# Patient Record
Sex: FEMALE | Race: WHITE | NOT HISPANIC OR LATINO | Employment: OTHER | URBAN - METROPOLITAN AREA
[De-identification: names, ages, dates, MRNs, and addresses within clinical notes are randomized per-mention and may not be internally consistent; named-entity substitution may affect disease eponyms.]

---

## 2018-06-26 ENCOUNTER — HOSPITAL ENCOUNTER (INPATIENT)
Facility: HOSPITAL | Age: 68
LOS: 3 days | Discharge: NURSING, REHAB OR LONG-TERM CARE | DRG: 065 | End: 2018-06-29
Attending: EMERGENCY MEDICINE | Admitting: PSYCHIATRY & NEUROLOGY
Payer: COMMERCIAL

## 2018-06-26 DIAGNOSIS — Z74.09 IMPAIRED FUNCTIONAL MOBILITY AND ENDURANCE: ICD-10-CM

## 2018-06-26 DIAGNOSIS — I63.311 CEREBROVASCULAR ACCIDENT (CVA) DUE TO THROMBOSIS OF RIGHT MIDDLE CEREBRAL ARTERY: ICD-10-CM

## 2018-06-26 DIAGNOSIS — I63.81 LACUNAR INFARCT, ACUTE: ICD-10-CM

## 2018-06-26 DIAGNOSIS — I67.1 CAROTID-CAVERNOUS FISTULA: ICD-10-CM

## 2018-06-26 DIAGNOSIS — I10 ESSENTIAL HYPERTENSION: Primary | ICD-10-CM

## 2018-06-26 DIAGNOSIS — E87.6 HYPOKALEMIA: ICD-10-CM

## 2018-06-26 DIAGNOSIS — I63.9 STROKE: ICD-10-CM

## 2018-06-26 LAB
ALBUMIN SERPL BCP-MCNC: 4.2 G/DL
ALP SERPL-CCNC: 67 U/L
ALT SERPL W/O P-5'-P-CCNC: 11 U/L
ANION GAP SERPL CALC-SCNC: 15 MMOL/L
AST SERPL-CCNC: 27 U/L
BASOPHILS # BLD AUTO: 0.04 K/UL
BASOPHILS NFR BLD: 0.6 %
BILIRUB SERPL-MCNC: 0.3 MG/DL
BILIRUB UR QL STRIP: NEGATIVE
BUN SERPL-MCNC: 6 MG/DL
CALCIUM SERPL-MCNC: 9.9 MG/DL
CHLORIDE SERPL-SCNC: 97 MMOL/L
CHOLEST SERPL-MCNC: 275 MG/DL
CHOLEST/HDLC SERPL: 3.6 {RATIO}
CLARITY UR REFRACT.AUTO: CLEAR
CO2 SERPL-SCNC: 22 MMOL/L
COLOR UR AUTO: NORMAL
CREAT SERPL-MCNC: 0.5 MG/DL (ref 0.5–1.4)
CREAT SERPL-MCNC: 0.7 MG/DL
DIFFERENTIAL METHOD: ABNORMAL
EOSINOPHIL # BLD AUTO: 0 K/UL
EOSINOPHIL NFR BLD: 0.3 %
ERYTHROCYTE [DISTWIDTH] IN BLOOD BY AUTOMATED COUNT: 13 %
EST. GFR  (AFRICAN AMERICAN): >60 ML/MIN/1.73 M^2
EST. GFR  (NON AFRICAN AMERICAN): >60 ML/MIN/1.73 M^2
ESTIMATED AVG GLUCOSE: 103 MG/DL
GLUCOSE SERPL-MCNC: 85 MG/DL
GLUCOSE UR QL STRIP: NEGATIVE
HBA1C MFR BLD HPLC: 5.2 %
HCT VFR BLD AUTO: 38.5 %
HDLC SERPL-MCNC: 77 MG/DL
HDLC SERPL: 28 %
HGB BLD-MCNC: 13.4 G/DL
HGB UR QL STRIP: NEGATIVE
IMM GRANULOCYTES # BLD AUTO: 0.03 K/UL
IMM GRANULOCYTES NFR BLD AUTO: 0.5 %
INR PPP: 0.9
KETONES UR QL STRIP: NEGATIVE
LDLC SERPL CALC-MCNC: 163 MG/DL
LEUKOCYTE ESTERASE UR QL STRIP: NEGATIVE
LYMPHOCYTES # BLD AUTO: 1.4 K/UL
LYMPHOCYTES NFR BLD: 22.2 %
MCH RBC QN AUTO: 31.3 PG
MCHC RBC AUTO-ENTMCNC: 34.8 G/DL
MCV RBC AUTO: 90 FL
MONOCYTES # BLD AUTO: 0.5 K/UL
MONOCYTES NFR BLD: 8 %
NEUTROPHILS # BLD AUTO: 4.3 K/UL
NEUTROPHILS NFR BLD: 68.4 %
NITRITE UR QL STRIP: NEGATIVE
NONHDLC SERPL-MCNC: 198 MG/DL
NRBC BLD-RTO: 0 /100 WBC
PH UR STRIP: 7 [PH] (ref 5–8)
PLATELET # BLD AUTO: 326 K/UL
PMV BLD AUTO: 10.2 FL
POCT GLUCOSE: 92 MG/DL (ref 70–110)
POTASSIUM SERPL-SCNC: 3.5 MMOL/L
PROT SERPL-MCNC: 7.8 G/DL
PROT UR QL STRIP: NEGATIVE
PROTHROMBIN TIME: 9.6 SEC
RBC # BLD AUTO: 4.28 M/UL
RETIRED EF AND QEF - SEE NOTES: 65 (ref 55–65)
SAMPLE: NORMAL
SODIUM SERPL-SCNC: 134 MMOL/L
SP GR UR STRIP: 1.02 (ref 1–1.03)
TRIGL SERPL-MCNC: 175 MG/DL
TSH SERPL DL<=0.005 MIU/L-ACNC: 1.05 UIU/ML
URN SPEC COLLECT METH UR: NORMAL
UROBILINOGEN UR STRIP-ACNC: NEGATIVE EU/DL
WBC # BLD AUTO: 6.26 K/UL

## 2018-06-26 PROCEDURE — A4216 STERILE WATER/SALINE, 10 ML: HCPCS | Performed by: PSYCHIATRY & NEUROLOGY

## 2018-06-26 PROCEDURE — 92523 SPEECH SOUND LANG COMPREHEN: CPT

## 2018-06-26 PROCEDURE — 85610 PROTHROMBIN TIME: CPT

## 2018-06-26 PROCEDURE — 83036 HEMOGLOBIN GLYCOSYLATED A1C: CPT

## 2018-06-26 PROCEDURE — G8996 SWALLOW CURRENT STATUS: HCPCS | Mod: CI

## 2018-06-26 PROCEDURE — 25000003 PHARM REV CODE 250: Performed by: PSYCHIATRY & NEUROLOGY

## 2018-06-26 PROCEDURE — 25500020 PHARM REV CODE 255: Performed by: EMERGENCY MEDICINE

## 2018-06-26 PROCEDURE — 99285 EMERGENCY DEPT VISIT HI MDM: CPT | Mod: 25

## 2018-06-26 PROCEDURE — 93005 ELECTROCARDIOGRAM TRACING: CPT

## 2018-06-26 PROCEDURE — 99285 EMERGENCY DEPT VISIT HI MDM: CPT | Mod: ,,, | Performed by: EMERGENCY MEDICINE

## 2018-06-26 PROCEDURE — 80053 COMPREHEN METABOLIC PANEL: CPT

## 2018-06-26 PROCEDURE — 93306 TTE W/DOPPLER COMPLETE: CPT | Mod: 26,,, | Performed by: INTERNAL MEDICINE

## 2018-06-26 PROCEDURE — 81003 URINALYSIS AUTO W/O SCOPE: CPT

## 2018-06-26 PROCEDURE — 93306 TTE W/DOPPLER COMPLETE: CPT

## 2018-06-26 PROCEDURE — 84443 ASSAY THYROID STIM HORMONE: CPT

## 2018-06-26 PROCEDURE — 93010 ELECTROCARDIOGRAM REPORT: CPT | Mod: ,,, | Performed by: INTERNAL MEDICINE

## 2018-06-26 PROCEDURE — 82565 ASSAY OF CREATININE: CPT

## 2018-06-26 PROCEDURE — 20600001 HC STEP DOWN PRIVATE ROOM

## 2018-06-26 PROCEDURE — 25000003 PHARM REV CODE 250: Performed by: NURSE PRACTITIONER

## 2018-06-26 PROCEDURE — 99223 1ST HOSP IP/OBS HIGH 75: CPT | Mod: ,,, | Performed by: PSYCHIATRY & NEUROLOGY

## 2018-06-26 PROCEDURE — 99223 1ST HOSP IP/OBS HIGH 75: CPT | Mod: ,,, | Performed by: NEUROLOGICAL SURGERY

## 2018-06-26 PROCEDURE — 85025 COMPLETE CBC W/AUTO DIFF WBC: CPT

## 2018-06-26 PROCEDURE — 92610 EVALUATE SWALLOWING FUNCTION: CPT

## 2018-06-26 PROCEDURE — 80061 LIPID PANEL: CPT

## 2018-06-26 PROCEDURE — G8997 SWALLOW GOAL STATUS: HCPCS | Mod: CH

## 2018-06-26 PROCEDURE — 82962 GLUCOSE BLOOD TEST: CPT

## 2018-06-26 RX ORDER — SODIUM CHLORIDE 9 MG/ML
INJECTION, SOLUTION INTRAVENOUS CONTINUOUS
Status: DISCONTINUED | OUTPATIENT
Start: 2018-06-26 | End: 2018-06-28

## 2018-06-26 RX ORDER — CLOPIDOGREL BISULFATE 75 MG/1
75 TABLET ORAL DAILY
Status: DISCONTINUED | OUTPATIENT
Start: 2018-06-26 | End: 2018-06-29 | Stop reason: HOSPADM

## 2018-06-26 RX ORDER — ATORVASTATIN CALCIUM 20 MG/1
80 TABLET, FILM COATED ORAL DAILY
Status: DISCONTINUED | OUTPATIENT
Start: 2018-06-27 | End: 2018-06-29 | Stop reason: HOSPADM

## 2018-06-26 RX ORDER — ASPIRIN 325 MG
325 TABLET ORAL DAILY
Status: DISCONTINUED | OUTPATIENT
Start: 2018-06-26 | End: 2018-06-27

## 2018-06-26 RX ORDER — SODIUM CHLORIDE 0.9 % (FLUSH) 0.9 %
3 SYRINGE (ML) INJECTION EVERY 8 HOURS
Status: DISCONTINUED | OUTPATIENT
Start: 2018-06-26 | End: 2018-06-29 | Stop reason: HOSPADM

## 2018-06-26 RX ORDER — CLOPIDOGREL BISULFATE 75 MG/1
75 TABLET ORAL DAILY
Status: DISCONTINUED | OUTPATIENT
Start: 2018-06-27 | End: 2018-06-26

## 2018-06-26 RX ORDER — ATORVASTATIN CALCIUM 20 MG/1
40 TABLET, FILM COATED ORAL DAILY
Status: DISCONTINUED | OUTPATIENT
Start: 2018-06-26 | End: 2018-06-26

## 2018-06-26 RX ADMIN — SODIUM CHLORIDE: 0.9 INJECTION, SOLUTION INTRAVENOUS at 10:06

## 2018-06-26 RX ADMIN — Medication 3 ML: at 01:06

## 2018-06-26 RX ADMIN — IOHEXOL 100 ML: 350 INJECTION, SOLUTION INTRAVENOUS at 10:06

## 2018-06-26 RX ADMIN — CLOPIDOGREL 75 MG: 75 TABLET, FILM COATED ORAL at 04:06

## 2018-06-26 RX ADMIN — ASPIRIN 325 MG ORAL TABLET 325 MG: 325 PILL ORAL at 11:06

## 2018-06-26 RX ADMIN — ATORVASTATIN CALCIUM 40 MG: 20 TABLET, FILM COATED ORAL at 11:06

## 2018-06-26 NOTE — CONSULTS
Inpatient consult to Physical Medicine Rehab  Consult performed by: KAYLA FLANAGAN  Consult ordered by: ALEXANDRU MARTIN  Reason for consult: assess rehab needs      Reviewed patient history and current admission.  Rehab team following.  Full consult to follow.    MADIHA Terrazas, FNP-C  Physical Medicine & Rehabilitation   06/26/2018  Spectralink: [id number]

## 2018-06-26 NOTE — ASSESSMENT & PLAN NOTE
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   NIHSS 4, CTH without acute abnormality.  Classic ataxic hemiparesis lacunar syndrome. She has been symptomatic for approximately > 24 h, thus out of the window for iv alteplase or endovascular intervention.  Admit to the stroke service and complete stroke work up. She is unable to have MRI due to clipped aneurysm but a follow up CTH in 24 h is advisable.    Antithrombotics for secondary stroke prevention: Antiplatelets: Aspirin: 325 mg daily    Statins for secondary stroke prevention and hyperlipidemia, if present:   Statins: Atorvastatin- 40 mg daily    Aggressive risk factor modification: Smoking, Exercise     Rehab efforts: PT/OT/SLP to evaluate and treat    Diagnostics ordered/pending: CTA Head to assess vasculature , CTA Neck/Arch to assess vasculature, HgbA1C to assess blood glucose levels, Lipid Profile to assess cholesterol levels, TTE to assess cardiac function/status , TSH to assess thyroid function    VTE prophylaxis: Heparin 5000 units SQ every 8 hours    BP parameters: Infarct: No intervention, SBP <220

## 2018-06-26 NOTE — PLAN OF CARE
Problem: SLP Goal  Goal: SLP Goal  Speech Language Pathology Goals  Goals expected to be met by 7/3/18  1. Pt will tolerate regular diet with thin liquids, MOD I      Outcome: Ongoing (interventions implemented as appropriate)  SLP evaluation completed. Patient presents with Mild Dysarthria. No overt S/S aspiration with trials presented. REC: Regular diet with thin liquids, whole medications one at a time, and strict aspiration precautions. ST to continue to follow to monitor tolerance and educate Patient on compensatory strategies for speech. Findings/recs reviewed with Pt, nurse and MD team.  Thank you.    POORNIMA Teixeira., [facility]-SLP  Speech-Language Pathology  Pager: [phone]  6/26/2018

## 2018-06-26 NOTE — ED NOTES
Patient remains on continuous cardiac monitor, automatic blood pressure cuff and continuous pulse oximeter. TV turned on for the patient. HOB elevated to 45 degrees

## 2018-06-26 NOTE — ED PROVIDER NOTES
Encounter Date: 6/26/2018    SCRIBE #1 NOTE: I, Tammie Bocanegra, am scribing for, and in the presence of,  Dr. Gamboa. I have scribed the following portions of the note - the Resident attestation. Other sections scribed: Attending's interpretation EKG.       History     Chief Complaint   Patient presents with    Extremity Weakness     started tripping over L foot yest at 0930     Shannan Reza is a 67 y.o. healthy female who presents ot the ED c/o evolving L sided weakness and L facial droop onset 9:30PM yesterday.     Patient states that she began to notice L-sided foot weakness yesterday evening, though she was unconcerned and went to sleep. When she awoke around 0500 this AM 06/26, she recognized L Leg and L arm weakness. She was initially able to walk around her house and made a cup of coffee. Shortly thereafter she dropped her coffee, noticed evolving weakness, and was no longer able to walk so she sat down. Around 0630 this morning, her  awoke and when he saws the patient noticed a L sided facial droop. Deficit persists on arrival and evaluation in the ED.     Patient denies CP/SOB, N/V/D, F/C, denies arthralgia/myalgia, numbness/tingling or pain otherwise.             Review of patient's allergies indicates:   Allergen Reactions    Percodan [oxycodone hcl-oxycodone-asa] Itching     Past Medical History:   Diagnosis Date    Cerebrovascular accident (CVA) due to thrombosis of right middle cerebral artery 6/26/2018     Past Surgical History:   Procedure Laterality Date    BRAIN SURGERY       History reviewed. No pertinent family history.  Social History   Substance Use Topics    Smoking status: Current Every Day Smoker     Packs/day: 0.50     Years: 25.00     Types: Cigarettes    Smokeless tobacco: Former User    Alcohol use Yes     Review of Systems   Constitutional: Negative for activity change, chills and fever.   HENT: Negative for voice change.    Eyes: Negative for photophobia and visual  disturbance.   Respiratory: Negative for apnea, chest tightness and shortness of breath.    Cardiovascular: Negative for chest pain, palpitations and leg swelling.   Gastrointestinal: Negative for abdominal distention, abdominal pain, diarrhea, nausea and vomiting.   Endocrine: Negative for polyuria.   Genitourinary: Negative for dysuria and urgency.   Musculoskeletal: Positive for gait problem. Negative for arthralgias and back pain.   Skin: Negative for color change.   Neurological: Positive for facial asymmetry (L facial droop) and weakness (LUE/LLE). Negative for dizziness, seizures, syncope and numbness.   Hematological: Negative for adenopathy. Does not bruise/bleed easily.   Psychiatric/Behavioral: Negative for agitation and behavioral problems.       Physical Exam     Initial Vitals [06/26/18 0819]   BP Pulse Resp Temp SpO2   (!) 229/102 81 18 98.6 °F (37 °C) 96 %      MAP       --         Physical Exam    Vitals reviewed.  Constitutional: She appears well-developed and well-nourished.   HENT:   Head: Normocephalic and atraumatic.   Eyes: Conjunctivae and EOM are normal. Pupils are equal, round, and reactive to light. No scleral icterus.   Neck: Normal range of motion.   Cardiovascular: Normal rate, regular rhythm and normal heart sounds.   No murmur heard.  Pulmonary/Chest: Breath sounds normal. No stridor. No respiratory distress. She has no wheezes. She has no rales.   Abdominal: Soft. Bowel sounds are normal. She exhibits no distension. There is no tenderness.   Musculoskeletal: Normal range of motion. She exhibits no edema or tenderness.   Neurological: She is alert and oriented to person, place, and time. A cranial nerve deficit (L facial droop ) is present. No sensory deficit. GCS eye subscore is 4. GCS verbal subscore is 5. GCS motor subscore is 6.   LUE/LLE 4/5 strength  RUE/RLE 5/5 strength  Ataxia of LUE on finger to nose test         ED Course   Procedures  Labs Reviewed   CBC W/ AUTO  DIFFERENTIAL - Abnormal; Notable for the following:        Result Value    MCH 31.3 (*)     All other components within normal limits   COMPREHENSIVE METABOLIC PANEL - Abnormal; Notable for the following:     Sodium 134 (*)     CO2 22 (*)     BUN, Bld 6 (*)     All other components within normal limits   LIPID PANEL - Abnormal; Notable for the following:     Cholesterol 275 (*)     Triglycerides 175 (*)     HDL 77 (*)     LDL Cholesterol 163.0 (*)     All other components within normal limits   PROTIME-INR   TSH   HEMOGLOBIN A1C   HEMOGLOBIN A1C    Narrative:     add on [facility] ER order #[id number] per Dr. Gamboa @  06/26/2018  11:02    URINALYSIS   POCT GLUCOSE   POCT GLUCOSE   ISTAT CREATININE     EKG Readings: (Independently Interpreted)   Adequate tracing EKG shows sinus rhythm at a rate of 73 BPM with a normal axis without ischemic changes    Attending's interpretation: NSR at a rate of 73 bpm. Normal axis. Normal ST segments. Normal T waves.           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  Angiography would be helpful for further evaluation.    History of remote aneurysm surgery with a surgical clip in the right suprasellar cistern and metal plate over the right frontal bone    Moderate to severe stenosis of the right vertebral artery origin.    No focal high-grade stenosis or occlusion of the intracranial arteries.    COMMUNICATION  This critical result was discovered/received at 11:00.  The critical information above was relayed directly by Dr. Angela by telephone to Dr. Thomas On 06/26/2018 at 11:05 a.m..    Electronically signed by resident:  Olaf Lima  Date:    06/26/2018  Time:    10:40    Electronically signed by: Randall Angela MD  Date:    06/26/2018  Time:    11:44             Narrative:    EXAMINATION:  CTA HEAD AND NECK (XPD)    CLINICAL HISTORY:  Stroke    TECHNIQUE:  CT angiogram was performed from the level of the sravanthi to the top of the head following the IV administration of 75mL of Omnipaque 350.   Sagittal and coronal reconstructions and maximum intensity projection reconstructions were performed. Arterial stenosis percentages are based on NASCET measurement criteria.    COMPARISON:  CT head 06/26/2018    FINDINGS:  Intracranial Compartment:    Extensive artifact from right anterior calvarial hardware and metallic device within the suprasellar cistern presumably a surgical clip by history.  Artifact obscures the frontal cerebral hemispheres.  Given the limitation from metallic artifact, there is no evidence of hemorrhage, edema or major vascular distribution infarct.    Ventricles and sulci are normal in size for age without evidence of hydrocephalus. No extra-axial blood or fluid collections.    Skull/Extracranial Contents (limited evaluation): Postoperative changes from right frontal craniotomy.  Mastoid air cells and paranasal sinuses are essentially clear.    Non-Vascular Structures of the Neck/Thoracic Inlet (limited evaluation): Normal.    Aorta: Normal 3 vessel arch.    Extracranial carotid circulation: Occlusion of the right ICA at its origin with reconstitution of the distal cervical ICA near the skull base.  Enlargement of the right ECA branches.  Left ICA is patent without evidence of hemodynamically significant stenosis.    Extracranial vertebral circulation: Right vertebral artery is patent however there is moderate to severe stenosis at its origin.  Left vertebral artery is dominant.    Intracranial Arteries: Direct communication between the right ICA and cavernous sinus which appears to be filled with arterialized blood  consistent with a carotid cavernous sinus fistula.  No focal high-grade stenosis or occlusion of the intracranial arteries.    Venous structures (limited evaluation): There appears to be decompression of the cavernous sinus by an enlarged right superior ophthalmic vein and other small skullbase veins.  No proptosis identified.  Recommend clinical correlation for sequela increased intraorbital pressure.  Right facial vein is also dilated.                               X-Ray Chest AP Portable (Final result)  Result time 06/26/18 09:38:42    Final result by Trevor Way Jr., MD (06/26/18 09:38:42)                 Impression:      No detrimental change.      Electronically signed by: Trevor Way MD  Date:    06/26/2018  Time:    09:38             Narrative:    EXAMINATION:  XR CHEST AP PORTABLE    CLINICAL HISTORY:  Stroke;    TECHNIQUE:  Single frontal view of the chest was performed.    COMPARISON:  October 2015.    FINDINGS:  Heart size and pulmonary vessels are normal.  The lungs fairly well aerated.  Bilateral breast implants noted.  No confluent consolidation or pneumothorax seen.                               CT Head Without Contrast (Final result)  Result time 06/26/18 09:45:08    Final result by Randall Angela MD (06/26/18 09:45:08)                 Impression:      Limited exam secondary to extensive artifact from right frontal calvarial metallic structure and right paraclinoid surgical clip.  Given limitation, there is no evidence of acute hemorrhage or infarction.    Postoperative changes from right craniotomy and paraclinoid aneurysmal clipping.    Electronically signed by resident: Olaf Lima  Date:    06/26/2018  Time:    09:09    Electronically signed by: Randall Angela MD  Date:    06/26/2018  Time:    09:45             Narrative:    EXAMINATION:  CT HEAD WITHOUT CONTRAST    CLINICAL HISTORY:  Focal neuro deficit, new, fixed or worsening, >6 hours    TECHNIQUE:  Low dose axial CT images  obtained throughout the head without intravenous contrast. Sagittal and coronal reconstructions were performed.    COMPARISON:  None.    FINDINGS:  Intracranial compartment:    Extensive artifact from a metallic device within the anterior right frontal calvarium and right paraclinoid surgical clip.  Artifact obscures the frontal cerebral hemispheres. Postoperative changes from right craniotomy and paraclinoid aneurysmal clipping.  Given the limitation from metallic artifact, there is no evidence of hemorrhage, edema or major vascular distribution infarct.    Ventricles and sulci are normal in size for age without evidence of hydrocephalus. No extra-axial blood or fluid collections.    Skull/extracranial contents (limited evaluation): Postoperative changes as above.  Mastoid air cells and paranasal sinuses are essentially clear.                                 Medical Decision Making:   History:   Old Medical Records: I decided to obtain old medical records.  Independently Interpreted Test(s):   I have ordered and independently interpreted EKG Reading(s) - see prior notes  Clinical Tests:   Lab Tests: Reviewed and Ordered  Radiological Study: Ordered and Reviewed  Medical Tests: Ordered and Reviewed       APC / Resident Notes:   67 y.o. F presents to the ED with evolving LUE/LLE motor deficit, L-sided ataxia, and L facial droop. Concerning for acute stroke, stroke code called, vascular neurology evaluating patient. Dispo per vascular neurology. 9:05 AM        Scribe Attestation:   Scribe #1: I performed the above scribed service and the documentation accurately describes the services I performed. I attest to the accuracy of the note.    Attending Attestation:   Physician Attestation Statement for Resident:  As the supervising MD   Physician Attestation Statement: I have personally seen and examined this patient.   I agree with the above history. -: 67 y.o. female presents with left sided weakness and left facial  droop with an evolving time course. Stroke code activated. CT limited by patient's metal hardware in head. Unable to obtain MRI. She will be admitted to the stroke service.    As the supervising MD I agree with the above PE.    As the supervising MD I agree with the above treatment, course, plan, and disposition.          Physician Attestation for Scribe:      Comments: I, Dr. Jason Gamboa, personally performed the services described in this documentation. All medical record entries made by the scribe were at my direction and in my presence.  I have reviewed the chart and agree that the record reflects my personal performance and is accurate and complete. Jason Gamboa MD.  1:18 PM 06/26/2018                 Clinical Impression:   The encounter diagnosis was Stroke.      Disposition:   Disposition: Admitted                        Jason Gamboa MD  06/26/18 8620

## 2018-06-26 NOTE — ED NOTES
"The patient came to the ER today with c/o "foot drop" that started yesterday. Patient make coffee this am and began to feed her cat around 0630 (so was walking around this am) then "couldn't get out of the chair". The patient was LKN at 0400. Pt states at 0400 "it felt really numb but she was able to walk to the kitchen". Pt arrived in ED hypertensive, although pt denies any hx of htn and states she is not on any prescription medication. Non skid socks applied to both feet. Fall risk and allergy band applied to upper extremity. Pt denies pain at this time. Pt denies any confusion yesterday. Sensation intact at this time. Pt is oriented x4. Pt reports hx of brain aneurysm at age 18 with clip placement surgery  "

## 2018-06-26 NOTE — ED NOTES
Patient dressed in gown & placed on continuous cardiac monitor, automatic blood pressure cuff and continuous pulse oximeter.

## 2018-06-26 NOTE — ED NOTES
Per Speech Therapy, ok to have regular diet, & take oral medications one at a time. Speech states she will contact Stroke about diet order

## 2018-06-26 NOTE — SIGNIFICANT EVENT
"Vascular Neurology attending:  Called by ED nurse regarding concern of increasing left sided weakness.  In fact, my exam reveals significant worsening of LUE weakness that now virtually exhibits plegic LUE monoparesis, and slight worsening LLE weakness.  CTA brain and neck : R ICA occluded (as per patient the R ICA was " closed" at the time of CC fistula surgery in the late 60's). 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  Plan for angio as discussed with IR.  Add Plavix.  Will follow closely.    Bakari Ga MD  Vascular Neurology.          "

## 2018-06-26 NOTE — SUBJECTIVE & OBJECTIVE
(Not in a hospital admission)    Review of patient's allergies indicates:   Allergen Reactions    Percodan [oxycodone hcl-oxycodone-asa] Itching       Past Medical History:   Diagnosis Date    Cerebrovascular accident (CVA) due to thrombosis of right middle cerebral artery 6/26/2018     Past Surgical History:   Procedure Laterality Date    BRAIN SURGERY       Family History     None        Social History Main Topics    Smoking status: Current Every Day Smoker     Packs/day: 0.50     Years: 25.00     Types: Cigarettes    Smokeless tobacco: Former User    Alcohol use Yes    Drug use: No    Sexual activity: Not on file     Review of Systems   Constitutional: Negative for chills, diaphoresis, fatigue and fever.   HENT: Negative for hearing loss, tinnitus and trouble swallowing.    Eyes: Negative for photophobia, pain and visual disturbance.   Respiratory: Negative for chest tightness and shortness of breath.    Cardiovascular: Negative for chest pain and palpitations.   Gastrointestinal: Negative for abdominal pain and vomiting.   Endocrine: Negative for cold intolerance and polyuria.   Genitourinary: Negative for hematuria.   Musculoskeletal: Negative for arthralgias, neck pain and neck stiffness.   Skin: Negative for rash.   Allergic/Immunologic: Negative for immunocompromised state.   Neurological: Positive for facial asymmetry and weakness. Negative for dizziness, speech difficulty and numbness.   Hematological: Does not bruise/bleed easily.   Psychiatric/Behavioral: Negative for agitation, behavioral problems and confusion.     Objective:     Weight: 48.1 kg (106 lb)  Body mass index is 19.39 kg/m².  Vital Signs (Most Recent):  Temp: 98.6 °F (37 °C) (06/26/18 0819)  Pulse: 71 (06/26/18 1232)  Resp: (!) 21 (06/26/18 1232)  BP: (!) 194/79 (06/26/18 1232)  SpO2: 99 % (06/26/18 1232) Vital Signs (24h Range):  Temp:  [98.6 °F (37 °C)] 98.6 °F (37 °C)  Pulse:  [66-81] 71  Resp:  [13-21] 21  SpO2:  [96 %-99 %]  99 %  BP: (185-229)/() 194/79       Date 06/26/18 0700 - 06/27/18 0659   Shift 9100-8928 0322-4720 5938-4638 24 Hour Total   I  N  T  A  K  E   Shift Total  (mL/kg)       O  U  T  P  U  T   Urine  (mL/kg/hr) 600   600    Shift Total  (mL/kg) 600  (12.5)   600  (12.5)   Weight (kg) 48.1 48.1 48.1 48.1                        Neurosurgery Physical Exam    NIH Scale:  Interval: 24 hrs post onset of symptoms +/- 20 mins  1a. Level Of Consciousness: 0-->Alert: keenly responsive  1b. LOC Questions: 0-->Answers both questions correctly  1c. LOC Commands: 0-->Performs both tasks correctly  2. Best Gaze: 0-->Normal  3. Visual: 0-->No visual loss  4. Facial Palsy: 0-->Normal symmetrical movements  5a. Motor Arm, Left: 1-->Drift: limb holds 90 (or 45) degrees, but drifts down before full 10 seconds: does not hit bed or other support  5b. Motor Arm, Right: 0-->No drift: limb holds 90 (or 45) degrees for full 10 secs  6a. Motor Leg, Left: 1-->Drift: leg falls by the end of the 5-sec period but does not hit bed  6b. Motor Leg, Right: 0-->No drift: leg holds 30 degree position for full 5 secs  7. Limb Ataxia: 2-->Present in two limbs  8. Sensory: 0-->Normal: no sensory loss  9. Best Language: 0-->No aphasia: normal  10. Dysarthria: 0-->Normal  11. Extinction and Inattention (formerly Neglect): 0-->No abnormality  Total (NIH Stroke Scale): 4    General: well developed, well nourished, no distress.   Head: normocephalic, atraumatic  Neurologic: Alert and oriented. Thought content appropriate.  GCS: Motor: 6/Verbal: 5/Eyes: 4 GCS Total: 15  Mental Status: Awake, Alert, Oriented x 4  Language: No aphasia  Speech: No dysarthria  Cranial nerves: face symmetric, tongue midline, CN II-XII grossly intact.   Eyes: pupils equal, round, reactive to light with accomodation, EOMI.  Pulmonary: normal respirations, no signs of respiratory distress  Abdomen: soft, non-distended, not tender to palpation  Sensory: intact to light touch  throughout    Motor Strength: Moves all extremities spontaneously with good tone.  Full strength right upper and lower extremities; left hemiparesis. No abnormal movements seen.     DTR's: 2 + and symmetric in UE and LE  Pronator Drift: L drift  Finger-to-nose: L ataxia  Babinski: absent  Pulses: 2+ and symmetric radial and dorsalis pedis.  Skin: Skin is warm, dry and intact.      Significant Labs:    Recent Labs  Lab 06/26/18  0842   GLU 85   *   K 3.5   CL 97   CO2 22*   BUN 6*   CREATININE 0.7   CALCIUM 9.9       Recent Labs  Lab 06/26/18  0842   WBC 6.26   HGB 13.4   HCT 38.5          Recent Labs  Lab 06/26/18  0842   INR 0.9     Microbiology Results (last 7 days)     ** No results found for the last 168 hours. **        Significant Diagnostics:  CTA head 06/26/2018 reviewed.  Direct communication between the right ICA and cavernous sinus which appears to be filled with arterialized blood consistent with a carotid cavernous sinus fistula.

## 2018-06-26 NOTE — ED NOTES
Report weakness tripping over L foot yest 930, then 8 pm weakness to L arm, van neg, + sensation to both sides of body, facial droop L, no visual probplems

## 2018-06-26 NOTE — HPI
"Mrs Reza is a 68 y/o R handed female with a medical history that is relevant for clipping of brain aneurysm in the late 60's and smoking, who presents to the ED with complains of left sided weakness and trouble walking since yesterday morning. Stroke code activated.  Patient said that she never had similar symptoms before. Yesterday morning around 9 am started noticing " weakness and left foot drop" that impeded normal ambulation, and subsequently the entire left leg and leg arm became weak. Said that this morning she couldn't use the left hand properly and at some point her  noticed slight left face weakness and they made a decision to come to the ED.  She stated that she has been off balance since yesterday but denies associated left sided numbness-tingling, HA, vertigo, double vision, difficulty swallowing, confusion, slurred speech, language or vision impairment.  "

## 2018-06-26 NOTE — CONSULTS
[facility]  Vascular Neurology  Comprehensive Stroke Center  Consult Note    Consults  Assessment/Plan:     Patient is a 67 y.o. 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   NIHSS 4, CTH without acute abnormality.  Classic ataxic hemiparesis lacunar syndrome. She has been symptomatic for approximately > 24 h, thus out of the window for iv alteplase or endovascular intervention.  Admit to the stroke service and complete stroke work up. She is unable to have MRI due to clipped CC fistula but a follow up CTH in 24 h is advisable.    Antithrombotics for secondary stroke prevention: Antiplatelets: Aspirin: 325 mg daily    Statins for secondary stroke prevention and hyperlipidemia, if present:   Statins: Atorvastatin- 40 mg daily    Aggressive risk factor modification: Smoking, Exercise     Rehab efforts: PT/OT/SLP to evaluate and treat    Diagnostics ordered/pending: CTA Head to assess vasculature , CTA Neck/Arch to assess vasculature, HgbA1C to assess blood glucose levels, Lipid Profile to assess cholesterol levels, TTE to assess cardiac function/status , TSH to assess thyroid function    VTE prophylaxis: Heparin 5000 units SQ every 8 hours    BP parameters: Infarct: No intervention, SBP <220                STROKE DOCUMENTATION     Acute Stroke Times   Last Known Normal Date: 06/25/18  Last Known Normal Time: 0900  Symptom Onset Date: 06/25/18  Symptom Onset Time: 0900  Stroke Team Called Date: 06/26/18  Stroke Team Called Time: 0830  Stroke Team Arrival Date: 06/26/18  Stroke Team Arrival Time: 0835  CT Interpretation Time: 0840  Decision to Treat Time for Alteplase:  (No iv alteplase candidate)  Decision to Treat  "Time for IR:  (No IR candidate)    NIH Scale:  Interval: 24 hrs post onset of symptoms +/- 20 mins  1a. Level Of Consciousness: 0-->Alert: keenly responsive  1b. LOC Questions: 0-->Answers both questions correctly  1c. LOC Commands: 0-->Performs both tasks correctly  2. Best Gaze: 0-->Normal  3. Visual: 0-->No visual loss  4. Facial Palsy: 0-->Normal symmetrical movements  5a. Motor Arm, Left: 1-->Drift: limb holds 90 (or 45) degrees, but drifts down before full 10 seconds: does not hit bed or other support  5b. Motor Arm, Right: 0-->No drift: limb holds 90 (or 45) degrees for full 10 secs  6a. Motor Leg, Left: 1-->Drift: leg falls by the end of the 5-sec period but does not hit bed  6b. Motor Leg, Right: 0-->No drift: leg holds 30 degree position for full 5 secs  7. Limb Ataxia: 2-->Present in two limbs  8. Sensory: 0-->Normal: no sensory loss  9. Best Language: 0-->No aphasia: normal  10. Dysarthria: 0-->Normal  11. Extinction and Inattention (formerly Neglect): 0-->No abnormality  Total (NIH Stroke Scale): 4    Modified Deo Score: 0  [place] Coma Scale:15   ABCD2 Score:    [id number] Score:   HAS -BLED Score:   ICH Score:   Hunt & Thomas Classification:       Thrombolysis Candidate? No, Out of window       Interventional Revascularization Candidate?   Is the patient eligible for mechanical endovascular reperfusion (GEORGES)?  No; No significant neurological deficit      Hemorrhagic change of an Ischemic Stroke: Does this patient have an ischemic stroke with hemorrhagic changes? 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 Stroke code activated.  Patient said that she never had similar symptoms before. Yesterday morning around 9 am started noticing " weakness and left " "foot drop" that impeded normal ambulation, and subsequently the entire left leg and leg arm became weak. Said that this morning she couldn't use the left hand properly and at some point her  noticed slight left face weakness and they made a decision to come to the ED.  She stated that she has been off balance since yesterday but denies associated left sided numbness-tingling, HA, vertigo, double vision, difficulty swallowing, confusion, slurred speech, language or vision impairment.        History reviewed. No pertinent past medical history.  Past Surgical History:   Procedure Laterality Date    BRAIN SURGERY       History reviewed. No pertinent family history.  Social History   Substance Use Topics    Smoking status: Current Every Day Smoker     Packs/day: 0.50     Years: 25.00     Types: Cigarettes    Smokeless tobacco: Former User    Alcohol use Yes     Review of patient's allergies indicates:   Allergen Reactions    Percodan [oxycodone hcl-oxycodone-asa] Itching       Medications: I have reviewed the current medication administration record.      (Not in a hospital admission)    Review of Systems   Constitutional: Negative for chills, diaphoresis, fatigue and fever.   HENT: Negative for hearing loss, tinnitus and trouble swallowing.    Eyes: Negative for photophobia, pain and visual disturbance.   Respiratory: Negative for chest tightness and shortness of breath.    Cardiovascular: Negative for chest pain and palpitations.   Gastrointestinal: Negative for abdominal pain and vomiting.   Endocrine: Negative for cold intolerance and polyuria.   Genitourinary: Negative for hematuria.   Musculoskeletal: Negative for arthralgias, neck pain and neck stiffness.   Skin: Negative for rash.   Allergic/Immunologic: Negative for immunocompromised state.   Neurological: Positive for facial asymmetry and weakness. Negative for dizziness, speech difficulty and numbness.   Hematological: Does not bruise/bleed easily. "   Psychiatric/Behavioral: Negative for agitation, behavioral problems and confusion.     Objective:     Vital Signs (Most Recent):  Temp: 98.6 °F (37 °C) (06/26/18 0819)  Pulse: 75 (06/26/18 0859)  Resp: 19 (06/26/18 0859)  BP: (!) 185/76 (06/26/18 0857)  SpO2: 99 % (06/26/18 0859)    Vital Signs Range (Last 24H):  Temp:  [98.6 °F (37 °C)]   Pulse:  [75-81]   Resp:  [18-19]   BP: (185-229)/()   SpO2:  [96 %-99 %]     Physical Exam   Constitutional: She is oriented to person, place, and time. She appears well-developed and well-nourished.   HENT:   Head: Normocephalic and atraumatic.   Eyes: EOM are normal. Pupils are equal, round, and reactive to light.   Neck: Normal range of motion. Neck supple.   Cardiovascular: Normal rate and regular rhythm.    Pulmonary/Chest: Effort normal. No respiratory distress.   Abdominal: She exhibits no distension and no mass.   Genitourinary:   Genitourinary Comments: No performed   Musculoskeletal: Normal range of motion. She exhibits no edema or deformity.   Neurological: She is alert and oriented to person, place, and time. A cranial nerve deficit is present. No sensory deficit. She exhibits normal muscle tone. Coordination abnormal.   Skin: No rash noted. She is not diaphoretic. No erythema. No pallor.   Psychiatric: She has a normal mood and affect. Her behavior is normal.   Vitals reviewed.      Neurological Exam:   LOC: alert  Attention Span: Good   Language: No aphasia  Articulation: No dysarthria  Orientation: Person, Place, Time   Visual Fields: Full  EOM (CN III, IV, VI): Full/intact  Pupils (CN II, III): PERRL  Facial Sensation (CN V): Normal  Facial Movement (CN VII): Lower facial weakness on the Left  Gag Reflex: present  Reflexes: 2+ throughout  Motor: Arm left  Paresis: 3/5  Leg left  Paresis: 4/5  Arm right  Normal 5/5  Leg right Normal 5/5  Cebellar: Upper Extremity Appendicular Ataxia (Finger Nose Finger)  Left and impaired heel-knee-shin on the L  Sensation:  Intact to light touch, temperature and vibration  Tone: Normal tone throughout      Laboratory:  CMP:   Recent Labs  Lab 06/26/18  0842   CALCIUM 9.9   ALBUMIN 4.2   PROT 7.8   *   K 3.5   CO2 22*   CL 97   BUN 6*   CREATININE 0.7   ALKPHOS 67   ALT 11   AST 27   BILITOT 0.3     CBC:   Recent Labs  Lab 06/26/18  0842   WBC 6.26   RBC 4.28   HGB 13.4   HCT 38.5      MCV 90   MCH 31.3*   MCHC 34.8     Lipid Panel:   Recent Labs  Lab 06/26/18  0842   CHOL 275*   LDLCALC 163.0*   HDL 77*   TRIG 175*     Coagulation:   Recent Labs  Lab 06/26/18  0842   INR 0.9     Hgb A1C: No results for input(s): HGBA1C in the last 168 hours.  TSH: No results for input(s): TSH in the last 168 hours.    Diagnostic Results:      Brain imaging:  CT head without contrast 6/26/18: limited by artifacts from prior clipping but no ostensible acute intracranial abnormality.    Vessel Imaging:  None    Cardiac Evaluation:   NSR on bedside monitor.      Bakari Ga MD  Comprehensive Stroke Center  Department of Vascular Neurology   [facility]

## 2018-06-26 NOTE — HPI
Shannan Reza is a 67 y.o. female smoker with a history of CC fistula s/p clipping in 1969 who presents following acute onset LSW and left facial droop at 0930 yesterday morning. Her symptoms began as L foot drop and progressed to L hemiparesis, at which time she decided to present to the ED. Symptoms of previous CC fistula included proptosis, diplopia, and conjunctival injection, which she currently denies.

## 2018-06-26 NOTE — PT/OT/SLP EVAL
Speech Language Pathology Evaluation  Cognitive/Bedside Swallow    Patient Name:  Shannan Reza   MRN:  [id number]  Admitting Diagnosis: .Diagnoses of Stroke, Lacunar infarct, acute, and Carotid-cavernous fistula were pertinent to this visit.    Recommendations:                  General Recommendations:  Dysphagia therapy and Speech/language therapy  Diet recommendations:  Regular, Thin   Aspiration Precautions: 1 bite/sip at a time, Avoid talking while eating, Feed only when awake/alert, HOB to 90 degrees, Meds whole 1 at a time, Remain upright 30 minutes post meal and Strict aspiration precautions  Please continue to monitor for signs and symptoms of aspiration and discontinue oral feeding should you notice any of the following: watery eyes, reddened facial area, wet vocal quality, increased work of breathing, change in respiratory status, increased congestion, coughing, fever, and.or confusion.   General Precautions: Standard, aspiration, fall  Communication strategies:  provide increased time to answer    History:     Past Medical History:   Diagnosis Date    Cerebrovascular accident (CVA) due to thrombosis of right middle cerebral artery 6/26/2018       Past Surgical History:   Procedure Laterality Date    BRAIN SURGERY         Social History: Patient lives with Son in [place], [place].    Prior Intubation HX:  n/a    Chest X-Rays: 6/26/18: No detrimental change.    CT head 6/26/18: Limited exam secondary to extensive artifact from right frontal calvarial metallic structure and right paraclinoid surgical clip.  Given limitation, there is no evidence of acute hemorrhage or infarction.    Postoperative changes from right craniotomy and paraclinoid aneurysmal clipping.    Prior diet: regular, thin    Occupation/hobbies/homemaking:Currently employed, X-ray tech    Subjective     SLp reviewed Pt with nurse, nurse clears for ST and reports no difficulties with speech or word-finding noted  Pt presents calm and  "cooperative  She explains, "I don't know why my son made me come in, its just my leg and arm a little bit"  She denies pain   No family present in room  Patient goals: to get back to work    Pain/Comfort:  · Pain Rating 1: 0/10  · Pain Rating Post-Intervention 1: 0/10    Objective:     Cognitive Status:    Attention No obvious deficits observed at the sustained level, [facility] assessment of divided attention warranted   Perseveration Not present  Orientation Oriented x4  Memory Immediate Recall WFL for 3 unrelated items, and long term recall WFl  [employer] to ADLs intact  Safety awareness intact  Managing finances 100% accuracy on fx math word problems for figuring change   Simple calculation intact  Reasoning: time: 50% accuracy, I'ly, Deductive reasoning: WFL for F04, verbally presented ,     Receptive Language:   Comprehension:      Patient completes m/u YNQ, 2-unit commands and 3-unit commands WFL     Pragmatics:    Normal affect, normal eye contact, normal give and take t/o conversational tasks appreciated     Expressive Language:  Verbal:    Automatic Speech  Counting WFL  Naming Confrontation WFL for 5/5 objects , Convergent WFL for Fo3 verbally presented  and Divergent responsive Patient names 15 animals/minute I'ly   Sentence formulation deemed normal  Conversational speech no word-finding difficulty noted at the structured converational level or during picture description tasks (cookie theft)   Nonverbal:   DNA      Motor Speech:  Mild Dysarthria as characterized by mildly imprecise articulatory precision at the sentence-length level, decreased breath support     Voice:   mildly strained, clear, adequate intensity     Visual-Spatial:  Mildly Impaired. Patient completes clock drawing tasks with mildly rotated numbers around clock and mildly decreased symmetry of [place].    Reading:   WFL at the paragraph level     Written Expression:   WFl for writing to disctation at the sentence level. Pt " uses R, dominant hand t/o writing tasks     Oral Musculature Evaluation  · Oral Musculature: left weakness  · Dentition: present and adequate  · Secretion Management: adequate  · Mucosal Quality: adequate  · Mandibular Strength and Mobility: WNL  · Oral Labial Strength and Mobility: impaired coordination  · Lingual Strength and Mobility: functional strength, functional lateral movement  · Volitional Cough: present, strong   · Volitional Swallow: elcited, timely   · Voice Prior to PO Intake: clear    Bedside Swallow Eval:   Consistencies Assessed:  · Thin liquids ice chip x1, cup edge sips x6  · Puree tsp bites x2  · Solids bites of cracker x2     Oral Phase:   · WFL    Pharyngeal Phase:   · no overt clinical signs/symptoms of aspiration    Compensatory Strategies  · None    Treatment: SLP educates Pt on safety/fall precautions, stroke awareness and S/S aspiration. Patient verbalizes understanding. No additional questions. Whiteboard updated.     Assessment:     Shannan Reza is a 67 y.o. female with an SLP diagnosis of Dysarthria and Mild Cognitive-Linguistic Impairment .  She presents with mildly decreased articulatory precision, breath support, time/math reasoning and visiospatial skills. No overt S/S aspiration noted with trials at the bedside. ST to continue to follow to monitor tolerance of diet and provide tx to improve speech and cognitive-linguistic skills for highest level of [place]. She would benefit from ongoing ST via IRF upon d/c from acute.    Goals:    SLP Goals        Problem: SLP Goal    Goal Priority Disciplines Outcome   SLP Goal     SLP Ongoing (interventions implemented as appropriate)   Description:  Speech Language Pathology Goals  Goals expected to be met by 7/3/18  1. Pt will tolerate regular diet with thin liquids, MOD I  2. Pt will recall 3/3 unrelated items post 3 minute filled delay, 90% of the time,  MOD I  3. Pt will complete OMEs x10 ea to improve labial strength/coordination,  MOD I  4. Pt will repeat sentences (moderate length) with 90% intelligibility, MOD I   5. Pt will complete visiospatial tasks with 90% accuracy, MOD I  6. Educate Pt on S/S aspiration and aspiration precautions                        Plan:     · Patient to be seen:  5 x/week   · Plan of Care expires:  07/26/18  · Plan of Care reviewed with:  patient   · SLP Follow-Up:  Yes       Discharge recommendations:  Discharge Facility/Level Of Care Needs: other (see comments) (pending progress and PT/OT recs)   Barriers to Discharge:  Level of Skilled Assistance Needed     Time Tracking:     SLP Treatment Date:   06/26/18  Speech Start Time:  1104  Speech Stop Time:  1128     Speech Total Time (min):  24 min    Billable Minutes: Eval 16  and Eval Swallow and Oral Function 8    POORNIMA Teixeira., [facility]-SLP  Speech-Language Pathology  Pager: [phone]      06/26/2018

## 2018-06-26 NOTE — SUBJECTIVE & OBJECTIVE
History reviewed. No pertinent past medical history.  Past Surgical History:   Procedure Laterality Date    BRAIN SURGERY       History reviewed. No pertinent family history.  Social History   Substance Use Topics    Smoking status: Current Every Day Smoker     Packs/day: 0.50     Years: 25.00     Types: Cigarettes    Smokeless tobacco: Former User    Alcohol use Yes     Review of patient's allergies indicates:   Allergen Reactions    Percodan [oxycodone hcl-oxycodone-asa] Itching       Medications: I have reviewed the current medication administration record.      (Not in a hospital admission)    Review of Systems   Constitutional: Negative for chills, diaphoresis, fatigue and fever.   HENT: Negative for hearing loss, tinnitus and trouble swallowing.    Eyes: Negative for photophobia, pain and visual disturbance.   Respiratory: Negative for chest tightness and shortness of breath.    Cardiovascular: Negative for chest pain and palpitations.   Gastrointestinal: Negative for abdominal pain and vomiting.   Endocrine: Negative for cold intolerance and polyuria.   Genitourinary: Negative for hematuria.   Musculoskeletal: Negative for arthralgias, neck pain and neck stiffness.   Skin: Negative for rash.   Allergic/Immunologic: Negative for immunocompromised state.   Neurological: Positive for facial asymmetry and weakness. Negative for dizziness, speech difficulty and numbness.   Hematological: Does not bruise/bleed easily.   Psychiatric/Behavioral: Negative for agitation, behavioral problems and confusion.     Objective:     Vital Signs (Most Recent):  Temp: 98.6 °F (37 °C) (06/26/18 0819)  Pulse: 75 (06/26/18 0859)  Resp: 19 (06/26/18 0859)  BP: (!) 185/76 (06/26/18 0857)  SpO2: 99 % (06/26/18 0859)    Vital Signs Range (Last 24H):  Temp:  [98.6 °F (37 °C)]   Pulse:  [75-81]   Resp:  [18-19]   BP: (185-229)/()   SpO2:  [96 %-99 %]     Physical Exam   Constitutional: She is oriented to person, place, and  time. She appears well-developed and well-nourished.   HENT:   Head: Normocephalic and atraumatic.   Eyes: EOM are normal. Pupils are equal, round, and reactive to light.   Neck: Normal range of motion. Neck supple.   Cardiovascular: Normal rate and regular rhythm.    Pulmonary/Chest: Effort normal. No respiratory distress.   Abdominal: She exhibits no distension and no mass.   Genitourinary:   Genitourinary Comments: No performed   Musculoskeletal: Normal range of motion. She exhibits no edema or deformity.   Neurological: She is alert and oriented to person, place, and time. A cranial nerve deficit is present. No sensory deficit. She exhibits normal muscle tone. Coordination abnormal.   Skin: No rash noted. She is not diaphoretic. No erythema. No pallor.   Psychiatric: She has a normal mood and affect. Her behavior is normal.   Vitals reviewed.      Neurological Exam:   LOC: alert  Attention Span: Good   Language: No aphasia  Articulation: No dysarthria  Orientation: Person, Place, Time   Visual Fields: Full  EOM (CN III, IV, VI): Full/intact  Pupils (CN II, III): PERRL  Facial Sensation (CN V): Normal  Facial Movement (CN VII): Lower facial weakness on the Left  Gag Reflex: present  Reflexes: 2+ throughout  Motor: Arm left  Paresis: 3/5  Leg left  Paresis: 4/5  Arm right  Normal 5/5  Leg right Normal 5/5  Cebellar: Upper Extremity Appendicular Ataxia (Finger Nose Finger)  Left and impaired heel-knee-shin on the L  Sensation: Intact to light touch, temperature and vibration  Tone: Normal tone throughout      Laboratory:  CMP:   Recent Labs  Lab 06/26/18  0842   CALCIUM 9.9   ALBUMIN 4.2   PROT 7.8   *   K 3.5   CO2 22*   CL 97   BUN 6*   CREATININE 0.7   ALKPHOS 67   ALT 11   AST 27   BILITOT 0.3     CBC:   Recent Labs  Lab 06/26/18  0842   WBC 6.26   RBC 4.28   HGB 13.4   HCT 38.5      MCV 90   MCH 31.3*   MCHC 34.8     Lipid Panel:   Recent Labs  Lab 06/26/18  0842   CHOL 275*   LDLCALC 163.0*   HDL  77*   TRIG 175*     Coagulation:   Recent Labs  Lab 06/26/18  0842   INR 0.9     Hgb A1C: No results for input(s): HGBA1C in the last 168 hours.  TSH: No results for input(s): TSH in the last 168 hours.    Diagnostic Results:      Brain imaging:  CT head without contrast 6/26/18: limited by artifacts from prior clipping but no ostensible acute intracranial abnormality.    Vessel Imaging:  None    Cardiac Evaluation:   NSR on bedside monitor.

## 2018-06-26 NOTE — ED NOTES
Spoke with Dr. Ga with Stroke via telephone. NOTIFIED OF: Patient had a change in neuro status. Pt left arm is now weaker than earlier (some effort against gravity now as opposed earlier was left arm drift). LLE remains the same as prior assessments with drift. Pt now also c/o waxing and waning headaches on the right temporal area. No new orders at this time. Will continue to monitor the patient.

## 2018-06-26 NOTE — ASSESSMENT & PLAN NOTE
67 y.o. female s/p remote clipping of carotid carvernous fistula clipping who presents with LSW, L facial droop with likely lacunar infarct.    - No acute neurosurgical intervention indicated.  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  - Further management per vascular neurology.    Discussed with Dr. Garvin.

## 2018-06-26 NOTE — CONSULTS
[facility]  Neurosurgery  Consult Note    Consults  Subjective:     Chief Complaint/Reason for Admission: CC fistula, lacunar infarct    History of Present Illness: Shannan Reza is a 67 y.o. female smoker with a history of CC fistula s/p clipping in 1969 who presents following acute onset LSW and left facial droop at 0930 yesterday morning. Her symptoms began as L foot drop and progressed to L hemiparesis, at which time she decided to present to the ED. Symptoms of previous CC fistula included proptosis, diplopia, and conjunctival injection, which she currently denies.       (Not in a hospital admission)    Review of patient's allergies indicates:   Allergen Reactions    Percodan [oxycodone hcl-oxycodone-asa] Itching       Past Medical History:   Diagnosis Date    Cerebrovascular accident (CVA) due to thrombosis of right middle cerebral artery 6/26/2018     Past Surgical History:   Procedure Laterality Date    BRAIN SURGERY       Family History     None        Social History Main Topics    Smoking status: Current Every Day Smoker     Packs/day: 0.50     Years: 25.00     Types: Cigarettes    Smokeless tobacco: Former User    Alcohol use Yes    Drug use: No    Sexual activity: Not on file     Review of Systems   Constitutional: Negative for chills, diaphoresis, fatigue and fever.   HENT: Negative for hearing loss, tinnitus and trouble swallowing.    Eyes: Negative for photophobia, pain and visual disturbance.   Respiratory: Negative for chest tightness and shortness of breath.    Cardiovascular: Negative for chest pain and palpitations.   Gastrointestinal: Negative for abdominal pain and vomiting.   Endocrine: Negative for cold intolerance and polyuria.   Genitourinary: Negative for hematuria.   Musculoskeletal: Negative for arthralgias, neck pain and neck stiffness.   Skin: Negative for rash.   Allergic/Immunologic: Negative for immunocompromised state.   Neurological: Positive for  facial asymmetry and weakness. Negative for dizziness, speech difficulty and numbness.   Hematological: Does not bruise/bleed easily.   Psychiatric/Behavioral: Negative for agitation, behavioral problems and confusion.     Objective:     Weight: 48.1 kg (106 lb)  Body mass index is 19.39 kg/m².  Vital Signs (Most Recent):  Temp: 98.6 °F (37 °C) (06/26/18 0819)  Pulse: 71 (06/26/18 1232)  Resp: (!) 21 (06/26/18 1232)  BP: (!) 194/79 (06/26/18 1232)  SpO2: 99 % (06/26/18 1232) Vital Signs (24h Range):  Temp:  [98.6 °F (37 °C)] 98.6 °F (37 °C)  Pulse:  [66-81] 71  Resp:  [13-21] 21  SpO2:  [96 %-99 %] 99 %  BP: (185-229)/() 194/79       Date 06/26/18 0700 - 06/27/18 0659   Shift 6545-6276 4227-4188 4559-7050 24 Hour Total   I  N  T  A  K  E   Shift Total  (mL/kg)       O  U  T  P  U  T   Urine  (mL/kg/hr) 600   600    Shift Total  (mL/kg) 600  (12.5)   600  (12.5)   Weight (kg) 48.1 48.1 48.1 48.1                        Neurosurgery Physical Exam    NIH Scale:  Interval: 24 hrs post onset of symptoms +/- 20 mins  1a. Level Of Consciousness: 0-->Alert: keenly responsive  1b. LOC Questions: 0-->Answers both questions correctly  1c. LOC Commands: 0-->Performs both tasks correctly  2. Best Gaze: 0-->Normal  3. Visual: 0-->No visual loss  4. Facial Palsy: 0-->Normal symmetrical movements  5a. Motor Arm, Left: 1-->Drift: limb holds 90 (or 45) degrees, but drifts down before full 10 seconds: does not hit bed or other support  5b. Motor Arm, Right: 0-->No drift: limb holds 90 (or 45) degrees for full 10 secs  6a. Motor Leg, Left: 1-->Drift: leg falls by the end of the 5-sec period but does not hit bed  6b. Motor Leg, Right: 0-->No drift: leg holds 30 degree position for full 5 secs  7. Limb Ataxia: 2-->Present in two limbs  8. Sensory: 0-->Normal: no sensory loss  9. Best Language: 0-->No aphasia: normal  10. Dysarthria: 0-->Normal  11. Extinction and Inattention (formerly Neglect): 0-->No abnormality  Total (NIH Stroke  Scale): 4    General: well developed, well nourished, no distress.   Head: normocephalic, atraumatic  Neurologic: Alert and oriented. Thought content appropriate.  GCS: Motor: 6/Verbal: 5/Eyes: 4 GCS Total: 15  Mental Status: Awake, Alert, Oriented x 4  Language: No aphasia  Speech: No dysarthria  Cranial nerves: face symmetric, tongue midline, CN II-XII grossly intact.   Eyes: pupils equal, round, reactive to light with accomodation, EOMI.  Pulmonary: normal respirations, no signs of respiratory distress  Abdomen: soft, non-distended, not tender to palpation  Sensory: intact to light touch throughout    Motor Strength: Moves all extremities spontaneously with good tone.  Full strength right upper and lower extremities; left hemiparesis. No abnormal movements seen.     DTR's: 2 + and symmetric in UE and LE  Pronator Drift: L drift  Finger-to-nose: L ataxia  Babinski: absent  Pulses: 2+ and symmetric radial and dorsalis pedis.  Skin: Skin is warm, dry and intact.      Significant Labs:    Recent Labs  Lab 06/26/18  0842   GLU 85   *   K 3.5   CL 97   CO2 22*   BUN 6*   CREATININE 0.7   CALCIUM 9.9       Recent Labs  Lab 06/26/18  0842   WBC 6.26   HGB 13.4   HCT 38.5          Recent Labs  Lab 06/26/18  0842   INR 0.9     Microbiology Results (last 7 days)     ** No results found for the last 168 hours. **        Significant Diagnostics:  CTA head 06/26/2018 reviewed.  Direct communication between the right ICA and cavernous sinus which appears to be filled with arterialized blood consistent with a carotid cavernous sinus fistula.    Assessment/Plan:     Lacunar infarct, acute    67 y.o. female s/p remote clipping of carotid carvernous fistula clipping who presents with LSW, L facial droop with likely lacunar infarct.    - No acute neurosurgical intervention indicated.  - Recommend ophthalmology consult evaluation of engorged right ophthalmic vein, likely chronic given CC fistula but would like baseline  exam.  - Further management per vascular neurology.    Discussed with Dr. Garvin.              ALBERT AtwoodC  Neurosurgery  [facility]

## 2018-06-27 PROBLEM — I10 ESSENTIAL HYPERTENSION: Status: ACTIVE | Noted: 2018-06-27

## 2018-06-27 PROBLEM — Z74.09 IMPAIRED FUNCTIONAL MOBILITY AND ENDURANCE: Status: ACTIVE | Noted: 2018-06-27

## 2018-06-27 PROBLEM — E87.6 HYPOKALEMIA: Status: ACTIVE | Noted: 2018-06-27

## 2018-06-27 LAB
ALBUMIN SERPL BCP-MCNC: 3.5 G/DL
ALP SERPL-CCNC: 58 U/L
ALT SERPL W/O P-5'-P-CCNC: 9 U/L
ANION GAP SERPL CALC-SCNC: 10 MMOL/L
APTT BLDCRRT: 23.8 SEC
AST SERPL-CCNC: 20 U/L
BASOPHILS # BLD AUTO: 0.04 K/UL
BASOPHILS NFR BLD: 0.7 %
BILIRUB SERPL-MCNC: 0.5 MG/DL
BUN SERPL-MCNC: 6 MG/DL
CALCIUM SERPL-MCNC: 9.1 MG/DL
CHLORIDE SERPL-SCNC: 107 MMOL/L
CK MB SERPL-MCNC: 0.9 NG/ML
CK MB SERPL-RTO: 2 %
CK SERPL-CCNC: 44 U/L
CO2 SERPL-SCNC: 25 MMOL/L
CREAT SERPL-MCNC: 0.6 MG/DL
DIFFERENTIAL METHOD: ABNORMAL
EOSINOPHIL # BLD AUTO: 0.1 K/UL
EOSINOPHIL NFR BLD: 1.5 %
ERYTHROCYTE [DISTWIDTH] IN BLOOD BY AUTOMATED COUNT: 13.2 %
EST. GFR  (AFRICAN AMERICAN): >60 ML/MIN/1.73 M^2
EST. GFR  (NON AFRICAN AMERICAN): >60 ML/MIN/1.73 M^2
GLUCOSE SERPL-MCNC: 103 MG/DL
HCT VFR BLD AUTO: 34.7 %
HGB BLD-MCNC: 12.1 G/DL
IMM GRANULOCYTES # BLD AUTO: 0.02 K/UL
IMM GRANULOCYTES NFR BLD AUTO: 0.4 %
INR PPP: 1
LYMPHOCYTES # BLD AUTO: 1.4 K/UL
LYMPHOCYTES NFR BLD: 25.5 %
MAGNESIUM SERPL-MCNC: 2.3 MG/DL
MCH RBC QN AUTO: 31.8 PG
MCHC RBC AUTO-ENTMCNC: 34.9 G/DL
MCV RBC AUTO: 91 FL
MONOCYTES # BLD AUTO: 0.5 K/UL
MONOCYTES NFR BLD: 8.7 %
NEUTROPHILS # BLD AUTO: 3.5 K/UL
NEUTROPHILS NFR BLD: 63.2 %
NRBC BLD-RTO: 0 /100 WBC
PHOSPHATE SERPL-MCNC: 3.5 MG/DL
PLATELET # BLD AUTO: 300 K/UL
PMV BLD AUTO: 10.5 FL
POTASSIUM SERPL-SCNC: 3.4 MMOL/L
PROT SERPL-MCNC: 6.5 G/DL
PROTHROMBIN TIME: 10.1 SEC
RBC # BLD AUTO: 3.81 M/UL
SODIUM SERPL-SCNC: 142 MMOL/L
TROPONIN I SERPL DL<=0.01 NG/ML-MCNC: <0.006 NG/ML
WBC # BLD AUTO: 5.49 K/UL

## 2018-06-27 PROCEDURE — 83735 ASSAY OF MAGNESIUM: CPT

## 2018-06-27 PROCEDURE — 25000003 PHARM REV CODE 250: Performed by: NURSE PRACTITIONER

## 2018-06-27 PROCEDURE — 97166 OT EVAL MOD COMPLEX 45 MIN: CPT

## 2018-06-27 PROCEDURE — 82550 ASSAY OF CK (CPK): CPT

## 2018-06-27 PROCEDURE — 99233 SBSQ HOSP IP/OBS HIGH 50: CPT | Mod: ,,, | Performed by: PSYCHIATRY & NEUROLOGY

## 2018-06-27 PROCEDURE — 82553 CREATINE MB FRACTION: CPT

## 2018-06-27 PROCEDURE — 36415 COLL VENOUS BLD VENIPUNCTURE: CPT

## 2018-06-27 PROCEDURE — 25000003 PHARM REV CODE 250: Performed by: STUDENT IN AN ORGANIZED HEALTH CARE EDUCATION/TRAINING PROGRAM

## 2018-06-27 PROCEDURE — 85025 COMPLETE CBC W/AUTO DIFF WBC: CPT

## 2018-06-27 PROCEDURE — 80053 COMPREHEN METABOLIC PANEL: CPT

## 2018-06-27 PROCEDURE — A4216 STERILE WATER/SALINE, 10 ML: HCPCS | Performed by: PSYCHIATRY & NEUROLOGY

## 2018-06-27 PROCEDURE — 92526 ORAL FUNCTION THERAPY: CPT

## 2018-06-27 PROCEDURE — 85610 PROTHROMBIN TIME: CPT

## 2018-06-27 PROCEDURE — 97162 PT EVAL MOD COMPLEX 30 MIN: CPT

## 2018-06-27 PROCEDURE — 85730 THROMBOPLASTIN TIME PARTIAL: CPT

## 2018-06-27 PROCEDURE — 84100 ASSAY OF PHOSPHORUS: CPT

## 2018-06-27 PROCEDURE — 84484 ASSAY OF TROPONIN QUANT: CPT

## 2018-06-27 PROCEDURE — 20600001 HC STEP DOWN PRIVATE ROOM

## 2018-06-27 PROCEDURE — 97530 THERAPEUTIC ACTIVITIES: CPT

## 2018-06-27 PROCEDURE — 99222 1ST HOSP IP/OBS MODERATE 55: CPT | Mod: ,,, | Performed by: NURSE PRACTITIONER

## 2018-06-27 PROCEDURE — 25000003 PHARM REV CODE 250: Performed by: PSYCHIATRY & NEUROLOGY

## 2018-06-27 PROCEDURE — 63600175 PHARM REV CODE 636 W HCPCS: Performed by: PHYSICIAN ASSISTANT

## 2018-06-27 PROCEDURE — 25000003 PHARM REV CODE 250: Performed by: PHYSICIAN ASSISTANT

## 2018-06-27 RX ORDER — LABETALOL HYDROCHLORIDE 5 MG/ML
10 INJECTION, SOLUTION INTRAVENOUS EVERY 4 HOURS PRN
Status: DISCONTINUED | OUTPATIENT
Start: 2018-06-27 | End: 2018-06-29 | Stop reason: HOSPADM

## 2018-06-27 RX ORDER — ASPIRIN 325 MG
325 TABLET ORAL DAILY
Status: DISCONTINUED | OUTPATIENT
Start: 2018-06-28 | End: 2018-06-29 | Stop reason: HOSPADM

## 2018-06-27 RX ORDER — HEPARIN SODIUM 5000 [USP'U]/ML
5000 INJECTION, SOLUTION INTRAVENOUS; SUBCUTANEOUS EVERY 8 HOURS
Status: DISCONTINUED | OUTPATIENT
Start: 2018-06-27 | End: 2018-06-29 | Stop reason: HOSPADM

## 2018-06-27 RX ORDER — POTASSIUM CHLORIDE 20 MEQ/15ML
20 SOLUTION ORAL ONCE
Status: COMPLETED | OUTPATIENT
Start: 2018-06-27 | End: 2018-06-27

## 2018-06-27 RX ADMIN — CLOPIDOGREL 75 MG: 75 TABLET, FILM COATED ORAL at 10:06

## 2018-06-27 RX ADMIN — LABETALOL HYDROCHLORIDE 10 MG: 5 INJECTION, SOLUTION INTRAVENOUS at 05:06

## 2018-06-27 RX ADMIN — Medication 3 ML: at 10:06

## 2018-06-27 RX ADMIN — Medication 3 ML: at 05:06

## 2018-06-27 RX ADMIN — Medication 3 ML: at 02:06

## 2018-06-27 RX ADMIN — ASPIRIN 325 MG ORAL TABLET 325 MG: 325 PILL ORAL at 10:06

## 2018-06-27 RX ADMIN — ATORVASTATIN CALCIUM 80 MG: 20 TABLET, FILM COATED ORAL at 10:06

## 2018-06-27 RX ADMIN — HEPARIN SODIUM 5000 UNITS: 5000 INJECTION, SOLUTION INTRAVENOUS; SUBCUTANEOUS at 09:06

## 2018-06-27 RX ADMIN — POTASSIUM CHLORIDE 20 MEQ: 20 SOLUTION ORAL at 09:06

## 2018-06-27 NOTE — HOSPITAL COURSE
6/27/18:Evaluated by therapy.  Bed mobility SV-MaxA.  Sit to stand and transfers MaxA and then refused with PT.  UBD/grooming MaxA and LBD/toileting totalA. Passed bedside swallow evaluation.  SLP recommending regular diet and thin liquids. SLP diagnosis of Dysarthria and Mild Cognitive-Linguistic Impairment

## 2018-06-27 NOTE — HPI
Shannan Reza is a 67-year-old female with PMHx of tobacco abuse and Carotid carvenous fistula (s/p clipping in 1969).  Patient presented to [facility] on 6/26 with LSW and left facial droop.  CTH study was limited, but revealed no evidence of acute hemorrhage or infarction with postoperative changes from right craniotomy and paraclinoid aneurysmal clipping.  Not a tPA candidate 2/2 no significant neurological deficit. CTA revealed occlusion of the right ICA with CC fistula (cancelled angiogram, likely incidental in relation to acute stroke. F/U with NSGY) . Echo revealed Left Atrial Enlargement (need 30 day event monitor).  No MRI 2/2 clipping. Possible etiology includes small vessel disease vs embolic stroke of undetermined source.     Functional History: Patient lives in [place] with son in a single story home with 1 step to enter.  Prior to admission, (I) with ADLs and mobility.  Patient is R handed.  DME: none.

## 2018-06-27 NOTE — ASSESSMENT & PLAN NOTE
See hospital course for functional, cognitive/speech/language, and nutrition/swallow status.      Recommendations  -  Encourage mobility, OOB in chair at least 3 hours per day, and early ambulation as appropriate   -  PT/OT evaluate and treat  -  SLP speech and cognitive evaluate and treat  -  Monitor sleep disturbances and establish consistent sleep-wake cycle  -  Monitor for bowel and bladder dysfunction  -  Monitor for shoulder pain and subluxation  -  Monitor for spasticity  -  Monitor for and prevent skin breakdown and pressure ulcers  · Early mobility, repositioning/weight shifting every 20-30 minutes when sitting, turn patient every 2 hours, proper mattress/overlay and chair cushioning, pressure relief/heel protector boots  -  DVT prophylaxis:  [facility]  -  Reviewed discharge options (IP rehab, SNF, HH therapy, and OP therapy)

## 2018-06-27 NOTE — CONSULTS
[facility]  Physical Medicine & Rehab  Consult Note    Patient Name: Shannan Reza  MRN: [id number]  Admission Date: 6/26/2018  Hospital Length of Stay: 1 days  Attending Physician: Pat Anaya MD     Inpatient consult to Physical Medicine & Rehabilitation  Consult performed by: Jessica Murillo NP  Consult requested by:  Pat Anaya MD    Reason for Consult:  assess rehabilitation needs  Consults  Subjective:     Principal Problem: CVA due to thrombosis of right middle cerebral artery     HPI: Shannan Reza is a 67-year-old female with PMHx of tobacco abuse and CC fistula (s/p clipping in 1969)  .  Patient presented to [facility] on 6/26 with LSW and left facial droop.  CTH study was limited, but revealed no evidence of acute hemorrhage or infarction with postoperative changes from right craniotomy and paraclinoid aneurysmal clipping..  Not a tPA candidate 2/2 no significant neurological deficit. CTA revealed occlusion of the right ICA .  Cerebral angiogram pending.     Functional History: Patient lives in [place] with son in a single story home with 1 step to enter.  Prior to admission, (I) with ADLs and mobility.  Patient is R handed.  DME: none.    Hospital Course: 6/27/18: PT evaluation pending. Evaluated by OT.  Bed mobility SV-MaxA .  Sit to stand and transfers MaxA.  UBD/grooming MaxA and LBD/toileting totalA.. Passed bedside swallow evaluation.  SLP recommending regular diet and thin liquids. SLP diagnosis of Dysarthria and Mild Cognitive-Linguistic Impairment       Past Medical History:   Diagnosis Date    Cerebrovascular accident (CVA) due to thrombosis of right middle cerebral artery 6/26/2018     Past Surgical History:   Procedure Laterality Date    BRAIN SURGERY       Review of patient's allergies indicates:   Allergen Reactions    Percodan [oxycodone hcl-oxycodone-asa] Itching       Scheduled Medications:    aspirin  325 mg Per NG tube Daily    atorvastatin  80 mg Oral  Daily    clopidogrel  75 mg Oral Daily    sodium chloride 0.9%  3 mL Intravenous Q8H       PRN Medications: labetalol, sodium chloride 0.9%    Family History     Unknown per patient.         Social History Main Topics    Smoking status: Current Every Day Smoker     Packs/day: 0.50     Years: 25.00     Types: Cigarettes    Smokeless tobacco: Former User    Alcohol use Yes    Drug use: No    Sexual activity: Not on file     Review of Systems   Constitutional: Negative for chills, fatigue and fever.   HENT: Negative for trouble swallowing and voice change.    Eyes: Negative for photophobia and visual disturbance.   Respiratory: Negative for cough, shortness of breath and wheezing.    Cardiovascular: Negative for chest pain and palpitations.   Gastrointestinal: Negative for abdominal distention, nausea and vomiting.   Genitourinary: Negative for difficulty urinating and flank pain.   Musculoskeletal: Positive for gait problem. Negative for arthralgias.   Skin: Negative for color change and rash.   Neurological: Positive for weakness. Negative for facial asymmetry, speech difficulty, numbness and headaches.   Psychiatric/Behavioral: Negative for agitation and confusion.     Objective:     Vital Signs (Most Recent):  Temp: 98.4 °F (36.9 °C) (06/27/18 0724)  Pulse: 62 (06/27/18 0746)  Resp: 16 (06/27/18 0724)  BP: (!) 173/74 (06/27/18 0724)  SpO2: (!) 94 % (06/27/18 0724)    Vital Signs (24h Range):  Temp:  [97.7 °F (36.5 °C)-98.9 °F (37.2 °C)] 98.4 °F (36.9 °C)  Pulse:  [62-86] 62  Resp:  [13-23] 16  SpO2:  [94 %-99 %] 94 %  BP: (157-205)/(70-85) 173/74     Body mass index is 19.07 kg/m².    Physical Exam   Constitutional: She is oriented to person, place, and time. She appears well-developed and well-nourished.   HENT:   Head: Normocephalic and atraumatic.   Eyes: EOM are normal. Pupils are equal, round, and reactive to light.   Neck: Neck supple.   Cardiovascular: Normal rate and regular rhythm.     Pulmonary/Chest: Effort normal. No respiratory distress.   Abdominal: Soft. There is no tenderness.   Musculoskeletal: Normal range of motion. She exhibits no deformity.   Neurological: She is alert and oriented to person, place, and time. No sensory deficit. She exhibits abnormal muscle tone (L side).   -mild dysarthria  RUE: 5/5, 5/5 .  LUE: 0/5, 0/5 .  RLE: 5/5, DF 5/5, PF 5/5.  LLE: 0/5, DF 0/5, PF 0/5.  Skin: Skin is warm and dry.   Psychiatric: She has a normal mood and affect. Her behavior is normal. Cognition and memory are impaired.   Vitals reviewed.        Diagnostic Results:   Labs: Reviewed  ECG: Reviewed  X-Ray: Reviewed  CT: Reviewed  MRI: Reviewed    Assessment/Plan:     Impaired functional mobility and endurance    -2/2 stroke  -see CVA   -PT/OT/SLP  -OT recommending IRF        Cerebrovascular accident (CVA) due to thrombosis of right middle cerebral artery    See hospital course for functional, cognitive/speech/language, and nutrition/swallow status.      Recommendations  -  Encourage mobility, OOB in chair at least 3 hours per day, and early ambulation as appropriate   -  PT/OT evaluate and treat  -  SLP speech and cognitive evaluate and treat  -  Monitor sleep disturbances and establish consistent sleep-wake cycle  -  Monitor for bowel and bladder dysfunction  -  Monitor for shoulder pain and subluxation  -  Monitor for spasticity  -  Monitor for and prevent skin breakdown and pressure ulcers  · Early mobility, repositioning/weight shifting every 20-30 minutes when sitting, turn patient every 2 hours, proper mattress/overlay and chair cushioning, pressure relief/heel protector boots  -  DVT prophylaxis:  SCDs  -  Reviewed discharge options (IP rehab, SNF, HH therapy, and OP therapy)          PT evaluation pending. Will follow progress and discuss with rehab team for rehab recommendation.      Thank you for your consult.     Jessica Murillo NP  Department of Physical Medicine &  Rehab  [facility]

## 2018-06-27 NOTE — PLAN OF CARE
Problem: Physical Therapy Goal  Goal: Physical Therapy Goal  Goals to be met by: 18    Patient will increase functional independence with mobility by performin. Supine to sit with supervision with HOB flat   2. Sit to supine with supervision with HOB flat   3. Sit to stand transfer with Contact Guard Assistance with or without least restrictive AD.   4. Bed to chair transfer with Contact Guard Assistance with or without least restrictive AD.   5. Gait  x 20 feet with Minimal Assistance with or without least restrictive AD.   6. Sitting at edge of bed x5 minutes with stand by assistance while performing dynamic reaching tasks to improve sitting balance   7. Stand for 3 minutes with Contact Guard Assistance   8. Lower extremity exercise program x10 reps per handout, with assistance as needed    Outcome: Ongoing (interventions implemented as appropriate)  PT evaluation completed- see note for details. Goals established and POC initiated.     Rubina Medina PT, DPT   2018  Pager: [phone]

## 2018-06-27 NOTE — H&P
Please see consult note dated 6/26/18 per Dr. Harmeet Heredia PA-C  Vascular Neurology  [phone]

## 2018-06-27 NOTE — PLAN OF CARE
Problem: Occupational Therapy Goal  Goal: Occupational Therapy Goal  OT evaluation completed.  BRIGHT Umana  6/27/2018

## 2018-06-27 NOTE — PT/OT/SLP PROGRESS
"Speech Language Pathology Treatment    Patient Name:  Shannan Reza   MRN:  [id number]  Admitting Diagnosis: <principal problem not specified>    Recommendations:                 General Recommendations:  Dysphagia therapy and Speech/language therapy  Diet recommendations:  Regular, Liquid Diet Level: Thin   Aspiration Precautions: 1 bite/sip at a time, Avoid talking while eating, Feed only when awake/alert, HOB to 90 degrees, Meds whole 1 at a time, Remain upright 30 minutes post meal and Strict aspiration precautions   General Precautions: Standard, aspiration, fall  Communication strategies:  provide increased time to answer    Subjective     Discussed pt with nurse prior to session; nurse reported no difficulty with thin liquid/whole medication  Pt was alert, calm and cooperative  Pt reports that her left extremities have worsened since admit  Pt reports total sensation in her left oral cavity  Pt reports no difficulty manipulating or swallowing food/drink  No family present in room  Patient goals: "I have to choose one of these rehab places"     Pain/Comfort:  · Pain Rating 1: 0/10  · Pain Rating Post-Intervention 1: 0/10    Objective:     Has the patient been evaluated by SLP for swallowing?   Yes  Keep patient NPO? No   Current Respiratory Status: room air      Patient seen by Speech for swallowing treatment. Patient was found sitting up in bed when clinician entered the room. She tolerated trials of cup edge thin, ice chips, puree and solids without s/s of aspiration. Patient was alert, oriented and knowledgeable regarding her status and reason for hospitilization. Her speech continues to be mildy dysarthric, but she was 100% intelligible in all contexts. Patient reports worsening physical symptoms in her left extremities, but there was so significant change from a speech/swallowing perspective. Whiteboard updated. Call light within reach and bed alarm in place. Patient reclined HOB as clinician left the " room. Speech will continue to monitor for swallowing and csl.     Assessment:     Shannan Reza is a 67 y.o. female with an SLP diagnosis of Dysphagia and Mild Dysarthria.  She presents with mildly decreased articulatory precision and mildly reduced breath support. No overt s/s of aspiration noted with bedside trials of all consistencies. ST will continue to monitor diet tolerance and provide treatment to improve speech. Patient would benefit from ongoing ST via inpatient rehab upon d/c from acute.    Goals:    SLP Goals        Problem: SLP Goal    Goal Priority Disciplines Outcome   SLP Goal     SLP Ongoing (interventions implemented as appropriate)   Description:  Speech Language Pathology Goals  Goals expected to be met by 7/3/18  1. Pt will tolerate regular diet with thin liquids, MOD I  2. Pt will recall 3/3 unrelated items post 3 minute filled delay, 90% of the time,  MOD I  3. Pt will complete OMEs x10 ea to improve labial strength/coordination, MOD I  4. Pt will repeat sentences (moderate length) with 90% intelligibility, MOD I   5. Pt will complete visiospatial tasks with 90% accuracy, MOD I  6. Educate Pt on S/S aspiration and aspiration precautions                        Plan:     · Patient to be seen:  5 x/week   · Plan of Care expires:  07/26/18  · Plan of Care reviewed with:  patient   · SLP Follow-Up:  Yes       Discharge recommendations:  rehabilitation facility   Barriers to Discharge:  Level of Skilled Assistance Needed     Time Tracking:     SLP Treatment Date:   06/27/18  Speech Start Time:  1200  Speech Stop Time:  1217     Speech Total Time (min):  17 min    Billable Minutes: Treatment Swallowing Dysfunction 17    RAUL Olsen-SLP  Speech-Language Pathology  Pager: [phone]     06/27/2018

## 2018-06-27 NOTE — PT/OT/SLP EVAL
"Occupational Therapy   Evaluation    Name: Shannan Reza  MRN: [id number]  Admitting Diagnosis:  Stroke    Recommendations:     Discharge Recommendations: rehabilitation facility  Discharge Equipment Recommendations:  3-in-1 commode, bath bench, wheelchair  Barriers to discharge:  Inaccessible home environment, Decreased caregiver support    History:     Occupational Profile:  Patient resides in [place] with son (who works) in one story home with no steps to enter.  PTA patient independent with ADLs including driving.  Currently owns no DME.  Patient is right handed.  Works:  X-ray technician.  Hobbies:  Gardening.  Roles/Responsibilities: mother, x-ray tech, cooking, paying bills.      Past Medical History:   Diagnosis Date    Cerebrovascular accident (CVA) due to thrombosis of right middle cerebral artery 6/26/2018       Past Surgical History:   Procedure Laterality Date    BRAIN SURGERY         Subjective     Patient:  "I had a little foot drop at work Monday and I was stumbling on the floor so I started picking up my knee higher.  Then it started in my arm.  Tuesday morning my face was drooping. Now, I can't move my left side."  Communicated with: Nurse prior to session.  Pain/Comfort:  · Pain Rating 1: 0/10  · Pain Rating Post-Intervention 1: 0/10    Patients cultural, spiritual, Anabaptist conflicts given the current situation: Gnosticism    Objective:     Patient found with: bed alarm, blood pressure cuff, telemetry, peripheral IV  Family not present.  General Precautions: Standard, aspiration, fall   Orthopedic Precautions:N/A   Braces: N/A     Occupational Performance:    Bed Mobility:    · Patient completed Rolling/Turning to Left with  supervision  · Patient completed Rolling/Turning to Right with maximal assistance  · Patient completed Scooting/Bridging with maximal assistance  · Patient completed Supine to Sit with maximal assistance  · Patient completed Sit to Supine with maximal " assistance    Functional Mobility/Transfers:  · Patient completed Sit <> Stand Transfer with maximal assistance  with  no assistive device   · Patient completed Bed <> Chair Transfer using Stand Pivot technique with maximal assistance with no assistive device    Activities of Daily Living:  · Grooming: maximal assistance while seated EOB  · UB Dressing: maximal assistance while seated EOB  · LB Dressing: total assistance while seated EOB  · Toileting: total assistance      Cognitive/Visual Perceptual:  Cognitive/Psychosocial Skills:     -       Oriented to: Person, Place, Time and Situation   -       Follows Commands/attention:Follows one-step commands  -       Safety awareness/insight to disability: impaired   -       Mood/Affect/Coping skills/emotional control: mildly agitated    Physical Exam:  Postural examination/scapula alignment:    -       Rounded shoulders  Skin integrity: Visible skin intact  Edema:  None noted  Upper Extremity Range of Motion:     -       Right Upper Extremity: WNL  -       Left Upper Extremity: PROM WNL  Upper Extremity Strength:    -       Right Upper Extremity: WNL  -       Left Upper Extremity: 1/5    Patient left supine with all lines intact, call button in reach and bed alarm on    AMPAC 6 Click:  AMPA Total Score: 11    Treatment & Education:  Patient education provided for stroke warning signs, prevention guidelines and personal risk factors.  Patient verbalizing understanding via teach back method.  Patient education provided on role of OT and need for rehab upon discharge.   Continued education, patient/ family training recommended.  Patient alert and oriented x 3; able to follow 4/4 one step commands.  Patient attentive and interactive throughout the session.  Patient able to identify 5/5 body parts.  Able to name 5/5 objects.  Able to sequence 7/7 days of the week and 12/12 months of the year.  Addressed oral motor ex 2* left facial weakness.  PROM performed left UE one set x  "10 rep in all planes of motion.  Max assist with postural control while seated EOB with left UE in weight bearing.  Patient's functional status and disposition recommendation discussed with stroke team in daily rounds.  White board updated in patient's room.  OT asked if there were any other questions; patient/ family had no further questions.       Education:    Assessment:     Shannan Reza is a 67 y.o. female with a medical diagnosis of Stroke.  She presents with performance deficits affecting function are weakness, impaired self care skills, impaired balance, decreased coordination, decreased safety awareness, decreased ROM, impaired coordination, decreased upper extremity function, impaired functional mobilty, impaired endurance, impaired cognition, decreased lower extremity function, abnormal tone, impaired fine motor.    These performance deficits have resulted in activity limitations including but not limited to:   bed mobility, transfers, ascending/ descending stairs, walking short and long distances, walking around obstacles, transitional movement patterns (kneeling, bending); eating, upper body dressing, lower body dressing, brushing teeth, toileting, bathing, carrying objects, balancing checkbook, shopping, meal preparation, and driving.   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 Patient will benefit from skilled OT services to maximize level of independence with self-care skills and functional mobility.  Will benefit from rehab.    Rehab Prognosis:  Good; patient would benefit from acute skilled OT services to address these deficits and reach maximum level of function.         Clinical Decision Makin.  OT Mod:  "Pt evaluation falls under moderate complexity for evaluation coding due to identification of 3-5 performance deficits noted as stated above. Eval required Min/Mod assistance to complete on this date and detailed assessment(s) were " "utilized. Moreover, an expanded review of history and occupational profile obtained with additional review of cognitive, physical and psychosocial hx."     Plan:     Patient to be seen 5 x/week to address the above listed problems via self-care/home management, neuromuscular re-education, cognitive retraining, sensory integration, therapeutic activities, therapeutic exercises  · Plan of Care Expires: 07/25/18  · Plan of Care Reviewed with: patient    This Plan of care has been discussed with the patient who was involved in its development and understands and is in agreement with the identified goals and treatment plan    GOALS:    Occupational Therapy Goals        Problem: Occupational Therapy Goal    Goal Priority Disciplines Outcome Interventions   Occupational Therapy Goal     OT, PT/OT     Description:  Goals set 6/27 to be addressed for 7 days with expiration date, 7/4:  Patient will increase functional independence with ADLs by performing:    Patient will demonstrate rolling to the right with Min assist.  Not met   Patient will demonstrate rolling to the left with modified independence.   Not met  Patient will demonstrate supine -sit with min assist.   Not met  Patient will demonstrate stand pivot transfers with min assist.   Not met  Patient will demonstrate grooming while standing with min assist.   Not met  Patient will demonstrate upper body dressing with min assist while seated EOB.   Not met  Patient will demonstrate lower body dressing with mod assist while seated EOB.   Not met  Patient will demonstrate toileting with mod assist.   Not met  Patient's family / caregiver will demonstrate independence and safety with assisting patient with self-care skills and functional mobility.     Not met  Patient's family / caregiver will demonstrate independence with providing ROM and changes in bed positioning.   Not met  Patient and/or patient's family will verbalize understanding of stroke prevention guidelines, " personal risk factors and stroke warning signs via teachback method.  Not met                           Time Tracking:     OT Date of Treatment: 06/27/18  OT Start Time: 0544  OT Stop Time: 0614  OT Total Time (min): 30 min    Billable Minutes:Evaluation 20  Therapeutic Activity 10    BRIGHT Umana  6/27/2018

## 2018-06-27 NOTE — PLAN OF CARE
Problem: SLP Goal  Goal: SLP Goal  Speech Language Pathology Goals  Goals expected to be met by 7/3/18  1. Pt will tolerate regular diet with thin liquids, MOD I  2. Pt will recall 3/3 unrelated items post 3 minute filled delay, 90% of the time,  MOD I  3. Pt will complete OMEs x10 ea to improve labial strength/coordination, MOD I  4. Pt will repeat sentences (moderate length) with 90% intelligibility, MOD I   5. Pt will complete visiospatial tasks with 90% accuracy, MOD I  6. Educate Pt on S/S aspiration and aspiration precautions       Outcome: Ongoing (interventions implemented as appropriate)  Patient seen by Speech for swallowing treatment. She tolerated trials of cup edge thin, ice chips, puree and solids without s/s of aspiration. Patient was alert, oriented and knowledgeable regarding her status and reason for hospitilization. Her speech continues to be mildy dysarthric, but she was 100% intelligible in all contexts. Patient reports worsening physical symptoms in her left extremities, but there was so significant change from a speech/swallowing perspective. Speech will continue to monitor for swallowing and csl.     Olaf Mckenzie CF-SLP  Speech-Language Pathology  Pager: [phone]     06/27/2018

## 2018-06-27 NOTE — PLAN OF CARE
PCP: none    Extended Emergency Contact Information  Primary Emergency Contact: David Reza  Address: [address]           [place]  Home Phone: [phone]  Mobile Phone: [phone]  Relation: Son      [id number] - [place] - [address]  [address]  [place]  Phone: [phone] Fax: [phone]    Payor: [facility] / Plan: [facility] CHOICE PLUS / Product Type: Commercial /     Patient was independent living with her adult son prior to hospitalization. Awaiting PT/OT/SLP recommendations. Cm will continue to follow.     06/27/18 0815   Discharge Assessment   Assessment Type Discharge Planning Assessment   Confirmed/corrected address and phone number on facesheet? Yes   Assessment information obtained from? Patient;Caregiver   Expected Length of Stay (days) 2   Communicated expected length of stay with patient/caregiver yes   Prior to hospitilization cognitive status: Alert/Oriented   Prior to hospitalization functional status: Independent   Current cognitive status: Alert/Oriented   Current Functional Status: Needs Assistance   Facility Arrived From: Home   Lives With child(sandi), adult   Able to Return to Prior Arrangements unable to determine at this time (comments)   Is patient able to care for self after discharge? Unable to determine at this time (comments)   Who are your caregiver(s) and their phone number(s)? David Alfaro (Son) [phone]   Patient's perception of discharge disposition rehab facility   Readmission Within The Last 30 Days no previous admission in last 30 days   Patient currently being followed by outpatient case management? No   Patient currently receives any other outside agency services? No   Equipment Currently Used at Home none   Do you have any problems affording any of your prescribed medications? (N/A)   Is the patient taking medications as prescribed? (N/A)   Does the patient have transportation  home? Yes   Transportation Available car;family or friend will provide   Does the patient receive services at the [facility]? No   Discharge Plan A Rehab   Discharge Plan B Skilled Nursing Facility   Patient/Family In Agreement With Plan yes

## 2018-06-27 NOTE — SUBJECTIVE & OBJECTIVE
Past Medical History:   Diagnosis Date    Cerebrovascular accident (CVA) due to thrombosis of right middle cerebral artery 6/26/2018     Past Surgical History:   Procedure Laterality Date    BRAIN SURGERY       Review of patient's allergies indicates:   Allergen Reactions    Percodan [oxycodone hcl-oxycodone-asa] Itching       Scheduled Medications:    aspirin  325 mg Per NG tube Daily    atorvastatin  80 mg Oral Daily    clopidogrel  75 mg Oral Daily    sodium chloride 0.9%  3 mL Intravenous Q8H       PRN Medications: labetalol, sodium chloride 0.9%    Family History     Unknown per patient.         Social History Main Topics    Smoking status: Current Every Day Smoker     Packs/day: 0.50     Years: 25.00     Types: Cigarettes    Smokeless tobacco: Former User    Alcohol use Yes    Drug use: No    Sexual activity: Not on file     Review of Systems   Constitutional: Negative for chills, fatigue and fever.   HENT: Negative for trouble swallowing and voice change.    Eyes: Negative for photophobia and visual disturbance.   Respiratory: Negative for cough, shortness of breath and wheezing.    Cardiovascular: Negative for chest pain and palpitations.   Gastrointestinal: Negative for abdominal distention, nausea and vomiting.   Genitourinary: Negative for difficulty urinating and flank pain.   Musculoskeletal: Positive for gait problem. Negative for arthralgias.   Skin: Negative for color change and rash.   Neurological: Positive for weakness. Negative for facial asymmetry, speech difficulty, numbness and headaches.   Psychiatric/Behavioral: Negative for agitation and confusion.     Objective:     Vital Signs (Most Recent):  Temp: 98.4 °F (36.9 °C) (06/27/18 0724)  Pulse: 62 (06/27/18 0746)  Resp: 16 (06/27/18 0724)  BP: (!) 173/74 (06/27/18 0724)  SpO2: (!) 94 % (06/27/18 0724)    Vital Signs (24h Range):  Temp:  [97.7 °F (36.5 °C)-98.9 °F (37.2 °C)] 98.4 °F (36.9 °C)  Pulse:  [62-86] 62  Resp:  [13-23]  16  SpO2:  [94 %-99 %] 94 %  BP: (157-205)/(70-85) 173/74     Body mass index is 19.07 kg/m².    Physical Exam   Constitutional: She is oriented to person, place, and time. She appears well-developed and well-nourished.   HENT:   Head: Normocephalic and atraumatic.   Eyes: EOM are normal. Pupils are equal, round, and reactive to light.   Neck: Neck supple.   Cardiovascular: Normal rate and regular rhythm.    Pulmonary/Chest: Effort normal. No respiratory distress.   Abdominal: Soft. There is no tenderness.   Musculoskeletal: Normal range of motion. She exhibits no deformity.   Neurological: She is alert and oriented to person, place, and time. No sensory deficit. She exhibits abnormal muscle tone (L side).   -mild dysarthria  RUE: 5/5, 5/5 .  LUE: 0/5, 0/5 .  RLE: 5/5, DF 5/5, PF 5/5.  LLE: 0/5, DF 0/5, PF 0/5.     Skin: Skin is warm and dry.   Psychiatric: She has a normal mood and affect. Her behavior is normal. Cognition and memory are impaired.   Vitals reviewed.    NEUROLOGICAL EXAMINATION:     MENTAL STATUS   Oriented to person, place, and time.     CRANIAL NERVES     CN III, IV, VI   Pupils are equal, round, and reactive to light.  Extraocular motions are normal.       Diagnostic Results:   Labs: Reviewed  ECG: Reviewed  X-Ray: Reviewed  CT: Reviewed  MRI: Reviewed

## 2018-06-27 NOTE — CONSULTS
Consult received re: stroke pathway. Pt re-started on Regular diet after being NPO this AM. Pt reports good PO intake last night with good appetite. Reports wt is stable. Pt with no medical hx. No needs at this time. Please re-consult if needed.

## 2018-06-27 NOTE — PLAN OF CARE
Cm discussed in patient rehab recommendation from therapy with the patient. Cm provided a list of rehabs to choose from. Cm asked for three choices ASAP. Cm will continue to follow.    1515  Cm spoke to the patient and her son at bedside to get rehab choices. Cm was given [facility] and [facility]. Sw notified. Cm will continue to follow.

## 2018-06-27 NOTE — PT/OT/SLP EVAL
"Physical Therapy Evaluation    Patient Name:  Shannan Reza   MRN:  [id number]    Recommendations:     Discharge Recommendations:  rehabilitation facility   Discharge Equipment Recommendations: 3-in-1 commode, bath bench (additional DME TBD pending progress)   Barriers to discharge: Decreased caregiver support    Assessment:     Shannan Reza is a 67 y.o. female admitted with a medical diagnosis of cerebrovascular accident (CVA) due to thrombosis of right middle cerebral artery.  She presents with the following impairments/functional limitations: left sided weakness, impaired endurance, gait instability, impaired functional mobilty, impaired self care skills, impaired balance, decreased coordination, decreased lower extremity function, decreased upper extremity function, decreased ROM. Prior to admit, pt was independent and active, working full-time. Following CVA, pt has experienced significant decline in mobility and participation in ADLs due to functional deficits listed above. Pt is a good candidate for inpatient rehabilitation and would benefit from skilled PT intervention to address listed deficits, reduce fall risk, and maximize (I) and safety with functional mobility.     Rehab Prognosis:  good; patient would benefit from acute skilled PT services to address these deficits and reach maximum level of function.      Recent Surgery: * No surgery found *      Plan:     During this hospitalization, patient to be seen 5 x/week to address the above listed problems via gait training, therapeutic activities, therapeutic exercises, neuromuscular re-education, wheelchair management/training  · Plan of Care Expires:  07/27/18   Plan of Care Reviewed with: patient, son    Subjective     Communicated with RN prior to session.  Patient found supine with son at bedside upon PT entry to room. Pt alert and agreeable to evaluation.      Chief Complaint: L sided weakness   Patient comments/goals: "I stood up earlier, and my " "feet completely slipped out from under me. I'm not standing again."   Pain/Comfort:  · Pain Rating 1: 0/10  · Pain Rating Post-Intervention 1: 0/10    Patients cultural, spiritual, Episcopal conflicts given the current situation: no conflicts    Patient History:     Living Environment: Pt lives with son in [facility] with threshold to enter    Prior Level of Function: independent with mobility and ADLs   DME owned: none   Caregiver Assistance: assistance from son, however son works during the day. 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       Objective:     Patient found with: telemetry, peripheral IV     General Precautions: Standard, fall, aspiration   Orthopedic Precautions:N/A   Braces: N/A     Exams:  · Cognitive Exam:  Patient is oriented to Person, Place, Time and Situation and follows 100% of simple commands   · Gross Motor Coordination:  impaired heel to shin LLE   · Postural Exam:  Patient presented with the following abnormalities:    · -       Rounded shoulders  · -       Forward head  · -       L lateral and posterior lean   · Sensation:    · -       Intact  · RLE ROM: WFL  · RLE Strength: WFL  · LLE ROM/strength: PROM WFL; Ankle DF 0/5, knee flex/ext 3/5, hip flexion 2/5   · Vision: pt denied blurry or double vision; visual tracking intact       Functional Mobility:       Bed Mobility  · Rolling: to right with CGA   · Supine to sit: moderate assistance     · Sit to supine: moderate assistance       Transfers Pt refusing trial of sit>stand transfer. Pt stated that she performed standing trial earlier and feels unsafe to perform transfer again, despite max encouragement. Educated pt on importance of therapy assessment, pt agreeable to practicing transfers at next visit.      Gait    Unable to perform          Balance  - Static Sitting: impaired; " Pt requiring RUE support during static sitting EOB, demonstrating L lateral and posterior lean   - Dynamic Sitting: impaired; pt able to perform reaching outside ELISHA x 1 trial with RUE, with min A required to maintain balance. Pt with fluctuating balance, requiring CGA-mod A to maintain while sitting EOB.   - Static Standing: unable to assess  - Dynamic Standing: unable to assess     AM-PAC 6 CLICK MOBILITY  Total Score:11       Therapeutic Activities and Exercises:  Pt and son educated on:  - role of PT and POC/goals for therapy   - safety with mobility and fall risk      Pt verbalized understanding and expressed no further concerns/questions.     Patient left HOB elevated with all lines intact, call button in reach, RN notified and son present.    GOALS:    Physical Therapy Goals        Problem: Physical Therapy Goal    Goal Priority Disciplines Outcome Goal Variances Interventions   Physical Therapy Goal     PT/OT, PT Ongoing (interventions implemented as appropriate)     Description:  Goals to be met by: 18    Patient will increase functional independence with mobility by performin. Supine to sit with supervision with HOB flat   2. Sit to supine with supervision with HOB flat   3. Sit to stand transfer with Contact Guard Assistance with or without least restrictive AD.   4. Bed to chair transfer with Contact Guard Assistance with or without least restrictive AD.   5. Gait  x 20 feet with Minimal Assistance with or without least restrictive AD.   6. Sitting at edge of bed x5 minutes with stand by assistance while performing dynamic reaching tasks to improve sitting balance   7. Stand for 3 minutes with Contact Guard Assistance   8. Lower extremity exercise program x10 reps per handout, with assistance as needed                      History:     Past Medical History:   Diagnosis Date    Cerebrovascular accident (CVA) due to thrombosis of right middle cerebral artery 2018       Past Surgical  History:   Procedure Laterality Date    BRAIN SURGERY         Clinical Decision Making:     Moderate complexity evaluation:   · Evolving clinical presentation   · 1-2 personal factors/comorbidities affecting treatment   · Examining and addressing 3+ functional deficits, activity limitations, and participation restrictions    Time Tracking:     PT Received On: 06/27/18  PT Start Time: 0826     PT Stop Time: 0838  PT Total Time (min): 12 min     Billable Minutes: Evaluation 12 min    Rubina Medina PT, DPT   6/27/2018  Pager: [phone]

## 2018-06-27 NOTE — PLAN OF CARE
SW sent rehab referrals to [facility] and [facility]. [facility] will follow up.    Felicia Parish LMSW  [facility]  Ext. [phone]

## 2018-06-28 PROBLEM — I67.1 ANTERIOR COMMUNICATING ARTERY ANEURYSM: Status: ACTIVE | Noted: 2018-06-28

## 2018-06-28 LAB
ALBUMIN SERPL BCP-MCNC: 3.5 G/DL
ALP SERPL-CCNC: 55 U/L
ALT SERPL W/O P-5'-P-CCNC: 11 U/L
ANION GAP SERPL CALC-SCNC: 10 MMOL/L
AST SERPL-CCNC: 18 U/L
BASOPHILS # BLD AUTO: 0.05 K/UL
BASOPHILS NFR BLD: 0.9 %
BILIRUB SERPL-MCNC: 0.4 MG/DL
BUN SERPL-MCNC: 9 MG/DL
CALCIUM SERPL-MCNC: 9.4 MG/DL
CHLORIDE SERPL-SCNC: 109 MMOL/L
CO2 SERPL-SCNC: 23 MMOL/L
CREAT SERPL-MCNC: 0.7 MG/DL
DIFFERENTIAL METHOD: ABNORMAL
EOSINOPHIL # BLD AUTO: 0.1 K/UL
EOSINOPHIL NFR BLD: 2.2 %
ERYTHROCYTE [DISTWIDTH] IN BLOOD BY AUTOMATED COUNT: 13.2 %
EST. GFR  (AFRICAN AMERICAN): >60 ML/MIN/1.73 M^2
EST. GFR  (NON AFRICAN AMERICAN): >60 ML/MIN/1.73 M^2
GLUCOSE SERPL-MCNC: 111 MG/DL
HCT VFR BLD AUTO: 34.5 %
HGB BLD-MCNC: 11.8 G/DL
IMM GRANULOCYTES # BLD AUTO: 0.02 K/UL
IMM GRANULOCYTES NFR BLD AUTO: 0.3 %
LYMPHOCYTES # BLD AUTO: 1.7 K/UL
LYMPHOCYTES NFR BLD: 29.4 %
MCH RBC QN AUTO: 31.6 PG
MCHC RBC AUTO-ENTMCNC: 34.2 G/DL
MCV RBC AUTO: 92 FL
MONOCYTES # BLD AUTO: 0.4 K/UL
MONOCYTES NFR BLD: 7.6 %
NEUTROPHILS # BLD AUTO: 3.5 K/UL
NEUTROPHILS NFR BLD: 59.6 %
NRBC BLD-RTO: 0 /100 WBC
PLATELET # BLD AUTO: 266 K/UL
PMV BLD AUTO: 10.4 FL
POTASSIUM SERPL-SCNC: 3.8 MMOL/L
PROT SERPL-MCNC: 6.5 G/DL
RBC # BLD AUTO: 3.74 M/UL
SODIUM SERPL-SCNC: 142 MMOL/L
WBC # BLD AUTO: 5.82 K/UL

## 2018-06-28 PROCEDURE — 25000003 PHARM REV CODE 250: Performed by: STUDENT IN AN ORGANIZED HEALTH CARE EDUCATION/TRAINING PROGRAM

## 2018-06-28 PROCEDURE — 25000003 PHARM REV CODE 250: Performed by: PHYSICIAN ASSISTANT

## 2018-06-28 PROCEDURE — 36415 COLL VENOUS BLD VENIPUNCTURE: CPT

## 2018-06-28 PROCEDURE — 97530 THERAPEUTIC ACTIVITIES: CPT

## 2018-06-28 PROCEDURE — 25000003 PHARM REV CODE 250: Performed by: NURSE PRACTITIONER

## 2018-06-28 PROCEDURE — 63600175 PHARM REV CODE 636 W HCPCS: Performed by: PHYSICIAN ASSISTANT

## 2018-06-28 PROCEDURE — 25000003 PHARM REV CODE 250: Performed by: PSYCHIATRY & NEUROLOGY

## 2018-06-28 PROCEDURE — 99233 SBSQ HOSP IP/OBS HIGH 50: CPT | Mod: ,,, | Performed by: PSYCHIATRY & NEUROLOGY

## 2018-06-28 PROCEDURE — 20600001 HC STEP DOWN PRIVATE ROOM

## 2018-06-28 PROCEDURE — 85025 COMPLETE CBC W/AUTO DIFF WBC: CPT

## 2018-06-28 PROCEDURE — 92507 TX SP LANG VOICE COMM INDIV: CPT

## 2018-06-28 PROCEDURE — 97112 NEUROMUSCULAR REEDUCATION: CPT

## 2018-06-28 PROCEDURE — 99232 SBSQ HOSP IP/OBS MODERATE 35: CPT | Mod: ,,, | Performed by: NURSE PRACTITIONER

## 2018-06-28 PROCEDURE — 80053 COMPREHEN METABOLIC PANEL: CPT

## 2018-06-28 PROCEDURE — 97535 SELF CARE MNGMENT TRAINING: CPT

## 2018-06-28 PROCEDURE — A4216 STERILE WATER/SALINE, 10 ML: HCPCS | Performed by: PSYCHIATRY & NEUROLOGY

## 2018-06-28 RX ORDER — LISINOPRIL 2.5 MG/1
2.5 TABLET ORAL ONCE
Status: COMPLETED | OUTPATIENT
Start: 2018-06-28 | End: 2018-06-28

## 2018-06-28 RX ORDER — SENNOSIDES 8.6 MG/1
8.6 TABLET ORAL DAILY PRN
Status: DISCONTINUED | OUTPATIENT
Start: 2018-06-28 | End: 2018-06-29 | Stop reason: HOSPADM

## 2018-06-28 RX ORDER — LISINOPRIL 2.5 MG/1
2.5 TABLET ORAL DAILY
Status: DISCONTINUED | OUTPATIENT
Start: 2018-06-28 | End: 2018-06-28

## 2018-06-28 RX ORDER — LISINOPRIL 10 MG/1
10 TABLET ORAL DAILY
Status: DISCONTINUED | OUTPATIENT
Start: 2018-06-29 | End: 2018-06-29

## 2018-06-28 RX ADMIN — CLOPIDOGREL 75 MG: 75 TABLET, FILM COATED ORAL at 09:06

## 2018-06-28 RX ADMIN — Medication 3 ML: at 10:06

## 2018-06-28 RX ADMIN — ASPIRIN 325 MG ORAL TABLET 325 MG: 325 PILL ORAL at 09:06

## 2018-06-28 RX ADMIN — LISINOPRIL 2.5 MG: 2.5 TABLET ORAL at 08:06

## 2018-06-28 RX ADMIN — HEPARIN SODIUM 5000 UNITS: 5000 INJECTION, SOLUTION INTRAVENOUS; SUBCUTANEOUS at 08:06

## 2018-06-28 RX ADMIN — LISINOPRIL 2.5 MG: 2.5 TABLET ORAL at 10:06

## 2018-06-28 RX ADMIN — HEPARIN SODIUM 5000 UNITS: 5000 INJECTION, SOLUTION INTRAVENOUS; SUBCUTANEOUS at 03:06

## 2018-06-28 RX ADMIN — Medication 3 ML: at 02:06

## 2018-06-28 RX ADMIN — Medication 3 ML: at 06:06

## 2018-06-28 RX ADMIN — HEPARIN SODIUM 5000 UNITS: 5000 INJECTION, SOLUTION INTRAVENOUS; SUBCUTANEOUS at 06:06

## 2018-06-28 RX ADMIN — ATORVASTATIN CALCIUM 80 MG: 20 TABLET, FILM COATED ORAL at 09:06

## 2018-06-28 NOTE — PT/OT/SLP PROGRESS
"Occupational Therapy   Treatment    Name: Shannan Reza  MRN: [id number]  Admitting Diagnosis:  Cerebrovascular accident (CVA) due to thrombosis of right middle cerebral artery       Recommendations:     Discharge Recommendations: rehabilitation facility  Discharge Equipment Recommendations:  3-in-1 commode, bath bench, wheelchair  Barriers to discharge:  Inaccessible home environment, Decreased caregiver support    Subjective   Patient: "I get aggravated but I am ready to do what I have to."  Communicated with: Nurse prior to session.  Pain/Comfort:  · Pain Rating 1: 0/10  · Pain Rating Post-Intervention 1: 0/10    Patients cultural, spiritual, Druze conflicts given the current situation: Orthodoxy    Objective:     Patient found with: peripheral IV, telemetry  Family not present.  General Precautions: Standard, aspiration, fall   Orthopedic Precautions:N/A   Braces: N/A     Occupational Performance:    Bed Mobility:    · Patient completed Rolling/Turning to Left with  supervision  · Patient completed Rolling/Turning to Right with moderate assistance  · Patient completed Scooting/Bridging with moderate assistance  · Patient completed Supine to Sit with moderate assistance  · Patient completed Sit to Supine with moderate assistance     Functional Mobility/Transfers:  · Patient completed Sit <> Stand Transfer with moderate assistance  with  no assistive device   · Patient completed Bed <> Chair Transfer using Stand Pivot technique with maximal assistance with no assistive device    Activities of Daily Living:  · Grooming: minimum assistance while seated EOB with left UE in weight bearing  · UB Dressing: maximal assistance while seated EOB  · LB Dressing: total assistance while seated EOB    Patient left supine with all lines intact, call button in reach and bed alarm on    AMPAC 6 Click:  AMPAC Total Score: 13    Treatment & Education:  Patient education provided for stroke warning signs, prevention guidelines " and personal risk factors.  Patient verbalizing understanding via teach back method.  Patient education provided on role of OT and need for rehab upon discharge.   Continued education, patient/ family training recommended.  Patient alert and oriented x 3; able to follow 4/4 one step commands.  Patient attentive and interactive throughout the session.  Addressed oral motor ex 2* left facial weakness.  PROM performed left UE one set x 10 rep in all planes of motion.  SBA-CGA with postural control while seated EOB with left UE in weight bearing.  Patient's functional status and disposition recommendation discussed with stroke team in daily rounds.  White board updated in patient's room.  OT asked if there were any other questions; patient/ family had no further questions.  Education:    Assessment:     Shannan Reza is a 67 y.o. female with a medical diagnosis of Stroke.  She presents with performance deficits affecting function are weakness, impaired self care skills, impaired balance, decreased coordination, decreased safety awareness, decreased ROM, impaired coordination, decreased upper extremity function, impaired functional mobilty, impaired endurance, impaired cognition, decreased lower extremity function, abnormal tone, impaired fine motor.    These performance deficits have resulted in activity limitations including but not limited to:   bed mobility, transfers, ascending/ descending stairs, walking short and long distances, walking around obstacles, transitional movement patterns (kneeling, bending); eating, upper body dressing, lower body dressing, brushing teeth, toileting, bathing, carrying objects, balancing checkbook, shopping, meal preparation, and driving.   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 Patient will benefit from skilled OT services to maximize level of independence with self-care skills and functional mobility.  Will benefit from rehab.  Improvements noted with mobility.     Rehab Prognosis:  Good; patient would benefit from acute skilled OT services to address these deficits and reach maximum level of function.       Plan:     Patient to be seen 5 x/week to address the above listed problems via self-care/home management, neuromuscular re-education, cognitive retraining, sensory integration, therapeutic activities, therapeutic exercises  · Plan of Care Expires: 07/25/18  · Plan of Care Reviewed with: patient    This Plan of care has been discussed with the patient who was involved in its development and understands and is in agreement with the identified goals and treatment plan    GOALS:    Occupational Therapy Goals        Problem: Occupational Therapy Goal    Goal Priority Disciplines Outcome Interventions   Occupational Therapy Goal     OT, PT/OT     Description:  Goals set 6/27 to be addressed for 7 days with expiration date, 7/4:  Patient will increase functional independence with ADLs by performing:    Patient will demonstrate rolling to the right with Min assist.  Not met   Patient will demonstrate rolling to the left with modified independence.   Not met  Patient will demonstrate supine -sit with min assist.   Not met  Patient will demonstrate stand pivot transfers with min assist.   Not met  Patient will demonstrate grooming while standing with min assist.   Not met  Patient will demonstrate upper body dressing with min assist while seated EOB.   Not met  Patient will demonstrate lower body dressing with mod assist while seated EOB.   Not met  Patient will demonstrate toileting with mod assist.   Not met  Patient's family / caregiver will demonstrate independence and safety with assisting patient with self-care skills and functional mobility.     Not met  Patient's family / caregiver will demonstrate independence with providing ROM and changes in bed positioning.   Not met  Patient and/or patient's family will verbalize understanding of  stroke prevention guidelines, personal risk factors and stroke warning signs via teachback method.  Not met                           Time Tracking:     OT Date of Treatment: 06/28/18  OT Start Time: 0705  OT Stop Time: 0748  OT Total Time (min): 43 min    Billable Minutes:Self Care/Home Management 26  Neuromuscular Re-education 17    BRIGHT Umana  6/28/2018

## 2018-06-28 NOTE — SUBJECTIVE & OBJECTIVE
Neurologic Chief Complaint: LSW, L facial droop     Subjective:     Interval History: Patient is seen for follow-up neurological assessment and treatment recommendations:      Facial asymmetry on left overnight, repeat CTH stable. 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 Replaced potassium for hypokalemia.     HPI, Past Medical, Family, and Social History remains the same as documented in the initial encounter.     Review of Systems   Constitutional: Negative for fever.   Eyes: Negative for visual disturbance.   Respiratory: Negative for shortness of breath.    Cardiovascular: Negative for chest pain.   Skin: Negative for rash.   Neurological: Positive for facial asymmetry and weakness. Negative for speech difficulty.     Scheduled Meds:   [START ON 6/28/2018] aspirin  325 mg Oral Daily    atorvastatin  80 mg Oral Daily    clopidogrel  75 mg Oral Daily    heparin (porcine)  5,000 Units Subcutaneous Q8H    sodium chloride 0.9%  3 mL Intravenous Q8H     Continuous Infusions:   sodium chloride 0.9% 75 mL/hr at 06/26/18 1035    sodium chloride 0.9%       PRN Meds:labetalol, sodium chloride 0.9%    Objective:     Vital Signs (Most Recent):  Temp: 98.4 °F (36.9 °C) (06/27/18 1538)  Pulse: 66 (06/27/18 1549)  Resp: 18 (06/27/18 1538)  BP: 139/65 (06/27/18 1538)  SpO2: (!) 93 % (06/27/18 1538)  BP Location: Right arm    Vital Signs Range (Last 24H):  Temp:  [97.7 °F (36.5 °C)-98.9 °F (37.2 °C)]   Pulse:  [62-86]   Resp:  [16-20]   BP: (139-205)/(65-85)   SpO2:  [93 %-96 %]   BP Location: Right arm    Physical Exam   Constitutional: She appears well-developed and well-nourished. No distress.   HENT:   Head: Normocephalic and atraumatic.   Eyes: EOM are normal.   Pulmonary/Chest: Effort normal.   Musculoskeletal:   Left hemiparesis   Skin: Skin is warm and dry. She is not diaphoretic.   Psychiatric: She has a normal mood and affect. Her behavior is normal.       Neurological Exam:    LOC: alert  Attention Span: Good   Language: No aphasia  Articulation: No dysarthria  Orientation: Person, Place, Time   Visual Fields: Full  EOM (CN III, IV, VI): Full/intact  Facial Movement (CN VII): Lower facial weakness on the Left  Motor: Arm left  Paresis: 1/5  Leg left  Paresis: 3/5  Arm right  Normal 5/5  Leg right Normal 5/5  Cebellar: No evidence of appendicular or axial ataxia  Sensation: Intact to light touch, temperature and vibration  Tone: Flaccid  LUE     Laboratory:  CMP:   Recent Labs  Lab 06/27/18  0508   CALCIUM 9.1   ALBUMIN 3.5   PROT 6.5      K 3.4*   CO2 25      BUN 6*   CREATININE 0.6   ALKPHOS 58   ALT 9*   AST 20   BILITOT 0.5     BMP:   Recent Labs  Lab 06/27/18  0508      K 3.4*      CO2 25   BUN 6*   CREATININE 0.6   CALCIUM 9.1     CBC:   Recent Labs  Lab 06/27/18  0508   WBC 5.49   RBC 3.81*   HGB 12.1   HCT 34.7*      MCV 91   MCH 31.8*   MCHC 34.9     Lipid Panel:   Recent Labs  Lab 06/26/18  0842   CHOL 275*   LDLCALC 163.0*   HDL 77*   TRIG 175*     Coagulation:   Recent Labs  Lab 06/27/18  0508   INR 1.0   APTT 23.8     Platelet Aggregation Study: No results for input(s): PLTAGG, PLTAGINTERP, PLTAGREGLACO, ADPPLTAGGREG in the last 168 hours.  Hgb A1C:   Recent Labs  Lab 06/26/18  0842   HGBA1C 5.2     TSH:   Recent Labs  Lab 06/26/18  0842   TSH 1.055       Diagnostic Results     Brain Imaging   CTH 6/26/18:   No acute intracranial abnormalities identified allowing for limitations by the extensive metal artifact along the right frontal calvarium.    Stable postop changes.  No significant change noted compared to noncontrast head CT examination performed earlier today.      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  Angiography would be helpful for further  evaluation.    History of remote aneurysm surgery with a surgical clip in the right suprasellar cistern and metal plate over the right frontal bone. 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    Moderate to severe stenosis of the right vertebral artery origin.    No focal high-grade stenosis or occlusion of the intracranial arteries.      Cardiac Imaging   ECHO 6/26/18:   CONCLUSIONS     1 - Concentric remodeling.     2 - Normal left ventricular systolic function (EF 60-65%).     3 - Normal right ventricular systolic function .     4 - Indeterminate LV diastolic function.     5 - Severe left atrial enlargement

## 2018-06-28 NOTE — PT/OT/SLP PROGRESS
Physical Therapy Treatment    Patient Name:  Shannan Reza   MRN:  [id number]  Admitting Diagnosis: Cerebrovascular accident (CVA) due to thrombosis of right middle cerebral artery  Recent Surgery: * No surgery found *      Recommendations:     Discharge Recommendations:  rehabilitation facility   Discharge Equipment Recommendations: 3-in-1 commode, bath bench   Barriers to discharge: Decreased caregiver support    Plan:     During this hospitalization, patient to be seen 5 x/week to address the above listed problems via gait training, therapeutic activities, therapeutic exercises, neuromuscular re-education  · Plan of Care Expires:  07/27/18   Plan of Care Reviewed with: patient, son    This Plan of care has been discussed with the patient who was involved in its development and understands and is in agreement with the identified goals and treatment plan    Subjective     Communicated with RN (Irma) prior to session.     Patient comments: Pt with no major complaints  Pain/Comfort:  · Pain Rating 1: 0/10  · Pain Rating Post-Intervention 1: 0/10    Objective:     2 attempts for tx session 2* MD visit    Patient found with: bed alarm, telemetry    Patient found sup in bed upon PT entry to room, agreeable to treatment.  Son present in the room.    General Precautions: Standard, Cardiac fall, aspiration   Orthopedic Precautions:N/A   Braces: N/A       BED MOBILITY (vc's for hand placement sequencing of task):        Rolling to the L with mod A with no use of bedrail       Sup > sit at the EOB with mod A from L side lying        Sit > sup Not performed 2* pt left seated UIC        Scooting hips to the EOB upon sitting with mod A x2 symmetrical scoots       Scooting hips along the EOB to the R requiring mod A x2 scoots       Pt performs scooting laterally to the L via bridging with mod A x2 scoot(s)           SITTING AT THE EDGE OF THE BED (5-8 min)   Assistance Level Required: mod to min A for trunk control with R UE  support and L UE in weight bearing position on firm surface     Postural deviations noted: flexed trunk, rounded shoulders, PPT   Encouraged: upright posture, neutral pelvis.  Pt performs reaching with R UE in multiple directions with mod A for trunk and L UE in WB'ng position        TRANSFERS  (vc's for hand placement, sequencing of task and safety) 2 trials each direction   Patient completed Bed <> BS chair Transfer using Squat Pivot technique to the R with mod A with no assistive device   Patient completed Chair <> EOB Transfer using Squat Pivot technique to the L with mod A with no assistive device      EDUCATION  Education provided to pt regarding: postural control, weight shift, safety during transfers.    They were provided and educated on proper positioning in supine and in sitting with support of affected L UE in order to increase awareness of it and to decrease the effects of immobility, specifically edema and pain.     Whiteboard updated with correct mobility information. RN/PCT notified.  Pt safe to transfer with RN/PCT via SQPT: Use no AD with mod A of 1 person.    Patient left up in chair, with L UE propped on pillow for scapular support and edema management with all lines intact, call button in reach, RN notified and son present    AM-PAC 6 CLICK MOBILITY  Turning over in bed (including adjusting bedclothes, sheets and blankets)?: 2  Sitting down on and standing up from a chair with arms (e.g., wheelchair, bedside commode, etc.): 2  Moving from lying on back to sitting on the side of the bed?: 2  Moving to and from a bed to a chair (including a wheelchair)?: 2  Need to walk in hospital room?: 1  Climbing 3-5 steps with a railing?: 1  Basic Mobility Total Score: 10     Assessment:     Shannan Reza is a 67 y.o. female admitted with a medical diagnosis of Cerebrovascular accident (CVA) due to thrombosis of right middle cerebral artery.  She presents with the following impairments/functional  limitations:  weakness, impaired sensation, impaired self care skills, impaired functional mobilty, gait instability, impaired balance, decreased coordination, decreased upper extremity function, decreased lower extremity function, decreased safety awareness, abnormal tone, decreased ROM, impaired coordination, impaired fine motor. L hemiparesis requiring significant assistance and verbal cues for bed mob, sitting at the EOB, scooting to/along the EOB, transfers 2* weakness, impaired balance.   In light of pt's current functional level and deficits, it is anticipated that pt will need to participate in an intense rehab program consisting of PT, OT and ST in order to achieve full rehab potential to return to previous level of function and roles.  Pt remains motivated to participate in PT session and will cont to benefit from skilled PT intervention.    Rehab Prognosis:  Good; patient would benefit from acute skilled PT services to address these deficits and reach maximum level of function.      GOALS:    Physical Therapy Goals        Problem: Physical Therapy Goal    Goal Priority Disciplines Outcome Goal Variances Interventions   Physical Therapy Goal     PT/OT, PT Ongoing (interventions implemented as appropriate)     Description:  Goals to be met by: 18    Patient will increase functional independence with mobility by performin. Supine to sit with supervision with HOB flat   2. Sit to supine with supervision with HOB flat   3. Sit to stand transfer with Contact Guard Assistance with or without least restrictive AD.   4. Bed to chair transfer with Contact Guard Assistance with or without least restrictive AD.   5. Gait  x 20 feet with Minimal Assistance with or without least restrictive AD.   6. Sitting at edge of bed x5 minutes with stand by assistance while performing dynamic reaching tasks to improve sitting balance   7. Stand for 3 minutes with Contact Guard Assistance   8. Lower extremity exercise  program x10 reps per handout, with assistance as needed                      Time Tracking:     PT Received On: 06/28/18  PT Start Time: 1130     PT Stop Time: 1203  PT Total Time (min): 33 min     Billable Minutes: Therapeutic Activity 33    Treatment Type: Treatment  PT/PTA: MIKO Jensen PTA.  Pager [phone]    6/28/2018    .

## 2018-06-28 NOTE — PT/OT/SLP PROGRESS
Speech Language Pathology Treatment    Patient Name:  Shannan Reza   MRN:  [id number]  Admitting Diagnosis: Cerebrovascular accident (CVA) due to thrombosis of right middle cerebral artery    Recommendations:                 General Recommendations:  Dysphagia therapy and Speech/language therapy  Diet recommendations:  Regular, Liquid Diet Level: Thin   Aspiration Precautions: 1 bite/sip at a time, Avoid talking while eating, Feed only when awake/alert, HOB to 90 degrees, Meds whole 1 at a time, Remain upright 30 minutes post meal and Strict aspiration precautions   General Precautions: Standard, aspiration, fall  Communication strategies:  provide increased time to answer    Subjective     SLP reviewed patient with nurse prior to session; nurse reported no difficulty with current diet/meds   Son in room with patient during session   Pt was alert, calm and cooperative  Pt reports no change in her swallowing, language, speech or cognition  Pt and son wished to speak with CM regarding d/c; SLP contacted CM after session to pass along message  Patient goals: She is ready to move on to rehab     Pain/Comfort:  · Pain Rating 1: 0/10  · Pain Rating Post-Intervention 1: 0/10    Objective:     Has the patient been evaluated by SLP for swallowing?   Yes  Keep patient NPO? No   Current Respiratory Status: room air      Patient seated up in bed with son in room when SLP entered. Patient was seen for training with oral motor exercises to increase labial strength and coordination. Patient still exhibits reduced movement in left cheek upon lip protrusion and retraction, but she did not acknowledge any change her speech or oral sensation. Patient demonstrated OMEs with mod assist after modeling from SLP. Patient verbalized understanding of the reason for exercises. Patient reported tolerating regular diet with thin liquids without s/s of aspiration. No further questions for SLP from patient or son. Whiteboard updated. Call light  within reach and son present when SLP left the room.     Assessment:     Shannan Reza is a 67 y.o. female with an SLP diagnosis of Dysphagia and Mild Dysarthria.  She presents with mildly decreased articulatory precision, but is still 100% intelligible. Patient also presents with labial/lingual weakness which is being addressed with OMEs. ST will continue to monitor diet tolerance and provide treatment to improve speech. Patient would benefit from ongoing ST via inpatient rehab upon d/c from acute.     Goals:    SLP Goals        Problem: SLP Goal    Goal Priority Disciplines Outcome   SLP Goal     SLP Ongoing (interventions implemented as appropriate)   Description:  Speech Language Pathology Goals  Goals expected to be met by 7/3/18  1. Pt will tolerate regular diet with thin liquids, MOD I  2. Pt will recall 3/3 unrelated items post 3 minute filled delay, 90% of the time,  MOD I  3. Pt will complete OMEs x10 ea to improve labial strength/coordination, MOD I  4. Pt will repeat sentences (moderate length) with 90% intelligibility, MOD I   5. Pt will complete visiospatial tasks with 90% accuracy, MOD I  6. Educate Pt on S/S aspiration and aspiration precautions                        Plan:     · Patient to be seen:  5 x/week   · Plan of Care expires:  07/26/18  · Plan of Care reviewed with:  patient, son   · SLP Follow-Up:  Yes       Discharge recommendations:  rehabilitation facility   Barriers to Discharge:  Level of Skilled Assistance Needed     Time Tracking:     SLP Treatment Date:   06/28/18  Speech Start Time:  1044  Speech Stop Time:  1058     Speech Total Time (min):  14 min    Billable Minutes: Speech Therapy Individual 14    RAUL Olsen-SLP  Speech-Language Pathology  Pager: [phone]     06/28/2018

## 2018-06-28 NOTE — PROGRESS NOTES
[facility]  Physical Medicine & Rehab  Progress Note    Patient Name: Shannan Reza  MRN: [id number]  Admission Date: 6/26/2018  Length of Stay: 2 days  Attending Physician: Simon Martinez MD    Subjective:     Principal Problem:Cerebrovascular accident (CVA) due to thrombosis of right middle cerebral artery    Hospital Course:   6/27/18:Evaluated by therapy.  Bed mobility SV-MaxA.  Sit to stand and transfers MaxA and then refused with PT.  UBD/grooming MaxA and LBD/toileting totalA. Passed bedside swallow evaluation.  SLP recommending regular diet and thin liquids. SLP diagnosis of Dysarthria and Mild Cognitive-Linguistic Impairment     Interval History 6/28/2018:  Patient is seen for follow-up rehab evaluation and recommendations: Possible etiology includes small vessel disease vs embolic stroke of undetermined source.   Evaluated by therapy.     HPI, Past Medical, Family, and Social History remains the same as documented in the initial encounter.    Scheduled Medications:    aspirin  325 mg Oral Daily    atorvastatin  80 mg Oral Daily    clopidogrel  75 mg Oral Daily    heparin (porcine)  5,000 Units Subcutaneous Q8H    lisinopril  2.5 mg Oral Daily    sodium chloride 0.9%  3 mL Intravenous Q8H       Diagnostic Results: Labs: Reviewed    PRN Medications: labetalol, sodium chloride 0.9%    Review of Systems   Constitutional: Negative for chills, fatigue and fever.   HENT: Negative for trouble swallowing and voice change.    Eyes: Negative for photophobia and visual disturbance.   Respiratory: Negative for cough, shortness of breath and wheezing.    Cardiovascular: Negative for chest pain and palpitations.   Gastrointestinal: Negative for abdominal distention, nausea and vomiting.   Genitourinary: Negative for difficulty urinating and flank pain.   Musculoskeletal: Positive for gait problem. Negative for arthralgias.   Skin: Negative for color change and rash.   Neurological: Positive  for weakness. Negative for facial asymmetry, speech difficulty, numbness and headaches.   Psychiatric/Behavioral: Negative for agitation and confusion.     Objective:     Vital Signs (Most Recent):  Temp: 98 °F (36.7 °C) (06/28/18 0713)  Pulse: 65 (06/28/18 0850)  Resp: 18 (06/28/18 0713)  BP: (!) 144/85 (06/28/18 0713)  SpO2: (!) 94 % (06/28/18 0713)    Vital Signs (24h Range):  Temp:  [97.7 °F (36.5 °C)-98.5 °F (36.9 °C)] 98 °F (36.7 °C)  Pulse:  [62-78] 65  Resp:  [17-20] 18  SpO2:  [93 %-99 %] 94 %  BP: (139-167)/(65-85) 144/85     Physical Exam   Constitutional: She is oriented to person, place, and time. She appears well-developed and well-nourished.   HENT:   Head: Normocephalic and atraumatic.   Eyes: EOM are normal. Pupils are equal, round, and reactive to light.   Neck: Neck supple.   Cardiovascular: Normal rate and regular rhythm.    Pulmonary/Chest: Effort normal. No respiratory distress.   Abdominal: Soft. There is no tenderness.   Musculoskeletal: Normal range of motion. She exhibits no deformity.   Neurological: She is alert and oriented to person, place, and time. No sensory deficit. She exhibits abnormal muscle tone (L side).   -mild dysarthria  RUE: 5/5, 5/5 .  LUE: 0/5, 0/5 .  RLE: 5/5, DF 5/5, PF 5/5.  LLE: 0/5, DF 0/5, PF 0/5.  Skin: Skin is warm and dry.   Psychiatric: She has a normal mood and affect. Her behavior is normal. Cognition and memory are impaired.   Vitals reviewed.    Assessment/Plan:      * Cerebrovascular accident (CVA) due to thrombosis of right middle cerebral artery    See hospital course for functional, cognitive/speech/language, and nutrition/swallow status.      Recommendations  -  Encourage mobility, OOB in chair at least 3 hours per day, and early ambulation as appropriate   -  PT/OT evaluate and treat  -  SLP speech and cognitive evaluate and treat  -  Monitor sleep disturbances and establish consistent sleep-wake cycle  -  Monitor for bowel and bladder  dysfunction  -  Monitor for shoulder pain and subluxation  -  Monitor for spasticity  -  Monitor for and prevent skin breakdown and pressure ulcers  · Early mobility, repositioning/weight shifting every 20-30 minutes when sitting, turn patient every 2 hours, proper mattress/overlay and chair cushioning, pressure relief/heel protector boots  -  DVT prophylaxis:  [facility]  -  Reviewed discharge options (IP rehab, SNF, HH therapy, and OP therapy)          Hypokalemia    -resolved        Impaired functional mobility and endurance    -2/2 stroke  -see CVA   -PT/OT/SLP          Carotid-cavernous fistula    -noted on CTA  -s/p previous clipping 1969  -Per VN, f/u with NSGY recommended        Recommend Inpatient Rehab.  IRF preference per patient/[facility].  Barriers to IRF admission:  [facility] does not cover insurance. Will sign off.  Please call with questions/concerns or re-consult if situation changes.        Jessica Murillo NP  Department of Physical Medicine & Rehab   [facility]

## 2018-06-28 NOTE — PLAN OF CARE
Problem: Physical Therapy Goal  Goal: Physical Therapy Goal  Goals to be met by: 18    Patient will increase functional independence with mobility by performin. Supine to sit with supervision with HOB flat   2. Sit to supine with supervision with HOB flat   3. Sit to stand transfer with Contact Guard Assistance with or without least restrictive AD.   4. Bed to chair transfer with Contact Guard Assistance with or without least restrictive AD.   5. Gait  x 20 feet with Minimal Assistance with or without least restrictive AD.   6. Sitting at edge of bed x5 minutes with stand by assistance while performing dynamic reaching tasks to improve sitting balance   7. Stand for 3 minutes with Contact Guard Assistance   8. Lower extremity exercise program x10 reps per handout, with assistance as needed       Discharge Recommendations: Rehab    Pt safe to transfer with RN/PCT via SQPT: Use no AD with mod A of 1 person.    Goals remain appropriate.     Mariela Jensen, PTA.   [phone]   2018

## 2018-06-28 NOTE — PROGRESS NOTES
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   NIHSS 4, CTH without acute abnormality.  Classic ataxic hemiparesis lacunar syndrome. She has been symptomatic for approximately > 24 h, thus out of the window for iv alteplase or endovascular intervention. She is unable to have MRI due to clipped aneurysm.  Possible etiology includes small vessel disease vs embolic (ESUS). Evidence of LAE on ECHO, will recommend 30d event monitor upon discharge. 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 Recommend OP follow up with neurosurgery.   6/27:  Worsened L facial weakness overnight, repeat CTH without acute changes, likely progression and evolution of current stroke.     Antithrombotics for secondary stroke prevention: Antiplatelets: Aspirin: 325 mg daily    Statins for secondary stroke prevention and hyperlipidemia, if present: Statins: Atorvastatin- 40 mg daily    Aggressive risk factor modification: Smoking, Exercise     Rehab efforts: PT/OT/SLP to evaluate and treat - recommend IP rehab. On regular/thin diet     Diagnostics ordered/pending: none     VTE prophylaxis: Heparin 5000 units SQ every 8 hours    BP parameters: Infarct: No intervention, SBP <220            Essential hypertension    BP range 157-205 over last 24 hours  Ok BP <220 acutely with infarct and no intervention  Consider starting home meds gradually         Hypokalemia    K 3.4  20meq KCl solution, PO x 1 dose          Impaired functional mobility and endurance    PT/OT evaluate and treat  Recommend IP  rehab        Carotid-cavernous fistula    Seen on CTA   Cancelled angiogram, likely incidental in relation to acute stroke symptoms   Follow up OP with neurosurgery/IR              6/27/18: Facial asymmetry on left overnight, repeat CTH stable. 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 Replaced potassium for hypokalemia.     STROKE DOCUMENTATION   Acute Stroke Times   Last Known Normal Date: 06/25/18  Last Known Normal Time: 0900  Symptom Onset Date: 06/25/18  Symptom Onset Time: 0900  Stroke Team Called Date: 06/26/18  Stroke Team Called Time: 0830  Stroke Team Arrival Date: 06/26/18  Stroke Team Arrival Time: 0835  CT Interpretation Time: 0840  Decision to Treat Time for Alteplase:  (No iv alteplase candidate)  Decision to Treat Time for IR:  (No IR candidate)    NIH Scale:  1a. Level Of Consciousness: 0-->Alert: keenly responsive  1b. LOC Questions: 0-->Answers both questions correctly  1c. LOC Commands: 0-->Performs both tasks correctly  2. Best Gaze: 0-->Normal  3. Visual: 0-->No visual loss  4. Facial Palsy: 1-->Minor paralysis (flattened nasolabial fold, asymmetry on smiling)  5a. Motor Arm, Left: 3-->No effort against gravity: limb falls  5b. Motor Arm, Right: 0-->No drift: limb holds 90 (or 45) degrees for full 10 secs  6a. Motor Leg, Left: 1-->Drift: leg falls by the end of the 5-sec period but does not hit bed  6b. Motor Leg, Right: 0-->No drift: leg holds 30 degree position for full 5 secs  7. Limb Ataxia: 0-->Absent  8. Sensory: 0-->Normal: no sensory loss  9. Best Language: 0-->No aphasia: normal  10. Dysarthria: 0-->Normal  11. Extinction and Inattention (formerly Neglect): 0-->No abnormality  Total (NIH Stroke Scale): 5       Modified McLennan Score: 0  [place] Coma Scale:    ABCD2 Score:    [id number] Score:   HAS -BLED Score:   ICH Score:   Hunt & Thomas Classification:      Hemorrhagic change of an Ischemic Stroke: Does this patient have an ischemic stroke with  hemorrhagic changes? No     Neurologic Chief Complaint: LSW, L facial droop     Subjective:     Interval History: Patient is seen for follow-up neurological assessment and treatment recommendations:      Facial asymmetry on left overnight, repeat CTH stable. 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 Replaced potassium for hypokalemia.     HPI, Past Medical, Family, and Social History remains the same as documented in the initial encounter.     Review of Systems   Constitutional: Negative for fever.   Eyes: Negative for visual disturbance.   Respiratory: Negative for shortness of breath.    Cardiovascular: Negative for chest pain.   Skin: Negative for rash.   Neurological: Positive for facial asymmetry and weakness. Negative for speech difficulty.     Scheduled Meds:   [START ON 6/28/2018] aspirin  325 mg Oral Daily    atorvastatin  80 mg Oral Daily    clopidogrel  75 mg Oral Daily    heparin (porcine)  5,000 Units Subcutaneous Q8H    sodium chloride 0.9%  3 mL Intravenous Q8H     Continuous Infusions:   sodium chloride 0.9% 75 mL/hr at 06/26/18 1035    sodium chloride 0.9%       PRN Meds:labetalol, sodium chloride 0.9%    Objective:     Vital Signs (Most Recent):  Temp: 98.4 °F (36.9 °C) (06/27/18 1538)  Pulse: 66 (06/27/18 1549)  Resp: 18 (06/27/18 1538)  BP: 139/65 (06/27/18 1538)  SpO2: (!) 93 % (06/27/18 1538)  BP Location: Right arm    Vital Signs Range (Last 24H):  Temp:  [97.7 °F (36.5 °C)-98.9 °F (37.2 °C)]   Pulse:  [62-86]   Resp:  [16-20]   BP: (139-205)/(65-85)   SpO2:  [93 %-96 %]   BP Location: Right arm    Physical Exam   Constitutional: She appears well-developed and well-nourished. No distress.   HENT:   Head: Normocephalic and atraumatic.   Eyes: EOM are normal.   Pulmonary/Chest: Effort normal.   Musculoskeletal:   Left hemiparesis   Skin: Skin is warm and dry. She is not diaphoretic.   Psychiatric: She has a normal mood and affect. Her behavior is normal.        Neurological Exam:   LOC: alert  Attention Span: Good   Language: No aphasia  Articulation: No dysarthria  Orientation: Person, Place, Time   Visual Fields: Full  EOM (CN III, IV, VI): Full/intact  Facial Movement (CN VII): Lower facial weakness on the Left  Motor: Arm left  Paresis: 1/5  Leg left  Paresis: 3/5  Arm right  Normal 5/5  Leg right Normal 5/5  Cebellar: No evidence of appendicular or axial ataxia  Sensation: Intact to light touch, temperature and vibration  Tone: Flaccid  LUE     Laboratory:  CMP:   Recent Labs  Lab 06/27/18  0508   CALCIUM 9.1   ALBUMIN 3.5   PROT 6.5      K 3.4*   CO2 25      BUN 6*   CREATININE 0.6   ALKPHOS 58   ALT 9*   AST 20   BILITOT 0.5     BMP:   Recent Labs  Lab 06/27/18  0508      K 3.4*      CO2 25   BUN 6*   CREATININE 0.6   CALCIUM 9.1     CBC:   Recent Labs  Lab 06/27/18  0508   WBC 5.49   RBC 3.81*   HGB 12.1   HCT 34.7*      MCV 91   MCH 31.8*   MCHC 34.9     Lipid Panel:   Recent Labs  Lab 06/26/18  0842   CHOL 275*   LDLCALC 163.0*   HDL 77*   TRIG 175*     Coagulation:   Recent Labs  Lab 06/27/18  0508   INR 1.0   APTT 23.8     Platelet Aggregation Study: No results for input(s): PLTAGG, PLTAGINTERP, PLTAGREGLACO, ADPPLTAGGREG in the last 168 hours.  Hgb A1C:   Recent Labs  Lab 06/26/18  0842   HGBA1C 5.2     TSH:   Recent Labs  Lab 06/26/18  0842   TSH 1.055       Diagnostic Results     Brain Imaging   CTH 6/26/18:   No acute intracranial abnormalities identified allowing for limitations by the extensive metal artifact along the right frontal calvarium.    Stable postop changes.  No significant change noted compared to noncontrast head CT examination performed earlier today.      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  Angiography would  be helpful for further evaluation.    History of remote aneurysm surgery with a surgical clip in the right suprasellar cistern and metal plate over the right frontal bone. 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    Moderate to severe stenosis of the right vertebral artery origin.    No focal high-grade stenosis or occlusion of the intracranial arteries.      Cardiac Imaging   ECHO 6/26/18:   CONCLUSIONS     1 - Concentric remodeling.     2 - Normal left ventricular systolic function (EF 60-65%).     3 - Normal right ventricular systolic function .     4 - Indeterminate LV diastolic function.     5 - Severe left atrial enlargement      Meghann Heredia PA-C  Comprehensive Stroke Center  Department of Vascular Neurology   [facility]

## 2018-06-28 NOTE — PLAN OF CARE
WENDY sent clinical updates to [facility] for review. [facility] are interested in pt's case and will submit to pt's insurance once clincal updates are reviewed and physician approves. WENDY will follow up.    3:37 PM  Maeve with [facility] will submit to pt's insurance in hopes to admit patient to rehab tomorrow. WENDY will continue to follow.    Felicia Parish LMSW  [facility]  Ext. [phone]

## 2018-06-28 NOTE — PLAN OF CARE
Problem: SLP Goal  Goal: SLP Goal  Speech Language Pathology Goals  Goals expected to be met by 7/3/18  1. Pt will tolerate regular diet with thin liquids, MOD I  2. Pt will recall 3/3 unrelated items post 3 minute filled delay, 90% of the time,  MOD I  3. Pt will complete OMEs x10 ea to improve labial strength/coordination, MOD I  4. Pt will repeat sentences (moderate length) with 90% intelligibility, MOD I   5. Pt will complete visiospatial tasks with 90% accuracy, MOD I  6. Educate Pt on S/S aspiration and aspiration precautions       Outcome: Ongoing (interventions implemented as appropriate)  Patient was seen for training with oral motor exercises to increase labial strength and coordination. Patient still exhibits reduced movement in left cheek upon lip protrusion and retraction. Patient tolerating regular diet with thin liquids without s/s of aspiration.

## 2018-06-28 NOTE — HOSPITAL COURSE
In summary, Ms. Reza is a 68yo F smoker with HTN, HLD, prior intracranial aneurysm clipping who presented with LSW, dysarthria who likely has a lacunar stroke causing ataxic hemiparesis. CTH was inconclusive for acute infarcts due to metal artifact and lack of sensitivity for small infarcts on CT. MRI unable to be performed due to prior aneurysm clipping. Unclear etiology although small vessel thrombosis likely but cannot rule out small embolic event. ECHO with evidence of LAE, will recommend 30d event monitor after discharge from rehab. Patient was found to have C-C fistula and 3mm Acomm aneurysm on CTA. Patient can follow up with OP angiogram in 2-3 weeks with Dr. Angela. Patient was started on DAPT, atorvastatin 80mg daily for secondary stroke prevention. Patient's blood pressure goal long term is <140/80, and was started on lisinopril while inpatient. Recommend continued BP monitoring at rehab with adjustments to antihypertensives as appropriate to reach long term goals.     6/27/18: Facial asymmetry on left overnight, repeat CTH stable. 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 Replaced potassium for hypokalemia.   6/28/18: No acute events overnight. Started lisinopril 2.5mg daily, increase tomorrow likely pending response. Pending rehab discharge.   6/29/18: No acute events overnight. Awaiting discharge to IP rehab. VSS, labs stable.

## 2018-06-28 NOTE — ASSESSMENT & PLAN NOTE
Seen on CTA   Cancelled angiogram, likely incidental in relation to acute stroke symptoms   Follow up OP with neurosurgery/IR

## 2018-06-28 NOTE — SUBJECTIVE & OBJECTIVE
Interval History 6/28/2018:  Patient is seen for follow-up rehab evaluation and recommendations: Possible etiology includes small vessel disease vs embolic stroke of undetermined source.   Evaluated by therapy.     HPI, Past Medical, Family, and Social History remains the same as documented in the initial encounter.    Scheduled Medications:    aspirin  325 mg Oral Daily    atorvastatin  80 mg Oral Daily    clopidogrel  75 mg Oral Daily    heparin (porcine)  5,000 Units Subcutaneous Q8H    lisinopril  2.5 mg Oral Daily    sodium chloride 0.9%  3 mL Intravenous Q8H       Diagnostic Results: Labs: Reviewed    PRN Medications: labetalol, sodium chloride 0.9%    Review of Systems   Constitutional: Negative for chills, fatigue and fever.   HENT: Negative for trouble swallowing and voice change.    Eyes: Negative for photophobia and visual disturbance.   Respiratory: Negative for cough, shortness of breath and wheezing.    Cardiovascular: Negative for chest pain and palpitations.   Gastrointestinal: Negative for abdominal distention, nausea and vomiting.   Genitourinary: Negative for difficulty urinating and flank pain.   Musculoskeletal: Positive for gait problem. Negative for arthralgias.   Skin: Negative for color change and rash.   Neurological: Positive for weakness. Negative for facial asymmetry, speech difficulty, numbness and headaches.   Psychiatric/Behavioral: Negative for agitation and confusion.     Objective:     Vital Signs (Most Recent):  Temp: 98 °F (36.7 °C) (06/28/18 0713)  Pulse: 65 (06/28/18 0850)  Resp: 18 (06/28/18 0713)  BP: (!) 144/85 (06/28/18 0713)  SpO2: (!) 94 % (06/28/18 0713)    Vital Signs (24h Range):  Temp:  [97.7 °F (36.5 °C)-98.5 °F (36.9 °C)] 98 °F (36.7 °C)  Pulse:  [62-78] 65  Resp:  [17-20] 18  SpO2:  [93 %-99 %] 94 %  BP: (139-167)/(65-85) 144/85     Physical Exam   Constitutional: She is oriented to person, place, and time. She appears well-developed and well-nourished.    HENT:   Head: Normocephalic and atraumatic.   Eyes: EOM are normal. Pupils are equal, round, and reactive to light.   Neck: Neck supple.   Cardiovascular: Normal rate and regular rhythm.    Pulmonary/Chest: Effort normal. No respiratory distress.   Abdominal: Soft. There is no tenderness.   Musculoskeletal: Normal range of motion. She exhibits no deformity.   Neurological: She is alert and oriented to person, place, and time. No sensory deficit. She exhibits abnormal muscle tone (L side).   -mild dysarthria  RUE: 5/5, 5/5 .  LUE: 0/5, 0/5 .  RLE: 5/5, DF 5/5, PF 5/5.  LLE: 0/5, DF 0/5, PF 0/5.     Skin: Skin is warm and dry.   Psychiatric: She has a normal mood and affect. Her behavior is normal. Cognition and memory are impaired.   Vitals reviewed.    NEUROLOGICAL EXAMINATION:     MENTAL STATUS   Oriented to person, place, and time.     CRANIAL NERVES     CN III, IV, VI   Pupils are equal, round, and reactive to light.  Extraocular motions are normal.

## 2018-06-28 NOTE — PLAN OF CARE
Problem: Stroke (Ischemic) (Adult)  Goal: Signs and Symptoms of Listed Potential Problems Will be Absent, Minimized or Managed (Stroke)  Signs and symptoms of listed potential problems will be absent, minimized or managed by discharge/transition of care (reference Stroke (Ischemic) (Adult) CPG).   Outcome: Ongoing (interventions implemented as appropriate)  POC reviewed with pt at bedside.  Verbalized understanding.  VSS stable.  On tele.  NSR.  Able to make needs known.  Bed locked.  Safety maintained.  Bed alarm on and audible.  On 0.9% NS at 75ml/hr continous.  Slept well last night and no distress observed.

## 2018-06-28 NOTE — ASSESSMENT & PLAN NOTE
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   NIHSS 4, CTH without acute abnormality.  Classic ataxic hemiparesis lacunar syndrome. She has been symptomatic for approximately > 24 h, thus out of the window for iv alteplase or endovascular intervention. She is unable to have MRI due to clipped aneurysm.  Possible etiology includes small vessel disease vs embolic (ESUS). Evidence of LAE on ECHO, will recommend 30d event monitor upon discharge. 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 Recommend OP follow up with neurosurgery.   6/27:  Worsened L facial weakness overnight, repeat CTH without acute changes, likely progression and evolution of current stroke.     Antithrombotics for secondary stroke prevention: Antiplatelets: Aspirin: 325 mg daily    Statins for secondary stroke prevention and hyperlipidemia, if present: Statins: Atorvastatin- 40 mg daily    Aggressive risk factor modification: Smoking, Exercise     Rehab efforts: PT/OT/SLP to evaluate and treat - recommend IP rehab. On regular/thin diet     Diagnostics ordered/pending: none     VTE prophylaxis: Heparin 5000 units SQ every 8 hours    BP parameters: Infarct: No intervention, SBP <220

## 2018-06-28 NOTE — PLAN OF CARE
Problem: Occupational Therapy Goal  Goal: Occupational Therapy Goal  Goals set 6/27 to be addressed for 7 days with expiration date, 7/4:  Patient will increase functional independence with ADLs by performing:    Patient will demonstrate rolling to the right with Min assist.  Not met   Patient will demonstrate rolling to the left with modified independence.   Not met  Patient will demonstrate supine -sit with min assist.   Not met  Patient will demonstrate stand pivot transfers with min assist.   Not met  Patient will demonstrate grooming while standing with min assist.   Not met  Patient will demonstrate upper body dressing with min assist while seated EOB.   Not met  Patient will demonstrate lower body dressing with mod assist while seated EOB.   Not met  Patient will demonstrate toileting with mod assist.   Not met  Patient's family / caregiver will demonstrate independence and safety with assisting patient with self-care skills and functional mobility.     Not met  Patient's family / caregiver will demonstrate independence with providing ROM and changes in bed positioning.   Not met  Patient and/or patient's family will verbalize understanding of stroke prevention guidelines, personal risk factors and stroke warning signs via teachback method.  Not met          Goals remain appropriate.  BRIGHT Umana  6/28/2018

## 2018-06-28 NOTE — ASSESSMENT & PLAN NOTE
See hospital course for functional, cognitive/speech/language, and nutrition/swallow status.      Recommendations  -  Encourage mobility, OOB in chair at least 3 hours per day, and early ambulation as appropriate   -  PT/OT evaluate and treat  -  SLP speech and cognitive evaluate and treat  -  Monitor sleep disturbances and establish consistent sleep-wake cycle  -  Monitor for bowel and bladder dysfunction  -  Monitor for shoulder pain and subluxation  -  Monitor for spasticity  -  Monitor for and prevent skin breakdown and pressure ulcers  · Early mobility, repositioning/weight shifting every 20-30 minutes when sitting, turn patient every 2 hours, proper mattress/overlay and chair cushioning, pressure relief/heel protector boots  -  DVT prophylaxis:  [facility]  -  Reviewed discharge options (IP rehab, SNF, HH therapy, and OP therapy)

## 2018-06-28 NOTE — ASSESSMENT & PLAN NOTE
BP range 157-205 over last 24 hours  Ok BP <220 acutely with infarct and no intervention  Consider starting home meds gradually

## 2018-06-29 ENCOUNTER — TELEPHONE (OUTPATIENT)
Dept: NEUROLOGY | Facility: HOSPITAL | Age: 68
End: 2018-06-29

## 2018-06-29 VITALS
RESPIRATION RATE: 18 BRPM | HEIGHT: 62 IN | DIASTOLIC BLOOD PRESSURE: 85 MMHG | HEART RATE: 63 BPM | SYSTOLIC BLOOD PRESSURE: 159 MMHG | WEIGHT: 104.25 LBS | BODY MASS INDEX: 19.19 KG/M2 | TEMPERATURE: 98 F | OXYGEN SATURATION: 98 %

## 2018-06-29 DIAGNOSIS — I67.1 INTRACRANIAL ANEURYSM: Primary | ICD-10-CM

## 2018-06-29 PROBLEM — F17.200 SMOKER: Status: ACTIVE | Noted: 2018-06-29

## 2018-06-29 PROCEDURE — 63600175 PHARM REV CODE 636 W HCPCS: Performed by: PHYSICIAN ASSISTANT

## 2018-06-29 PROCEDURE — 25000003 PHARM REV CODE 250: Performed by: PHYSICIAN ASSISTANT

## 2018-06-29 PROCEDURE — 97112 NEUROMUSCULAR REEDUCATION: CPT

## 2018-06-29 PROCEDURE — 25000003 PHARM REV CODE 250: Performed by: STUDENT IN AN ORGANIZED HEALTH CARE EDUCATION/TRAINING PROGRAM

## 2018-06-29 PROCEDURE — 97535 SELF CARE MNGMENT TRAINING: CPT

## 2018-06-29 PROCEDURE — 25000003 PHARM REV CODE 250: Performed by: PSYCHIATRY & NEUROLOGY

## 2018-06-29 PROCEDURE — 25000003 PHARM REV CODE 250: Performed by: NURSE PRACTITIONER

## 2018-06-29 PROCEDURE — 92526 ORAL FUNCTION THERAPY: CPT

## 2018-06-29 PROCEDURE — 97530 THERAPEUTIC ACTIVITIES: CPT

## 2018-06-29 PROCEDURE — A4216 STERILE WATER/SALINE, 10 ML: HCPCS | Performed by: PSYCHIATRY & NEUROLOGY

## 2018-06-29 PROCEDURE — 99233 SBSQ HOSP IP/OBS HIGH 50: CPT | Mod: ,,, | Performed by: PSYCHIATRY & NEUROLOGY

## 2018-06-29 RX ORDER — LISINOPRIL 10 MG/1
10 TABLET ORAL DAILY
Qty: 90 TABLET | Refills: 0
Start: 2018-06-30 | End: 2018-06-29 | Stop reason: HOSPADM

## 2018-06-29 RX ORDER — LISINOPRIL 20 MG/1
20 TABLET ORAL DAILY
Status: DISCONTINUED | OUTPATIENT
Start: 2018-06-30 | End: 2018-06-29 | Stop reason: HOSPADM

## 2018-06-29 RX ORDER — ATORVASTATIN CALCIUM 80 MG/1
80 TABLET, FILM COATED ORAL DAILY
Qty: 90 TABLET | Refills: 0
Start: 2018-06-30 | End: 2018-10-15 | Stop reason: SDUPTHER

## 2018-06-29 RX ORDER — LISINOPRIL 10 MG/1
10 TABLET ORAL ONCE
Status: COMPLETED | OUTPATIENT
Start: 2018-06-29 | End: 2018-06-29

## 2018-06-29 RX ORDER — ASPIRIN 325 MG
325 TABLET ORAL DAILY
Refills: 0 | Status: ON HOLD | COMMUNITY
Start: 2018-06-30 | End: 2018-07-25

## 2018-06-29 RX ORDER — CLOPIDOGREL BISULFATE 75 MG/1
75 TABLET ORAL DAILY
Qty: 30 TABLET | Refills: 0
Start: 2018-06-30 | End: 2018-10-15 | Stop reason: SDUPTHER

## 2018-06-29 RX ORDER — LISINOPRIL 20 MG/1
20 TABLET ORAL DAILY
Qty: 90 TABLET | Refills: 0 | Status: ON HOLD
Start: 2018-06-30 | End: 2018-07-24

## 2018-06-29 RX ORDER — SENNOSIDES 8.6 MG/1
1 TABLET ORAL DAILY PRN
Status: ON HOLD | COMMUNITY
Start: 2018-06-29 | End: 2018-07-24

## 2018-06-29 RX ADMIN — CLOPIDOGREL 75 MG: 75 TABLET, FILM COATED ORAL at 09:06

## 2018-06-29 RX ADMIN — ATORVASTATIN CALCIUM 80 MG: 20 TABLET, FILM COATED ORAL at 09:06

## 2018-06-29 RX ADMIN — HEPARIN SODIUM 5000 UNITS: 5000 INJECTION, SOLUTION INTRAVENOUS; SUBCUTANEOUS at 03:06

## 2018-06-29 RX ADMIN — LISINOPRIL 10 MG: 10 TABLET ORAL at 09:06

## 2018-06-29 RX ADMIN — Medication 3 ML: at 03:06

## 2018-06-29 RX ADMIN — LISINOPRIL 10 MG: 10 TABLET ORAL at 12:06

## 2018-06-29 RX ADMIN — SENNA 8.6 MG: 8.6 TABLET, COATED ORAL at 09:06

## 2018-06-29 RX ADMIN — ASPIRIN 325 MG ORAL TABLET 325 MG: 325 PILL ORAL at 09:06

## 2018-06-29 RX ADMIN — Medication 3 ML: at 06:06

## 2018-06-29 RX ADMIN — HEPARIN SODIUM 5000 UNITS: 5000 INJECTION, SOLUTION INTRAVENOUS; SUBCUTANEOUS at 06:06

## 2018-06-29 NOTE — PLAN OF CARE
06/29/18 1604   Final Note   Assessment Type Final Discharge Note   Discharge Disposition Home     Patient is discharged to [facility] today. Sw to arrange transportation to the facility. Cm spoke to the patient's son regarding discharge today. The patient's son was in agreement with [facility].

## 2018-06-29 NOTE — PT/OT/SLP PROGRESS
"Physical Therapy Treatment    Patient Name:  Shannan Reza   MRN:  [id number]  Admitting Diagnosis: Cerebrovascular accident (CVA) due to thrombosis of right middle cerebral artery  Recent Surgery: * No surgery found *      Recommendations:     Discharge Recommendations:  rehabilitation facility   Discharge Equipment Recommendations:  (TBD)   Barriers to discharge: Decreased caregiver support    Plan:     During this hospitalization, patient to be seen 5 x/week to address the above listed problems via gait training, therapeutic activities, therapeutic exercises, neuromuscular re-education  · Plan of Care Expires:  07/27/18   Plan of Care Reviewed with: patient, son    This Plan of care has been discussed with the patient who was involved in its development and understands and is in agreement with the identified goals and treatment plan    Subjective     Communicated with RN (Sara) prior to session.     Patient comments: "I know this [L] overextends"  Pain/Comfort:  · Pain Rating 1: 0/10  · Pain Rating Post-Intervention 1: 0/10    Objective:     Patient found with: bed alarm, telemetry    Patient found sup in bed upon PT entry to room, agreeable to treatment.  Son present in the room.    General Precautions: Standard, Cardiac aspiration, fall   Orthopedic Precautions:N/A   Braces: N/A       BED MOBILITY (vc's for hand placement sequencing of task):        Rolling to the L with SBA with use of bedrail       Sup > sit at the EOB with SBA from L side lying        Sit > sup with mod A       Scooting hips to the EOB upon sitting with min/mod A x2 symmetrical scoots       Scooting hips along the EOB to the R & L requiring mod A xmultiple scoots       Pt performs scooting laterally to the R via bridging with min A x2 scoot(s)         SITTING AT THE EDGE OF THE BED    Assistance Level Required: CGA/SBA for trunk ext with R UE support and L UE in weight bearing position on firm surface     Postural deviations noted: " flexed trunk, rounded shoulders, PPT   Encouraged: upright posture, neutral pelvis        TRANSFERS  (vc's for hand placement, sequencing of task and safety)   Patient completed Sit <> Stand Transfer from EOB with mod A for hip ext, trunk ext, balance, L LE and L UE with B UE support x2-3 trials   Patient completed Stand <> Sit Transfer to EOB with mod A for controlled descent with B UE support       STANDING (5 min)       Pt tolerates standing with tray table requiring mod/min A for hip ext, trunk ext, L knee control, support to L UE with vc's.        DYNAMIC STANDING BALANCE   (5 min)       Pt performs the following activities:       - stepping laterally to the R with R LE and then to neutral, stepping forward and back to neutral with R LE with B UE support (son providing), with mod/max A of 2            EDUCATION  Education provided to pt regarding: postural control, weight shift, L knee control.    They were provided and educated on proper positioning in supine and in sitting with support of affected L UE in order to increase awareness of it and to decrease the effects of immobility, specifically edema and pain.     Whiteboard updated with correct mobility information. RN/PCT notified.  Pt safe to transfer OOB with RN/PCT via SQPT: Use no AD with min A of 1 person.    Patient left supine, with L UE propped on pillow for scapular support and edema management with all lines intact, call button in reach, bed alarm on, RN notified and son present    AM-PAC 6 CLICK MOBILITY  Turning over in bed (including adjusting bedclothes, sheets and blankets)?: 3  Sitting down on and standing up from a chair with arms (e.g., wheelchair, bedside commode, etc.): 2  Moving from lying on back to sitting on the side of the bed?: 3  Moving to and from a bed to a chair (including a wheelchair)?: 2  Need to walk in hospital room?: 2  Climbing 3-5 steps with a railing?: 1  Basic Mobility Total Score: 13     Assessment:     Shannan  Juaquin is a 67 y.o. female admitted with a medical diagnosis of Cerebrovascular accident (CVA) due to thrombosis of right middle cerebral artery.  She presents with the following impairments/functional limitations:  weakness, impaired endurance, impaired sensation, impaired self care skills, impaired functional mobilty, gait instability, impaired balance, impaired cognition, decreased coordination, decreased upper extremity function, decreased lower extremity function, decreased safety awareness, abnormal tone, decreased ROM, impaired coordination, impaired fine motor. L hemiparesis requiring significant assistance and verbal cues for sit < > stand, scooting along/to EOB, standing and pre-gait activities 2* weakness, impaired balance, decreased L knee control.   In light of pt's current functional level and deficits, it is anticipated that pt will need to participate in an intense rehab program consisting of PT, OT and ST in order to achieve full rehab potential to return to previous level of function and roles.      Rehab Prognosis:  Good; patient would benefit from acute skilled PT services to address these deficits and reach maximum level of function.      GOALS:    Physical Therapy Goals        Problem: Physical Therapy Goal    Goal Priority Disciplines Outcome Goal Variances Interventions   Physical Therapy Goal     PT/OT, PT Ongoing (interventions implemented as appropriate)     Description:  Goals to be met by: 18    Patient will increase functional independence with mobility by performin. Supine to sit with supervision with HOB flat    NOT MET  2. Sit to supine with supervision with HOB flat     NOT MET  3. Sit to stand transfer with Contact Guard Assistance with or without least restrictive AD.  NOT MET     4. Bed to chair transfer with Contact Guard Assistance with or without least restrictive AD.   NOT MET    5. Gait  x 20 feet with Minimal Assistance with or without least restrictive AD.      NOT MET  6. Sitting at edge of bed x5 minutes with stand by assistance while performing dynamic reaching tasks to improve sitting balance     NOT MET  7. Stand for 3 minutes with Contact Guard Assistance     NOT MET  8. Lower extremity exercise program x10 reps per handout, with assistance as needed    NOT MET                       Time Tracking:     PT Received On: 06/29/18  PT Start Time: 1408     PT Stop Time: 1439  PT Total Time (min): 31 min     Billable Minutes: Therapeutic Activity 21 and Neuromuscular Re-education 10    Treatment Type: Treatment  PT/PTA: PTA     PTA Visit Number: 2       Mariela Jensen PTA.  Pager [phone]    6/29/2018    .

## 2018-06-29 NOTE — PLAN OF CARE
Problem: Physical Therapy Goal  Goal: Physical Therapy Goal  Goals to be met by: 18    Patient will increase functional independence with mobility by performin. Supine to sit with supervision with HOB flat    NOT MET  2. Sit to supine with supervision with HOB flat     NOT MET  3. Sit to stand transfer with Contact Guard Assistance with or without least restrictive AD.  NOT MET     4. Bed to chair transfer with Contact Guard Assistance with or without least restrictive AD.   NOT MET    5. Gait  x 20 feet with Minimal Assistance with or without least restrictive AD.     NOT MET  6. Sitting at edge of bed x5 minutes with stand by assistance while performing dynamic reaching tasks to improve sitting balance     NOT MET  7. Stand for 3 minutes with Contact Guard Assistance     NOT MET  8. Lower extremity exercise program x10 reps per handout, with assistance as needed    NOT MET       Discharge Recommendations: Rehab    Pt safe to transfer OOB with RN/PCT via SQPT: Use no AD with min A of 1 person.    Goals remain appropriate.     Mariela Jensen, PTA.   [phone]   2018

## 2018-06-29 NOTE — PT/OT/SLP PROGRESS
"Speech Language Pathology Treatment    Patient Name:  Shannan Reza   MRN:  [id number]  Admitting Diagnosis: Cerebrovascular accident (CVA) due to thrombosis of right middle cerebral artery    Recommendations:                 General Recommendations:  Dysphagia therapy and Speech/language therapy  Diet recommendations:  Regular, Liquid Diet Level: Thin   Aspiration Precautions: Standard aspiration precautions   General Precautions: Standard, aspiration, fall  Communication strategies:  none    Subjective     "I sound all right" per pt  Patient goals: rehab  Pain/Comfort:  · Pain Rating 1: 0/10  · Pain Rating Post-Intervention 1: 0/10    Objective:     Has the patient been evaluated by SLP for swallowing?   Yes  Keep patient NPO? No   Current Respiratory Status: room air      Pt. Seen at bedside and was oriented x3 with good recall of recent and remote events.  OME were performed x10 with min - no cues.  She recalled speech and swallow strategies with no cues.  Speech was 100% intelligible in conversation.  REhab poc was reviewed with pt. With good understanding verbalized.       Assessment:     Shannan Reza is a 67 y.o. female with an SLP diagnosis of Dysphagia and Dysarthria.   Goals:    SLP Goals        Problem: SLP Goal    Goal Priority Disciplines Outcome   SLP Goal     SLP Ongoing (interventions implemented as appropriate)   Description:  Speech Language Pathology Goals  Goals expected to be met by 7/3/18  1. Pt will tolerate regular diet with thin liquids, MOD I  2. Pt will recall 3/3 unrelated items post 3 minute filled delay, 90% of the time,  MOD I  3. Pt will complete OMEs x10 ea to improve labial strength/coordination, MOD I  4. Pt will repeat sentences (moderate length) with 90% intelligibility, MOD I   5. Pt will complete visiospatial tasks with 90% accuracy, MOD I  6. Educate Pt on S/S aspiration and aspiration precautions                        Plan:     · Patient to be seen:  2 x/week   · Plan of " Care expires:  07/26/18  · Plan of Care reviewed with:  patient   · SLP Follow-Up:  Yes       Discharge recommendations:  rehabilitation facility       Time Tracking:     SLP Treatment Date:   06/29/18  Speech Start Time:  1125  Speech Stop Time:  1135     Speech Total Time (min):  10 min    Billable Minutes: Treatment Swallowing Dysfunction 10    Loren Miller MA, CCC-SLP  06/29/2018

## 2018-06-29 NOTE — PT/OT/SLP PROGRESS
"Occupational Therapy   Treatment    Name: Shannan Reza  MRN: [id number]  Admitting Diagnosis:  Cerebrovascular accident (CVA) due to thrombosis of right middle cerebral artery       Recommendations:     Discharge Recommendations: rehabilitation facility  Discharge Equipment Recommendations:  3-in-1 commode, bath bench  Barriers to discharge:  Inaccessible home environment, Decreased caregiver support    Subjective   Patient:  "I have been sitting up in the chair since 8am."  Communicated with: Nurse prior to session.  Pain/Comfort:  · Pain Rating 1: 0/10  · Pain Rating Post-Intervention 1: 0/10    Patients cultural, spiritual, Orthodoxy conflicts given the current situation: Hinduism    Objective:     Patient found with: bed alarm, telemetry  Family not present.  General Precautions: Standard, aspiration, fall   Orthopedic Precautions:N/A   Braces: N/A     Occupational Performance:    Bed Mobility:    · Patient completed Sit to Supine with min assistance      Functional Mobility/Transfers:  · Patient completed Sit <> Stand Transfer with min assistance  with  no assistive device   · Patient completed Bed <> Chair Transfer using Stand Pivot technique with moderate assistance with no assistive device     Activities of Daily Living:  · Grooming: minimum assistance while standing with left UE in weight bearing  · UB Dressing: moderate assistance while seated EOB  · LB Dressing: max assistance while seated EOB     Patient left supine with all lines intact and call button in reach    AMPAC 6 Click:  AMPAC Total Score: 14    Treatment & Education:  Patient education provided for stroke warning signs, prevention guidelines and personal risk factors.  Patient verbalizing understanding via teach back method.  Patient education provided on role of OT and need for rehab upon discharge.   Continued education, patient/ family training recommended.  Patient alert and oriented x 3; able to follow 4/4 one step commands.  Patient " attentive and interactive throughout the session. Addressed oral motor ex 2* left facial weakness.  PROM performed left UE one set x 10 rep in all planes of motion.  SBA-CGA with postural control while seated EOB with left UE in weight bearing.  Patient sat up in chair x 3 hours.   Patient's functional status and disposition recommendation discussed with stroke team in daily rounds.  White board updated in patient's room.  OT asked if there were any other questions; patient/ family had no further questions.  Education:    Assessment:     Shannan Reza is a 67 y.o. female with a medical diagnosis of Stroke.  She presents with performance deficits affecting function are weakness, impaired self care skills, impaired balance, decreased coordination, decreased safety awareness, decreased ROM, impaired coordination, decreased upper extremity function, impaired functional mobilty, impaired endurance, impaired cognition, decreased lower extremity function, abnormal tone, impaired fine motor.    These performance deficits have resulted in activity limitations including but not limited to:   bed mobility, transfers, ascending/ descending stairs, walking short and long distances, walking around obstacles, transitional movement patterns (kneeling, bending); eating, upper body dressing, lower body dressing, brushing teeth, toileting, bathing, carrying objects, balancing checkbook, shopping, meal preparation, and driving.   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 Patient will benefit from skilled OT services to maximize level of independence with self-care skills and functional mobility.  Will benefit from rehab. Improvements noted with ADLs and transfers.     Rehab Prognosis:  Good; patient would benefit from acute skilled OT services to address these deficits and reach maximum level of function.       Plan:     Patient to be seen 5 x/week to address the above listed problems  via self-care/home management, therapeutic activities, neuromuscular re-education, sensory integration, cognitive retraining, therapeutic exercises  · Plan of Care Expires: 07/25/18  · Plan of Care Reviewed with: patient    This Plan of care has been discussed with the patient who was involved in its development and understands and is in agreement with the identified goals and treatment plan    GOALS:    Occupational Therapy Goals        Problem: Occupational Therapy Goal    Goal Priority Disciplines Outcome Interventions   Occupational Therapy Goal     OT, PT/OT     Description:  Goals set 6/27 to be addressed for 7 days with expiration date, 7/4:  Patient will increase functional independence with ADLs by performing:    Patient will demonstrate rolling to the right with Min assist.  Not met   Patient will demonstrate rolling to the left with modified independence.   Not met  Patient will demonstrate supine -sit with min assist.   Not met  Patient will demonstrate stand pivot transfers with min assist.   Not met  Patient will demonstrate grooming while standing with min assist.   Not met  Patient will demonstrate upper body dressing with min assist while seated EOB.   Not met  Patient will demonstrate lower body dressing with mod assist while seated EOB.   Not met  Patient will demonstrate toileting with mod assist.   Not met  Patient's family / caregiver will demonstrate independence and safety with assisting patient with self-care skills and functional mobility.     Not met  Patient's family / caregiver will demonstrate independence with providing ROM and changes in bed positioning.   Not met  Patient and/or patient's family will verbalize understanding of stroke prevention guidelines, personal risk factors and stroke warning signs via teachback method.  Not met                           Time Tracking:     OT Date of Treatment: 06/29/18  OT Start Time: 1015  OT Stop Time: 1054  OT Total Time (min): 39  min    Billable Minutes:Self Care/Home Management 25  Neuromuscular Re-education 14    BRIGHT Umana  6/29/2018

## 2018-06-29 NOTE — NURSING
PT STABLE FOR D/C, VSS. IV REMOVED. D/C INSTRUCTIONS GIVEN TO PATIENT AND VERBALIZED D/C INSTRUCTIONS WITH PATIENT AND FAMILY. ALL QUESTIONS ANSWERED.

## 2018-06-29 NOTE — SUBJECTIVE & OBJECTIVE
Neurologic Chief Complaint: LSW, L facial droop     Subjective:     Interval History: Patient is seen for follow-up neurological assessment and treatment recommendations:     No acute events overnight. Awaiting discharge to IP rehab. VSS, labs stable.     HPI, Past Medical, Family, and Social History remains the same as documented in the initial encounter.     Review of Systems   Constitutional: Negative for fever.   Eyes: Negative for visual disturbance.   Respiratory: Negative for shortness of breath.    Cardiovascular: Negative for chest pain.   Skin: Negative for rash.   Neurological: Positive for facial asymmetry, weakness and numbness. Negative for speech difficulty.   Psychiatric/Behavioral: Negative for behavioral problems and confusion.     Scheduled Meds:   aspirin  325 mg Oral Daily    atorvastatin  80 mg Oral Daily    clopidogrel  75 mg Oral Daily    heparin (porcine)  5,000 Units Subcutaneous Q8H    lisinopril  10 mg Oral Once    [START ON 6/30/2018] lisinopril  20 mg Oral Daily    sodium chloride 0.9%  3 mL Intravenous Q8H     Continuous Infusions:   sodium chloride 0.9%       PRN Meds:labetalol, senna, sodium chloride 0.9%    Objective:     Vital Signs (Most Recent):  Temp: 99.1 °F (37.3 °C) (06/29/18 1121)  Pulse: 67 (06/29/18 1121)  Resp: 18 (06/29/18 1121)  BP: (!) 142/66 (06/29/18 1121)  SpO2: 96 % (06/29/18 1121)  BP Location: Right arm    Vital Signs Range (Last 24H):  Temp:  [97.4 °F (36.3 °C)-99.1 °F (37.3 °C)]   Pulse:  [49-80]   Resp:  [16-18]   BP: (142-194)/(66-85)   SpO2:  [92 %-98 %]   BP Location: Right arm    Physical Exam   Constitutional: She is oriented to person, place, and time. She appears well-developed and well-nourished. No distress.   HENT:   Head: Normocephalic and atraumatic.   Eyes: EOM are normal.   Pulmonary/Chest: Effort normal.   Musculoskeletal:   Left hemiparesis   Neurological: She is alert and oriented to person, place, and time.   Skin: Skin is warm and  dry. She is not diaphoretic.   Psychiatric: She has a normal mood and affect. Her behavior is normal.       Neurological Exam:   LOC: alert  Attention Span: Good   Language: No aphasia  Articulation: No dysarthria  Orientation: Person, Place, Time   Visual Fields: Full  EOM (CN III, IV, VI): Full/intact  Facial Movement (CN VII): Lower facial weakness on the Left  Motor: Arm left  Paresis: 1/5  Leg left  Paresis: 3/5  Arm right  Normal 5/5  Leg right Normal 5/5  Cebellar: No evidence of appendicular or axial ataxia  Sensation: Intact to light touch, temperature and vibration  Tone: Flaccid  LUE     Laboratory:  CMP:   No results for input(s): GLUCOSE, CALCIUM, ALBUMIN, PROT, NA, K, CO2, CL, BUN, CREATININE, ALKPHOS, ALT, AST, BILITOT in the last 24 hours.  BMP:     Recent Labs  Lab 06/28/18  0537      K 3.8      CO2 23   BUN 9   CREATININE 0.7   CALCIUM 9.4     CBC:     Recent Labs  Lab 06/28/18  0537   WBC 5.82   RBC 3.74*   HGB 11.8*   HCT 34.5*      MCV 92   MCH 31.6*   MCHC 34.2     Lipid Panel:     Recent Labs  Lab 06/26/18  0842   CHOL 275*   LDLCALC 163.0*   HDL 77*   TRIG 175*     Coagulation:     Recent Labs  Lab 06/27/18  0508   INR 1.0   APTT 23.8     Platelet Aggregation Study: No results for input(s): PLTAGG, PLTAGINTERP, PLTAGREGLACO, ADPPLTAGGREG in the last 168 hours.  Hgb A1C:     Recent Labs  Lab 06/26/18  0842   HGBA1C 5.2     TSH:     Recent Labs  Lab 06/26/18  0842   TSH 1.055       Diagnostic Results     Brain Imaging   CTH 6/26/18:   No acute intracranial abnormalities identified allowing for limitations by the extensive metal artifact along the right frontal calvarium.    Stable postop changes.  No significant change noted compared to noncontrast head CT examination performed earlier today.      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  Angiography would be helpful for further evaluation.    History of remote aneurysm surgery with a surgical clip in the right suprasellar cistern and metal plate over the right frontal bone. 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    Moderate to severe stenosis of the right vertebral artery origin.    No focal high-grade stenosis or occlusion of the intracranial arteries.      Cardiac Imaging   ECHO 6/26/18:   CONCLUSIONS     1 - Concentric remodeling.     2 - Normal left ventricular systolic function (EF 60-65%).     3 - Normal right ventricular systolic function .     4 - Indeterminate LV diastolic function.     5 - Severe left atrial enlargement

## 2018-06-29 NOTE — PROGRESS NOTES
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   NIHSS 4, CTH without acute abnormality.  Classic ataxic hemiparesis lacunar syndrome. She has been symptomatic for approximately > 24 h, thus out of the window for iv alteplase or endovascular intervention. She is unable to have MRI due to clipped aneurysm.  Possible etiology includes small vessel disease vs embolic (ESUS). Evidence of LAE on ECHO, will recommend 30d event monitor upon discharge. 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 Recommend OP follow up with neurosurgery.   6/27:  Worsened L facial weakness overnight, repeat CTH without acute changes, likely extension/evolution of current stroke.     Antithrombotics for secondary stroke prevention: Antiplatelets: Aspirin: 325 mg daily + plavix 75mg daily    Statins for secondary stroke prevention and hyperlipidemia, if present: Statins: Atorvastatin- 40 mg daily    Aggressive risk factor modification: Smoking, Exercise     Rehab efforts: PT/OT/SLP to evaluate and treat - recommend IP rehab. On regular/thin diet     Diagnostics ordered/pending: none     VTE prophylaxis: Heparin 5000 units SQ every 8 hours    BP parameters: Infarct: No intervention, SBP <180              Anterior communicating artery aneurysm    Wide neck, 3mm noted on CTA   Recommend follow up with neurosurgery and vascular neurology as OP         Essential hypertension    BP range 139-167 over last 24 hours  Ok BP <180   Lisinopril 2.5mg daily, increase to 10mg daily         Hypokalemia    K  3.4  20meq KCl solution, PO x 1 dose          Impaired functional mobility and endurance    PT/OT evaluate and treat  Recommend IP rehab        Carotid-cavernous fistula    Seen on CTA   Cancelled angiogram, likely incidental in relation to acute stroke symptoms   Follow up OP with neurosurgery/IR              6/27/18: Facial asymmetry on left overnight, repeat CTH stable. 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 Replaced potassium for hypokalemia.   6/28/18: No acute events overnight. Started lisinopril 2.5mg daily, increase tomorrow likely pending response. Pending rehab discharge.     STROKE DOCUMENTATION   Acute Stroke Times   Last Known Normal Date: 06/25/18  Last Known Normal Time: 0900  Symptom Onset Date: 06/25/18  Symptom Onset Time: 0900  Stroke Team Called Date: 06/26/18  Stroke Team Called Time: 0830  Stroke Team Arrival Date: 06/26/18  Stroke Team Arrival Time: 0835  CT Interpretation Time: 0840  Decision to Treat Time for Alteplase:  (No iv alteplase candidate)  Decision to Treat Time for IR:  (No IR candidate)    NIH Scale:  1a. Level Of Consciousness: 0-->Alert: keenly responsive  1b. LOC Questions: 1-->Answers one question correctly  1c. LOC Commands: 0-->Performs both tasks correctly  2. Best Gaze: 0-->Normal  3. Visual: 0-->No visual loss  4. Facial Palsy: 1-->Minor paralysis (flattened nasolabial fold, asymmetry on smiling)  5a. Motor Arm, Left: 3-->No effort against gravity: limb falls  5b. Motor Arm, Right: 0-->No drift: limb holds 90 (or 45) degrees for full 10 secs  6a. Motor Leg, Left: 1-->Drift: leg falls by the end of the 5-sec period but does not hit bed  6b. Motor Leg, Right: 0-->No drift: leg holds 30 degree position for full 5 secs  7. Limb Ataxia: 0-->Absent  8. Sensory: 0-->Normal: no sensory loss  9. Best Language: 0-->No aphasia: normal  10. Dysarthria: 1-->Mild-to-moderate dysarthria: patient slurs at least some words and, at worst, can be  understood with some difficulty  11. Extinction and Inattention (formerly Neglect): 0-->No abnormality  Total (NIH Stroke Scale): 7       Modified Deo Score: 0  Drake Coma Scale:    ABCD2 Score:    [id number] Score:   HAS -BLED Score:   ICH Score:   Hunt & Thomas Classification:      Hemorrhagic change of an Ischemic Stroke: Does this patient have an ischemic stroke with hemorrhagic changes? No     Neurologic Chief Complaint: LSW, L facial droop     Subjective:     Interval History: Patient is seen for follow-up neurological assessment and treatment recommendations:     No acute events overnight. Started lisinopril 2.5mg daily, increase tomorrow likely pending response. Pending rehab discharge.    HPI, Past Medical, Family, and Social History remains the same as documented in the initial encounter.     Review of Systems   Constitutional: Negative for fever.   Eyes: Negative for visual disturbance.   Respiratory: Negative for shortness of breath.    Cardiovascular: Negative for chest pain.   Skin: Negative for rash.   Neurological: Positive for facial asymmetry and weakness. Negative for speech difficulty.     Scheduled Meds:   aspirin  325 mg Oral Daily    atorvastatin  80 mg Oral Daily    clopidogrel  75 mg Oral Daily    heparin (porcine)  5,000 Units Subcutaneous Q8H    [START ON 6/29/2018] lisinopril  10 mg Oral Daily    lisinopril  2.5 mg Oral Once    sodium chloride 0.9%  3 mL Intravenous Q8H     Continuous Infusions:   sodium chloride 0.9%       PRN Meds:labetalol, senna, sodium chloride 0.9%    Objective:     Vital Signs (Most Recent):  Temp: 98.8 °F (37.1 °C) (06/28/18 1945)  Pulse: 65 (06/28/18 1945)  Resp: 16 (06/28/18 1945)  BP: (!) 170/78 (06/28/18 1945)  SpO2: (!) 92 % (06/28/18 1945)  BP Location: Right arm    Vital Signs Range (Last 24H):  Temp:  [97.4 °F (36.3 °C)-98.8 °F (37.1 °C)]   Pulse:  [64-80]   Resp:  [16-20]   BP: (140-194)/(70-85)   SpO2:  [92 %-99 %]   BP Location: Right  arm    Physical Exam   Constitutional: She appears well-developed and well-nourished. No distress.   HENT:   Head: Normocephalic and atraumatic.   Eyes: EOM are normal.   Pulmonary/Chest: Effort normal.   Musculoskeletal:   Left hemiparesis   Skin: Skin is warm and dry. She is not diaphoretic.   Psychiatric: She has a normal mood and affect. Her behavior is normal.       Neurological Exam:   LOC: alert  Attention Span: Good   Language: No aphasia  Articulation: No dysarthria  Orientation: Person, Place, Time   Visual Fields: Full  EOM (CN III, IV, VI): Full/intact  Facial Movement (CN VII): Lower facial weakness on the Left  Motor: Arm left  Paresis: 1/5  Leg left  Paresis: 3/5  Arm right  Normal 5/5  Leg right Normal 5/5  Cebellar: No evidence of appendicular or axial ataxia  Sensation: Intact to light touch, temperature and vibration  Tone: Flaccid  LUE     Laboratory:  CMP:     Recent Labs  Lab 06/28/18  0537   CALCIUM 9.4   ALBUMIN 3.5   PROT 6.5      K 3.8   CO2 23      BUN 9   CREATININE 0.7   ALKPHOS 55   ALT 11   AST 18   BILITOT 0.4     BMP:     Recent Labs  Lab 06/28/18  0537      K 3.8      CO2 23   BUN 9   CREATININE 0.7   CALCIUM 9.4     CBC:     Recent Labs  Lab 06/28/18  0537   WBC 5.82   RBC 3.74*   HGB 11.8*   HCT 34.5*      MCV 92   MCH 31.6*   MCHC 34.2     Lipid Panel:     Recent Labs  Lab 06/26/18  0842   CHOL 275*   LDLCALC 163.0*   HDL 77*   TRIG 175*     Coagulation:     Recent Labs  Lab 06/27/18  0508   INR 1.0   APTT 23.8     Platelet Aggregation Study: No results for input(s): PLTAGG, PLTAGINTERP, PLTAGREGLACO, ADPPLTAGGREG in the last 168 hours.  Hgb A1C:     Recent Labs  Lab 06/26/18  0842   HGBA1C 5.2     TSH:     Recent Labs  Lab 06/26/18  0842   TSH 1.055       Diagnostic Results     Brain Imaging   CTH 6/26/18:   No acute intracranial abnormalities identified allowing for limitations by the extensive metal artifact along the right frontal  calvarium.    Stable postop changes.  No significant change noted compared to noncontrast head CT examination performed earlier today.      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  Angiography would be helpful for further evaluation.    History of remote aneurysm surgery with a surgical clip in the right suprasellar cistern and metal plate over the right frontal bone. 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    Moderate to severe stenosis of the right vertebral artery origin.    No focal high-grade stenosis or occlusion of the intracranial arteries.      Cardiac Imaging   ECHO 6/26/18:   CONCLUSIONS     1 - Concentric remodeling.     2 - Normal left ventricular systolic function (EF 60-65%).     3 - Normal right ventricular systolic function .     4 - Indeterminate LV diastolic function.     5 - Severe left atrial enlargement      Meghann Heredia PA-C  Comprehensive Stroke Center  Department of Vascular Neurology   [facility]

## 2018-06-29 NOTE — PROGRESS NOTES
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   NIHSS 4, CTH without acute abnormality.  Classic ataxic hemiparesis lacunar syndrome. She has been symptomatic for approximately > 24 h upon presentation, thus out of the window for iv alteplase or endovascular intervention. She is unable to have MRI due to clipped aneurysm.  Possible etiology includes small vessel disease vs embolic (ESUS). Evidence of LAE on ECHO, will recommend 30d event monitor upon discharge. 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 Recommend OP follow up with neurosurgery/interventional radiology. Angiogram has been ordered for OP in 2-3 weeks.   6/27:  Worsened L facial weakness overnight, repeat CTH without acute changes, likely extension/evolution of current stroke.     6/29: Stable for discharge to IP rehab.     Antithrombotics for secondary stroke prevention: Antiplatelets: Aspirin: 325 mg daily + plavix 75mg daily    Statins for secondary stroke prevention and hyperlipidemia, if present: Statins: Atorvastatin- 40 mg daily    Aggressive risk factor modification: Smoking, Exercise     Rehab efforts: PT/OT/SLP to evaluate and treat - recommend IP rehab. On regular/thin diet     Diagnostics ordered/pending: none     VTE prophylaxis: Heparin 5000 units SQ every 8 hours    BP parameters: Infarct: No intervention, SBP <180              Smoker    Stroke risk factor  Encourage cessation         Anterior communicating artery aneurysm    Wide neck, 3mm noted on CTA   Recommend follow up with  interventional vascular neurology as OP for angiogram in about 2-3 weeks  Ordering angiogram prior to discharge, will plan with Dr. Angela         Essential hypertension    BP range 160-190 over last 24 hours  Ok BP <180 acutely   Lisinopril 10mg daily, increase to 20mg daily. May need to continue titration of antihypertensives at rehab to continue to bring patient down to long term goal of <140/80        Hypokalemia    K 3.4  20meq KCl solution, PO x 1 dose 6/28  Resolved          Impaired functional mobility and endurance    PT/OT evaluate and treat  Recommend IP rehab        Carotid-cavernous fistula    Seen on CTA   Cancelled angiogram acutely, likely incidental in relation to acute stroke symptoms   Follow up OP with neurosurgery/IR              6/27/18: Facial asymmetry on left overnight, repeat CTH stable. 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 Replaced potassium for hypokalemia.   6/28/18: No acute events overnight. Started lisinopril 2.5mg daily, increase tomorrow likely pending response. Pending rehab discharge.   6/29/18: No acute events overnight. Awaiting discharge to IP rehab. VSS, labs stable.     STROKE DOCUMENTATION   Acute Stroke Times   Last Known Normal Date: 06/25/18  Last Known Normal Time: 0900  Symptom Onset Date: 06/25/18  Symptom Onset Time: 0900  Stroke Team Called Date: 06/26/18  Stroke Team Called Time: 0830  Stroke Team Arrival Date: 06/26/18  Stroke Team Arrival Time: 0835  CT Interpretation Time: 0840  Decision to Treat Time for Alteplase:  (No iv alteplase candidate)  Decision to Treat Time for IR:  (No IR candidate)    NIH Scale:  1a. Level Of Consciousness: 0-->Alert: keenly responsive  1b. LOC Questions: 0-->Answers both questions correctly  1c. LOC Commands: 0-->Performs both tasks correctly  2. Best Gaze: 0-->Normal  3. Visual: 0-->No visual loss  4. Facial Palsy: 1-->Minor paralysis (flattened nasolabial fold, asymmetry on smiling)  5a.  Motor Arm, Left: 3-->No effort against gravity: limb falls  5b. Motor Arm, Right: 0-->No drift: limb holds 90 (or 45) degrees for full 10 secs  6a. Motor Leg, Left: 1-->Drift: leg falls by the end of the 5-sec period but does not hit bed  6b. Motor Leg, Right: 0-->No drift: leg holds 30 degree position for full 5 secs  7. Limb Ataxia: 0-->Absent  8. Sensory: 0-->Normal: no sensory loss  9. Best Language: 0-->No aphasia: normal  10. Dysarthria: 0-->Normal  11. Extinction and Inattention (formerly Neglect): 0-->No abnormality  Total (NIH Stroke Scale): 5       Modified Deo Score: 0  Terry Coma Scale:    ABCD2 Score:    [id number] Score:   HAS -BLED Score:   ICH Score:   Hunt & Thomas Classification:      Hemorrhagic change of an Ischemic Stroke: Does this patient have an ischemic stroke with hemorrhagic changes? No     Neurologic Chief Complaint: LSW, L facial droop     Subjective:     Interval History: Patient is seen for follow-up neurological assessment and treatment recommendations:     No acute events overnight. Awaiting discharge to IP rehab. VSS, labs stable.     HPI, Past Medical, Family, and Social History remains the same as documented in the initial encounter.     Review of Systems   Constitutional: Negative for fever.   Eyes: Negative for visual disturbance.   Respiratory: Negative for shortness of breath.    Cardiovascular: Negative for chest pain.   Skin: Negative for rash.   Neurological: Positive for facial asymmetry, weakness and numbness. Negative for speech difficulty.   Psychiatric/Behavioral: Negative for behavioral problems and confusion.     Scheduled Meds:   aspirin  325 mg Oral Daily    atorvastatin  80 mg Oral Daily    clopidogrel  75 mg Oral Daily    heparin (porcine)  5,000 Units Subcutaneous Q8H    lisinopril  10 mg Oral Once    [START ON 6/30/2018] lisinopril  20 mg Oral Daily    sodium chloride 0.9%  3 mL Intravenous Q8H     Continuous Infusions:   sodium chloride 0.9%        PRN Meds:labetalol, senna, sodium chloride 0.9%    Objective:     Vital Signs (Most Recent):  Temp: 99.1 °F (37.3 °C) (06/29/18 1121)  Pulse: 67 (06/29/18 1121)  Resp: 18 (06/29/18 1121)  BP: (!) 142/66 (06/29/18 1121)  SpO2: 96 % (06/29/18 1121)  BP Location: Right arm    Vital Signs Range (Last 24H):  Temp:  [97.4 °F (36.3 °C)-99.1 °F (37.3 °C)]   Pulse:  [49-80]   Resp:  [16-18]   BP: (142-194)/(66-85)   SpO2:  [92 %-98 %]   BP Location: Right arm    Physical Exam   Constitutional: She is oriented to person, place, and time. She appears well-developed and well-nourished. No distress.   HENT:   Head: Normocephalic and atraumatic.   Eyes: EOM are normal.   Pulmonary/Chest: Effort normal.   Musculoskeletal:   Left hemiparesis   Neurological: She is alert and oriented to person, place, and time.   Skin: Skin is warm and dry. She is not diaphoretic.   Psychiatric: She has a normal mood and affect. Her behavior is normal.       Neurological Exam:   LOC: alert  Attention Span: Good   Language: No aphasia  Articulation: No dysarthria  Orientation: Person, Place, Time   Visual Fields: Full  EOM (CN III, IV, VI): Full/intact  Facial Movement (CN VII): Lower facial weakness on the Left  Motor: Arm left  Paresis: 1/5  Leg left  Paresis: 3/5  Arm right  Normal 5/5  Leg right Normal 5/5  Cebellar: No evidence of appendicular or axial ataxia  Sensation: Intact to light touch, temperature and vibration  Tone: Flaccid  LUE     Laboratory:  CMP:   No results for input(s): GLUCOSE, CALCIUM, ALBUMIN, PROT, NA, K, CO2, CL, BUN, CREATININE, ALKPHOS, ALT, AST, BILITOT in the last 24 hours.  BMP:     Recent Labs  Lab 06/28/18  0537      K 3.8      CO2 23   BUN 9   CREATININE 0.7   CALCIUM 9.4     CBC:     Recent Labs  Lab 06/28/18  0537   WBC 5.82   RBC 3.74*   HGB 11.8*   HCT 34.5*      MCV 92   MCH 31.6*   MCHC 34.2     Lipid Panel:     Recent Labs  Lab 06/26/18  0842   CHOL 275*   LDLCALC 163.0*   HDL 77*    TRIG 175*     Coagulation:     Recent Labs  Lab 06/27/18  0508   INR 1.0   APTT 23.8     Platelet Aggregation Study: No results for input(s): PLTAGG, PLTAGINTERP, PLTAGREGLACO, ADPPLTAGGREG in the last 168 hours.  Hgb A1C:     Recent Labs  Lab 06/26/18  0842   HGBA1C 5.2     TSH:     Recent Labs  Lab 06/26/18  0842   TSH 1.055       Diagnostic Results     Brain Imaging   CTH 6/26/18:   No acute intracranial abnormalities identified allowing for limitations by the extensive metal artifact along the right frontal calvarium.    Stable postop changes.  No significant change noted compared to noncontrast head CT examination performed earlier today.      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  Angiography would be helpful for further evaluation.    History of remote aneurysm surgery with a surgical clip in the right suprasellar cistern and metal plate over the right frontal bone. 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    Moderate to severe stenosis of the right vertebral artery origin.    No focal high-grade stenosis or occlusion of the intracranial arteries.      Cardiac Imaging   ECHO 6/26/18:   CONCLUSIONS     1 - Concentric remodeling.     2 - Normal left ventricular systolic function (EF 60-65%).     3 - Normal right ventricular systolic function .     4 - Indeterminate LV diastolic function.     5 - Severe left atrial enlargement      Meghann Heredia PA-C  Comprehensive Stroke Center  Department of Vascular Neurology   [facility]

## 2018-06-29 NOTE — ASSESSMENT & PLAN NOTE
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   NIHSS 4, CTH without acute abnormality.  Classic ataxic hemiparesis lacunar syndrome. She has been symptomatic for approximately > 24 h, thus out of the window for iv alteplase or endovascular intervention. She is unable to have MRI due to clipped aneurysm.  Possible etiology includes small vessel disease vs embolic (ESUS). Evidence of LAE on ECHO, will recommend 30d event monitor upon discharge. 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 Recommend OP follow up with neurosurgery.   6/27:  Worsened L facial weakness overnight, repeat CTH without acute changes, likely extension/evolution of current stroke.     Antithrombotics for secondary stroke prevention: Antiplatelets: Aspirin: 325 mg daily + plavix 75mg daily    Statins for secondary stroke prevention and hyperlipidemia, if present: Statins: Atorvastatin- 40 mg daily    Aggressive risk factor modification: Smoking, Exercise     Rehab efforts: PT/OT/SLP to evaluate and treat - recommend IP rehab. On regular/thin diet     Diagnostics ordered/pending: none     VTE prophylaxis: Heparin 5000 units SQ every 8 hours    BP parameters: Infarct: No intervention, SBP <180

## 2018-06-29 NOTE — PLAN OF CARE
[facility]    FACILITY TRANSFER ORDERS      Patient Name: Shannan Reza  YOB: 1950    PCP: Primary Doctor No   PCP Address: None  PCP Phone Number: None  PCP Fax: None    Encounter Date: 06/29/2018    Admit to: [facility] rehab     Vital Signs:  Routine    Diagnoses:   Active Hospital Problems    Diagnosis  POA    *Cerebrovascular accident (CVA) due to thrombosis of right middle cerebral artery [I63.311]  Yes     Priority: 1 - High    Anterior communicating artery aneurysm [I67.1]  Yes    Impaired functional mobility and endurance [Z74.09]  Unknown    Hypokalemia [E87.6]  Unknown    Essential hypertension [I10]  Unknown    Stroke [I63.9]  Yes    Lacunar infarct, acute [I63.9]  Unknown    Carotid-cavernous fistula [I77.0]  Yes      Resolved Hospital Problems    Diagnosis Date Resolved POA   No resolved problems to display.       Allergies:  Review of patient's allergies indicates:   Allergen Reactions    Percodan [oxycodone hcl-oxycodone-asa] Itching       Diet: cardiac diet    Activities: Activity as tolerated    Nursing: per facility protocol      Labs: CBC and CMP Once     CONSULTS:    Physical Therapy to evaluate and treat. , Occupational Therapy to evaluate and treat., Speech Therapy to evaluate and treat for Language, Swallowing and Cognition. and  to evaluate for community resources/long-range planning.    MISCELLANEOUS CARE:  Routine Skin for Bedridden Patients: Apply moisture barrier cream to all skin folds and wet areas in perineal area daily and after baths and all bowel movements.    WOUND CARE ORDERS  None    Medications: Review discharge medications with patient and family and provide education.      Current Discharge Medication List      START taking these medications    Details   aspirin 325 MG tablet Take 1 tablet (325 mg total) by mouth once daily.  Refills: 0      atorvastatin (LIPITOR) 80 MG tablet Take 1 tablet (80 mg total) by mouth once  daily.  Qty: 90 tablet, Refills: 0      clopidogrel (PLAVIX) 75 mg tablet Take 1 tablet (75 mg total) by mouth once daily.  Qty: 30 tablet, Refills: 0      lisinopril (PRINIVIL,ZESTRIL) 20 MG tablet Take 1 tablet (20 mg total) by mouth once daily.  Qty: 90 tablet, Refills: 0      senna (SENOKOT) 8.6 mg tablet Take 1 tablet by mouth daily as needed for Constipation.                  _________________________________  Meghann Heredia PA-C  06/29/2018

## 2018-06-29 NOTE — ASSESSMENT & PLAN NOTE
Wide neck, 3mm noted on CTA   Recommend follow up with interventional vascular neurology as OP for angiogram in about 2-3 weeks  Ordering angiogram prior to discharge, will plan with Dr. Angela

## 2018-06-29 NOTE — PLAN OF CARE
Problem: Occupational Therapy Goal  Goal: Occupational Therapy Goal  Goals set 6/27 to be addressed for 7 days with expiration date, 7/4:  Patient will increase functional independence with ADLs by performing:    Patient will demonstrate rolling to the right with Min assist.  Not met   Patient will demonstrate rolling to the left with modified independence.   Not met  Patient will demonstrate supine -sit with min assist.   Not met  Patient will demonstrate stand pivot transfers with min assist.   Not met  Patient will demonstrate grooming while standing with min assist.   Not met  Patient will demonstrate upper body dressing with min assist while seated EOB.   Not met  Patient will demonstrate lower body dressing with mod assist while seated EOB.   Not met  Patient will demonstrate toileting with mod assist.   Not met  Patient's family / caregiver will demonstrate independence and safety with assisting patient with self-care skills and functional mobility.     Not met  Patient's family / caregiver will demonstrate independence with providing ROM and changes in bed positioning.   Not met  Patient and/or patient's family will verbalize understanding of stroke prevention guidelines, personal risk factors and stroke warning signs via teachback method.  Not met          Goals remain appropriate.  BRIGHT Umana  6/29/2018

## 2018-06-29 NOTE — PLAN OF CARE
Alex was notified by [facility] representative that she was going to submit to the patient's insurance. But while speaking to the patient's insurance company she was told the only 70% of the patient's stay with [facility] would be covered. She then called and spoke to the patient's son and informed him that the only rehab that would be 100% covered is [facility] due to the patient's insurance being employee insurance from [facility]. The [facility] representative stated that the patient's son asked if they could try [facility] because of the 100% coverage but would like  to continue to follow the patient as a second choice. Sw notified. Alex will continue to follow.

## 2018-06-29 NOTE — PLAN OF CARE
Problem: SLP Goal  Goal: SLP Goal  Speech Language Pathology Goals  Goals expected to be met by 7/3/18  1. Pt will tolerate regular diet with thin liquids, MOD I  2. Pt will recall 3/3 unrelated items post 3 minute filled delay, 90% of the time,  MOD I  3. Pt will complete OMEs x10 ea to improve labial strength/coordination, MOD I  4. Pt will repeat sentences (moderate length) with 90% intelligibility, MOD I   5. Pt will complete visiospatial tasks with 90% accuracy, MOD I  6. Educate Pt on S/S aspiration and aspiration precautions       Outcome: Ongoing (interventions implemented as appropriate)  Pt. Progressing towards goals.    Loren Miller MA/NORA-SLP  Speech Language Pathologist  Pager [phone]  6/29/2018

## 2018-06-29 NOTE — ASSESSMENT & PLAN NOTE
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   NIHSS 4, CTH without acute abnormality.  Classic ataxic hemiparesis lacunar syndrome. She has been symptomatic for approximately > 24 h upon presentation, thus out of the window for iv alteplase or endovascular intervention. She is unable to have MRI due to clipped aneurysm.  Possible etiology includes small vessel disease vs embolic (ESUS). Evidence of LAE on ECHO, will recommend 30d event monitor upon discharge. 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 Recommend OP follow up with neurosurgery/interventional radiology. Angiogram has been ordered for OP in 2-3 weeks.   6/27:  Worsened L facial weakness overnight, repeat CTH without acute changes, likely extension/evolution of current stroke.     6/29: Stable for discharge to IP rehab.     Antithrombotics for secondary stroke prevention: Antiplatelets: Aspirin: 325 mg daily + plavix 75mg daily    Statins for secondary stroke prevention and hyperlipidemia, if present: Statins: Atorvastatin- 40 mg daily    Aggressive risk factor modification: Smoking, Exercise     Rehab efforts: PT/OT/SLP to evaluate and treat - recommend IP rehab. On regular/thin diet     Diagnostics ordered/pending: none     VTE prophylaxis: Heparin 5000 units SQ every 8 hours    BP parameters: Infarct: No intervention, SBP <180

## 2018-06-29 NOTE — ASSESSMENT & PLAN NOTE
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   NIHSS 4, CTH without acute abnormality.  Classic ataxic hemiparesis lacunar syndrome. She has been symptomatic for approximately > 24 h upon presentation, thus out of the window for iv alteplase or endovascular intervention. She is unable to have MRI due to clipped aneurysm.  Possible etiology includes small vessel disease vs embolic (ESUS). Evidence of LAE on ECHO, will recommend 30d event monitor upon discharge. 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 Recommend OP follow up with neurosurgery/interventional radiology. Angiogram has been ordered for OP in 2-3 weeks.   6/27:  Worsened L facial weakness overnight, repeat CTH without acute changes, likely extension/evolution of current stroke.     6/29: Stable for discharge to IP rehab.     Antithrombotics for secondary stroke prevention: Antiplatelets: Aspirin: 325 mg daily + plavix 75mg daily    Statins for secondary stroke prevention and hyperlipidemia, if present: Statins: Atorvastatin- 40 mg daily    Aggressive risk factor modification: Smoking, Exercise     Rehab efforts: PT/OT/SLP to evaluate and treat - recommend IP rehab. On regular/thin diet     Diagnostics ordered/pending: none     VTE prophylaxis: Heparin 5000 units SQ every 8 hours    BP parameters: Infarct: No intervention, SBP <180

## 2018-06-29 NOTE — PLAN OF CARE
WENDY sent rehab orders, MAR, and discharge summary to [facility] for review. SW waiting to hear back for bed assignment.    2:46 PM  RN can call report to the Charge RN at [facility] at [phone].    WENDY scheduled report with [facility] for 4:30 PM .    TRACEY martinez.    Felicia Parish, SIMONA  [facility]  Ext. [phone]

## 2018-06-29 NOTE — ASSESSMENT & PLAN NOTE
BP range 160-190 over last 24 hours  Ok BP <180 acutely   Lisinopril 10mg daily, increase to 20mg daily. May need to continue titration of antihypertensives at rehab to continue to bring patient down to long term goal of <140/80

## 2018-06-29 NOTE — ASSESSMENT & PLAN NOTE
Seen on CTA   Cancelled angiogram acutely, likely incidental in relation to acute stroke symptoms   Follow up OP with neurosurgery/IR

## 2018-06-29 NOTE — DISCHARGE SUMMARY
"[facility]  Vascular Neurology  Comprehensive Stroke Center  Discharge Summary     Summary:     Admit Date: 6/26/2018  8:20 AM    Discharge Date and Time:  06/29/2018 12:19 PM    Attending Physician: Simon Martinez MD     Discharge Provider: Meghann Heredia PA-C    History of Present Illness: Mrs Reza is a 66 y/o R handed female with a medical history that is relevant for clipping of brain aneurysm in the late 60's and smoking, who presents to the ED with complains of left sided weakness and trouble walking since yesterday morning. Stroke code activated.  Patient said that she never had similar symptoms before. Yesterday morning around 9 am started noticing " weakness and left foot drop" that impeded normal ambulation, and subsequently the entire left leg and leg arm became weak. Said that this morning she couldn't use the left hand properly and at some point her  noticed slight left face weakness and they made a decision to come to the ED.  She stated that she has been off balance since yesterday but denies associated left sided numbness-tingling, HA, vertigo, double vision, difficulty swallowing, confusion, slurred speech, language or vision impairment.    Hospital Course (synopsis of major diagnoses, care, treatment, and services provided during the course of the hospital stay): In summary, Ms. Reza is a 68yo F smoker with HTN, HLD, prior intracranial aneurysm clipping who presented with LSW, dysarthria who likely has a lacunar stroke causing ataxic hemiparesis. CTH was inconclusive for acute infarcts due to metal artifact and lack of sensitivity for small infarcts on CT. MRI unable to be performed due to prior aneurysm clipping. Unclear etiology although small vessel thrombosis likely but cannot rule out small embolic event. ECHO with evidence of LAE, will recommend 30d event monitor after discharge from rehab. Patient was found to have C-C fistula and 3mm Acomm aneurysm on " CTA. Patient can follow up with OP angiogram in 2-3 weeks with Dr. Angela. Patient was started on DAPT, atorvastatin 80mg daily for secondary stroke prevention. Patient's blood pressure goal long term is <140/80, and was started on lisinopril while inpatient. Recommend continued BP monitoring at rehab with adjustments to antihypertensives as appropriate to reach long term goals.     6/27/18: Facial asymmetry on left overnight, repeat CTH stable. 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 Replaced potassium for hypokalemia.   6/28/18: No acute events overnight. Started lisinopril 2.5mg daily, increase tomorrow likely pending response. Pending rehab discharge.   6/29/18: No acute events overnight. Awaiting discharge to IP rehab. VSS, labs stable.     STROKE DOCUMENTATION   Acute Stroke Times   Last Known Normal Date: 06/25/18  Last Known Normal Time: 0900  Symptom Onset Date: 06/25/18  Symptom Onset Time: 0900  Stroke Team Called Date: 06/26/18  Stroke Team Called Time: 0830  Stroke Team Arrival Date: 06/26/18  Stroke Team Arrival Time: 0835  CT Interpretation Time: 0840  Decision to Treat Time for Alteplase:  (No iv alteplase candidate)  Decision to Treat Time for IR:  (No IR candidate)     NIH Scale:  1a. Level Of Consciousness: 0-->Alert: keenly responsive  1b. LOC Questions: 0-->Answers both questions correctly  1c. LOC Commands: 0-->Performs both tasks correctly  2. Best Gaze: 0-->Normal  3. Visual: 0-->No visual loss  4. Facial Palsy: 1-->Minor paralysis (flattened nasolabial fold, asymmetry on smiling)  5a. Motor Arm, Left: 3-->No effort against gravity: limb falls  5b. Motor Arm, Right: 0-->No drift: limb holds 90 (or 45) degrees for full 10 secs  6a. Motor Leg, Left: 1-->Drift: leg falls by the end of the 5-sec period but does not hit bed  6b. Motor Leg, Right: 0-->No drift: leg holds 30 degree position for full 5 secs  7. Limb Ataxia: 0-->Absent  8. Sensory:  1-->Mild-to-moderate sensory loss: patient feels pinprick is less sharp or is dull on the affected side: or there is a loss of superficial pain with pinprick, but patient is aware of being touched  9. Best Language: 0-->No aphasia: normal  10. Dysarthria: 0-->Normal  11. Extinction and Inattention (formerly Neglect): 0-->No abnormality  Total (NIH Stroke Scale): 6        Modified Deo Score: 3  Jennifer Coma Scale:15   ABCD2 Score:    [id number] Score:   HAS -BLED Score:   ICH Score:   Hunt & Thomas Classification:       Assessment/Plan:     Diagnostic Results:      Brain Imaging:   CTH 6/26/18:   No acute intracranial abnormalities identified allowing for limitations by the extensive metal artifact along the right frontal calvarium.    Stable postop changes.  No significant change noted compared to noncontrast head CT examination performed earlier today.        Vessel Imaging   CTA head and neck 6/26/18:      Occlusion of the right ICA with reconstitution at the skull base    Large right carotid-cavernous fistula with drainage into the right superior ophthalmic vein and other skullbase veins.  Angiography would be helpful for further evaluation.    History of remote aneurysm surgery with a surgical clip in the right suprasellar cistern and metal plate over the right frontal bone. 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    Moderate to severe stenosis of the right vertebral artery origin.    No focal high-grade stenosis or occlusion of the intracranial arteries.        Cardiac Imaging   ECHO 6/26/18:   CONCLUSIONS     1 - Concentric remodeling.     2 - Normal left ventricular systolic function (EF 60-65%).     3 - Normal right ventricular systolic function .     4 - Indeterminate LV diastolic function.     5 - Severe left atrial enlargement    Interventions: None    Complications: None    Disposition:      Final Active Diagnoses:    Diagnosis Date Noted POA    PRINCIPAL PROBLEM:  Cerebrovascular accident (CVA) due to thrombosis of right middle cerebral artery [I63.311] 06/26/2018 Yes    Smoker [F17.200] 06/29/2018 Unknown    Anterior communicating artery aneurysm [I67.1] 06/28/2018 Yes    Impaired functional mobility and endurance [Z74.09] 06/27/2018 Unknown    Hypokalemia [E87.6] 06/27/2018 Unknown    Essential hypertension [I10] 06/27/2018 Unknown    Stroke [I63.9] 06/26/2018 Yes    Lacunar infarct, acute [I63.9] 06/26/2018 Unknown    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   NIHSS 4, CTH without acute abnormality.  Classic ataxic hemiparesis lacunar syndrome. She has been symptomatic for approximately > 24 h upon presentation, thus out of the window for iv alteplase or endovascular intervention. She is unable to have MRI due to clipped aneurysm.  Possible etiology includes small vessel disease vs embolic (ESUS). Evidence of LAE on ECHO, will recommend 30d event monitor upon discharge. 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 Recommend OP follow up with neurosurgery/interventional radiology. Angiogram has been ordered for OP in 2-3 weeks.   6/27:  Worsened L facial weakness overnight, repeat CTH without acute changes, likely extension/evolution of current stroke.     6/29: Stable for discharge to IP rehab.     Antithrombotics for secondary stroke prevention: Antiplatelets: Aspirin: 325 mg daily + plavix 75mg daily    Statins for  secondary stroke prevention and hyperlipidemia, if present: Statins: Atorvastatin- 40 mg daily    Aggressive risk factor modification: Smoking, Exercise     Rehab efforts: PT/OT/SLP to evaluate and treat - recommend IP rehab. On regular/thin diet     Diagnostics ordered/pending: none     VTE prophylaxis: Heparin 5000 units SQ every 8 hours    BP parameters: Infarct: No intervention, SBP <180              Smoker    Stroke risk factor  Encourage cessation         Anterior communicating artery aneurysm    Wide neck, 3mm noted on CTA   Recommend follow up with interventional vascular neurology as OP for angiogram in about 2-3 weeks  Ordering angiogram prior to discharge, will plan with Dr. Angela         Essential hypertension    BP range 160-190 over last 24 hours  Ok BP <180 acutely   Lisinopril 10mg daily, increase to 20mg daily. May need to continue titration of antihypertensives at rehab to continue to bring patient down to long term goal of <140/80        Hypokalemia    K 3.4  20meq KCl solution, PO x 1 dose 6/28  Resolved          Impaired functional mobility and endurance    PT/OT evaluate and treat  Recommend IP rehab        Carotid-cavernous fistula    Seen on CTA   Cancelled angiogram acutely, likely incidental in relation to acute stroke symptoms   Follow up OP with neurosurgery/IR             Recommendations:     Post-discharge complication risks: Falls, Pneumonia, Seizure, Skin breakdown, Urinary tract infections    Stroke Education given to: patient and family    Follow-up in Stroke Clinic in 4-6 weeks.     Discharge Plan:  Antithrombotics: Aspirin 325mg, Clopidogrel 75mg  Statin: Atorvastatin 80mg  Smoking Cessation  Aggresive risk factor modification:  Hypertension  Smoking  High Cholesterol  Diet  Exercise    Follow Up:  Follow-up Information     Primary Doctor Adelso Angela MD.    Why:  For angiogram                  Patient Instructions:   No discharge procedures on  file.    Medications:  Reconciled Home Medications:      Medication List      START taking these medications    aspirin 325 MG tablet  Take 1 tablet (325 mg total) by mouth once daily.  Start taking on:  6/30/2018     atorvastatin 80 MG tablet  Commonly known as:  LIPITOR  Take 1 tablet (80 mg total) by mouth once daily.  Start taking on:  6/30/2018     clopidogrel 75 mg tablet  Commonly known as:  PLAVIX  Take 1 tablet (75 mg total) by mouth once daily.  Start taking on:  6/30/2018     lisinopril 20 MG tablet  Commonly known as:  PRINIVIL,ZESTRIL  Take 1 tablet (20 mg total) by mouth once daily.  Start taking on:  6/30/2018     senna 8.6 mg tablet  Commonly known as:  SENOKOT  Take 1 tablet by mouth daily as needed for Constipation.            Meghann Heredia PA-C  Comprehensive Stroke Center  Department of Vascular Neurology   [facility]

## 2018-06-29 NOTE — PT/OT/SLP DISCHARGE
Occupational Therapy Discharge Summary    Shannan Reza  MRN: [id number]   Principal Problem: Cerebrovascular accident (CVA) due to thrombosis of right middle cerebral artery      Patient Discharged from acute Occupational Therapy on 6/29.  Please refer to prior OT note dated 6/29 for functional status.    Assessment:      Patient appropriate for care in another setting.    Objective:     GOALS:    Occupational Therapy Goals        Problem: Occupational Therapy Goal    Goal Priority Disciplines Outcome Interventions   Occupational Therapy Goal     OT, PT/OT     Description:  Goals set 6/27 to be addressed for 7 days with expiration date, 7/4:  Patient will increase functional independence with ADLs by performing:    Patient will demonstrate rolling to the right with Min assist.  Not met   Patient will demonstrate rolling to the left with modified independence.   Not met  Patient will demonstrate supine -sit with min assist.   Not met  Patient will demonstrate stand pivot transfers with min assist.   Not met  Patient will demonstrate grooming while standing with min assist.   Not met  Patient will demonstrate upper body dressing with min assist while seated EOB.   Not met  Patient will demonstrate lower body dressing with mod assist while seated EOB.   Not met  Patient will demonstrate toileting with mod assist.   Not met  Patient's family / caregiver will demonstrate independence and safety with assisting patient with self-care skills and functional mobility.     Not met  Patient's family / caregiver will demonstrate independence with providing ROM and changes in bed positioning.   Not met  Patient and/or patient's family will verbalize understanding of stroke prevention guidelines, personal risk factors and stroke warning signs via teachback method.  Not met                           Reasons for Discontinuation of Therapy Services  Transfer to alternate level of care.      Plan:     Patient Discharged to: Inpatient  Rehab    BRIGHT Umana  6/29/2018

## 2018-06-29 NOTE — SUBJECTIVE & OBJECTIVE
Neurologic Chief Complaint: LSW, L facial droop     Subjective:     Interval History: Patient is seen for follow-up neurological assessment and treatment recommendations:     No acute events overnight. Started lisinopril 2.5mg daily, increase tomorrow likely pending response. Pending rehab discharge.    HPI, Past Medical, Family, and Social History remains the same as documented in the initial encounter.     Review of Systems   Constitutional: Negative for fever.   Eyes: Negative for visual disturbance.   Respiratory: Negative for shortness of breath.    Cardiovascular: Negative for chest pain.   Skin: Negative for rash.   Neurological: Positive for facial asymmetry and weakness. Negative for speech difficulty.     Scheduled Meds:   aspirin  325 mg Oral Daily    atorvastatin  80 mg Oral Daily    clopidogrel  75 mg Oral Daily    heparin (porcine)  5,000 Units Subcutaneous Q8H    [START ON 6/29/2018] lisinopril  10 mg Oral Daily    lisinopril  2.5 mg Oral Once    sodium chloride 0.9%  3 mL Intravenous Q8H     Continuous Infusions:   sodium chloride 0.9%       PRN Meds:labetalol, senna, sodium chloride 0.9%    Objective:     Vital Signs (Most Recent):  Temp: 98.8 °F (37.1 °C) (06/28/18 1945)  Pulse: 65 (06/28/18 1945)  Resp: 16 (06/28/18 1945)  BP: (!) 170/78 (06/28/18 1945)  SpO2: (!) 92 % (06/28/18 1945)  BP Location: Right arm    Vital Signs Range (Last 24H):  Temp:  [97.4 °F (36.3 °C)-98.8 °F (37.1 °C)]   Pulse:  [64-80]   Resp:  [16-20]   BP: (140-194)/(70-85)   SpO2:  [92 %-99 %]   BP Location: Right arm    Physical Exam   Constitutional: She appears well-developed and well-nourished. No distress.   HENT:   Head: Normocephalic and atraumatic.   Eyes: EOM are normal.   Pulmonary/Chest: Effort normal.   Musculoskeletal:   Left hemiparesis   Skin: Skin is warm and dry. She is not diaphoretic.   Psychiatric: She has a normal mood and affect. Her behavior is normal.       Neurological Exam:   LOC:  alert  Attention Span: Good   Language: No aphasia  Articulation: No dysarthria  Orientation: Person, Place, Time   Visual Fields: Full  EOM (CN III, IV, VI): Full/intact  Facial Movement (CN VII): Lower facial weakness on the Left  Motor: Arm left  Paresis: 1/5  Leg left  Paresis: 3/5  Arm right  Normal 5/5  Leg right Normal 5/5  Cebellar: No evidence of appendicular or axial ataxia  Sensation: Intact to light touch, temperature and vibration  Tone: Flaccid  LUE     Laboratory:  CMP:     Recent Labs  Lab 06/28/18  0537   CALCIUM 9.4   ALBUMIN 3.5   PROT 6.5      K 3.8   CO2 23      BUN 9   CREATININE 0.7   ALKPHOS 55   ALT 11   AST 18   BILITOT 0.4     BMP:     Recent Labs  Lab 06/28/18  0537      K 3.8      CO2 23   BUN 9   CREATININE 0.7   CALCIUM 9.4     CBC:     Recent Labs  Lab 06/28/18  0537   WBC 5.82   RBC 3.74*   HGB 11.8*   HCT 34.5*      MCV 92   MCH 31.6*   MCHC 34.2     Lipid Panel:     Recent Labs  Lab 06/26/18  0842   CHOL 275*   LDLCALC 163.0*   HDL 77*   TRIG 175*     Coagulation:     Recent Labs  Lab 06/27/18  0508   INR 1.0   APTT 23.8     Platelet Aggregation Study: No results for input(s): PLTAGG, PLTAGINTERP, PLTAGREGLACO, ADPPLTAGGREG in the last 168 hours.  Hgb A1C:     Recent Labs  Lab 06/26/18  0842   HGBA1C 5.2     TSH:     Recent Labs  Lab 06/26/18  0842   TSH 1.055       Diagnostic Results     Brain Imaging   CTH 6/26/18:   No acute intracranial abnormalities identified allowing for limitations by the extensive metal artifact along the right frontal calvarium.    Stable postop changes.  No significant change noted compared to noncontrast head CT examination performed earlier today.      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  Angiography would be helpful for  further evaluation.    History of remote aneurysm surgery with a surgical clip in the right suprasellar cistern and metal plate over the right frontal bone. 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    Moderate to severe stenosis of the right vertebral artery origin.    No focal high-grade stenosis or occlusion of the intracranial arteries.      Cardiac Imaging   ECHO 6/26/18:   CONCLUSIONS     1 - Concentric remodeling.     2 - Normal left ventricular systolic function (EF 60-65%).     3 - Normal right ventricular systolic function .     4 - Indeterminate LV diastolic function.     5 - Severe left atrial enlargement

## 2018-07-02 ENCOUNTER — TELEPHONE (OUTPATIENT)
Dept: NEUROSURGERY | Facility: CLINIC | Age: 68
End: 2018-07-02

## 2018-07-02 NOTE — TELEPHONE ENCOUNTER
I contacted the pt for scheduling. Per the pt she is unable to schedule at this moment and will call back at a later time.

## 2018-07-08 NOTE — PT/OT/SLP DISCHARGE
Physical Therapy Discharge Summary    Name: Shannan Reza  MRN: [id number]   Principal Problem: Cerebrovascular accident (CVA) due to thrombosis of right middle cerebral artery     Patient Discharged from acute Physical Therapy on 18.  Please refer to prior PT noted date on 18 for functional status.     Assessment:     Patient has not met goals.    Objective:     GOALS:    Physical Therapy Goals        Problem: Physical Therapy Goal    Goal Priority Disciplines Outcome Goal Variances Interventions   Physical Therapy Goal     PT/OT, PT Ongoing (interventions implemented as appropriate)     Description:  Goals to be met by: 18    Patient will increase functional independence with mobility by performin. Supine to sit with supervision with HOB flat    NOT MET  2. Sit to supine with supervision with HOB flat     NOT MET  3. Sit to stand transfer with Contact Guard Assistance with or without least restrictive AD.  NOT MET     4. Bed to chair transfer with Contact Guard Assistance with or without least restrictive AD.   NOT MET    5. Gait  x 20 feet with Minimal Assistance with or without least restrictive AD.     NOT MET  6. Sitting at edge of bed x5 minutes with stand by assistance while performing dynamic reaching tasks to improve sitting balance     NOT MET  7. Stand for 3 minutes with Contact Guard Assistance     NOT MET  8. Lower extremity exercise program x10 reps per handout, with assistance as needed    NOT MET                       Reasons for Discontinuation of Therapy Services  Transfer to alternate level of care.      Plan:     Patient Discharged to: Inpatient Rehab.    Rubina Medina, PT  2018

## 2018-07-23 ENCOUNTER — HOSPITAL ENCOUNTER (OUTPATIENT)
Facility: HOSPITAL | Age: 68
Discharge: HOME | DRG: 641 | End: 2018-07-25
Attending: EMERGENCY MEDICINE | Admitting: HOSPITALIST
Payer: COMMERCIAL

## 2018-07-23 DIAGNOSIS — E87.6 HYPOKALEMIA: ICD-10-CM

## 2018-07-23 DIAGNOSIS — R55 SYNCOPE: ICD-10-CM

## 2018-07-23 DIAGNOSIS — I63.311 CEREBROVASCULAR ACCIDENT (CVA) DUE TO THROMBOSIS OF RIGHT MIDDLE CEREBRAL ARTERY: ICD-10-CM

## 2018-07-23 DIAGNOSIS — R55 SYNCOPE, VASOVAGAL: Primary | ICD-10-CM

## 2018-07-23 LAB
ABO + RH BLD: NORMAL
ALBUMIN SERPL BCP-MCNC: 3.2 G/DL
ALP SERPL-CCNC: 82 U/L
ALT SERPL W/O P-5'-P-CCNC: 36 U/L
ANION GAP SERPL CALC-SCNC: 9 MMOL/L
AST SERPL-CCNC: 40 U/L
BASOPHILS # BLD AUTO: 0.04 K/UL
BASOPHILS NFR BLD: 0.5 %
BILIRUB SERPL-MCNC: 0.2 MG/DL
BLD GP AB SCN CELLS X3 SERPL QL: NORMAL
BNP SERPL-MCNC: 44 PG/ML
BUN SERPL-MCNC: 4 MG/DL (ref 6–30)
BUN SERPL-MCNC: 6 MG/DL
CALCIUM SERPL-MCNC: 8.3 MG/DL
CHLORIDE SERPL-SCNC: 89 MMOL/L (ref 95–110)
CHLORIDE SERPL-SCNC: 94 MMOL/L
CO2 SERPL-SCNC: 19 MMOL/L
CREAT SERPL-MCNC: 0.3 MG/DL (ref 0.5–1.4)
CREAT SERPL-MCNC: 0.6 MG/DL
DIFFERENTIAL METHOD: ABNORMAL
EOSINOPHIL # BLD AUTO: 0.1 K/UL
EOSINOPHIL NFR BLD: 0.6 %
ERYTHROCYTE [DISTWIDTH] IN BLOOD BY AUTOMATED COUNT: 12 %
EST. GFR  (AFRICAN AMERICAN): >60 ML/MIN/1.73 M^2
EST. GFR  (NON AFRICAN AMERICAN): >60 ML/MIN/1.73 M^2
FOLATE SERPL-MCNC: 5.8 NG/ML
GLUCOSE SERPL-MCNC: 124 MG/DL (ref 70–110)
GLUCOSE SERPL-MCNC: 137 MG/DL
HCT VFR BLD AUTO: 23.3 %
HCT VFR BLD CALC: 28 %PCV (ref 36–54)
HGB BLD-MCNC: 8.7 G/DL
IMM GRANULOCYTES # BLD AUTO: 0.11 K/UL
IMM GRANULOCYTES NFR BLD AUTO: 1.2 %
INR PPP: 0.9
IRON SERPL-MCNC: 111 UG/DL
LDH SERPL L TO P-CCNC: 144 U/L
LYMPHOCYTES # BLD AUTO: 2 K/UL
LYMPHOCYTES NFR BLD: 22.4 %
MAGNESIUM SERPL-MCNC: 2.5 MG/DL
MCH RBC QN AUTO: 31.8 PG
MCHC RBC AUTO-ENTMCNC: 37.3 G/DL
MCV RBC AUTO: 85 FL
MONOCYTES # BLD AUTO: 0.6 K/UL
MONOCYTES NFR BLD: 6.4 %
NEUTROPHILS # BLD AUTO: 6.1 K/UL
NEUTROPHILS NFR BLD: 68.9 %
NRBC BLD-RTO: 0 /100 WBC
PHOSPHATE SERPL-MCNC: 3.1 MG/DL
PHOSPHATE SERPL-MCNC: 3.1 MG/DL
PLATELET # BLD AUTO: 383 K/UL
PMV BLD AUTO: 10.2 FL
POC IONIZED CALCIUM: 1.11 MMOL/L (ref 1.06–1.42)
POC TCO2 (MEASURED): 23 MMOL/L (ref 23–29)
POTASSIUM BLD-SCNC: 4.1 MMOL/L (ref 3.5–5.1)
POTASSIUM SERPL-SCNC: 4.3 MMOL/L
PROT SERPL-MCNC: 6.3 G/DL
PROTHROMBIN TIME: 10 SEC
RBC # BLD AUTO: 2.74 M/UL
RETICS/RBC NFR AUTO: 1.3 %
SAMPLE: ABNORMAL
SATURATED IRON: 39 %
SODIUM BLD-SCNC: 122 MMOL/L (ref 136–145)
SODIUM SERPL-SCNC: 122 MMOL/L
TOTAL IRON BINDING CAPACITY: 286 UG/DL
TRANSFERRIN SERPL-MCNC: 193 MG/DL
TROPONIN I SERPL DL<=0.01 NG/ML-MCNC: <0.006 NG/ML
VIT B12 SERPL-MCNC: 236 PG/ML
WBC # BLD AUTO: 8.81 K/UL

## 2018-07-23 PROCEDURE — 83615 LACTATE (LD) (LDH) ENZYME: CPT

## 2018-07-23 PROCEDURE — 84100 ASSAY OF PHOSPHORUS: CPT

## 2018-07-23 PROCEDURE — 84484 ASSAY OF TROPONIN QUANT: CPT

## 2018-07-23 PROCEDURE — 82607 VITAMIN B-12: CPT

## 2018-07-23 PROCEDURE — 96361 HYDRATE IV INFUSION ADD-ON: CPT | Performed by: EMERGENCY MEDICINE

## 2018-07-23 PROCEDURE — 86901 BLOOD TYPING SEROLOGIC RH(D): CPT

## 2018-07-23 PROCEDURE — 25000003 PHARM REV CODE 250: Performed by: EMERGENCY MEDICINE

## 2018-07-23 PROCEDURE — 93010 ELECTROCARDIOGRAM REPORT: CPT | Mod: ,,, | Performed by: INTERNAL MEDICINE

## 2018-07-23 PROCEDURE — 82746 ASSAY OF FOLIC ACID SERUM: CPT

## 2018-07-23 PROCEDURE — 99285 EMERGENCY DEPT VISIT HI MDM: CPT | Mod: 25 | Performed by: EMERGENCY MEDICINE

## 2018-07-23 PROCEDURE — 12000002 HC ACUTE/MED SURGE SEMI-PRIVATE ROOM

## 2018-07-23 PROCEDURE — 83735 ASSAY OF MAGNESIUM: CPT

## 2018-07-23 PROCEDURE — 85045 AUTOMATED RETICULOCYTE COUNT: CPT

## 2018-07-23 PROCEDURE — G0378 HOSPITAL OBSERVATION PER HR: HCPCS

## 2018-07-23 PROCEDURE — 93005 ELECTROCARDIOGRAM TRACING: CPT | Performed by: EMERGENCY MEDICINE

## 2018-07-23 PROCEDURE — 85025 COMPLETE CBC W/AUTO DIFF WBC: CPT

## 2018-07-23 PROCEDURE — 85610 PROTHROMBIN TIME: CPT

## 2018-07-23 PROCEDURE — 80053 COMPREHEN METABOLIC PANEL: CPT

## 2018-07-23 PROCEDURE — 96360 HYDRATION IV INFUSION INIT: CPT | Performed by: EMERGENCY MEDICINE

## 2018-07-23 PROCEDURE — 99285 EMERGENCY DEPT VISIT HI MDM: CPT | Mod: ,,, | Performed by: EMERGENCY MEDICINE

## 2018-07-23 PROCEDURE — 83880 ASSAY OF NATRIURETIC PEPTIDE: CPT

## 2018-07-23 PROCEDURE — 83540 ASSAY OF IRON: CPT

## 2018-07-23 RX ADMIN — SODIUM CHLORIDE 1000 ML: 0.9 INJECTION, SOLUTION INTRAVENOUS at 10:07

## 2018-07-24 PROBLEM — R55 SYNCOPE, VASOVAGAL: Status: ACTIVE | Noted: 2018-07-24

## 2018-07-24 PROBLEM — F10.10 ALCOHOL ABUSE: Status: ACTIVE | Noted: 2018-07-24

## 2018-07-24 PROBLEM — I63.81 LACUNAR INFARCT, ACUTE: Status: RESOLVED | Noted: 2018-06-26 | Resolved: 2018-07-24

## 2018-07-24 PROBLEM — R40.20 LOC (LOSS OF CONSCIOUSNESS): Status: ACTIVE | Noted: 2018-07-24

## 2018-07-24 PROBLEM — E87.1 HYPONATREMIA: Status: ACTIVE | Noted: 2018-07-24

## 2018-07-24 PROBLEM — I69.354 HEMIPARESIS AFFECTING LEFT SIDE AS LATE EFFECT OF STROKE: Status: ACTIVE | Noted: 2018-06-27

## 2018-07-24 LAB
ALBUMIN SERPL BCP-MCNC: 3.3 G/DL
ALP SERPL-CCNC: 89 U/L
ALT SERPL W/O P-5'-P-CCNC: 33 U/L
ANION GAP SERPL CALC-SCNC: 6 MMOL/L
ANION GAP SERPL CALC-SCNC: 6 MMOL/L
ANION GAP SERPL CALC-SCNC: 9 MMOL/L
AST SERPL-CCNC: 26 U/L
BASOPHILS # BLD AUTO: 0.01 K/UL
BASOPHILS NFR BLD: 0.2 %
BILIRUB SERPL-MCNC: 0.3 MG/DL
BILIRUB UR QL STRIP: NEGATIVE
BUN SERPL-MCNC: 6 MG/DL
BUN SERPL-MCNC: 7 MG/DL
BUN SERPL-MCNC: 8 MG/DL
CALCIUM SERPL-MCNC: 8.9 MG/DL
CALCIUM SERPL-MCNC: 9.1 MG/DL
CALCIUM SERPL-MCNC: 9.2 MG/DL
CHLORIDE SERPL-SCNC: 100 MMOL/L
CHLORIDE SERPL-SCNC: 101 MMOL/L
CHLORIDE SERPL-SCNC: 104 MMOL/L
CLARITY UR REFRACT.AUTO: CLEAR
CO2 SERPL-SCNC: 21 MMOL/L
CO2 SERPL-SCNC: 22 MMOL/L
CO2 SERPL-SCNC: 24 MMOL/L
COLOR UR AUTO: NORMAL
CREAT SERPL-MCNC: 0.6 MG/DL
CREAT SERPL-MCNC: 0.6 MG/DL
CREAT SERPL-MCNC: 0.7 MG/DL
DIFFERENTIAL METHOD: ABNORMAL
EOSINOPHIL # BLD AUTO: 0 K/UL
EOSINOPHIL NFR BLD: 0.2 %
ERYTHROCYTE [DISTWIDTH] IN BLOOD BY AUTOMATED COUNT: 12.2 %
EST. GFR  (AFRICAN AMERICAN): >60 ML/MIN/1.73 M^2
EST. GFR  (NON AFRICAN AMERICAN): >60 ML/MIN/1.73 M^2
GLUCOSE SERPL-MCNC: 105 MG/DL
GLUCOSE SERPL-MCNC: 118 MG/DL
GLUCOSE SERPL-MCNC: 119 MG/DL
GLUCOSE UR QL STRIP: NEGATIVE
HCT VFR BLD AUTO: 26.1 %
HGB BLD-MCNC: 9.1 G/DL
HGB UR QL STRIP: NEGATIVE
IMM GRANULOCYTES # BLD AUTO: 0.03 K/UL
IMM GRANULOCYTES NFR BLD AUTO: 0.5 %
KETONES UR QL STRIP: NEGATIVE
LEUKOCYTE ESTERASE UR QL STRIP: NEGATIVE
LYMPHOCYTES # BLD AUTO: 1.3 K/UL
LYMPHOCYTES NFR BLD: 21.2 %
MAGNESIUM SERPL-MCNC: 2.3 MG/DL
MCH RBC QN AUTO: 30.7 PG
MCHC RBC AUTO-ENTMCNC: 34.9 G/DL
MCV RBC AUTO: 88 FL
MONOCYTES # BLD AUTO: 0.3 K/UL
MONOCYTES NFR BLD: 4.6 %
NEUTROPHILS # BLD AUTO: 4.3 K/UL
NEUTROPHILS NFR BLD: 73.3 %
NITRITE UR QL STRIP: NEGATIVE
NRBC BLD-RTO: 0 /100 WBC
OSMOLALITY SERPL: 264 MOSM/KG
OSMOLALITY UR: 113 MOSM/KG
PH UR STRIP: 6 [PH] (ref 5–8)
PHOSPHATE SERPL-MCNC: 3 MG/DL
PLATELET # BLD AUTO: 393 K/UL
PMV BLD AUTO: 9.8 FL
POTASSIUM SERPL-SCNC: 3.9 MMOL/L
POTASSIUM SERPL-SCNC: 4.3 MMOL/L
POTASSIUM SERPL-SCNC: 4.3 MMOL/L
PROT SERPL-MCNC: 6.3 G/DL
PROT UR QL STRIP: NEGATIVE
RBC # BLD AUTO: 2.96 M/UL
SODIUM SERPL-SCNC: 128 MMOL/L
SODIUM SERPL-SCNC: 131 MMOL/L
SODIUM SERPL-SCNC: 134 MMOL/L
SODIUM UR-SCNC: <20 MMOL/L
SP GR UR STRIP: 1 (ref 1–1.03)
URN SPEC COLLECT METH UR: NORMAL
UROBILINOGEN UR STRIP-ACNC: NEGATIVE EU/DL
WBC # BLD AUTO: 5.91 K/UL

## 2018-07-24 PROCEDURE — 84300 ASSAY OF URINE SODIUM: CPT

## 2018-07-24 PROCEDURE — G0378 HOSPITAL OBSERVATION PER HR: HCPCS

## 2018-07-24 PROCEDURE — 83930 ASSAY OF BLOOD OSMOLALITY: CPT

## 2018-07-24 PROCEDURE — 36415 COLL VENOUS BLD VENIPUNCTURE: CPT

## 2018-07-24 PROCEDURE — 84100 ASSAY OF PHOSPHORUS: CPT

## 2018-07-24 PROCEDURE — 85025 COMPLETE CBC W/AUTO DIFF WBC: CPT

## 2018-07-24 PROCEDURE — 25000003 PHARM REV CODE 250: Performed by: STUDENT IN AN ORGANIZED HEALTH CARE EDUCATION/TRAINING PROGRAM

## 2018-07-24 PROCEDURE — 97165 OT EVAL LOW COMPLEX 30 MIN: CPT

## 2018-07-24 PROCEDURE — 80053 COMPREHEN METABOLIC PANEL: CPT

## 2018-07-24 PROCEDURE — 63600175 PHARM REV CODE 636 W HCPCS: Performed by: STUDENT IN AN ORGANIZED HEALTH CARE EDUCATION/TRAINING PROGRAM

## 2018-07-24 PROCEDURE — 83735 ASSAY OF MAGNESIUM: CPT

## 2018-07-24 PROCEDURE — 81003 URINALYSIS AUTO W/O SCOPE: CPT

## 2018-07-24 PROCEDURE — 80048 BASIC METABOLIC PNL TOTAL CA: CPT

## 2018-07-24 PROCEDURE — 11000001 HC ACUTE MED/SURG PRIVATE ROOM

## 2018-07-24 PROCEDURE — 99222 1ST HOSP IP/OBS MODERATE 55: CPT | Mod: ,,, | Performed by: HOSPITALIST

## 2018-07-24 PROCEDURE — 83935 ASSAY OF URINE OSMOLALITY: CPT

## 2018-07-24 PROCEDURE — 97161 PT EVAL LOW COMPLEX 20 MIN: CPT

## 2018-07-24 RX ORDER — LISINOPRIL 40 MG/1
40 TABLET ORAL DAILY
COMMUNITY
End: 2018-10-15 | Stop reason: SDUPTHER

## 2018-07-24 RX ORDER — IBUPROFEN 200 MG
16 TABLET ORAL
Status: DISCONTINUED | OUTPATIENT
Start: 2018-07-24 | End: 2018-07-25 | Stop reason: HOSPADM

## 2018-07-24 RX ORDER — IBUPROFEN 200 MG
24 TABLET ORAL
Status: DISCONTINUED | OUTPATIENT
Start: 2018-07-24 | End: 2018-07-25 | Stop reason: HOSPADM

## 2018-07-24 RX ORDER — ASPIRIN 325 MG
325 TABLET ORAL DAILY
Status: DISCONTINUED | OUTPATIENT
Start: 2018-07-24 | End: 2018-07-24

## 2018-07-24 RX ORDER — ACETAMINOPHEN 500 MG
1000 TABLET ORAL EVERY 8 HOURS PRN
Status: DISCONTINUED | OUTPATIENT
Start: 2018-07-24 | End: 2018-07-25 | Stop reason: HOSPADM

## 2018-07-24 RX ORDER — SODIUM CHLORIDE 9 MG/ML
INJECTION, SOLUTION INTRAVENOUS CONTINUOUS
Status: DISCONTINUED | OUTPATIENT
Start: 2018-07-24 | End: 2018-07-24

## 2018-07-24 RX ORDER — ENOXAPARIN SODIUM 100 MG/ML
40 INJECTION SUBCUTANEOUS EVERY 24 HOURS
Status: DISCONTINUED | OUTPATIENT
Start: 2018-07-24 | End: 2018-07-25 | Stop reason: HOSPADM

## 2018-07-24 RX ORDER — AMLODIPINE BESYLATE 10 MG/1
10 TABLET ORAL DAILY
COMMUNITY
End: 2018-10-15 | Stop reason: SDUPTHER

## 2018-07-24 RX ORDER — SODIUM CHLORIDE 0.9 % (FLUSH) 0.9 %
5 SYRINGE (ML) INJECTION
Status: DISCONTINUED | OUTPATIENT
Start: 2018-07-24 | End: 2018-07-25 | Stop reason: HOSPADM

## 2018-07-24 RX ORDER — GLUCAGON 1 MG
1 KIT INJECTION
Status: DISCONTINUED | OUTPATIENT
Start: 2018-07-24 | End: 2018-07-25 | Stop reason: HOSPADM

## 2018-07-24 RX ORDER — ATORVASTATIN CALCIUM 20 MG/1
80 TABLET, FILM COATED ORAL DAILY
Status: DISCONTINUED | OUTPATIENT
Start: 2018-07-24 | End: 2018-07-25 | Stop reason: HOSPADM

## 2018-07-24 RX ORDER — CLOPIDOGREL BISULFATE 75 MG/1
75 TABLET ORAL DAILY
Status: DISCONTINUED | OUTPATIENT
Start: 2018-07-24 | End: 2018-07-25 | Stop reason: HOSPADM

## 2018-07-24 RX ORDER — THIAMINE HCL 100 MG
100 TABLET ORAL DAILY
Status: DISCONTINUED | OUTPATIENT
Start: 2018-07-24 | End: 2018-07-25 | Stop reason: HOSPADM

## 2018-07-24 RX ORDER — ONDANSETRON 2 MG/ML
4 INJECTION INTRAMUSCULAR; INTRAVENOUS EVERY 8 HOURS PRN
Status: DISCONTINUED | OUTPATIENT
Start: 2018-07-24 | End: 2018-07-25 | Stop reason: HOSPADM

## 2018-07-24 RX ORDER — FOLIC ACID 1 MG/1
1 TABLET ORAL DAILY
Status: DISCONTINUED | OUTPATIENT
Start: 2018-07-24 | End: 2018-07-25 | Stop reason: HOSPADM

## 2018-07-24 RX ORDER — ACETAMINOPHEN 325 MG/1
650 TABLET ORAL EVERY 4 HOURS PRN
Status: DISCONTINUED | OUTPATIENT
Start: 2018-07-24 | End: 2018-07-25 | Stop reason: HOSPADM

## 2018-07-24 RX ORDER — SENNOSIDES 8.6 MG/1
1 TABLET ORAL DAILY PRN
Status: DISCONTINUED | OUTPATIENT
Start: 2018-07-24 | End: 2018-07-25 | Stop reason: HOSPADM

## 2018-07-24 RX ORDER — LISINOPRIL 20 MG/1
20 TABLET ORAL DAILY
Status: DISCONTINUED | OUTPATIENT
Start: 2018-07-24 | End: 2018-07-25 | Stop reason: HOSPADM

## 2018-07-24 RX ADMIN — LISINOPRIL 20 MG: 20 TABLET ORAL at 08:07

## 2018-07-24 RX ADMIN — ATORVASTATIN CALCIUM 80 MG: 20 TABLET, FILM COATED ORAL at 08:07

## 2018-07-24 RX ADMIN — SODIUM CHLORIDE: 0.9 INJECTION, SOLUTION INTRAVENOUS at 10:07

## 2018-07-24 RX ADMIN — Medication 100 MG: at 10:07

## 2018-07-24 RX ADMIN — ASPIRIN 325 MG ORAL TABLET 325 MG: 325 PILL ORAL at 08:07

## 2018-07-24 RX ADMIN — CLOPIDOGREL 75 MG: 75 TABLET, FILM COATED ORAL at 08:07

## 2018-07-24 RX ADMIN — ENOXAPARIN SODIUM 40 MG: 100 INJECTION SUBCUTANEOUS at 05:07

## 2018-07-24 RX ADMIN — THERA TABS 1 TABLET: TAB at 10:07

## 2018-07-24 RX ADMIN — FOLIC ACID 1 MG: 1 TABLET ORAL at 10:07

## 2018-07-24 NOTE — ASSESSMENT & PLAN NOTE
Patient with hyponatremia in setting of beer consumption, likely hypovolemia, corrected with iv fluids. Encouraged more modest consumption and hydration.

## 2018-07-24 NOTE — ED TRIAGE NOTES
Pt presents per EMS after having a syncopal episode son reports pt was difficult to arouse for approx 1 min. Pt awake and alert. Pt c/o feeling dizzy.

## 2018-07-24 NOTE — PT/OT/SLP EVAL
Physical Therapy Evaluation and Discharge Note    Patient Name:  Shannan Reza   MRN:  [id number]    Recommendations:     Discharge Recommendations:  outpatient OT, outpatient PT   Discharge Equipment Recommendations: none   Barriers to discharge: None    Assessment:     Shannan Reza is a 67 y.o. female admitted with a medical diagnosis of Hyponatremia. .  At this time, patient is functioning at their prior level of function and does not require further acute PT services. To benefit from HHPT services to evaluate home safety; however, patient declined. Good family support. Safe to DC home with family care and outpatient therapy services to maximize functional independence.    Recent Surgery: * No surgery found *      Plan:     During this hospitalization, patient does not require further acute PT services.  Please re-consult if situation changes.     Plan of Care Reviewed with: patient, son    Subjective     Communicated with RN prior to session.  Patient found supine with son present upon PT entry to room, agreeable to evaluation.      Chief Complaint: none stated  Patient comments/goals: declines HHPT services stating her son is able to provide 24-hr care and would rather participate with OP therapy services  Pain/Comfort:  · Pain Rating 1: 0/10  · Pain Rating Post-Intervention 1: 0/10    Patients cultural, spiritual, Holiness conflicts given the current situation: none stated    Living Environment:  Patient lives with son in 1-story home. Has all necessary DME from recent hospital admission for CVA including L AFO. AFO not present in room for PT evaluation.  Prior to admission, patients level of function was requiring assist with ADLs and short-distance household ambulation using joseph-walker since recent CVA.  Patient has the following equipment: wheelchair, bedside commode, bath bench (joseph-walker).  DME owned (not currently used): none.  Upon discharge, patient will have assistance from son 24-hr  care.    Objective:     Patient found with: peripheral IV, telemetry     General Precautions: Standard, fall   Orthopedic Precautions:N/A   Braces: N/A     Exams:  · Cognitive Exam:  Patient is oriented to Person, Place, Time and Situation and follows 100% of multi-step commands   · Fine Motor Coordination: -       Impaired  alternating toe taps with L foot  · Gross Motor Coordination:  WFL  · Postural Exam:  Patient presented with the following abnormalities: -       Rounded shoulders  · -       Forward head  · Sensation: -       Intact  light/touch BLE  · Skin Integrity/Edema:  -       Skin integrity: Visible skin intact  · RLE ROM: WFL  · RLE Strength: generalized 5/5  · LLE ROM: hip/knee ROM WFL; however, ankle DF limited to neutral.  · LLE Strength: hip flexion 3+/5; hip adduction/abduction 4-/5; knee flexion 4/5; knee extension 4+/5; ankle DF 2+/5; great toe extension 5/5    Functional Mobility:  · Bed Mobility:  Rolling Right: modified independence  · Supine to Sit: modified independence  · Transfers:  Sit to Stand:  contact guard assistance with hand-held assist  · Bed to Chair: contact guard assistance with  hand-held assist  using  Stand Pivot  · Gait: CGA-Min Assist with R HHA simulating ambulation using joseph-walker. Decreased weight shifting ability noted over LLE; decreased HSTO progresion on LLE; toe drag with LLE  · Balance: impaired dynamic balance requiring assist of 1 to decrease fall risk    AM-PAC 6 CLICK MOBILITY  Total Score:20       Therapeutic Activities and Exercises:  OT present for co-evaluation     Patient educated on:   - role of PT/POC    - safety with all functional mobility   - transfer training   - gait training with HHA   - importance of participation with therapy services   - safes to ambulate with joseph-walker and 1 person assist at this time.    Patient/son verbalized understanding of all education provided.      Patient left up in chair with all lines intact, call button in  reach, RN notified and family/OT present.    GOALS:    Physical Therapy Goals     Not on file          Multidisciplinary Problems (Resolved)        Problem: Physical Therapy Goal    Goal Priority Disciplines Outcome Goal Variances Interventions   Physical Therapy Goal   (Resolved)     PT/OT, PT Outcome(s) achieved                     History:     Past Medical History:   Diagnosis Date    Carotid-cavernous fistula     Cerebrovascular accident (CVA) due to thrombosis of right middle cerebral artery 6/26/2018    Essential hypertension 6/27/2018    Lacunar infarct, acute 6/26/2018       Past Surgical History:   Procedure Laterality Date    BRAIN SURGERY         Clinical Decision Making:     History  Co-morbidities and personal factors that may impact the plan of care Examination  Body Structures and Functions, activity limitations and participation restrictions that may impact the plan of care Clinical Presentation   Decision Making/ Complexity Score   Co-morbidities:   [] Time since onset of injury / illness / exacerbation  [x] Status of current condition  []Patient's cognitive status and safety concerns    [x] Multiple Medical Problems (see med hx)  Personal Factors:   [] Patient's age  [] Prior Level of function   [] Patient's home situation (environment and family support)  [] Patient's level of motivation  [] Expected progression of patient      HISTORY:(criteria)    [] 48940 - no personal factors/history    [x] 92273 - has 1-2 personal factor/comorbidity     [] 62309 - has >3 personal factor/comorbidity     Body Regions:  [] Objective examination findings  [] Head     []  Neck  [] Trunk   [] Upper Extremity  [] Lower Extremity    Body Systems:  [] For communication ability, affect, cognition, language, and learning style: the assessment of the ability to make needs known, consciousness, orientation (person, place, and time), expected emotional /behavioral responses, and learning preferences (eg, learning  barriers, education  needs)  [x] For the neuromuscular system: a general assessment of gross coordinated movement (eg, balance, gait, locomotion, transfers, and transitions) and motor function  (motor control and motor learning)  [x] For the musculoskeletal system: the assessment of gross symmetry, gross range of motion, gross strength, height, and weight  [] For the integumentary system: the assessment of pliability(texture), presence of scar formation, skin color, and skin integrity  [] For cardiovascular/pulmonary system: the assessment of heart rate, respiratory rate, blood pressure, and edema     Activity limitations:    [] Patient's cognitive status and saf ety concerns          [] Status of current condition      [] Weight bearing restriction  [] Cardiopulmunary Restriction    Participation Restrictions:   [] Goals and goal agreement with the patient     [] Rehab potential (prognosis) and probable outcome      Examination of Body System: (criteria)    [x] 97570 - addressing 1-2 elements    [] 25107 - addressing a total of 3 or more elements     [] 26853 -  Addressing a total of 4 or more elements         Clinical Presentation: (criteria)  Stable - 45607     On examination of body system using standardized tests and measures patient presents with 1-2 elements from any of the following: body structures and functions, activity limitations, and/or participation restrictions.  Leading to a clinical presentation that is considered stable and/or uncomplicated                              Clinical Decision Making  (Eval Complexity):  Low- 43388     Time Tracking:     PT Received On: 07/24/18  PT Start Time: 1052     PT Stop Time: 1106  PT Total Time (min): 14 min     Billable Minutes: Evaluation 14      Randall Torres III, PT  07/24/2018

## 2018-07-24 NOTE — ED NOTES
Patient identifiers for Shannan Reza 67 y.o. female checked and correct.  Chief Complaint   Patient presents with    Loss of Consciousness     Pt was eating dinner with her son and @ around 1900 had a syncopal episode at the table and was unconscious for approx 1 min. PT alert at thias time and offers no c/o.      Past Medical History:   Diagnosis Date    Carotid-cavernous fistula     Cerebrovascular accident (CVA) due to thrombosis of right middle cerebral artery 6/26/2018    Essential hypertension 6/27/2018     Allergies reported:   Review of patient's allergies indicates:   Allergen Reactions    Percodan [oxycodone hcl-oxycodone-asa] Itching       LOC: Patient is awake, alert, and aware of environment with an appropriate affect. Patient is oriented x 3 and speaking appropriately.  APPEARANCE: Patient resting comfortably and in no acute distress. Patient is clean and well groomed, patient's clothing is properly fastened.  HEENT: No abnormalities noted  SKIN: The skin is warm and dry. Patient has normal skin turgor and moist mucus membranes. Skin is intact; no bruising or breakdown noted.  MUSKULOSKELETAL: Patient is moving all extremities well, no obvious deformities noted. Pulses intact.   RESPIRATORY: Airway is open and patent. Respirations are spontaneous and non-labored with normal effort and rate, BBS=clear  CARDIAC: Patient has a normal rate and rhythm. Normal sinus on cardiac monitor,No peripheral edema noted.   ABDOMEN: No distention noted. Bowel sounds active in all 4 quadrants. Soft and non-tender upon palpation.  NEUROLOGICAL: pupils 3mm, PERRL. Facial expression is symmetrical. Patient's hand grasp is weaker on the left side, which is accurate with her history of stroke affecting the left side. Normal sensation in all extremities when touched with finger.

## 2018-07-24 NOTE — H&P
[facility] Hospital Medicine  History & Physical    Patient Name: Shannan Reza  MRN: [id number]  Admission Date: 7/23/2018  Attending Physician: Manish Sky MD   Primary Care Provider: [facility] Medicine Team: [facility]OS[facility] MED 4 Clemente Zamora MD     Patient information was obtained from patient and ER records.     Subjective:     Principal Problem:<principal problem not specified>    Chief Complaint:   Chief Complaint   Patient presents with    Loss of Consciousness     Pt was eating dinner with her son and @ around 1900 had a syncopal episode at the table and was unconscious for approx 1 min. PT alert at thias time and offers no c/o.         HPI: Shannan Reza is a 67 y.o. 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  Patient had a syncopal episode today while having dinner with her son at 1900, lasted for less than a minute.  She reports feeling hot, dizzy, and nausous right before the episode. During the episode, son witnessed that she was rolling her eyes with labor breathing.  She does not remember anything during that event.  She states that she recovers spontaneously without loss of bowel/urinary control, tongue biting, confusion, or any other focal neurological symptoms.  She endores polyuria, but has no other urinary symptoms.  She denies fall or injury.  She denies any CP, SOB, palpitations, constipation, cough, wheeze, fever, and HA.  Patient takes antihypertensives and endorses drinking 5 beers today - normal for the weekend according to pt. Drink 2-3 drinks a day on the weekdays.      When EMS arrived the patient appeared lethargic, but awake. Patient was given IV Zofran, and is complaining of nausea in the ED. The patient's son reports that he monitors the patient's BP at home and has notice a gradual fall in systolic. Patient had a  stroke 3 weeks ago, and was put on Plavix.  She reports having multiple bruises everywhere since then.  In the emergency department, CBC, CMP, EKG, and CT were ordered. ED was concerned about bleeding.      Hospital medicine has been asked to admit for further evaluation and treatment.    No new subjective & objective note has been filed under this hospital service since the last note was generated.    Assessment/Plan:     Hyponatremia    euvolemic hypotonic hyponatremia  Serum os - 264  Urine os - 113  Urine Na - <20  Consistent with primary polydipsia  Allow pt to autocorrect  Recheck labs in am  Hold IV fluids          Essential hypertension    Continue home lisinopril and Norvasc         Impaired functional mobility and endurance      PT/OT consult        Cerebrovascular accident (CVA) due to thrombosis of right middle cerebral artery    Aspirin: 325 mg daily + plavix 75mg daily  Statins: Atorvastatin- 40 mg daily          VTE Risk Mitigation         Ordered     IP VTE LOW RISK PATIENT  Once      07/24/18 0200     Place DESIREE hose  Until discontinued      07/24/18 0200             Clemente Zamora MD  Department of Hospital Medicine   [facility]

## 2018-07-24 NOTE — PT/OT/SLP EVAL
Occupational Therapy   Evaluation and Discharge Note    Name: Shannan Reza  MRN: [id number]  Admitting Diagnosis:  Hyponatremia      Recommendations:     Discharge Recommendations: outpatient OT  Discharge Equipment Recommendations:  none  Barriers to discharge:  None    History:     Occupational Profile:  Living Environment: Lives with son in 1SH with threshold AMBREEN; tub/shower combo  Previous level of function: SPV or light assist for short-distance household mobility using joseph walker; Min A with ADLs   Equipment Owned:  wheelchair, bedside commode, bath bench, walker, joseph  Assistance upon Discharge: son provides SPV as needed    Past Medical History:   Diagnosis Date    Carotid-cavernous fistula     Cerebrovascular accident (CVA) due to thrombosis of right middle cerebral artery 6/26/2018    Essential hypertension 6/27/2018    Lacunar infarct, acute 6/26/2018       Past Surgical History:   Procedure Laterality Date    BRAIN SURGERY         Subjective     Chief Complaint: none stated  Patient/Family stated goals: return home  Communicated with: RN prior to session.  Pain/Comfort:  · Pain Rating 1: 0/10  · Pain Rating Post-Intervention 1: 0/10    Objective:     Patient found with: peripheral IV, telemetry    General Precautions: Standard, fall   Orthopedic Precautions:N/A   Braces: N/A     Occupational Performance:    Functional Mobility/Transfers:  · Bed Mobility:  Rolling Right: modified independence  · Supine to Sit: modified independence  · Transfers:  Sit to Stand:  contact guard assistance with hand-held assist  · Bed to Chair: contact guard assistance with  hand-held assist  using  Stand Pivot  · Gait: CGA-Min Assist with R HHA simulating ambulation using joseph-walker. Decreased weight shifting ability noted over LLE  · Balance: impaired dynamic balance requiring assist of 1 to decrease fall risk    Activities of Daily Living:  · Not tested; pt reports requiring Min-Mod A for ADLs due to LUE/LLE  "deficits; pt/son both describe pt's current ADL performance as consistent with prior to this hospital admit    Cognitive/Visual Perceptual:  Cognitive/Psychosocial Skills:     -       Oriented to: Person, Place, Time and Situation   -       Follows Commands/attention:Follows multistep  commands  -       Communication: clear/fluent  -       Memory: No Deficits noted  -       Safety awareness/insight to disability: intact   -       Mood/Affect/Coping skills/emotional control: Appropriate to situation    Physical Exam:  Postural examination/scapula alignment:    -       Rounded shoulders  -       Posterior pelvic tilt  Skin integrity: Visible skin intact  Edema:  None noted  Sensation:    -       Intact  Motor Planning:    -       intact  Dominant hand:    -       Right  Upper Extremity Strength/ROM: pt's RUE strength/ROM is WFL; LUE strength, AROM, and coordination is impaired from previous CVA; LUE PROM is WFL    Patient left up in chair with all lines intact, call button in reach and son present    AMPAC 6 Click:  AMPAC Total Score: 18    Treatment & Education:  Pt/son educated on OT role/POC; recommendation for  HHOT/HHPT for home safety evaluation; pt/son declined but agreeable to consider OP PT/OT  Education:    Assessment:     Shannan Reza is a 67 y.o. female with a medical diagnosis of Hyponatremia. At this time, patient is functioning at their prior level of function and does not require further acute OT services.     Clinical Decision Makin.  OT Low:  "Pt evaluation falls under low complexity for evaluation coding due to performance deficits noted in 1-3 areas as stated above and 0 co-morbities affecting current functional status. Data obtained from problem focused assessments. No modifications or assistance was required for completion of evaluation. Only brief occupational profile and history review completed."     Plan:     During this hospitalization, patient does not require further acute OT " services.  Please re-consult if situation changes.    · Plan of Care Reviewed with: patient, son    This Plan of care has been discussed with the patient who was involved in its development and understands and is in agreement with the identified goals and treatment plan    GOALS:    Occupational Therapy Goals     Not on file          Multidisciplinary Problems (Resolved)        Problem: Occupational Therapy Goal    Goal Priority Disciplines Outcome Interventions   Occupational Therapy Goal   (Resolved)     OT, PT/OT Outcome(s) achieved                    Time Tracking:     OT Date of Treatment: 07/24/18  OT Start Time: 1052  OT Stop Time: 1106  OT Total Time (min): 14 min    Billable Minutes:Evaluation 14    BRIGHT Napier  7/24/2018

## 2018-07-24 NOTE — HPI
Shannan Reza is a 67 y.o. 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  Patient had a syncopal episode today while having dinner with her son at 1900, lasted for less than a minute.  She reports feeling hot, dizzy, and nausous right before the episode. During the episode, son witnessed that she was rolling her eyes with labor breathing.  She does not remember anything during that event.  She states that she recovers spontaneously without loss of bowel/urinary control, tongue biting, confusion, or any other focal neurological symptoms.  She endores polyuria, but has no other urinary symptoms.  She denies fall or injury.  She denies any CP, SOB, palpitations, constipation, cough, wheeze, fever, and HA.  Patient takes antihypertensives and endorses drinking 5 beers today - normal for the weekend according to pt. Drink 2-3 drinks a day on the weekdays.      When EMS arrived the patient appeared lethargic, but awake. Patient was given IV Zofran, and is complaining of nausea in the ED. The patient's son reports that he monitors the patient's BP at home and has notice a gradual fall in systolic. Patient had a stroke 3 weeks ago, and was put on Plavix.  She reports having multiple bruises everywhere since then.  In the emergency department, CBC, CMP, EKG, and CT were ordered. ED was concerned about bleeding.      Hospital medicine has been asked to admit for further evaluation and treatment.

## 2018-07-24 NOTE — ASSESSMENT & PLAN NOTE
euvolemic hypotonic hyponatremia  Serum os - 264  Urine os - 113  Urine Na - <20  Consistent with primary polydipsia  Allow pt to autocorrect  Recheck labs in am  Hold IV fluids

## 2018-07-24 NOTE — SUBJECTIVE & OBJECTIVE
Past Medical History:   Diagnosis Date    Carotid-cavernous fistula     Cerebrovascular accident (CVA) due to thrombosis of right middle cerebral artery 6/26/2018    Essential hypertension 6/27/2018       Past Surgical History:   Procedure Laterality Date    BRAIN SURGERY         Review of patient's allergies indicates:   Allergen Reactions    Percodan [oxycodone hcl-oxycodone-asa] Itching       No current facility-administered medications on file prior to encounter.      Current Outpatient Prescriptions on File Prior to Encounter   Medication Sig    aspirin 325 MG tablet Take 1 tablet (325 mg total) by mouth once daily.    atorvastatin (LIPITOR) 80 MG tablet Take 1 tablet (80 mg total) by mouth once daily.    clopidogrel (PLAVIX) 75 mg tablet Take 1 tablet (75 mg total) by mouth once daily.    lisinopril (PRINIVIL,ZESTRIL) 20 MG tablet Take 1 tablet (20 mg total) by mouth once daily.    senna (SENOKOT) 8.6 mg tablet Take 1 tablet by mouth daily as needed for Constipation.     Family History     None        Social History Main Topics    Smoking status: Current Every Day Smoker     Packs/day: 0.50     Years: 25.00     Types: Cigarettes    Smokeless tobacco: Former User    Alcohol use Yes      Comment: drank 5 beers today.     Drug use: No    Sexual activity: Not on file     Review of Systems   Constitutional: Negative for appetite change and chills.   HENT: Negative for congestion, drooling and trouble swallowing.    Eyes: Negative for visual disturbance.   Respiratory: Negative for apnea, chest tightness and shortness of breath.    Cardiovascular: Negative for chest pain and leg swelling.   Gastrointestinal: Negative for abdominal distention and abdominal pain.   Endocrine: Positive for polyuria.   Genitourinary: Negative for decreased urine volume and urgency.   Musculoskeletal: Negative for neck pain.   Skin: Negative for color change.   Allergic/Immunologic: Positive for food allergies.    Neurological: Positive for syncope.   Hematological: Bruises/bleeds easily.   Psychiatric/Behavioral: Negative for agitation.     Objective:     Vital Signs (Most Recent):  Temp: 97.9 °F (36.6 °C) (07/23/18 1948)  Pulse: 70 (07/24/18 0032)  Resp: 15 (07/24/18 0032)  BP: 137/65 (07/24/18 0032)  SpO2: 98 % (07/24/18 0032) Vital Signs (24h Range):  Temp:  [97.9 °F (36.6 °C)] 97.9 °F (36.6 °C)  Pulse:  [59-74] 70  Resp:  [11-18] 15  SpO2:  [97 %-100 %] 98 %  BP: (126-157)/(60-70) 137/65     Weight: 52.6 kg (116 lb)  Body mass index is 21.22 kg/m².    Physical Exam   Constitutional: She is oriented to person, place, and time. She appears well-developed and well-nourished. No distress.   HENT:   Head: Normocephalic and atraumatic.   Eyes: Conjunctivae and EOM are normal. Pupils are equal, round, and reactive to light.       Brown discoloration and patterning surrounding the pupils   Neck: Normal range of motion. Neck supple.   Cardiovascular: Normal rate, regular rhythm and intact distal pulses.    Pulmonary/Chest: Effort normal and breath sounds normal. No respiratory distress. She has no wheezes.   Abdominal: Soft. Bowel sounds are normal. She exhibits no distension. There is no tenderness. There is no guarding.   Musculoskeletal: Normal range of motion. She exhibits no edema.   Neurological: She is alert and oriented to person, place, and time. She is not disoriented. No cranial nerve deficit or sensory deficit. GCS eye subscore is 4. GCS verbal subscore is 5. GCS motor subscore is 6.   4+/5 L compared to R upper and lower extremity weakness   Skin: Skin is warm and dry. Bruising and ecchymosis noted. She is not diaphoretic.   Psychiatric: She has a normal mood and affect.   Nursing note and vitals reviewed.        CRANIAL NERVES     CN I  cranial nerve I not tested    CN III, IV, VI   Pupils are equal, round, and reactive to light.  Extraocular motions are normal.     CN V   Right facial sensation deficit:  complete  Left facial sensation deficit: complete    CN VII   Right facial weakness: none  Left facial weakness: none       Significant Labs:   Recent Lab Results       07/24/18  0152 07/24/18  0055 07/23/18  2136 07/23/18  2130 07/23/18 2017      Immature Granulocytes     1.2(H)     Immature Grans (Abs)     0.11  Comment:  Mild elevation in immature granulocytes is non specific and   can be seen in a variety of conditions including stress response,   acute inflammation, trauma and pregnancy. Correlation with other   laboratory and clinical findings is essential.  (H)     Albumin     3.2(L)     Alkaline Phosphatase     82     ALT     36     Anion Gap     9     Appearance, UA  Clear        AST     40  Comment:  *Result may be interfered by visible hemolysis     Baso #     0.04     Basophil%     0.5     Bilirubin (UA)  Negative        Total Bilirubin     0.2  Comment:  For infants and newborns, interpretation of results should be based  on gestational age, weight and in agreement with clinical  observations.  Premature Infant recommended reference ranges:  Up to 24 hours.............<8.0 mg/dL  Up to 48 hours............<12.0 mg/dL  3-5 days..................<15.0 mg/dL  6-29 days.................<15.0 mg/dL       BNP     44  Comment:  Values of less than 100 pg/ml are consistent with non-CHF populations.     BUN, Bld     6(L)     Calcium     8.3(L)     Chloride     94(L)     CO2     19(L)     Color, UA  Straw        Creatinine     0.6     Differential Method     Automated     eGFR if      >60.0     eGFR if non      >60.0  Comment:  Calculation used to obtain the estimated glomerular filtration  rate (eGFR) is the CKD-EPI equation.        Eos #     0.1     Eosinophil%     0.6     Folate     5.8     Glucose     137(H)     Glucose, UA  Negative        Gran # (ANC)     6.1     Gran%     68.9     Group & Rh    O POS      Hematocrit     23.3(L)     Hemoglobin     8.7(L)     INDIRECT QUOC     NEG      Coumadin Monitoring INR     0.9  Comment:  Coumadin Therapy:  2.0 - 3.0 for INR for all indicators except mechanical heart valves  and antiphospholipid syndromes which should use 2.5 - 3.5.       Iron     111     Ketones, UA  Negative        LD     144  Comment:  Results are increased in hemolyzed samples.  *Result may be interfered by visible hemolysis       Leukocytes, UA  Negative        Lymph #     2.0     Lymph%     22.4     Magnesium     2.5     MCH     31.8(H)     MCHC     37.3(H)     MCV     85     Mono #     0.6     Mono%     6.4     MPV     10.2     Nitrite, UA  Negative        nRBC     0     Occult Blood UA  Negative        Osmolality 264(L)         Osmolality, Ur  113  Comment:  The random urine reference ranges provided were established   for 24 hour urine collections.  No reference ranges exist for  random urine specimens.  Correlate clinically.          pH, UA  6.0        Phosphorus     3.1          3.1     Platelets     383(H)     POC BUN   4(L)       POC Chloride   89(L)       POC Creatinine   0.3(L)       POC Glucose   124(H)       POC Hematocrit   28(L)       POC Ionized Calcium   1.11       POC Potassium   4.1       POC Sodium   122(L)       POC TCO2 (MEASURED)   23       Potassium     4.3     Total Protein     6.3  Comment:  *Result may be interfered by visible hemolysis     Protein, UA  Negative  Comment:  Recommend a 24 hour urine protein or a urine   protein/creatinine ratio if globulin induced proteinuria is  clinically suspected.          Protime     10.0     RBC     2.74(L)     RDW     12.0     Retic     1.3     Sample   SATHISH       Saturated Iron     39     Sodium     122(L)     Sodium Urine Random  <20  Comment:  The random urine reference ranges provided were established   for 24 hour urine collections.  No reference ranges exist for  random urine specimens.  Correlate clinically.  (A)        Specific [place], UA  1.005        Specimen UA  Urine, Clean Catch        TIBC     286      Transferrin     193(L)     Troponin I     <0.006  Comment:  The reference interval for Troponin I represents the 99th percentile   cutoff   for our facility and is consistent with 3rd generation assay   performance.       Urobilinogen, UA  Negative        Vitamin B-12     236     WBC     8.81                     Significant Imaging: I have reviewed and interpreted all pertinent imaging results/findings within the past 24 hours.

## 2018-07-24 NOTE — PLAN OF CARE
Problem: Occupational Therapy Goal  Goal: Occupational Therapy Goal  Outcome: Outcome(s) achieved Date Met: 07/24/18  Pt discharged from acute OT services.  No goals set.  BRIGHT Napier  7/24/2018

## 2018-07-24 NOTE — PLAN OF CARE
Problem: Physical Therapy Goal  Goal: Physical Therapy Goal  Outcome: Outcome(s) achieved Date Met: 07/24/18  Patient at baseline level of function from a functional mobility standpoint. PT to DC. No further skilled acute PT services medically necessary. To benefit from outpatient therapy services to maximize functional independence.    Randall Torres III, DPT, PT  7/24/2018

## 2018-07-24 NOTE — H&P
[facility] Hospital Medicine  History & Physical    Patient Name: Shannan Reza  MRN: [id number]  Admission Date: 7/23/2018  Attending Physician: Clemente Pratt MD  Primary Care Provider: Primary Doctor [facility] Medicine Team: [facility] MED 4 Clemente Zamora MD     Patient information was obtained from patient and ER records.     Subjective:     Principal Problem:<principal problem not specified>    Chief Complaint:   Chief Complaint   Patient presents with    Loss of Consciousness     Pt was eating dinner with her son and @ around 1900 had a syncopal episode at the table and was unconscious for approx 1 min. PT alert at thias time and offers no c/o.         HPI: Shannan Reza is a 67 y.o. 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  Patient had a syncopal episode today while having dinner with her son at 1900, lasted for less than a minute.  She reports feeling hot, dizzy, and nausous right before the episode. During the episode, son witnessed that she was rolling her eyes with labor breathing.  She does not remember anything during that event.  She states that she recovers spontaneously without loss of bowel/urinary control, tongue biting, confusion, or any other focal neurological symptoms.  She endores polyuria, but has no other urinary symptoms.  She denies fall or injury.  She denies any CP, SOB, palpitations, constipation, cough, wheeze, fever, and HA.  Patient takes antihypertensives and endorses drinking 5 beers today - normal for the weekend according to pt. Drink 2-3 drinks a day on the weekdays.      When EMS arrived the patient appeared lethargic, but awake. Patient was given IV Zofran, and is complaining of nausea in the ED. The patient's son reports that he monitors the patient's BP at home and has notice a gradual fall in systolic. Patient had a  stroke 3 weeks ago, and was put on Plavix.  She reports having multiple bruises everywhere since then.  In the emergency department, CBC, CMP, EKG, and CT were ordered. ED was concerned about bleeding.      Hospital medicine has been asked to admit for further evaluation and treatment.    Past Medical History:   Diagnosis Date    Carotid-cavernous fistula     Cerebrovascular accident (CVA) due to thrombosis of right middle cerebral artery 6/26/2018    Essential hypertension 6/27/2018       Past Surgical History:   Procedure Laterality Date    BRAIN SURGERY         Review of patient's allergies indicates:   Allergen Reactions    Percodan [oxycodone hcl-oxycodone-asa] Itching       No current facility-administered medications on file prior to encounter.      Current Outpatient Prescriptions on File Prior to Encounter   Medication Sig    aspirin 325 MG tablet Take 1 tablet (325 mg total) by mouth once daily.    atorvastatin (LIPITOR) 80 MG tablet Take 1 tablet (80 mg total) by mouth once daily.    clopidogrel (PLAVIX) 75 mg tablet Take 1 tablet (75 mg total) by mouth once daily.    lisinopril (PRINIVIL,ZESTRIL) 20 MG tablet Take 1 tablet (20 mg total) by mouth once daily.    senna (SENOKOT) 8.6 mg tablet Take 1 tablet by mouth daily as needed for Constipation.     Family History     None        Social History Main Topics    Smoking status: Current Every Day Smoker     Packs/day: 0.50     Years: 25.00     Types: Cigarettes    Smokeless tobacco: Former User    Alcohol use Yes      Comment: drank 5 beers today.     Drug use: No    Sexual activity: Not on file     Review of Systems   Constitutional: Negative for appetite change and chills.   HENT: Negative for congestion, drooling and trouble swallowing.    Eyes: Negative for visual disturbance.   Respiratory: Negative for apnea, chest tightness and shortness of breath.    Cardiovascular: Negative for chest pain and leg swelling.   Gastrointestinal: Negative  for abdominal distention and abdominal pain.   Endocrine: Positive for polyuria.   Genitourinary: Negative for decreased urine volume and urgency.   Musculoskeletal: Negative for neck pain.   Skin: Negative for color change.   Allergic/Immunologic: Positive for food allergies.   Neurological: Positive for syncope.   Hematological: Bruises/bleeds easily.   Psychiatric/Behavioral: Negative for agitation.     Objective:     Vital Signs (Most Recent):  Temp: 97.9 °F (36.6 °C) (07/23/18 1948)  Pulse: 70 (07/24/18 0032)  Resp: 15 (07/24/18 0032)  BP: 137/65 (07/24/18 0032)  SpO2: 98 % (07/24/18 0032) Vital Signs (24h Range):  Temp:  [97.9 °F (36.6 °C)] 97.9 °F (36.6 °C)  Pulse:  [59-74] 70  Resp:  [11-18] 15  SpO2:  [97 %-100 %] 98 %  BP: (126-157)/(60-70) 137/65     Weight: 52.6 kg (116 lb)  Body mass index is 21.22 kg/m².    Physical Exam   Constitutional: She is oriented to person, place, and time. She appears well-developed and well-nourished. No distress.   HENT:   Head: Normocephalic and atraumatic.   Eyes: Conjunctivae and EOM are normal. Pupils are equal, round, and reactive to light.       Brown discoloration and patterning surrounding the pupils   Neck: Normal range of motion. Neck supple.   Cardiovascular: Normal rate, regular rhythm and intact distal pulses.    Pulmonary/Chest: Effort normal and breath sounds normal. No respiratory distress. She has no wheezes.   Abdominal: Soft. Bowel sounds are normal. She exhibits no distension. There is no tenderness. There is no guarding.   Musculoskeletal: Normal range of motion. She exhibits no edema.   Neurological: She is alert and oriented to person, place, and time. She is not disoriented. No cranial nerve deficit or sensory deficit. GCS eye subscore is 4. GCS verbal subscore is 5. GCS motor subscore is 6.   4+/5 L compared to R upper and lower extremity weakness   Skin: Skin is warm and dry. Bruising and ecchymosis noted. She is not diaphoretic.   Psychiatric: She  has a normal mood and affect.   Nursing note and vitals reviewed.        CRANIAL NERVES     CN I  cranial nerve I not tested    CN III, IV, VI   Pupils are equal, round, and reactive to light.  Extraocular motions are normal.     CN V   Right facial sensation deficit: complete  Left facial sensation deficit: complete    CN VII   Right facial weakness: none  Left facial weakness: none       Significant Labs:   Recent Lab Results       07/24/18  0152 07/24/18  0055 07/23/18  2136 07/23/18  2130 07/23/18 2017      Immature Granulocytes     1.2(H)     Immature Grans (Abs)     0.11  Comment:  Mild elevation in immature granulocytes is non specific and   can be seen in a variety of conditions including stress response,   acute inflammation, trauma and pregnancy. Correlation with other   laboratory and clinical findings is essential.  (H)     Albumin     3.2(L)     Alkaline Phosphatase     82     ALT     36     Anion Gap     9     Appearance, UA  Clear        AST     40  Comment:  *Result may be interfered by visible hemolysis     Baso #     0.04     Basophil%     0.5     Bilirubin (UA)  Negative        Total Bilirubin     0.2  Comment:  For infants and newborns, interpretation of results should be based  on gestational age, weight and in agreement with clinical  observations.  Premature Infant recommended reference ranges:  Up to 24 hours.............<8.0 mg/dL  Up to 48 hours............<12.0 mg/dL  3-5 days..................<15.0 mg/dL  6-29 days.................<15.0 mg/dL       BNP     44  Comment:  Values of less than 100 pg/ml are consistent with non-CHF populations.     BUN, Bld     6(L)     Calcium     8.3(L)     Chloride     94(L)     CO2     19(L)     Color, UA  Straw        Creatinine     0.6     Differential Method     Automated     eGFR if      >60.0     eGFR if non      >60.0  Comment:  Calculation used to obtain the estimated glomerular filtration  rate (eGFR) is the CKD-EPI  equation.        Eos #     0.1     Eosinophil%     0.6     Folate     5.8     Glucose     137(H)     Glucose, UA  Negative        Gran # (ANC)     6.1     Gran%     68.9     Group & Rh    O POS      Hematocrit     23.3(L)     Hemoglobin     8.7(L)     INDIRECT QUOC    NEG      Coumadin Monitoring INR     0.9  Comment:  Coumadin Therapy:  2.0 - 3.0 for INR for all indicators except mechanical heart valves  and antiphospholipid syndromes which should use 2.5 - 3.5.       Iron     111     Ketones, UA  Negative        LD     144  Comment:  Results are increased in hemolyzed samples.  *Result may be interfered by visible hemolysis       Leukocytes, UA  Negative        Lymph #     2.0     Lymph%     22.4     Magnesium     2.5     MCH     31.8(H)     MCHC     37.3(H)     MCV     85     Mono #     0.6     Mono%     6.4     MPV     10.2     Nitrite, UA  Negative        nRBC     0     Occult Blood UA  Negative        Osmolality 264(L)         Osmolality, Ur  113  Comment:  The random urine reference ranges provided were established   for 24 hour urine collections.  No reference ranges exist for  random urine specimens.  Correlate clinically.          pH, UA  6.0        Phosphorus     3.1          3.1     Platelets     383(H)     POC BUN   4(L)       POC Chloride   89(L)       POC Creatinine   0.3(L)       POC Glucose   124(H)       POC Hematocrit   28(L)       POC Ionized Calcium   1.11       POC Potassium   4.1       POC Sodium   122(L)       POC TCO2 (MEASURED)   23       Potassium     4.3     Total Protein     6.3  Comment:  *Result may be interfered by visible hemolysis     Protein, UA  Negative  Comment:  Recommend a 24 hour urine protein or a urine   protein/creatinine ratio if globulin induced proteinuria is  clinically suspected.          Protime     10.0     RBC     2.74(L)     RDW     12.0     Retic     1.3     Sample   SATHISH       Saturated Iron     39     Sodium     122(L)     Sodium Urine Random   <20  Comment:  The random urine reference ranges provided were established   for 24 hour urine collections.  No reference ranges exist for  random urine specimens.  Correlate clinically.  (A)        Specific [place], UA  1.005        Specimen UA  Urine, Clean Catch        TIBC     286     Transferrin     193(L)     Troponin I     <0.006  Comment:  The reference interval for Troponin I represents the 99th percentile   cutoff   for our facility and is consistent with 3rd generation assay   performance.       Urobilinogen, UA  Negative        Vitamin B-12     236     WBC     8.81                     Significant Imaging: I have reviewed and interpreted all pertinent imaging results/findings within the past 24 hours.    Assessment/Plan:     Hyponatremia    euvolemic hypotonic hyponatremia  Serum os - 264  Urine os - 113  Urine Na - <20  Consistent with primary polydipsia  Allow pt to autocorrect  Recheck labs in am  Hold IV fluids          Essential hypertension    Continue home lisinopril and Norvasc         Impaired functional mobility and endurance      PT/OT consult        Cerebrovascular accident (CVA) due to thrombosis of right middle cerebral artery      Aspirin: 325 mg daily + plavix 75mg daily  Statins: Atorvastatin- 40 mg daily          VTE Risk Mitigation         Ordered     IP VTE LOW RISK PATIENT  Once      07/24/18 0200     Place DESIREE hose  Until discontinued      07/24/18 0200             Clemente Zamora MD  Department of Hospital Medicine   [facility]

## 2018-07-24 NOTE — ED PROVIDER NOTES
Encounter Date: 7/23/2018    SCRIBE #1 NOTE: I, Gisselle Stiles, am scribing for, and in the presence of,  Dr. Sawant. I have scribed the following portions of the note - the EKG reading. Other sections scribed: HPI, ROS, MDM, ED Management .       History     Chief Complaint   Patient presents with    Loss of Consciousness     Pt was eating dinner with her son and @ around 1900 had a syncopal episode at the table and was unconscious for approx 1 min. PT alert at thias time and offers no c/o.      Time patient was seen by the provider: 8:23 PM      The patient is a 67 Y.O. female with a PM Hx of stroke who presents to the ED with a complaint of LOC. Patient had a syncopal episode today while eating some french fries today. Patient's son witnessed the episode and reports that it lasted a minute. She reports feeling hot, dizzy, and nauseous right before the episode. When EMS arrived the patient appeared lethargic, but awake. Patient was given IV Zofran, and is complaining of nausea in the ED. The patient's son reports that he monitors the patient's BP at home and has notice a gradual fall in systolic. Patient takes antihypertensives and endorses drinking 5 beers today. She denies any CP, SOB, palpitations, constipation, urinary problems, cough, wheeze, fever, and HA.           Review of patient's allergies indicates:   Allergen Reactions    Percodan [oxycodone hcl-oxycodone-asa] Itching     Past Medical History:   Diagnosis Date    Carotid-cavernous fistula     Cerebrovascular accident (CVA) due to thrombosis of right middle cerebral artery 6/26/2018    Essential hypertension 6/27/2018     Past Surgical History:   Procedure Laterality Date    BRAIN SURGERY       History reviewed. No pertinent family history.  Social History   Substance Use Topics    Smoking status: Current Every Day Smoker     Packs/day: 0.50     Years: 25.00     Types: Cigarettes    Smokeless tobacco: Former User    Alcohol use Yes       Comment: drank 5 beers today.      Review of Systems   Constitutional: Negative for appetite change and fever.   HENT: Negative for sore throat.    Respiratory: Negative for shortness of breath.    Cardiovascular: Negative for chest pain and palpitations.   Gastrointestinal: Negative for nausea.   Genitourinary: Negative for dysuria.   Musculoskeletal: Negative for back pain.   Skin: Negative for rash.   Neurological: Positive for syncope. Negative for weakness and headaches.   Hematological: Does not bruise/bleed easily.       Physical Exam     Initial Vitals [07/23/18 1948]   BP Pulse Resp Temp SpO2   (!) 140/63 (!) 59 18 97.9 °F (36.6 °C) 97 %      MAP       --         Physical Exam    Nursing note and vitals reviewed.  Constitutional: She appears well-developed and well-nourished. She appears distressed.   HENT:   Head: Normocephalic and atraumatic.   Eyes: EOM are normal. Pupils are equal, round, and reactive to light.   Neck: Normal range of motion. Neck supple.   Cardiovascular: Normal rate, regular rhythm, normal heart sounds and intact distal pulses. Exam reveals no gallop and no friction rub.    No murmur heard.  Pulmonary/Chest: Breath sounds normal. No respiratory distress.   Abdominal: Soft. Bowel sounds are normal. There is no tenderness.   Musculoskeletal: Normal range of motion.   Neurological: She is alert and oriented to person, place, and time. She has normal strength.   Skin: Skin is warm and dry.         ED Course   Procedures  Labs Reviewed   CBC W/ AUTO DIFFERENTIAL - Abnormal; Notable for the following:        Result Value    RBC 2.74 (*)     Hemoglobin 8.7 (*)     Hematocrit 23.3 (*)     MCH 31.8 (*)     MCHC 37.3 (*)     Platelets 383 (*)     Immature Granulocytes 1.2 (*)     Immature Grans (Abs) 0.11 (*)     All other components within normal limits   COMPREHENSIVE METABOLIC PANEL - Abnormal; Notable for the following:     Sodium 122 (*)     Chloride 94 (*)     CO2 19 (*)     Glucose 137  (*)     BUN, Bld 6 (*)     Calcium 8.3 (*)     Albumin 3.2 (*)     All other components within normal limits   ISTAT PROCEDURE - Abnormal; Notable for the following:     POC Glucose 124 (*)     POC BUN 4 (*)     POC Creatinine 0.3 (*)     POC Sodium 122 (*)     POC Chloride 89 (*)     POC Hematocrit 28 (*)     All other components within normal limits   PROTIME-INR   TROPONIN I   B-TYPE NATRIURETIC PEPTIDE   MAGNESIUM   PHOSPHORUS   PHOSPHORUS   RETICULOCYTES   FOLATE   VITAMIN B12   IRON AND TIBC   LACTATE DEHYDROGENASE   URINALYSIS, REFLEX TO URINE CULTURE   TYPE & SCREEN   ISTAT CHEM8     EKG Readings: (Independently Interpreted)   HR of 62 with a NSR       Imaging Results    None          Medical Decision Making:   History:   Old Medical Records: I decided to obtain old medical records.  Initial Assessment:   67 Y.O. female with a hx of stroke present to the ED via EMS after syncopal episode. The episode was witnessed by the patient's son. The son reports that he takes the patient's BP at home and has notice a gradual fall in systolic 104. Will do labs, EKG, and CT. The patient's medication may need to be adjusted.    ED Management:  9:36 PM  Pt found to have significantly lower hemoglobin. We have done a rectal exam and the patient's stool is negative for hem. Concerned because the pt is on Plavix. This could be the cause for the patient's syncopal episodes.     9:45PM  i.state drawn and patient has a hematocrit of 28 compared to a previous of 34. Patient's sodium is still at 122.              Scribe Attestation:   Scribe #1: I performed the above scribed service and the documentation accurately describes the services I performed. I attest to the accuracy of the note.               Clinical Impression:   The primary encounter diagnosis was Syncope, vasovagal. Diagnoses of Syncope, Hypokalemia, and Cerebrovascular accident (CVA) due to thrombosis of right middle cerebral artery were also pertinent to this  visit.      Disposition:   Disposition: Admitted  Condition: Serious                        Lamine Barry MD  07/27/18 5167

## 2018-07-24 NOTE — ED NOTES
Telemetry Verification   Patient placed on Telemetry Box  Verified with War Room  Box # [id number]       Rate 67   Rhythm NSR

## 2018-07-24 NOTE — PLAN OF CARE
Primary Doctor No     Payor: [employer] / Plan: [facility] CHOICE PLUS / Product Type: Commercial /      Extended Emergency Contact Information  Primary Emergency Contact: David Reza  Address: [address] [place] [address] [place]  Home Phone: [phone]  Mobile Phone: [phone]  Relation: Son        07/24/18 1512   Discharge Assessment   Assessment Type Discharge Planning Assessment   Confirmed/corrected address and phone number on facesheet? Yes   Assessment information obtained from? Patient   Expected Length of Stay (days) 3   Communicated expected length of stay with patient/caregiver yes   Prior to hospitilization cognitive status: Alert/Oriented   Prior to hospitalization functional status: Assistive Equipment   Current cognitive status: Alert/Oriented   Current Functional Status: Assistive Equipment;Needs Assistance   Lives With child(sandi), adult   Able to Return to Prior Arrangements unable to determine at this time (comments)   Is patient able to care for self after discharge? Unable to determine at this time (comments)   Patient's perception of discharge disposition home or selfcare;home health   Readmission Within The Last 30 Days current reason for admission unrelated to previous admission   If yes, most recent facility name: [facility]   Patient currently being followed by outpatient case management? No   Patient currently receives any other outside agency services? No   Equipment Currently Used at Home walker, joseph;wheelchair;bedside commode;bath bench   Do you have any problems affording any of your prescribed medications? No   Is the patient taking medications as prescribed? yes   Does the patient have transportation home? Yes   Transportation Available family or friend will provide   Does the patient receive services at the [facility]? No   Discharge Plan A Home with family   Discharge Plan B Home Health;Home with family   Patient/Family In  Agreement With Plan yes   Readmission Questionnaire   At the time of your discharge, did someone talk to you about what your health problems were? Yes   At the time of discharge, did someone talk to you about what to watch out for regarding worsening of your health problem? Yes   At the time of discharge, did someone talk to you about what to do if you experienced worsening of your health problem? Yes   At the time of discharge, did someone talk to you about which medication to take when you left the hospital and which ones to stop taking? Yes   At the time of discharge, did someone talk to you about when and where to follow up with a doctor after you left the hospital? Yes   What do you believe caused you to be sick enough to be re-admitted? syncopal episode   How often do you need to have someone help you when you read instructions, pamphlets, or other written material from your doctor or pharmacy? Sometimes   Do you have problems taking your medications as prescribed? No   Do you have any problems affording any of  your prescribed medications? No   Do you have problems obtaining/receiving your medications? No   Does the patient have transportation to healthcare appointments? Yes   Living Arrangements house   Does the patient have family/friends to help with healtcare needs after discharge? yes   Does your caregiver provide all the help you need? Yes   Are you currently feeling confused? No   Are you currently having problems thinking? No   Are you currently having memory problems? No   Have you felt down, depressed, or hopeless? 0   Have you felt little interest or pleasure in doing things? 0   In the last 7 days, my sleep quality was: fair

## 2018-07-24 NOTE — PHARMACY MED REC
"Admission Medication Reconciliation - Pharmacy Consult Note    The home medication history was taken by Bella Alcantar, Pharmacy Technician.  Based on information gathered and subsequent review by the clinical pharmacist, the items below may need attention.     You may go to "Admission" then "Reconcile Home Medications" tabs to review and/or act upon these items.     Potentially problematic discrepancies with current MAR  o Patient IS taking the following which was not ordered upon admit  o Aspirin 325mg po daily - this was prescribed by neurology following CVA in combination with plavix, recommending verifying with neurology role for dual antiplatelet therapy with high dose aspirin for CVA  o Amlodipine 10mg po daily   o Patient is taking a drug DIFFERENTLY than how ordered upon admit  o Lisinopril 40mg po daily - prescribed as 20mg po daily     Please address this information as you see fit.  Feel free to contact us if you have any questions or require assistance.    Daisy Barragan, PharmD, BCPS, Internal Medicine Clinical Pharmacy Specialist  EXT [phone]                    .    .            "

## 2018-07-24 NOTE — MEDICAL/APP STUDENT
[facility]  History & Physical    Patient Name: Shannan Reza  MRN: [id number]  Admission Date: 07/24/2018  Attending Physician:     PCP:     Primary Doctor No    CC:     Chief Complaint   Patient presents with    Loss of Consciousness     Pt was eating dinner with her son and @ around 1900 had a syncopal episode at the table and was unconscious for approx 1 min. PT alert at thias time and offers no c/o.        HISTORY OF PRESENT ILLNESS:     Shannan Reza is a 67 y.o. 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  Patient had a syncopal episode today while having dinner with her son at 1900, lasted for less than a minute.  She reports feeling hot, dizzy, and nausous right before the episode. During the episode, son witnessed that she was rolling her eyes with labor breathing.  She does not remember anything during that event.  She states that she recovers spontaneously without loss of bowel/urinary control, tongue biting, confusion, or any other focal neurological symptoms.  She endores polyuria, but has no other urinary symptoms.  She denies fall or injury.  She denies any CP, SOB, palpitations, constipation, cough, wheeze, fever, and HA.  Patient takes antihypertensives and endorses drinking 5 beers today.      When EMS arrived the patient appeared lethargic, but awake. Patient was given IV Zofran, and is complaining of nausea in the ED. The patient's son reports that he monitors the patient's BP at home and has notice a gradual fall in systolic. Patient had a stroke 3 weeks ago, and was put on Plavix.  She reports having multiple bruises everywhere since then.  In the emergency department, CBC, CMP, EKG, and CT were ordered. ED was concerned about bleeding.     Hospital medicine has been asked to admit for further evaluation and treatment.     REVIEW OF SYSTEMS:     --  Constitutional: No fever or chills.  -- Eyes: No visual changes, diplopia, pain, tearing, blind spots, or discharge.   -- Ears, nose, mouth, throat, and face: No congestion, sore throat, epistaxis, d/c, bleeding gums, neck stiffness masses, or dental issues.  -- Respiratory: No cough, shortness of breath, hemoptysis, stridor, wheezing, or night sweats.  -- Cardiovascular: No chest pain, SALEEM, syncope, PND, edema, cyanosis, or palpitations.   -- Gastrointestinal: No vomiting, abdominal pain, hematemesis, melena, dyspepsia, or change in bowel habits.  -- Genitourinary: No hematuria, dysuria, frequency, urgency, nocturia, stones, or incontinence. Positive for polyuria.  -- Integument/breast: No rash, pruritis, pigmentation changes, dryness, or changes in hair.  Positive for implanted breasts.  -- Hematologic/lymphatic: No lymphadenopathy. Positive for ecchymosis.   -- Musculoskeletal: No acute arthralgias, acute myalgias, joint swelling, acute limitations of ROM, or acute muscular weakness.  Weakness on her bilateral PRASAD and LLL after the stroke.   -- Neurological: No seizures, headaches, incoordination, paraesthesias, ataxia, vertigo, or tremors.  -- Behavioral/Psych: No auditory or visual hallucinations, depression, or suicidal/homicidal ideations.  -- Endocrine: No heat or cold intolerance, polydipsia, or unintentional weight gain / loss.  -- Allergy/Immunologic: No recurrent infections or adverse reaction to food, insects, or difficulty breathing.    Pain Scale  0 on scale of 1 to 10    PAST MEDICAL / SURGICAL HISTORY:     Past Medical History:   Diagnosis Date    Carotid-cavernous fistula     Cerebrovascular accident (CVA) due to thrombosis of right middle cerebral artery 6/26/2018    Essential hypertension 6/27/2018     Past Surgical History:   Procedure Laterality Date    BRAIN SURGERY         FAMILY HISTORY:     History reviewed. No pertinent family history.    SOCIAL HISTORY:     Social History     Social  History    Marital status:      Spouse name: N/A    Number of children: N/A    Years of education: N/A     Social History Main Topics    Smoking status: Current Every Day Smoker     Packs/day: 0.50     Years: 25.00     Types: Cigarettes    Smokeless tobacco: Former User    Alcohol use Yes      Comment: drank 5 beers today.     Drug use: No    Sexual activity: Not Asked     Other Topics Concern    None     Social History Narrative    None     ALLERGIES:     Review of patient's allergies indicates:   Allergen Reactions    Percodan [oxycodone hcl-oxycodone-asa] Itching       HOME MEDICATIONS:     Prior to Admission medications    Medication Sig Start Date End Date Taking? Authorizing Provider   aspirin 325 MG tablet Take 1 tablet (325 mg total) by mouth once daily. 6/30/18 6/30/19  Meghann Heredia PA-C   atorvastatin (LIPITOR) 80 MG tablet Take 1 tablet (80 mg total) by mouth once daily. 6/30/18 6/30/19  Meghann Heredia PA-C   clopidogrel (PLAVIX) 75 mg tablet Take 1 tablet (75 mg total) by mouth once daily. 6/30/18 6/30/19  Meghann Heredia PA-C   lisinopril (PRINIVIL,ZESTRIL) 20 MG tablet Take 1 tablet (20 mg total) by mouth once daily. 6/30/18 6/30/19  Meghann Heredia PA-C   senna (SENOKOT) 8.6 mg tablet Take 1 tablet by mouth daily as needed for Constipation. 6/29/18   Meghann Heredia PA-C       PHYSICAL EXAM:     Wt Readings from Last 1 Encounters:   07/23/18 1948 52.6 kg (116 lb)     Body mass index is 21.22 kg/m².  Vitals:    07/23/18 2232 07/23/18 2332 07/24/18 0002 07/24/18 0032   BP: (!) 156/65 126/60 139/60 137/65   BP Location:       Patient Position:       Pulse: 65 63 61 70   Resp: 13 11 11 15   Temp:       TempSrc:       SpO2: 100% 100% 100% 98%   Weight:       Height:            -- General appearance: well developed. appears stated age   -- Head: normocephalic, atraumatic   -- Eyes: conjunctivae clear. Extraocular muscles intact  -- Nose: Nares normal. Septum midline.   --  Mouth/Throat: lips, mucosa, and tongue normal. no throat erythema.   -- Neck: supple, symmetrical, trachea midline, no JVD and thyroid not grossly enlarged, appears symmetric.   -- Lungs: clear to auscultation bilaterally. normal respiratory effort. No use of accessory muscles.   -- Chest wall: no tenderness. equal bilateral chest rise    -- Heart: regular rate and regular rhythm. S1, S2 normal.  no click, rub or gallop.  Systolic murmur noted.   -- Abdomen: soft, non-tender, non-distended, non-tympanic; bowel sounds normal; no masses  -- Extremities: no cyanosis, clubbing or edema.   -- Pulses: 2+ and symmetric   -- Skin: color normal, texture normal, turgor normal. No rashes or lesions.  Multiple bruising spots noted on her lower extreme ties.   -- Neurologic: Left side weaknesses, power +4, reflex +2, normal tone.  Normal strength and power on her right side. CNII-XII intact. Whitethorn coma scale: eyes open spontaneously-4, oriented & converses-5, obeys commands-6.      LABORATORY STUDIES:     Recent Results (from the past 36 hour(s))   CBC auto differential    Collection Time: 07/23/18  8:17 PM   Result Value Ref Range    WBC 8.81 3.90 - 12.70 K/uL    RBC 2.74 (L) 4.00 - 5.40 M/uL    Hemoglobin 8.7 (L) 12.0 - 16.0 g/dL    Hematocrit 23.3 (L) 37.0 - 48.5 %    MCV 85 82 - 98 fL    MCH 31.8 (H) 27.0 - 31.0 pg    MCHC 37.3 (H) 32.0 - 36.0 g/dL    RDW 12.0 11.5 - 14.5 %    Platelets 383 (H) 150 - 350 K/uL    MPV 10.2 9.2 - 12.9 fL    Immature Granulocytes 1.2 (H) 0.0 - 0.5 %    Gran # (ANC) 6.1 1.8 - 7.7 K/uL    Immature Grans (Abs) 0.11 (H) 0.00 - 0.04 K/uL    Lymph # 2.0 1.0 - 4.8 K/uL    Mono # 0.6 0.3 - 1.0 K/uL    Eos # 0.1 0.0 - 0.5 K/uL    Baso # 0.04 0.00 - 0.20 K/uL    nRBC 0 0 /100 WBC    Gran% 68.9 38.0 - 73.0 %    Lymph% 22.4 18.0 - 48.0 %    Mono% 6.4 4.0 - 15.0 %    Eosinophil% 0.6 0.0 - 8.0 %    Basophil% 0.5 0.0 - 1.9 %    Differential Method Automated    Comprehensive metabolic panel    Collection  Time: 07/23/18  8:17 PM   Result Value Ref Range    Sodium 122 (L) 136 - 145 mmol/L    Potassium 4.3 3.5 - 5.1 mmol/L    Chloride 94 (L) 95 - 110 mmol/L    CO2 19 (L) 23 - 29 mmol/L    Glucose 137 (H) 70 - 110 mg/dL    BUN, Bld 6 (L) 8 - 23 mg/dL    Creatinine 0.6 0.5 - 1.4 mg/dL    Calcium 8.3 (L) 8.7 - 10.5 mg/dL    Total Protein 6.3 6.0 - 8.4 g/dL    Albumin 3.2 (L) 3.5 - 5.2 g/dL    Total Bilirubin 0.2 0.1 - 1.0 mg/dL    Alkaline Phosphatase 82 55 - 135 U/L    AST 40 10 - 40 U/L    ALT 36 10 - 44 U/L    Anion Gap 9 8 - 16 mmol/L    eGFR if African American >60.0 >60 mL/min/1.73 m^2    eGFR if non African American >60.0 >60 mL/min/1.73 m^2   Protime-INR    Collection Time: 07/23/18  8:17 PM   Result Value Ref Range    Prothrombin Time 10.0 9.0 - 12.5 sec    INR 0.9 0.8 - 1.2   Troponin I    Collection Time: 07/23/18  8:17 PM   Result Value Ref Range    Troponin I <0.006 0.000 - 0.026 ng/mL   B-Type natriuretic peptide (BNP)    Collection Time: 07/23/18  8:17 PM   Result Value Ref Range    BNP 44 0 - 99 pg/mL   Magnesium    Collection Time: 07/23/18  8:17 PM   Result Value Ref Range    Magnesium 2.5 1.6 - 2.6 mg/dL   Phosphorus    Collection Time: 07/23/18  8:17 PM   Result Value Ref Range    Phosphorus 3.1 2.7 - 4.5 mg/dL   Phosphorus    Collection Time: 07/23/18  8:17 PM   Result Value Ref Range    Phosphorus 3.1 2.7 - 4.5 mg/dL   Reticulocytes    Collection Time: 07/23/18  8:17 PM   Result Value Ref Range    Retic 1.3 0.5 - 2.5 %   Iron and TIBC    Collection Time: 07/23/18  8:17 PM   Result Value Ref Range    Iron 111 30 - 160 ug/dL    Transferrin 193 (L) 200 - 375 mg/dL    TIBC 286 250 - 450 ug/dL    Saturated Iron 39 20 - 50 %   Folate    Collection Time: 07/23/18  8:17 PM   Result Value Ref Range    Folate 5.8 4.0 - 24.0 ng/mL   Vitamin B12    Collection Time: 07/23/18  8:17 PM   Result Value Ref Range    Vitamin B-12 236 210 - 950 pg/mL   Lactate dehydrogenase    Collection Time: 07/23/18  8:17 PM   Result  Value Ref Range     110 - 260 U/L   Type & Screen    Collection Time: 07/23/18  9:30 PM   Result Value Ref Range    Group & Rh O POS     Indirect North NEG    ISTAT PROCEDURE    Collection Time: 07/23/18  9:36 PM   Result Value Ref Range    POC Glucose 124 (H) 70 - 110 mg/dL    POC BUN 4 (L) 6 - 30 mg/dL    POC Creatinine 0.3 (L) 0.5 - 1.4 mg/dL    POC Sodium 122 (L) 136 - 145 mmol/L    POC Potassium 4.1 3.5 - 5.1 mmol/L    POC Chloride 89 (L) 95 - 110 mmol/L    POC TCO2 (MEASURED) 23 23 - 29 mmol/L    POC Ionized Calcium 1.11 1.06 - 1.42 mmol/L    POC Hematocrit 28 (L) 36 - 54 %PCV    Sample SATHISH    Sodium, urine, random    Collection Time: 07/24/18 12:55 AM   Result Value Ref Range    Sodium Urine Random <20 (A) 20 - 250 mmol/L        Lab Results   Component Value Date    INR 0.9 07/23/2018    INR 1.0 06/27/2018    INR 0.9 06/26/2018     Lab Results   Component Value Date    HGBA1C 5.2 06/26/2018     No results for input(s): POCTGLUCOSE in the last 72 hours.        MICROBIOLOGY DATA:     No results found for: LABGENI, LABREFE, LABUPPE, LABURIN, LABAFB    Microbiology x 7d:   Microbiology Results (last 7 days)     ** No results found for the last 168 hours. **            IMAGING:     Imaging Results          X-Ray Chest AP Portable (Final result)  Result time 07/23/18 22:20:06    Final result by Gary George MD (07/23/18 22:20:06)                 Impression:      No acute findings in the chest.      Electronically signed by: Gary George MD  Date:    07/23/2018  Time:    22:20             Narrative:    EXAMINATION:  XR CHEST AP PORTABLE    CLINICAL HISTORY:  Chest Pain;    TECHNIQUE:  Single frontal view of the chest was performed.    COMPARISON:  June 26, 2018.    FINDINGS:  Bilateral breast implants, similar to prior.    No consolidation, pleural effusion or pneumothorax.    Cardiomediastinal silhouette is unremarkable.                               CT Head Without Contrast (Final result)  Result  time 07/23/18 22:29:18    Final result by Gary George MD (07/23/18 22:29:18)                 Impression:      No acute intracranial abnormality noting evaluation is limited by significant beam hardening artifact from surgical hardware.    Postoperative change of right frontal craniotomy and right paraclinoid aneurysm clip.    Electronically signed by resident: Inocente Maria  Date:    07/23/2018  Time:    22:12    Electronically signed by: Gary George MD  Date:    07/23/2018  Time:    22:29             Narrative:    EXAMINATION:  CT HEAD WITHOUT CONTRAST    CLINICAL HISTORY:  Syncope/fainting;    TECHNIQUE:  Low dose axial images were obtained through the head.  Coronal and sagittal reformations were also performed. Contrast was not administered.    COMPARISON:  CT head without contrast 06/26/2018    FINDINGS:  There is postoperative change of right frontal craniotomy with associated hardware resulting in significant beam hardening artifact, limiting examination.  There is additional postoperative change of aneurysm clip in the right paraclinoid region.  Brain parenchyma demonstrates generalized cerebral volume loss with compensatory sulcal prominence and ventricular enlargement.  Accounting for limitation by beam hardening artifact, there is no discrete major vascular distribution infarct or parenchymal hemorrhage.    No evidence for hydrocephalus.    No extra-axial hemorrhage or abnormal fluid collections.    Apart from postsurgical change, the calvarium appears intact.  Visualized paranasal sinuses and mastoid air cells are essentially clear.                                ASSESSMENT & PLAN:     Shannan Reza is a 67 y.o. female present to the ED with one episode of loss of consciousness,     1. Primary Diagnosis:  Hyponatremia  - Patient drinks 5 beers on weekend on the regular basis, and 2 beers on other days.  She also s/p polyuria with 1 episode of alter mental status. Liver function and renal  function are intact.   - Order urine osmolarity and urine sodium.   - Monitor fluid IN/OUT  - Monitor electrolytes  - Monitor body weight      2. Normocytic Anemia  - Pt s/p multiple bruises, and H/H are 8.7/23.3, MCV = 85. ED performed rectal exam and patient's stool was negative.   - Patient is on Plavix with unknown souse of bleeding.   - Iron study with normal Iron level, but low transferrin.      3. Essential hypertension   - /63 at admission, been stable over time.  Son notice her BP trending down recently.   - continue home med for now: Lisinopril. 20mg PO    4. Hyperglycemia   Her glucose level at the ED was 137. Glucose was normal in previous admission, and no history of DM.    - Would do a fasting glucose test tomorrow again to confirm.       VTE Risk Mitigation     None            Adult PRN medications available   DVT prophylaxis given       DISPOSITION:     Will admit to the Hospital Medicine service for further evaluation and treatment.    Chart reviewed and updated where applicable.    ===============================================================    ***Signature

## 2018-07-25 VITALS
WEIGHT: 107.81 LBS | RESPIRATION RATE: 15 BRPM | HEART RATE: 59 BPM | OXYGEN SATURATION: 96 % | DIASTOLIC BLOOD PRESSURE: 54 MMHG | HEIGHT: 62 IN | TEMPERATURE: 98 F | BODY MASS INDEX: 19.84 KG/M2 | SYSTOLIC BLOOD PRESSURE: 117 MMHG

## 2018-07-25 PROBLEM — F54 PRIMARY POLYDIPSIA: Status: ACTIVE | Noted: 2018-07-25

## 2018-07-25 PROBLEM — R63.1 PRIMARY POLYDIPSIA: Status: ACTIVE | Noted: 2018-07-25

## 2018-07-25 LAB
ALBUMIN SERPL BCP-MCNC: 3.3 G/DL
ALP SERPL-CCNC: 84 U/L
ALT SERPL W/O P-5'-P-CCNC: 30 U/L
ANION GAP SERPL CALC-SCNC: 12 MMOL/L
ANION GAP SERPL CALC-SCNC: 7 MMOL/L
ANION GAP SERPL CALC-SCNC: 8 MMOL/L
AST SERPL-CCNC: 25 U/L
BILIRUB SERPL-MCNC: 0.3 MG/DL
BUN SERPL-MCNC: 5 MG/DL
BUN SERPL-MCNC: 5 MG/DL
BUN SERPL-MCNC: 6 MG/DL
CALCIUM SERPL-MCNC: 8.9 MG/DL
CALCIUM SERPL-MCNC: 9.2 MG/DL
CALCIUM SERPL-MCNC: 9.4 MG/DL
CHLORIDE SERPL-SCNC: 102 MMOL/L
CHLORIDE SERPL-SCNC: 102 MMOL/L
CHLORIDE SERPL-SCNC: 105 MMOL/L
CO2 SERPL-SCNC: 20 MMOL/L
CO2 SERPL-SCNC: 23 MMOL/L
CO2 SERPL-SCNC: 24 MMOL/L
CREAT SERPL-MCNC: 0.6 MG/DL
EST. GFR  (AFRICAN AMERICAN): >60 ML/MIN/1.73 M^2
EST. GFR  (NON AFRICAN AMERICAN): >60 ML/MIN/1.73 M^2
GLUCOSE SERPL-MCNC: 91 MG/DL
GLUCOSE SERPL-MCNC: 93 MG/DL
GLUCOSE SERPL-MCNC: 97 MG/DL
MAGNESIUM SERPL-MCNC: 2.4 MG/DL
PHOSPHATE SERPL-MCNC: 3.1 MG/DL
POTASSIUM SERPL-SCNC: 3.7 MMOL/L
POTASSIUM SERPL-SCNC: 3.9 MMOL/L
POTASSIUM SERPL-SCNC: 3.9 MMOL/L
PROT SERPL-MCNC: 6.3 G/DL
SODIUM SERPL-SCNC: 134 MMOL/L
SODIUM SERPL-SCNC: 134 MMOL/L
SODIUM SERPL-SCNC: 135 MMOL/L

## 2018-07-25 PROCEDURE — 80053 COMPREHEN METABOLIC PANEL: CPT

## 2018-07-25 PROCEDURE — 36415 COLL VENOUS BLD VENIPUNCTURE: CPT

## 2018-07-25 PROCEDURE — 80048 BASIC METABOLIC PNL TOTAL CA: CPT | Mod: 91

## 2018-07-25 PROCEDURE — 84100 ASSAY OF PHOSPHORUS: CPT

## 2018-07-25 PROCEDURE — 25000003 PHARM REV CODE 250: Performed by: STUDENT IN AN ORGANIZED HEALTH CARE EDUCATION/TRAINING PROGRAM

## 2018-07-25 PROCEDURE — 99217 PR OBSERVATION CARE DISCHARGE: CPT | Mod: ,,, | Performed by: HOSPITALIST

## 2018-07-25 PROCEDURE — G0378 HOSPITAL OBSERVATION PER HR: HCPCS

## 2018-07-25 PROCEDURE — 80048 BASIC METABOLIC PNL TOTAL CA: CPT

## 2018-07-25 PROCEDURE — 83735 ASSAY OF MAGNESIUM: CPT

## 2018-07-25 RX ORDER — FOLIC ACID 1 MG/1
1 TABLET ORAL DAILY
Qty: 30 TABLET | Refills: 3 | Status: SHIPPED | OUTPATIENT
Start: 2018-07-25 | End: 2018-10-15 | Stop reason: SDUPTHER

## 2018-07-25 RX ORDER — ASPIRIN 325 MG
325 TABLET ORAL DAILY
Refills: 0 | COMMUNITY
Start: 2018-07-25 | End: 2018-10-15 | Stop reason: SDUPTHER

## 2018-07-25 RX ORDER — LANOLIN ALCOHOL/MO/W.PET/CERES
100 CREAM (GRAM) TOPICAL DAILY
COMMUNITY
Start: 2018-07-25 | End: 2019-11-25

## 2018-07-25 RX ADMIN — THERA TABS 1 TABLET: TAB at 09:07

## 2018-07-25 RX ADMIN — FOLIC ACID 1 MG: 1 TABLET ORAL at 09:07

## 2018-07-25 RX ADMIN — Medication 100 MG: at 09:07

## 2018-07-25 RX ADMIN — CLOPIDOGREL 75 MG: 75 TABLET, FILM COATED ORAL at 09:07

## 2018-07-25 RX ADMIN — LISINOPRIL 20 MG: 20 TABLET ORAL at 09:07

## 2018-07-25 RX ADMIN — ATORVASTATIN CALCIUM 80 MG: 20 TABLET, FILM COATED ORAL at 09:07

## 2018-07-25 NOTE — PLAN OF CARE
Problem: Patient Care Overview  Goal: Plan of Care Review  Sodium nearly normalized, Medical team discontinued IV infusion. Slept well no complaints.  Expect discharge today.

## 2018-07-25 NOTE — ASSESSMENT & PLAN NOTE
Likely secondary to hypovolemia in setting of diuresis with caffeine and alcohol and limited water intake and heat exposure.   Continue with angiography workup as outpatient based on previous imaging findings after stroke.

## 2018-07-25 NOTE — NURSING
IV removed.  Bleeding controlled.  Discharge papers reviewed and given to pt.  Pt verbalizes understanding.

## 2018-07-25 NOTE — DISCHARGE SUMMARY
[facility] Hospital Medicine  Discharge Summary      Patient Name: Shannan Reza  MRN: [id number]  Admission Date: 7/23/2018  Hospital Length of Stay: 2 days  Discharge Date and Time:  07/25/2018 9:09 AM  Attending Physician: Manish Sky MD   Discharging Provider: Taurus Davis MD  Primary Care Provider: Primary Doctor [facility] Medicine Team: [facility]OSP MED 4 Taurus Davis MD    HPI:   Shannan Reza is a 67 y.o. 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  Patient had a syncopal episode today while having dinner with her son at 1900, lasted for less than a minute.  She reports feeling hot, dizzy, and nausous right before the episode. During the episode, son witnessed that she was rolling her eyes with labor breathing.  She does not remember anything during that event.  She states that she recovers spontaneously without loss of bowel/urinary control, tongue biting, confusion, or any other focal neurological symptoms.  She endores polyuria, but has no other urinary symptoms.  She denies fall or injury.  She denies any CP, SOB, palpitations, constipation, cough, wheeze, fever, and HA.  Patient takes antihypertensives and endorses drinking 5 beers today - normal for the weekend according to pt. Drink 2-3 drinks a day on the weekdays.      When EMS arrived the patient appeared lethargic, but awake. Patient was given IV Zofran, and is complaining of nausea in the ED. The patient's son reports that he monitors the patient's BP at home and has notice a gradual fall in systolic. Patient had a stroke 3 weeks ago, and was put on Plavix.  She reports having multiple bruises everywhere since then.  In the emergency department, CBC, CMP, EKG, and CT were ordered. ED was concerned about bleeding.      Hospital medicine has been asked to admit for further evaluation and treatment.    *  No surgery found *      Hospital Course:   Patient admitted after a likely vasovagal syncope episode in the setting of dehydration and heat exposure.     Patient drinks 5 beers most days and coffee and little other fluid.     She was found to have hyponatremia to 122. During her previous admission it was in the 130s but several years ago she had had levels in the high 120s. She was given IV fluids and slowly corrected over the next 24-36 hours back to normal range.     She symptomatically felt better and was discharged for follow up with PCP.     FOR FOLLOW UP:  1. Patient was on high dose ASA and Plavix after her recent stroke. Unclear if this is appropriate management, will need to be evaluated as outpatient.     2. Previous imaging had showed vascular abnormalities in the neck and patient is scheduled for angiogram as outpatient, will need follow up.            Review of Systems   Constitutional: Negative for appetite change and chills.   HENT: Negative for congestion, drooling and trouble swallowing.    Eyes: Negative for visual disturbance.   Respiratory: Negative for apnea, chest tightness and shortness of breath.    Cardiovascular: Negative for chest pain and leg swelling.   Gastrointestinal: Negative for abdominal distention and abdominal pain.   Genitourinary: Negative for decreased urine volume and urgency.   Musculoskeletal: Negative for neck pain.   Skin: Negative for color change.   Neurological: Negative for HAs or tremors  Psychiatric/Behavioral: Negative for agitation.      Objective:      Vitals:    07/25/18 0731   BP: (!) 117/54   Pulse: (!) 59   Resp: 15   Temp: 98.1 °F (36.7 °C)          Physical Exam   Constitutional: She is oriented to person, place, and time. She appears well-developed and well-nourished. No distress.   HENT:   Head: Normocephalic and atraumatic.   Eyes:  No conjunctival pallor or scleral icterus.   Neck: Normal range of motion. Neck supple.   Cardiovascular: Normal rate,  regular rhythm and intact distal pulses.    Pulmonary/Chest: Effort normal and breath sounds normal. No respiratory distress. She has no wheezes.   Abdominal: Soft. Bowel sounds are normal. She exhibits no distension. There is no tenderness. There is no guarding.   Musculoskeletal: Normal range of motion. She exhibits no edema.   Neurological: She is alert and oriented to person, place, and time. She is not disoriented. No cranial nerve deficit or sensory deficit. Some dysarthria of speech. GCS eye subscore is 4. GCS verbal subscore is 5. GCS motor subscore is 6.   4+/5 L compared to R upper and lower extremity weakness   Skin: Skin is warm and dry. She is not diaphoretic.   Psychiatric: She has a normal mood and affect.   Nursing note and vitals reviewed.    Consults:   Consults         Status Ordering Provider     IP consult to case management  Once     Provider:  (Not yet assigned)    Acknowledged [facility]          * Hyponatremia    Patient with hyponatremia in setting of beer consumption, likely hypovolemia, corrected with iv fluids. Encouraged more modest consumption and hydration.           Alcohol abuse    Counseled on dangers of alcohol abuse, patient did not seem to realize this had been a problem but was drinking primarily because she was not busy with work.   Will give short course of vitamin supplementation.           Syncope, vasovagal    Likely secondary to hypovolemia in setting of diuresis with caffeine and alcohol and limited water intake and heat exposure.   Continue with angiography workup as outpatient based on previous imaging findings after stroke.         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    Why:  As scheduled for hospital follow up.                Patient Instructions:     Ambulatory consult to Physical Therapy   Referral Priority: Routine Referral Type: Physical Medicine   Referral Reason: Specialty Services Required    Requested Specialty: Physical Therapy    Number of Visits Requested: 1      Diet Adult Regular     Notify your health care provider if you experience any of the following:  temperature >100.4     Notify your health care provider if you experience any of the following:  persistent nausea and vomiting or diarrhea     Notify your health care provider if you experience any of the following:  difficulty breathing or increased cough     Notify your health care provider if you experience any of the following:  persistent dizziness, light-headedness, or visual disturbances     Notify your health care provider if you experience any of the following:  increased confusion or weakness     Activity as tolerated         Significant Diagnostic Studies: Labs:   BMP:   Recent Labs  Lab 07/23/18  2017 07/24/18  0610 07/24/18  1109 07/24/18  1828 07/25/18  0031   * 119* 118* 105 97   * 128* 131* 134* 135*   K 4.3 4.3 4.3 3.9 3.7   CL 94* 100 101 104 105   CO2 19* 22* 21* 24 23   BUN 6* 6* 7* 8 6*   CREATININE 0.6 0.6 0.6 0.7 0.6   CALCIUM 8.3* 9.1 9.2 8.9 8.9   MG 2.5 2.3  --   --   --    , CMP   Recent  Labs  Lab 07/23/18 2017 07/24/18  0610 07/24/18  1109 07/24/18  1828 07/25/18  0031   * 128* 131* 134* 135*   K 4.3 4.3 4.3 3.9 3.7   CL 94* 100 101 104 105   CO2 19* 22* 21* 24 23   * 119* 118* 105 97   BUN 6* 6* 7* 8 6*   CREATININE 0.6 0.6 0.6 0.7 0.6   CALCIUM 8.3* 9.1 9.2 8.9 8.9   PROT 6.3 6.3  --   --   --    ALBUMIN 3.2* 3.3*  --   --   --    BILITOT 0.2 0.3  --   --   --    ALKPHOS 82 89  --   --   --    AST 40 26  --   --   --    ALT 36 33  --   --   --    ANIONGAP 9 6* 9 6* 7*   ESTGFRAFRICA >60.0 >60.0 >60.0 >60.0 >60.0   EGFRNONAA >60.0 >60.0 >60.0 >60.0 >60.0    and CBC   Recent Labs  Lab 07/23/18 2017 07/24/18  0917   WBC 8.81  --  5.91   HGB 8.7*  --  9.1*   HCT 23.3*  < > 26.1*   *  --  393*   < > = values in this interval not displayed.    Pending Diagnostic Studies:     Procedure Component Value Units Date/Time    Basic metabolic panel [[id number]] Collected:  07/25/18 0624    Order Status:  Sent Lab Status:  In process Updated:  07/25/18 0712    Specimen:  Blood from Blood     Comprehensive Metabolic Panel (CMP) [[id number]] Collected:  07/25/18 0623    Order Status:  Sent Lab Status:  In process Updated:  07/25/18 0712    Specimen:  Blood from Blood     Magnesium [[id number]] Collected:  07/25/18 0623    Order Status:  Sent Lab Status:  In process Updated:  07/25/18 0712    Specimen:  Blood from Blood     Phosphorus [[id number]] Collected:  07/25/18 0623    Order Status:  Sent Lab Status:  In process Updated:  07/25/18 0712    Specimen:  Blood from Blood     Urinalysis, Reflex to Urine Culture Urine, Clean Catch [[id number]] Collected:  07/23/18 2052    Order Status:  Sent Lab Status:  In process Updated:  07/23/18 2052    Specimen:  Urine from Urine          Medications:  Reconciled Home Medications:      Medication List      START taking these medications    folic acid 1 MG tablet  Commonly known as:  FOLVITE  Take 1 tablet (1 mg total) by mouth once daily.     thiamine 100  MG tablet  Take 1 tablet (100 mg total) by mouth once daily.        CHANGE how you take these medications    aspirin 325 MG tablet  Take 1 tablet (325 mg total) by mouth once daily. Talk to your doctor about whether to resume this medication  What changed:  additional instructions        CONTINUE taking these medications    atorvastatin 80 MG tablet  Commonly known as:  LIPITOR  Take 1 tablet (80 mg total) by mouth once daily.     clopidogrel 75 mg tablet  Commonly known as:  PLAVIX  Take 1 tablet (75 mg total) by mouth once daily.     lisinopril 40 MG tablet  Commonly known as:  PRINIVIL,ZESTRIL  Take 40 mg by mouth once daily.        ASK your doctor about these medications    amLODIPine 10 MG tablet  Commonly known as:  NORVASC  Take 10 mg by mouth once daily.            Indwelling Lines/Drains at time of discharge:   Lines/Drains/Airways          No matching active lines, drains, or airways          Time spent on the discharge of patient: 30 minutes  Patient was seen and examined on the date of discharge and determined to be suitable for discharge.         Taurus Davis MD  Department of Hospital Medicine  [facility]

## 2018-07-25 NOTE — ASSESSMENT & PLAN NOTE
Counseled on dangers of alcohol abuse, patient did not seem to realize this had been a problem but was drinking primarily because she was not busy with work.   Will give short course of vitamin supplementation.

## 2018-07-25 NOTE — HOSPITAL COURSE
Patient admitted after a likely vasovagal syncope episode in the setting of dehydration and heat exposure.     Patient drinks 5 beers most days and coffee and little other fluid.     She was found to have hyponatremia to 122. During her previous admission it was in the 130s but several years ago she had had levels in the high 120s. She was given IV fluids and slowly corrected over the next 24-36 hours back to normal range.     She symptomatically felt better and was discharged for follow up with PCP.     FOR FOLLOW UP:  1. Patient was on high dose ASA and Plavix after her recent stroke. Unclear if this is appropriate management, will need to be evaluated as outpatient.     2. Previous imaging had showed vascular abnormalities in the neck and patient is scheduled for angiogram as outpatient, will need follow up.

## 2018-07-26 NOTE — PLAN OF CARE
07/26/18 1100   Final Note   Assessment Type Final Discharge Note   Discharge Disposition Home

## 2018-07-27 ENCOUNTER — PATIENT OUTREACH (OUTPATIENT)
Dept: ADMINISTRATIVE | Facility: CLINIC | Age: 68
End: 2018-07-27

## 2018-07-27 NOTE — PATIENT INSTRUCTIONS
Discharge Instructions for Hyponatremia  You were diagnosed with hyponatremia, which means your blood level of sodium (salt) is too low. Salt is needed for the body and brain to work. Very low blood levels of sodium can be fatal. Symptoms can include headache, confusion, fatigue, muscle cramps, hallucinations, seizures, and coma. You have been treated to raise your blood levels of sodium. The following instructions will help you care for yourself at home as you have been instructed.  Home care  · Limit your intake of fluids. Drink only the amounts directed by your healthcare provider.  · Ask your healthcare provider what you should use to replace fluids if you are throwing up.  · Keep all follow-up appointments. Your provider needs to watch your condition closely.  To help prevent hyponatremia:  · Take all medicines exactly as directed. Certain medicines can lower blood sodium levels.  · If you have done something that makes you sweat a lot, drink fluids that contain salt and other electrolytes.   · Tell all healthcare providers what medicines you take. Mention all prescription and over-the-counter drugs and herbs.  · Have your sodium levels checked often. This is vital if you take a diuretic (medicine that helps your body get rid of water).  Follow-up  Schedule a follow-up visit as directed.  When to call your healthcare provider  Call your provider right away if you have any of the following:  · Severe tiredness  · Fainting  · Dizziness  · Loss of appetite  · Nausea or vomiting  · Confusion or forgetfullness  · Muscle spasms, cramping, or twitching  · Seizures  · Gait disturbances   Date Last Reviewed: 6/8/2015  © 0486-4107 [employer]. [address] [place] [address], [place] [address]. All rights reserved. This information is not intended as a substitute for professional medical care. Always follow your healthcare professional's instructions.

## 2018-10-07 ENCOUNTER — NURSE TRIAGE (OUTPATIENT)
Dept: ADMINISTRATIVE | Facility: CLINIC | Age: 68
End: 2018-10-07

## 2018-10-07 NOTE — TELEPHONE ENCOUNTER
Pt called re appt shelbi at 0900- cant make it. Rescheduled for 10/15 at 0830.     Reason for Disposition   Requesting regular office appointment    Protocols used: ST INFORMATION ONLY CALL-A-AH

## 2018-10-15 ENCOUNTER — OFFICE VISIT (OUTPATIENT)
Dept: NEUROSURGERY | Facility: CLINIC | Age: 68
End: 2018-10-15
Payer: COMMERCIAL

## 2018-10-15 VITALS
SYSTOLIC BLOOD PRESSURE: 159 MMHG | HEART RATE: 75 BPM | DIASTOLIC BLOOD PRESSURE: 70 MMHG | BODY MASS INDEX: 18.78 KG/M2 | WEIGHT: 102.69 LBS | TEMPERATURE: 99 F

## 2018-10-15 DIAGNOSIS — I67.1 ANTERIOR COMMUNICATING ARTERY ANEURYSM: ICD-10-CM

## 2018-10-15 DIAGNOSIS — I67.1 CAROTID-CAVERNOUS FISTULA: ICD-10-CM

## 2018-10-15 DIAGNOSIS — I63.311 CEREBROVASCULAR ACCIDENT (CVA) DUE TO THROMBOSIS OF RIGHT MIDDLE CEREBRAL ARTERY: Primary | ICD-10-CM

## 2018-10-15 DIAGNOSIS — I69.354 HEMIPARESIS AFFECTING LEFT SIDE AS LATE EFFECT OF STROKE: ICD-10-CM

## 2018-10-15 DIAGNOSIS — I10 ESSENTIAL HYPERTENSION: ICD-10-CM

## 2018-10-15 PROCEDURE — 3077F SYST BP >= 140 MM HG: CPT | Mod: CPTII,S$GLB,, | Performed by: NEUROLOGICAL SURGERY

## 2018-10-15 PROCEDURE — 1101F PT FALLS ASSESS-DOCD LE1/YR: CPT | Mod: CPTII,S$GLB,, | Performed by: NEUROLOGICAL SURGERY

## 2018-10-15 PROCEDURE — 3078F DIAST BP <80 MM HG: CPT | Mod: CPTII,S$GLB,, | Performed by: NEUROLOGICAL SURGERY

## 2018-10-15 PROCEDURE — 99215 OFFICE O/P EST HI 40 MIN: CPT | Mod: S$GLB,,, | Performed by: NEUROLOGICAL SURGERY

## 2018-10-15 PROCEDURE — 99999 PR PBB SHADOW E&M-EST. PATIENT-LVL III: CPT | Mod: PBBFAC,,, | Performed by: NEUROLOGICAL SURGERY

## 2018-10-15 RX ORDER — CLOPIDOGREL BISULFATE 75 MG/1
75 TABLET ORAL DAILY
Qty: 30 TABLET | Refills: 0 | Status: ON HOLD
Start: 2018-10-15 | End: 2019-04-26 | Stop reason: SDUPTHER

## 2018-10-15 RX ORDER — ASPIRIN 325 MG
325 TABLET ORAL DAILY
Refills: 0 | Status: ON HOLD | COMMUNITY
Start: 2018-10-15 | End: 2019-04-26 | Stop reason: HOSPADM

## 2018-10-15 RX ORDER — ATORVASTATIN CALCIUM 80 MG/1
80 TABLET, FILM COATED ORAL DAILY
Qty: 90 TABLET | Refills: 0
Start: 2018-10-15 | End: 2019-11-25 | Stop reason: SDUPTHER

## 2018-10-15 RX ORDER — LISINOPRIL 40 MG/1
40 TABLET ORAL DAILY
Qty: 30 TABLET | Refills: 0 | Status: ON HOLD | OUTPATIENT
Start: 2018-10-15 | End: 2019-04-26

## 2018-10-15 RX ORDER — AMLODIPINE BESYLATE 10 MG/1
10 TABLET ORAL DAILY
Qty: 30 TABLET | Refills: 0 | Status: ON HOLD | OUTPATIENT
Start: 2018-10-15 | End: 2019-04-26 | Stop reason: HOSPADM

## 2018-10-15 RX ORDER — FOLIC ACID 1 MG/1
1 TABLET ORAL DAILY
Qty: 30 TABLET | Refills: 3 | Status: SHIPPED | OUTPATIENT
Start: 2018-10-15 | End: 2019-11-25 | Stop reason: SDUPTHER

## 2018-10-15 NOTE — PROGRESS NOTES
History & Physical    I, Rajeev Arenas, attest that this documentation has been prepared under the direction and in the presence of Meng Camargo MD.    10/16/2018    Chief Complaint   Patient presents with    Consult       History of Present Illness:  Shannan Reza is a 67 y.o. patient with history of right MCA embolic stroke on 7/23/2018 s/p thrombectomy residual left-sided hemiparesis, remote history of Post trauma carotid-cavernous fistula clipped in 1969s, essential hypertension. Patient presents for follow up evaluation. Patient presents to clinic in a wheelchair.     Patient denies any headaches or eye redness. She reports history of pulsatile tinnitus on the right side which resolved about 10 years ago. At this point, patient is able to stand up on her own but requires assistance with walking. She is following up with Physical Therapy. Patient states that she has discontinued her smoking. Patient has run out of multiple medications and has not refilled her prescriptions. She last took Aspirin and Plavix yesterday, and last took antihypertensives and Lipitor 2 days ago.  Patient denies any recent  Changes on her right eye, no proptosis, no eye Erythema.      Review of patient's allergies indicates:   Allergen Reactions    Percodan [oxycodone hcl-oxycodone-asa] Itching       Current Outpatient Medications   Medication Sig Dispense Refill    thiamine 100 MG tablet Take 1 tablet (100 mg total) by mouth once daily.      amLODIPine (NORVASC) 10 MG tablet Take 1 tablet (10 mg total) by mouth once daily. 30 tablet 0    aspirin 325 MG tablet Take 1 tablet (325 mg total) by mouth once daily. Talk to your doctor about whether to resume this medication  0    atorvastatin (LIPITOR) 80 MG tablet Take 1 tablet (80 mg total) by mouth once daily. 90 tablet 0    clopidogrel (PLAVIX) 75 mg tablet Take 1 tablet (75 mg total) by mouth once daily. 30 tablet 0    folic acid (FOLVITE) 1 MG tablet Take 1 tablet (1 mg  total) by mouth once daily. 30 tablet 3    lisinopril (PRINIVIL,ZESTRIL) 40 MG tablet Take 1 tablet (40 mg total) by mouth once daily. 30 tablet 0     No current facility-administered medications for this visit.        Past Medical History:   Diagnosis Date    Carotid-cavernous fistula     Cerebrovascular accident (CVA) due to thrombosis of right middle cerebral artery 6/26/2018    Essential hypertension 6/27/2018    Lacunar infarct, acute 6/26/2018       Past Surgical History:   Procedure Laterality Date    BRAIN SURGERY         History reviewed. No pertinent family history.    Social History     Tobacco Use    Smoking status: Current Every Day Smoker     Packs/day: 0.50     Years: 25.00     Pack years: 12.50     Types: Cigarettes    Smokeless tobacco: Former User   Substance Use Topics    Alcohol use: Yes     Comment: drank 5 beers today.     Drug use: No        Review of Systems:  Review of Systems   Constitutional: Negative for activity change.   HENT: Negative for congestion.    Eyes: Negative for discharge, redness and visual disturbance.   Respiratory: Negative for apnea.    Cardiovascular: Negative for chest pain.   Gastrointestinal: Negative for abdominal distention.   Endocrine: Negative for cold intolerance.   Genitourinary: Negative for difficulty urinating.   Musculoskeletal: Positive for gait problem. Negative for arthralgias.   Neurological: Positive for weakness. Negative for dizziness and headaches.   Psychiatric/Behavioral: Negative for agitation.       Vital Signs (Most Recent)  Temp: 98.5 °F (36.9 °C) (10/15/18 0901)  Pulse: 75 (10/15/18 0901)  BP: (!) 159/70 (10/15/18 0901)     46.6 kg (102 lb 11.2 oz)       Physical Exam:  Physical Exam:    Constitutional: She appears well-developed.     Eyes: Pupils are equal, round, and reactive to light. EOM are normal. Right eye exhibits no discharge. Left eye exhibits no discharge.     Abdominal: Soft.     Skin: Skin displays no rash on trunk and  no rash on extremities.     Psych/Behavior: She is alert. She is oriented to person, place, and time.     Musculoskeletal:        Neck: There is no tenderness.        Back: Range of motion is full.        Right Upper Extremities: Range of motion is full. Muscle strength is 5/5. Tone is normal.        Left Upper Extremities: Range of motion is full. Muscle strength is 4/5. Tone is normal.       Right Lower Extremities: Range of motion is full. Muscle strength is 5/5. Tone is normal.        Left Lower Extremities: Range of motion is full. Muscle strength is 4/5. Tone is normal.     Neurological:        Coordination: She has abnormal tandem walking coordination. She has a normal Romberg Test.        Sensory: There is no sensory deficit in the trunk. There is no sensory deficit in the extremities.        DTRs: DTRs are DTRS NORMAL AND SYMMETRICnormal and symmetric. Tricep reflexes are 2+ on the right side and 3+ on the left side. Bicep reflexes are 2+ on the right side and 3+ on the left side. Brachioradialis reflexes are 2+ on the right side and 3+ on the left side. Patellar reflexes are 2+ on the right side and 3+ on the left side. Achilles reflexes are 2+ on the right side and 3+ on the left side. She displays no Babinski's sign on the right side. She displays no Babinski's sign on the left side.        Cranial nerves: Cranial nerve(s) II, III, IV, V, VI, VII, VIII, IX, X, XI and XII are intact.     Left hemiparesis.     Laboratory  CBC: Reviewed  CMP: Reviewed    Diagnostic Results:  CT: Reviewed  CT Head Without Contrast, dated 7/23/2018: Reviewed.   No acute intracranial abnormality noting evaluation is limited by significant beam hardening artifact from surgical hardware.    Postoperative change of right frontal craniotomy and right paraclinoid aneurysm clip.    ASSESSMENT/PLAN:       ICD-10-CM ICD-9-CM   1. Cerebrovascular accident (CVA) due to thrombosis of right middle cerebral artery I63.311 434.01   2.  Hemiparesis affecting left side as late effect of stroke I69.354 438.20   3. Anterior communicating artery aneurysm I67.1 437.3   4. Carotid-cavernous fistula I67.1 437.3   5. Essential hypertension I10 401.9       PLAN:    1. right-sided CC fistula, chronic, residual from previous head trauma. Patient comes with an embolic right-sided stroke that has left her with left-sided hemiparesis. At this point, we will continue with secondary stroke prevention. I will refill her Plavix, Aspirin, Atorvastatin, Amlodipine, Lisinopril, and Folic Acid until November when she follows up with her PCP.     2. Will refer her to Vascular Neurology for secondary stroke prevention.     3. We will follow up in 6 months with CTA head and neck, to follow up her aneurysm, and right-sided cc fistula.  I have explained risks and benefits of any possible intervention, patient does not want to proceed with any aggressive surgical intervention.    4. I have advised this patient against smoking which she has quit.     5. I have explained to her that if she starts having headaches, pulsations over the right eye, conjunctival hemorrhage or right eye proptosis, she should call us right away.     6.  Patient will follow-up with her primary care doctor for blood pressure management.  Ideally, we would want to keep the systolics, less than 160 ( considering her right-sided ICA occlusion).    7. I spent 45 minutes with the patient, 50% of time in counselingabout the above mentioned issues.          Shannan was seen today for consult.    Diagnoses and all orders for this visit:    Cerebrovascular accident (CVA) due to thrombosis of right middle cerebral artery  -     Ambulatory Referral to Neurology  -     CTA Head and Neck (xpd); Future    Hemiparesis affecting left side as late effect of stroke  -     Ambulatory Referral to Neurology  -     CTA Head and Neck (xpd); Future    Anterior communicating artery aneurysm    Carotid-cavernous  fistula    Essential hypertension      I, Dr. Meng Camargo, personally performed the services described in this documentation. All medical record entries made by the scribe were at my direction and in my presence.  I have reviewed the chart and agree that the record reflects my personal performance and is accurate and complete. Meng Camargo MD.  9:34 AM 10/16/2018

## 2018-10-16 ENCOUNTER — TELEPHONE (OUTPATIENT)
Dept: NEUROLOGY | Facility: CLINIC | Age: 68
End: 2018-10-16

## 2018-10-16 NOTE — TELEPHONE ENCOUNTER
----- Message from Crescencio Burrows MD sent at 10/16/2018 10:24 AM CDT -----      ----- Message -----  From: Meng Camargo MD  Sent: 10/16/2018   9:34 AM  To: Crescencio Burrows MD      This lady never had a follow up with stroke, for her right-sided ischemic stroke. Would you mind to give her a follow-up in clinic.  She used to work at [employer] with us.    Thanks    e

## 2018-10-24 ENCOUNTER — TELEPHONE (OUTPATIENT)
Dept: NEUROSURGERY | Facility: CLINIC | Age: 68
End: 2018-10-24

## 2018-10-24 NOTE — TELEPHONE ENCOUNTER
----- Message from Lesa Gaspar sent at 10/24/2018  9:44 AM CDT -----  Contact: [phone]  Pt is calling about forms that need to be completed for disability.  Please contact pt and would like to drop off paperwork.      Thank you!

## 2018-10-29 ENCOUNTER — TELEPHONE (OUTPATIENT)
Dept: NEUROLOGY | Facility: CLINIC | Age: 68
End: 2018-10-29

## 2019-01-24 ENCOUNTER — TELEPHONE (OUTPATIENT)
Dept: NEUROSURGERY | Facility: CLINIC | Age: 69
End: 2019-01-24

## 2019-01-24 NOTE — TELEPHONE ENCOUNTER
I returned the pt call and LM requesting a call back to discuss her concerns.  ----- Message from Sada Cornejo sent at 1/24/2019  1:11 PM CST -----  Contact: pt  Pt would like to be called back regarding her conditions and faxed that to her HR - fax [phone]    Pt can be reached at [phone]

## 2019-04-25 PROCEDURE — 93010 EKG 12-LEAD: ICD-10-PCS | Mod: ,,, | Performed by: INTERNAL MEDICINE

## 2019-04-25 PROCEDURE — 93005 ELECTROCARDIOGRAM TRACING: CPT

## 2019-04-25 PROCEDURE — 99291 CRITICAL CARE FIRST HOUR: CPT | Mod: GC,,, | Performed by: EMERGENCY MEDICINE

## 2019-04-25 PROCEDURE — 99291 CRITICAL CARE FIRST HOUR: CPT | Mod: 25

## 2019-04-25 PROCEDURE — 93010 ELECTROCARDIOGRAM REPORT: CPT | Mod: ,,, | Performed by: INTERNAL MEDICINE

## 2019-04-25 PROCEDURE — 99285 EMERGENCY DEPT VISIT HI MDM: CPT | Mod: 25

## 2019-04-25 PROCEDURE — 99291 PR CRITICAL CARE, E/M 30-74 MINUTES: ICD-10-PCS | Mod: GC,,, | Performed by: EMERGENCY MEDICINE

## 2019-04-26 ENCOUNTER — HOSPITAL ENCOUNTER (INPATIENT)
Facility: HOSPITAL | Age: 69
LOS: 1 days | Discharge: HOME | DRG: 812 | End: 2019-04-26
Attending: EMERGENCY MEDICINE | Admitting: EMERGENCY MEDICINE
Payer: MEDICARE

## 2019-04-26 VITALS
OXYGEN SATURATION: 97 % | DIASTOLIC BLOOD PRESSURE: 55 MMHG | HEART RATE: 88 BPM | TEMPERATURE: 98 F | RESPIRATION RATE: 18 BRPM | SYSTOLIC BLOOD PRESSURE: 129 MMHG

## 2019-04-26 DIAGNOSIS — I69.354 HEMIPARESIS AFFECTING LEFT SIDE AS LATE EFFECT OF STROKE: ICD-10-CM

## 2019-04-26 DIAGNOSIS — D50.9 IRON DEFICIENCY ANEMIA, UNSPECIFIED IRON DEFICIENCY ANEMIA TYPE: ICD-10-CM

## 2019-04-26 DIAGNOSIS — R55 SYNCOPE AND COLLAPSE: ICD-10-CM

## 2019-04-26 DIAGNOSIS — R55 SYNCOPE: ICD-10-CM

## 2019-04-26 DIAGNOSIS — E87.1 HYPONATREMIA: ICD-10-CM

## 2019-04-26 DIAGNOSIS — F10.10 ALCOHOL ABUSE: ICD-10-CM

## 2019-04-26 DIAGNOSIS — I63.311 CEREBROVASCULAR ACCIDENT (CVA) DUE TO THROMBOSIS OF RIGHT MIDDLE CEREBRAL ARTERY: ICD-10-CM

## 2019-04-26 DIAGNOSIS — I67.1 ANTERIOR COMMUNICATING ARTERY ANEURYSM: ICD-10-CM

## 2019-04-26 DIAGNOSIS — D64.9 ANEMIA REQUIRING TRANSFUSIONS: Primary | ICD-10-CM

## 2019-04-26 LAB
ABO + RH BLD: NORMAL
ALBUMIN SERPL BCP-MCNC: 4.2 G/DL (ref 3.5–5.2)
ALP SERPL-CCNC: 75 U/L (ref 55–135)
ALT SERPL W/O P-5'-P-CCNC: 22 U/L (ref 10–44)
ANION GAP SERPL CALC-SCNC: 10 MMOL/L (ref 8–16)
ANION GAP SERPL CALC-SCNC: 13 MMOL/L (ref 8–16)
AST SERPL-CCNC: 29 U/L (ref 10–40)
BACTERIA #/AREA URNS AUTO: ABNORMAL /HPF
BASOPHILS # BLD AUTO: 0.03 K/UL (ref 0–0.2)
BASOPHILS # BLD AUTO: 0.04 K/UL (ref 0–0.2)
BASOPHILS NFR BLD: 0.3 % (ref 0–1.9)
BASOPHILS NFR BLD: 0.5 % (ref 0–1.9)
BILIRUB SERPL-MCNC: 0.3 MG/DL (ref 0.1–1)
BILIRUB UR QL STRIP: NEGATIVE
BLD GP AB SCN CELLS X3 SERPL QL: NORMAL
BLD PROD TYP BPU: NORMAL
BLOOD UNIT EXPIRATION DATE: NORMAL
BLOOD UNIT TYPE CODE: 5100
BLOOD UNIT TYPE: NORMAL
BUN SERPL-MCNC: 6 MG/DL (ref 8–23)
BUN SERPL-MCNC: 6 MG/DL (ref 8–23)
CALCIUM SERPL-MCNC: 9.5 MG/DL (ref 8.7–10.5)
CALCIUM SERPL-MCNC: 9.9 MG/DL (ref 8.7–10.5)
CHLORIDE SERPL-SCNC: 91 MMOL/L (ref 95–110)
CHLORIDE SERPL-SCNC: 94 MMOL/L (ref 95–110)
CLARITY UR REFRACT.AUTO: CLEAR
CO2 SERPL-SCNC: 19 MMOL/L (ref 23–29)
CO2 SERPL-SCNC: 24 MMOL/L (ref 23–29)
CODING SYSTEM: NORMAL
COLOR UR AUTO: ABNORMAL
CREAT SERPL-MCNC: 0.7 MG/DL (ref 0.5–1.4)
CREAT SERPL-MCNC: 0.7 MG/DL (ref 0.5–1.4)
DIFFERENTIAL METHOD: ABNORMAL
DIFFERENTIAL METHOD: ABNORMAL
DISPENSE STATUS: NORMAL
EOSINOPHIL # BLD AUTO: 0 K/UL (ref 0–0.5)
EOSINOPHIL # BLD AUTO: 0 K/UL (ref 0–0.5)
EOSINOPHIL NFR BLD: 0.1 % (ref 0–8)
EOSINOPHIL NFR BLD: 0.4 % (ref 0–8)
ERYTHROCYTE [DISTWIDTH] IN BLOOD BY AUTOMATED COUNT: 14.3 % (ref 11.5–14.5)
ERYTHROCYTE [DISTWIDTH] IN BLOOD BY AUTOMATED COUNT: 14.4 % (ref 11.5–14.5)
EST. GFR  (AFRICAN AMERICAN): >60 ML/MIN/1.73 M^2
EST. GFR  (AFRICAN AMERICAN): >60 ML/MIN/1.73 M^2
EST. GFR  (NON AFRICAN AMERICAN): >60 ML/MIN/1.73 M^2
EST. GFR  (NON AFRICAN AMERICAN): >60 ML/MIN/1.73 M^2
FOLATE SERPL-MCNC: 16.2 NG/ML (ref 4–24)
GLUCOSE SERPL-MCNC: 109 MG/DL (ref 70–110)
GLUCOSE SERPL-MCNC: 110 MG/DL (ref 70–110)
GLUCOSE UR QL STRIP: NEGATIVE
HCT VFR BLD AUTO: 22.2 % (ref 37–48.5)
HCT VFR BLD AUTO: 29.8 % (ref 37–48.5)
HGB BLD-MCNC: 7.2 G/DL (ref 12–16)
HGB BLD-MCNC: 9.7 G/DL (ref 12–16)
HGB UR QL STRIP: NEGATIVE
HYALINE CASTS UR QL AUTO: 3 /LPF
IMM GRANULOCYTES # BLD AUTO: 0.03 K/UL (ref 0–0.04)
IMM GRANULOCYTES # BLD AUTO: 0.1 K/UL (ref 0–0.04)
IMM GRANULOCYTES NFR BLD AUTO: 0.4 % (ref 0–0.5)
IMM GRANULOCYTES NFR BLD AUTO: 0.8 % (ref 0–0.5)
IRON SERPL-MCNC: 21 UG/DL (ref 30–160)
KETONES UR QL STRIP: NEGATIVE
LEUKOCYTE ESTERASE UR QL STRIP: ABNORMAL
LYMPHOCYTES # BLD AUTO: 0.6 K/UL (ref 1–4.8)
LYMPHOCYTES # BLD AUTO: 0.6 K/UL (ref 1–4.8)
LYMPHOCYTES NFR BLD: 5.3 % (ref 18–48)
LYMPHOCYTES NFR BLD: 7.1 % (ref 18–48)
MAGNESIUM SERPL-MCNC: 2.2 MG/DL (ref 1.6–2.6)
MCH RBC QN AUTO: 24.9 PG (ref 27–31)
MCH RBC QN AUTO: 25.6 PG (ref 27–31)
MCHC RBC AUTO-ENTMCNC: 32.4 G/DL (ref 32–36)
MCHC RBC AUTO-ENTMCNC: 32.6 G/DL (ref 32–36)
MCV RBC AUTO: 77 FL (ref 82–98)
MCV RBC AUTO: 79 FL (ref 82–98)
MICROSCOPIC COMMENT: ABNORMAL
MONOCYTES # BLD AUTO: 0.6 K/UL (ref 0.3–1)
MONOCYTES # BLD AUTO: 0.7 K/UL (ref 0.3–1)
MONOCYTES NFR BLD: 5.7 % (ref 4–15)
MONOCYTES NFR BLD: 7.4 % (ref 4–15)
NEUTROPHILS # BLD AUTO: 10.4 K/UL (ref 1.8–7.7)
NEUTROPHILS # BLD AUTO: 7.1 K/UL (ref 1.8–7.7)
NEUTROPHILS NFR BLD: 84.2 % (ref 38–73)
NEUTROPHILS NFR BLD: 87.8 % (ref 38–73)
NITRITE UR QL STRIP: NEGATIVE
NON-SQ EPI CELLS #/AREA URNS AUTO: <1 /HPF
NRBC BLD-RTO: 0 /100 WBC
NRBC BLD-RTO: 0 /100 WBC
PH UR STRIP: 6 [PH] (ref 5–8)
PHOSPHATE SERPL-MCNC: 3.7 MG/DL (ref 2.7–4.5)
PLATELET # BLD AUTO: 411 K/UL (ref 150–350)
PLATELET # BLD AUTO: 414 K/UL (ref 150–350)
PMV BLD AUTO: 9.1 FL (ref 9.2–12.9)
PMV BLD AUTO: 9.9 FL (ref 9.2–12.9)
POTASSIUM SERPL-SCNC: 4.1 MMOL/L (ref 3.5–5.1)
POTASSIUM SERPL-SCNC: 4.1 MMOL/L (ref 3.5–5.1)
PROT SERPL-MCNC: 7.4 G/DL (ref 6–8.4)
PROT UR QL STRIP: NEGATIVE
RBC # BLD AUTO: 2.89 M/UL (ref 4–5.4)
RBC # BLD AUTO: 3.79 M/UL (ref 4–5.4)
RBC #/AREA URNS AUTO: 2 /HPF (ref 0–4)
SATURATED IRON: 4 % (ref 20–50)
SODIUM SERPL-SCNC: 123 MMOL/L (ref 136–145)
SODIUM SERPL-SCNC: 128 MMOL/L (ref 136–145)
SP GR UR STRIP: 1 (ref 1–1.03)
SQUAMOUS #/AREA URNS AUTO: 2 /HPF
TOTAL IRON BINDING CAPACITY: 474 UG/DL (ref 250–450)
TRANS ERYTHROCYTES VOL PATIENT: NORMAL ML
TRANSFERRIN SERPL-MCNC: 320 MG/DL (ref 200–375)
TRANSFERRIN SERPL-MCNC: 320 MG/DL (ref 200–375)
TROPONIN I SERPL DL<=0.01 NG/ML-MCNC: <0.006 NG/ML (ref 0–0.03)
URN SPEC COLLECT METH UR: ABNORMAL
VIT B12 SERPL-MCNC: 287 PG/ML (ref 210–950)
WBC # BLD AUTO: 11.81 K/UL (ref 3.9–12.7)
WBC # BLD AUTO: 8.4 K/UL (ref 3.9–12.7)
WBC #/AREA URNS AUTO: 3 /HPF (ref 0–5)

## 2019-04-26 PROCEDURE — 99222 1ST HOSP IP/OBS MODERATE 55: CPT | Mod: ,,, | Performed by: INTERNAL MEDICINE

## 2019-04-26 PROCEDURE — 83735 ASSAY OF MAGNESIUM: CPT

## 2019-04-26 PROCEDURE — 25000003 PHARM REV CODE 250: Performed by: INTERNAL MEDICINE

## 2019-04-26 PROCEDURE — 36430 TRANSFUSION BLD/BLD COMPNT: CPT

## 2019-04-26 PROCEDURE — 86901 BLOOD TYPING SEROLOGIC RH(D): CPT

## 2019-04-26 PROCEDURE — 84100 ASSAY OF PHOSPHORUS: CPT

## 2019-04-26 PROCEDURE — 93005 ELECTROCARDIOGRAM TRACING: CPT

## 2019-04-26 PROCEDURE — 80053 COMPREHEN METABOLIC PANEL: CPT

## 2019-04-26 PROCEDURE — 11000001 HC ACUTE MED/SURG PRIVATE ROOM

## 2019-04-26 PROCEDURE — 93010 ELECTROCARDIOGRAM REPORT: CPT | Mod: ,,, | Performed by: INTERNAL MEDICINE

## 2019-04-26 PROCEDURE — 82746 ASSAY OF FOLIC ACID SERUM: CPT

## 2019-04-26 PROCEDURE — 86920 COMPATIBILITY TEST SPIN: CPT

## 2019-04-26 PROCEDURE — 83540 ASSAY OF IRON: CPT

## 2019-04-26 PROCEDURE — 80048 BASIC METABOLIC PNL TOTAL CA: CPT

## 2019-04-26 PROCEDURE — 85025 COMPLETE CBC W/AUTO DIFF WBC: CPT | Mod: 91

## 2019-04-26 PROCEDURE — P9021 RED BLOOD CELLS UNIT: HCPCS

## 2019-04-26 PROCEDURE — 93010 EKG 12-LEAD: ICD-10-PCS | Mod: ,,, | Performed by: INTERNAL MEDICINE

## 2019-04-26 PROCEDURE — 99222 PR INITIAL HOSPITAL CARE,LEVL II: ICD-10-PCS | Mod: ,,, | Performed by: INTERNAL MEDICINE

## 2019-04-26 PROCEDURE — 84484 ASSAY OF TROPONIN QUANT: CPT

## 2019-04-26 PROCEDURE — 81001 URINALYSIS AUTO W/SCOPE: CPT

## 2019-04-26 PROCEDURE — 36415 COLL VENOUS BLD VENIPUNCTURE: CPT

## 2019-04-26 PROCEDURE — 82607 VITAMIN B-12: CPT

## 2019-04-26 RX ORDER — FERROUS SULFATE 325(65) MG
325 TABLET, DELAYED RELEASE (ENTERIC COATED) ORAL 2 TIMES DAILY WITH MEALS
Status: DISCONTINUED | OUTPATIENT
Start: 2019-04-26 | End: 2019-04-26 | Stop reason: HOSPADM

## 2019-04-26 RX ORDER — HYDROCODONE BITARTRATE AND ACETAMINOPHEN 500; 5 MG/1; MG/1
TABLET ORAL
Status: DISCONTINUED | OUTPATIENT
Start: 2019-04-26 | End: 2019-04-26 | Stop reason: HOSPADM

## 2019-04-26 RX ORDER — LISINOPRIL 40 MG/1
20 TABLET ORAL NIGHTLY
Qty: 15 TABLET | Refills: 0 | Status: SHIPPED | OUTPATIENT
Start: 2019-04-26 | End: 2019-11-25 | Stop reason: SDUPTHER

## 2019-04-26 RX ORDER — PANTOPRAZOLE SODIUM 40 MG/1
40 TABLET, DELAYED RELEASE ORAL DAILY
Status: DISCONTINUED | OUTPATIENT
Start: 2019-04-26 | End: 2019-04-26 | Stop reason: HOSPADM

## 2019-04-26 RX ORDER — FERROUS GLUCONATE 324(38)MG
324 TABLET ORAL
COMMUNITY
Start: 2019-04-26 | End: 2019-11-25 | Stop reason: SDUPTHER

## 2019-04-26 RX ORDER — AMOXICILLIN 250 MG
1 CAPSULE ORAL DAILY
COMMUNITY
Start: 2019-04-26 | End: 2019-11-25 | Stop reason: SDUPTHER

## 2019-04-26 RX ORDER — NAPROXEN SODIUM 220 MG/1
81 TABLET, FILM COATED ORAL DAILY
Refills: 0 | COMMUNITY
Start: 2019-04-29 | End: 2019-11-25 | Stop reason: SDUPTHER

## 2019-04-26 RX ORDER — SODIUM CHLORIDE 9 MG/ML
INJECTION, SOLUTION INTRAVENOUS CONTINUOUS
Status: DISCONTINUED | OUTPATIENT
Start: 2019-04-26 | End: 2019-04-26 | Stop reason: HOSPADM

## 2019-04-26 RX ORDER — SODIUM CHLORIDE 0.9 % (FLUSH) 0.9 %
10 SYRINGE (ML) INJECTION
Status: DISCONTINUED | OUTPATIENT
Start: 2019-04-26 | End: 2019-04-26 | Stop reason: HOSPADM

## 2019-04-26 RX ORDER — CLOPIDOGREL BISULFATE 75 MG/1
75 TABLET ORAL DAILY
Qty: 30 TABLET | Refills: 0
Start: 2019-04-29 | End: 2019-11-25 | Stop reason: SDUPTHER

## 2019-04-26 RX ORDER — PANTOPRAZOLE SODIUM 20 MG/1
20 TABLET, DELAYED RELEASE ORAL DAILY
Qty: 30 TABLET | Refills: 11 | Status: SHIPPED | OUTPATIENT
Start: 2019-04-26 | End: 2019-11-25 | Stop reason: SDUPTHER

## 2019-04-26 RX ADMIN — SODIUM CHLORIDE: 0.9 INJECTION, SOLUTION INTRAVENOUS at 03:04

## 2019-04-26 NOTE — PLAN OF CARE
Payor: MEDICARE / Plan: MEDICARE PART A & B / Product Type: Government /      Primary Doctor No     No future appointments.       [id number] - [place] - [address]  [address]  [place]  Phone: [phone] Fax: [phone]      Extended Emergency Contact Information  Primary Emergency Contact: David Reza  Address: [address]           [place] [facility]  Home Phone: [phone]  Mobile Phone: [phone]  Relation: Son        04/26/19 1229   Discharge Assessment   Assessment Type Discharge Planning Assessment   Confirmed/corrected address and phone number on facesheet? Yes   Assessment information obtained from? Patient   Expected Length of Stay (days) 2   Communicated expected length of stay with patient/caregiver yes   Prior to hospitilization cognitive status: Alert/Oriented   Prior to hospitalization functional status: Independent   Current cognitive status: Alert/Oriented   Current Functional Status: Independent   Lives With child(sandi), adult  (lives with son)   Able to Return to Prior Arrangements yes   Is patient able to care for self after discharge? Yes   Who are your caregiver(s) and their phone number(s)? Geraldo Hernandez    Patient's perception of discharge disposition home or selfcare   Readmission Within the Last 30 Days no previous admission in last 30 days   Patient currently being followed by outpatient case management? No   Patient currently receives any other outside agency services? No   Equipment Currently Used at Home none   Do you have any problems affording any of your prescribed medications? No   Is the patient taking medications as prescribed? yes   Does the patient have transportation home? Yes   Transportation Anticipated family or friend will provide   Does the patient receive services at the [facility]? No   Discharge Plan A Home;Home with family   Discharge Plan B Home   DME Needed Upon Discharge  none    Patient/Family in Agreement with Plan yes

## 2019-04-26 NOTE — PROVIDER PROGRESS NOTES - EMERGENCY DEPT.
Encounter Date: 4/25/2019    ED Physician Progress Notes       SCRIBE NOTE: IIsra, am scribing for, and in the presence of,   Ten Vital M.D.  Physician Statement: ITen M.D, personally performed the services described in this documentation as scribed by Isra Oden in my presence, and it is both accurate and complete.      EKG - STEMI Decision  Initial Reading: No STEMI present.

## 2019-04-26 NOTE — ED TRIAGE NOTES
Shannan Reza, a 68 y.o. female presents to the ED w/ complaint of pain to right hip after mechanical fall at home. PT stated that she did hit her head and denies LOC at that time. Later in the day PT was on toilet and needed son to come help her off due to pain and weakness. At that time PT had syncopal episode with LOC and son called EMS. PT denies chest pain, SOB, fever, chills, and n/v/d.     Triage note:  Chief Complaint   Patient presents with    Loss of Consciousness     Patient brought in by  EMS from home. Patient had an syncopal episode while using the toilet and fell off. Pt reports she takes blood thinners. HX of CVA     Review of patient's allergies indicates:   Allergen Reactions    Percodan [oxycodone hcl-oxycodone-asa] Itching     Past Medical History:   Diagnosis Date    Carotid-cavernous fistula     Cerebrovascular accident (CVA) due to thrombosis of right middle cerebral artery 6/26/2018    Essential hypertension 6/27/2018    Lacunar infarct, acute 6/26/2018     Adult Physical Assessment  LOC: Shannan Reza, 68 y.o. female verified via two identifiers.  The patient is awake, alert, oriented and speaking appropriately at this time.  APPEARANCE: Patient resting comfortably and appears to be in no acute distress at this time. Patient is clean and well groomed, patient's clothing is properly fastened.  SKIN:The skin is warm and dry, color consistent with ethnicity, patient has normal skin turgor and moist mucus membranes, skin intact, no breakdown or brusing noted.  MUSCULOSKELETAL: Patient moving all extremities well, no obvious swelling or deformities noted. Pain with ROM to right hip.   RESPIRATORY: Airway is open and patent, respirations are spontaneous, patient has a normal effort and rate, no accessory muscle use noted.  CARDIAC: Patient has a normal rate and rhythm, no periphreal edema noted in any extremity, capillary refill < 3 seconds in all extremities  ABDOMEN: Soft and non  tender to palpation, no abdominal distention noted. Bowel sounds present in all four quadrants.  NEUROLOGIC: Eyes open spontaneously, behavior appropriate to situation, follows commands, facial expression symmetrical, bilateral hand grasp equal and even, purposeful motor response noted, normal sensation in all extremities when touched with a finger. Residual weakness from CVA 10 months ago.

## 2019-04-26 NOTE — ED PROVIDER NOTES
Encounter Date: 4/25/2019    SCRIBE #1 NOTE: I, Kari Alonzo, am scribing for, and in the presence of,  Dr. Márquez . I have scribed the following portions of the note - the EKG reading.       History     Chief Complaint   Patient presents with    Loss of Consciousness     Patient brought in by  EMS from home. Patient had an syncopal episode while using the toilet and fell off. Pt reports she takes blood thinners. HX of CVA     Shannan Reza is a 68 year old lady who presented to the [facility] ED on 4/26/2019 w/ primary complaints of syncope and fall.  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  Currently on ASA & plavix.  Around 1500 she had a mechanical fall when she slipped out of her wheelchair, no associated syncope at the time.  She landed on her hip and reports hitting her head as well.  She then contacted her son to come help her ambulate to the bathroom.  While in the bathroom around 1700 she was unable to get up from the toilet and called her son in for help, as he was lifting her up she lost consciousness and fell, unresponsive for a brief period of time.  She did not hit her head during this fall.  She reports a similar episode of falling w/ LOC 1 week ago when she was in the shower.  BP upon arrival to ED was 102/53.  She takes lisinopril and norvasc for HTN, most recently taken prior to onset of symptoms.  Had a similar presentation in July 2018 for syncope & fall, also had a low BP at the time.          Review of patient's allergies indicates:   Allergen Reactions    Percodan [oxycodone hcl-oxycodone-asa] Itching     Past Medical History:   Diagnosis Date    Carotid-cavernous fistula     Cerebrovascular accident (CVA) due to thrombosis of right middle cerebral artery 6/26/2018    Essential hypertension 6/27/2018    Lacunar infarct, acute  6/26/2018     Past Surgical History:   Procedure Laterality Date    BRAIN SURGERY       History reviewed. No pertinent family history.  Social History     Tobacco Use    Smoking status: Current Every Day Smoker     Packs/day: 0.50     Years: 25.00     Pack years: 12.50     Types: Cigarettes    Smokeless tobacco: Former User   Substance Use Topics    Alcohol use: Yes     Comment: drank 5 beers today.     Drug use: No     Review of Systems   Constitutional: Negative for chills, diaphoresis, fatigue and fever.   HENT: Negative for congestion and sore throat.    Eyes: Negative for photophobia and visual disturbance.   Respiratory: Negative for cough, shortness of breath and wheezing.    Cardiovascular: Negative for chest pain, palpitations and leg swelling.   Gastrointestinal: Negative for abdominal distention, abdominal pain, constipation, diarrhea, nausea and vomiting.   Genitourinary: Negative for dysuria and hematuria.   Musculoskeletal: Negative for arthralgias and myalgias.   Skin: Negative for color change, pallor and rash.   Neurological: Positive for dizziness, syncope, weakness (R side, s/p stroke) and light-headedness. Negative for headaches.   Psychiatric/Behavioral: Negative for agitation and confusion. The patient is not nervous/anxious.        Physical Exam     Initial Vitals [04/25/19 2323]   BP Pulse Resp Temp SpO2   (!) 102/54 72 16 98.6 °F (37 °C) 97 %      MAP       --         Physical Exam    Constitutional: She appears well-developed and well-nourished. She is not diaphoretic. No distress.   HENT:   Head: Normocephalic and atraumatic.   Right Ear: External ear normal.   Left Ear: External ear normal.   Eyes: Conjunctivae and EOM are normal. No scleral icterus.   Neck: Normal range of motion. Neck supple.   Cardiovascular: Normal rate and regular rhythm.   No murmur heard.  Pulmonary/Chest: Breath sounds normal. No respiratory distress. She has no wheezes. She has no rales.   Abdominal: Soft.  Bowel sounds are normal. She exhibits no distension. There is no tenderness.   Genitourinary: Rectal exam shows guaiac negative stool. Guaiac negative stool. : Acceptable.  Musculoskeletal: She exhibits no edema or tenderness.   Painful ROM Left hip   Neurological: She is alert and oriented to person, place, and time. No cranial nerve deficit.   Decreased L sided strength   Skin: Skin is warm and dry. No erythema.   Psychiatric: She has a normal mood and affect. Thought content normal.         ED Course   Critical Care  Date/Time: 4/26/2019 7:00 AM  Performed by: Coty Márquez MD  Authorized by: Coty Márquez MD   Direct patient critical care time: 15 minutes  Additional history critical care time: 5 minutes  Ordering / reviewing critical care time: 10 minutes  Documentation critical care time: 5 minutes  Consult with family critical care time: 5 minutes  Total critical care time (exclusive of procedural time) : 40 minutes  Critical care time was exclusive of separately billable procedures and treating other patients and teaching time.  Critical care was necessary to treat or prevent imminent or life-threatening deterioration of the following conditions: circulatory failure.  Critical care was time spent personally by me on the following activities: development of treatment plan with patient or surrogate, evaluation of patient's response to treatment, examination of patient, obtaining history from patient or surrogate, ordering and performing treatments and interventions, ordering and review of laboratory studies, ordering and review of radiographic studies, pulse oximetry, re-evaluation of patient's condition and review of old charts.        Labs Reviewed   CBC W/ AUTO DIFFERENTIAL - Abnormal; Notable for the following components:       Result Value    RBC 2.89 (*)     Hemoglobin 7.2 (*)     Hematocrit 22.2 (*)     MCV 77 (*)     MCH 24.9 (*)     Platelets 414 (*)     Lymph # 0.6 (*)      Gran% 84.2 (*)     Lymph% 7.1 (*)     All other components within normal limits   COMPREHENSIVE METABOLIC PANEL - Abnormal; Notable for the following components:    Sodium 123 (*)     Chloride 91 (*)     CO2 19 (*)     BUN, Bld 6 (*)     All other components within normal limits   URINALYSIS, REFLEX TO URINE CULTURE - Abnormal; Notable for the following components:    Leukocytes, UA Trace (*)     All other components within normal limits    Narrative:     Preferred Collection Type->Urine, Clean Catch   URINALYSIS MICROSCOPIC - Abnormal; Notable for the following components:    Hyaline Casts, UA 3 (*)     All other components within normal limits    Narrative:     Preferred Collection Type->Urine, Clean Catch   TROPONIN I   TROPONIN I    Narrative:     ADD ON TROPONIN PER DR MARICEL AMIN/ORDER# [id number] @ 2:51AM   4/26/19     EKG Readings: (Independently Interpreted)   Initial Reading: No STEMI. Rhythm: Normal Sinus Rhythm. Heart Rate: 94.   , ,        Imaging Results          X-Ray Hips Bilateral 2 View Inc AP Pelvis (Final result)  Result time 04/26/19 02:29:29    Final result by Gary George MD (04/26/19 02:29:29)                 Impression:      No acute fracture.      Electronically signed by: Gary George MD  Date:    04/26/2019  Time:    02:29             Narrative:    EXAMINATION:  XR HIPS BILATERAL 2 VIEW INCL AP PELVIS    CLINICAL HISTORY:  fall;    TECHNIQUE:  AP view of the pelvis and frogleg lateral views of both hips were performed.    COMPARISON:  None.    FINDINGS:  Right: No fracture or dislocation.  Mild joint space narrowing.    Left: No fracture or dislocation.  Mild joint space narrowing.    Pelvis: No lytic or blastic lesion.                               CT Cervical Spine Without Contrast (Final result)  Result time 04/26/19 02:38:19    Final result by Clemente Chopra MD (04/26/19 02:38:19)                 Impression:      No evidence of acute fracture or  traumatic subluxation of the cervical spine.      Electronically signed by: Clemente Chopra MD  Date:    04/26/2019  Time:    02:38             Narrative:    EXAMINATION:  CT CERVICAL SPINE WITHOUT CONTRAST    CLINICAL HISTORY:  fall;    TECHNIQUE:  Low dose axial images, sagittal and coronal reformations were performed though the cervical spine.  Contrast was not administered.    COMPARISON:  None    FINDINGS:  Cervical spine alignment appears within normal limits.  Cervical vertebral body heights are maintained.  No evidence of acute displaced fracture.  Intervertebral disc space heights are relatively well maintained.  There are mild multilevel degenerative changes without evidence of significant bony spinal canal stenosis.    The prevertebral soft tissues appear within normal limits.  There are postoperative changes of the mandible.  Please see separate dictated head CT report for intracranial findings.  Visualized lung apices appear within normal limits.                               CT Head Without Contrast (Final result)  Result time 04/26/19 01:52:06    Final result by Gary George MD (04/26/19 01:52:06)                 Impression:      No acute intracranial abnormality, allowing for limitation by significant beam hardening artifact from surgical hardware.    Postoperative changes of right frontal craniotomy and right clinoid aneurysm clip.    Electronically signed by resident: Trevor Prado  Date:    04/26/2019  Time:    01:43    Electronically signed by: Gary George MD  Date:    04/26/2019  Time:    01:52             Narrative:    EXAMINATION:  CT HEAD WITHOUT CONTRAST    CLINICAL HISTORY:  Syncope/fainting;    TECHNIQUE:  Low dose axial CT images obtained throughout the head without intravenous contrast. Sagittal and coronal reconstructions were performed.    COMPARISON:  CT 07/23/2018.    FINDINGS:  Intracranial compartment:    Postoperative changes of right frontal craniotomy with cranial  hardware resulting in significant beam hardening artifact, limiting the examination.  There are also postoperative changes of aneurysmal clip in the right paraclinoid region.    There is again demonstrated generalized cerebral volume loss with compensatory sulcal prominence and ventricular enlargement.  Accounting for limitation from artifact there is no discrete major vascular distribution infarct or parenchymal hemorrhage.  No significant edema or mass.  No extra-axial blood or fluid collections.  No hydrocephalus.    Mastoid air cells and paranasal sinuses are essentially clear.                                 Medical Decision Making:   History:   Old Medical Records: I decided to obtain old medical records.  Old Records Summarized: records from previous admission(s) and records from clinic visits.       <> Summary of Records: 68F w/ history of CVA 2018, hypertension.  Recent ED visit in July 2018 for syncopal episode associated w/ hypotension.  Initial Assessment:   68 year old lady who presented to the [facility] ED on 4/26/2019 w/ primary complaints of syncope and fall.  Shortly after taking her BP meds she had a mechanical fall, landing on L hip and hitting head as well.  Two hours later she then experienced loss of consciousness after attempting to get up from the toilet.  BP upon arrival was 114/60, had a low BP prior to similar syncope episode 1 week ago while showering.  Differential includes but not limited to medication induced hypotension, vasovagal episode, arrhythmia, orthostatic hypotension.  Initiating workup w/ basic labs, head/neck imaging, hip Xray, orthostatic vitals, troponin.  Independently Interpreted Test(s):   I have ordered and independently interpreted EKG Reading(s) - see prior notes  Clinical Tests:   Medical Tests: Ordered and Reviewed       APC / Resident Notes:   2:31 AM CT head unremarkable, pending cervical CT and hip XR.       Scribe Attestation:   Scribe #1: I performed the above  scribed service and the documentation accurately describes the services I performed. I attest to the accuracy of the note.    Attending Attestation:   Physician Attestation Statement for Resident:  As the supervising MD   Physician Attestation Statement: I have personally seen and examined this patient.   I agree with the above history. -: 68F with PMH R MCA CVA (on Plavix), daily ETOH (3 beers/night), iron deficiency anemia requiring txf, presenting after syncopal episode. Pt reports she was at home when she had a mechanical fall, striking her head, son assisted her to sit on toilet then appeared to have a brief syncopal episode vs global weakness. She endorses left hip pain after fall along with global weakness, but denies HA, vision changes, CP, SOB, confusion, N/V, abd pain, changes in urination or stooling. Pt does endorse drinking several beers a night, her son states he drank beers with her tonight as well, but denies h/o WD seizure.    As the supervising MD I agree with the above PE.   - with the following exceptions: NAD, NCAT, A&Ox3, PERRL and EOMI, CN II-XII intact, clear speech, no JVD, neck supple/normal ROM, no CTL midline TTP, CTAB, RRR no mrg, baseline extremity ROM/M/S (decreased LEFT side 2/2 CVA) aside from left hip which has painful ROM however no rotation or shortening, no TTP, abd s/nt/nd, no LE edema, skin dry and warm.   As the supervising MD I agree with the above treatment, course, plan, and disposition.   -:  Multiple lab abnormalities including new onset anemia 7.2/22.2 decreased from 9.1/26.1 nine months ago, in addition to hyponatremia to 123. KAREEM guiac neg. Upon inquiry pt denies bloody or tarry stool, but endorses global weakness for several days, and she does note that she once required txf for anemia but does not know why she was anemic. Hyponatremia likely 2/2 potomania given regular ETOH use, pt with normal MS. XR shows no hip or pelvic fx, upon re-evaluation pt ranging hip and LE  extremities with good strength and no pain. Pt observed for several hours given AC status without change in mental status or new neuro deficits, Infectious and ACS w/u neg, however will admit pt for symptomatic anemia requiring transfusion.   I have reviewed and agree with the residents interpretation of the following: lab data, x-rays and CT scans.  I have reviewed the following: old records at this facility.                       Clinical Impression:       ICD-10-CM ICD-9-CM   1. Anemia requiring transfusions D64.9 285.9   2. Syncope R55 780.2   3. Syncope and collapse R55 780.2   4. Cerebrovascular accident (CVA) due to thrombosis of right middle cerebral artery I63.311 434.01   5. Hemiparesis affecting left side as late effect of stroke I69.354 438.20   6. 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 Iron deficiency anemia, unspecified iron deficiency anemia type D50.9 280.9                                Roldan Linn MD  Resident  04/26/19 2140       Coty Márquez MD  04/27/19 0011

## 2019-04-26 NOTE — NURSING
Pt discharged to home via personal wheelchair. Pt given prescriptions via e scripted to [facility] and copy of discharge home instructions. Pt denies pain at the this time. Pt educated on signs and symptoms of when to call the doctor. All belongings with the patient. Pt verbalized understanding of all discharge instructions.

## 2019-04-26 NOTE — NURSING
Pt arrived to room via stretcher. Pt rates pain as tolerable. Pt denies nausea. Blood infusing. Pt oriented to room and call system. Will continue to monitor.

## 2019-04-27 NOTE — DISCHARGE SUMMARY
[facility] Hospital Medicine  Discharge Summary      Patient Name: Shannan Reza  MRN: [id number]  Admission Date: 4/26/2019  Hospital Length of Stay: 1 days  Discharge Date and Time: 4/26/2019  4:17 PM  Attending Physician: Farhana att. providers found   Discharging Provider: Patricia Lau MD  Primary Care Provider: Emelia Curry MD    Hospital Medicine Team: [facility] HOSP MED D Patricia Lau MD    HPI:   68 F with hx of stroke (6/2018) and left hemiparesis, HTN, daily ETOH use who presents to the ED after fall without LOC then syncopal episode when going to the bathroom.  She required her son's assistance after her initial fall.  She is usually wheelchair bound when her son is not in the home.  She had a previous syncopal episode in 7/2018. She drinks beer daily at home mostly out of boredom but states she can stop at any time and does not wish to see psychiatry nor get a resource list for assistance.  She was found to have a hgb of 7.2. She denies melena or BRBPR at home.  Her PCP did give her stool card for home but she did not complete.  She states she has had an EGD in the past which was negative. Na was also low and improved with IVF.  She has been taking her amlodipine and lisinopril daily.       * No surgery found *      Hospital Course:   Iron deficiency anemia  Anemia requiring transfusions  Syncope   · s/p 1 uPBC  · Start iron supplements with laxative  · Heme stool per ED negative (verbal report)  · patient wishes to pursue outpatient evaluation and request discharge on the day of admit  · encouraged the patient to proceed with Cscope  · Pantoprazole   · Suspect syncope due to hypovolemia from anemia and anti-hypertensive therapy; complete workup with PCP     Hyponatremia  · Improved from 123 to 128 with IVF  · Stop ETOH use  · Improving with IVF but patient refuses to stay in hospital to continue   · F/U with PCP for f/u labs     Essential hypertension  · BP has remained normal since  admit  · Stop amlodipine and change lisinopril to 20 mg daily     Left hemiparesis s/p stroke   · Patient has not had f/u with stroke neurology   · discussed with Vascular neuro who recommends changing ASA to 81 mg and continuing plavix until f/u  · Patient does not wish to have PT/OT eval or home denisha     Alcohol abuse  · Encouraged cessation of beer intake and encouraged discussion with PCP if cessation help needed        Patient discharged after blood completed and repeat labs improved.       Consults:     Final Active Diagnoses:    Diagnosis Date Noted POA    PRINCIPAL PROBLEM:  Iron deficiency anemia [D50.9] 04/26/2019 Yes    Syncope and collapse [R55]  Unknown    Anemia requiring transfusions [D64.9] 04/26/2019 Yes    Hyponatremia [E87.1] 07/24/2018 Yes    Alcohol abuse [F10.10] 07/24/2018 Yes    Hemiparesis affecting left side as late effect of stroke [I69.354] 06/27/2018 Not Applicable    Essential hypertension [I10] 06/27/2018 Yes      Problems Resolved During this Admission:      Discharged Condition: fair    Disposition: Home or Self Care    Follow Up:  Follow-up Information     Emelia Curry MD. Go on 4/29/2019.    Specialty:  Internal Medicine  Why:  appt at 10:30 am  Contact information:  [address]  [facility] [address]  [facility]  [facility] [address]  [phone]                 Patient Instructions:      Diet Cardiac     Notify your health care provider if you experience any of the following:  temperature >100.4     Notify your health care provider if you experience any of the following:  persistent nausea and vomiting or diarrhea     Notify your health care provider if you experience any of the following:  severe uncontrolled pain     Notify your health care provider if you experience any of the following:  difficulty breathing or increased cough     Notify your health care provider if you experience any of the following:  severe persistent headache     Notify your health care  provider if you experience any of the following:  worsening rash     Notify your health care provider if you experience any of the following:  persistent dizziness, light-headedness, or visual disturbances     Notify your health care provider if you experience any of the following:  increased confusion or weakness     Activity as tolerated     Medications:  Reconciled Home Medications:      Medication List      START taking these medications    aspirin 81 MG Chew  Take 1 tablet (81 mg total) by mouth once daily.  Start taking on:  4/29/2019  Replaces:  aspirin 325 MG tablet     ferrous gluconate 324 MG tablet  Commonly known as:  FERGON  Take 1 tablet (324 mg total) by mouth daily with breakfast.     pantoprazole 20 MG tablet  Commonly known as:  PROTONIX  Take 1 tablet (20 mg total) by mouth once daily.     senna-docusate 8.6-50 mg 8.6-50 mg per tablet  Commonly known as:  SENOKOT-S  Take 1 tablet by mouth once daily.        CHANGE how you take these medications    clopidogrel 75 mg tablet  Commonly known as:  PLAVIX  Take 1 tablet (75 mg total) by mouth once daily.  Start taking on:  4/29/2019  What changed:  These instructions start on 4/29/2019. If you are unsure what to do until then, ask your doctor or other care provider.     lisinopril 40 MG tablet  Commonly known as:  PRINIVIL,ZESTRIL  Take 0.5 tablets (20 mg total) by mouth every evening.  What changed:    · how much to take  · when to take this        CONTINUE taking these medications    atorvastatin 80 MG tablet  Commonly known as:  LIPITOR  Take 1 tablet (80 mg total) by mouth once daily.     folic acid 1 MG tablet  Commonly known as:  FOLVITE  Take 1 tablet (1 mg total) by mouth once daily.     thiamine 100 MG tablet  Take 1 tablet (100 mg total) by mouth once daily.        STOP taking these medications    amLODIPine 10 MG tablet  Commonly known as:  NORVASC     aspirin 325 MG tablet  Replaced by:  aspirin 81 MG Chew            Significant Diagnostic  Studies:   Recent Results (from the past 24 hour(s))   CBC auto differential    Collection Time: 04/26/19  1:39 AM   Result Value Ref Range    WBC 8.40 3.90 - 12.70 K/uL    RBC 2.89 (L) 4.00 - 5.40 M/uL    Hemoglobin 7.2 (L) 12.0 - 16.0 g/dL    Hematocrit 22.2 (L) 37.0 - 48.5 %    MCV 77 (L) 82 - 98 fL    MCH 24.9 (L) 27.0 - 31.0 pg    MCHC 32.4 32.0 - 36.0 g/dL    RDW 14.4 11.5 - 14.5 %    Platelets 414 (H) 150 - 350 K/uL    MPV 9.9 9.2 - 12.9 fL    Immature Granulocytes 0.4 0.0 - 0.5 %    Gran # (ANC) 7.1 1.8 - 7.7 K/uL    Immature Grans (Abs) 0.03 0.00 - 0.04 K/uL    Lymph # 0.6 (L) 1.0 - 4.8 K/uL    Mono # 0.6 0.3 - 1.0 K/uL    Eos # 0.0 0.0 - 0.5 K/uL    Baso # 0.04 0.00 - 0.20 K/uL    nRBC 0 0 /100 WBC    Gran% 84.2 (H) 38.0 - 73.0 %    Lymph% 7.1 (L) 18.0 - 48.0 %    Mono% 7.4 4.0 - 15.0 %    Eosinophil% 0.4 0.0 - 8.0 %    Basophil% 0.5 0.0 - 1.9 %    Differential Method Automated    Comprehensive metabolic panel    Collection Time: 04/26/19  1:39 AM   Result Value Ref Range    Sodium 123 (L) 136 - 145 mmol/L    Potassium 4.1 3.5 - 5.1 mmol/L    Chloride 91 (L) 95 - 110 mmol/L    CO2 19 (L) 23 - 29 mmol/L    Glucose 109 70 - 110 mg/dL    BUN, Bld 6 (L) 8 - 23 mg/dL    Creatinine 0.7 0.5 - 1.4 mg/dL    Calcium 9.5 8.7 - 10.5 mg/dL    Total Protein 7.4 6.0 - 8.4 g/dL    Albumin 4.2 3.5 - 5.2 g/dL    Total Bilirubin 0.3 0.1 - 1.0 mg/dL    Alkaline Phosphatase 75 55 - 135 U/L    AST 29 10 - 40 U/L    ALT 22 10 - 44 U/L    Anion Gap 13 8 - 16 mmol/L    eGFR if African American >60.0 >60 mL/min/1.73 m^2    eGFR if non African American >60.0 >60 mL/min/1.73 m^2   Troponin I    Collection Time: 04/26/19  1:39 AM   Result Value Ref Range    Troponin I <0.006 0.000 - 0.026 ng/mL   Urinalysis, Reflex to Urine Culture Urine, Clean Catch    Collection Time: 04/26/19  4:17 AM   Result Value Ref Range    Specimen UA Urine, Clean Catch     Color, UA Straw Yellow, Straw, Sadie    Appearance, UA Clear Clear    pH, UA 6.0 5.0 -  8.0    Specific Gravity, UA 1.005 1.005 - 1.030    Protein, UA Negative Negative    Glucose, UA Negative Negative    Ketones, UA Negative Negative    Bilirubin (UA) Negative Negative    Occult Blood UA Negative Negative    Nitrite, UA Negative Negative    Leukocytes, UA Trace (A) Negative   Urinalysis Microscopic    Collection Time: 04/26/19  4:17 AM   Result Value Ref Range    RBC, UA 2 0 - 4 /hpf    WBC, UA 3 0 - 5 /hpf    Bacteria, UA Occasional None-Occ /hpf    Squam Epithel, UA 2 /hpf    Non-Squam Epith <1 <1/hpf /hpf    Hyaline Casts, UA 3 (A) 0-1/lpf /lpf    Microscopic Comment SEE COMMENT    Prepare RBC 1 Unit    Collection Time: 04/26/19  6:10 AM   Result Value Ref Range    UNIT NUMBER [id number]     PRODUCT CODE [id number]     DISPENSE STATUS ISSUED     CODING SYSTEM FRMJ001     Unit Blood Type Code 5100     Unit Blood Type O POS     Unit Expiration [id number]    Type & Screen    Collection Time: 04/26/19  6:33 AM   Result Value Ref Range    Group & Rh O POS     Indirect North NEG    Iron and TIBC    Collection Time: 04/26/19  6:33 AM   Result Value Ref Range    Iron 21 (L) 30 - 160 ug/dL    Transferrin 320 200 - 375 mg/dL    TIBC 474 (H) 250 - 450 ug/dL    Saturated Iron 4 (L) 20 - 50 %   Vitamin B12    Collection Time: 04/26/19  6:33 AM   Result Value Ref Range    Vitamin B-12 287 210 - 950 pg/mL   Folate    Collection Time: 04/26/19  6:33 AM   Result Value Ref Range    Folate 16.2 4.0 - 24.0 ng/mL   Transferrin    Collection Time: 04/26/19  6:33 AM   Result Value Ref Range    Transferrin 320 200 - 375 mg/dL   Basic metabolic panel    Collection Time: 04/26/19  1:49 PM   Result Value Ref Range    Sodium 128 (L) 136 - 145 mmol/L    Potassium 4.1 3.5 - 5.1 mmol/L    Chloride 94 (L) 95 - 110 mmol/L    CO2 24 23 - 29 mmol/L    Glucose 110 70 - 110 mg/dL    BUN, Bld 6 (L) 8 - 23 mg/dL    Creatinine 0.7 0.5 - 1.4 mg/dL    Calcium 9.9 8.7 - 10.5 mg/dL    Anion Gap 10 8 - 16 mmol/L    eGFR if African  American >60.0 >60 mL/min/1.73 m^2    eGFR if non African American >60.0 >60 mL/min/1.73 m^2   CBC auto differential    Collection Time: 04/26/19  1:49 PM   Result Value Ref Range    WBC 11.81 3.90 - 12.70 K/uL    RBC 3.79 (L) 4.00 - 5.40 M/uL    Hemoglobin 9.7 (L) 12.0 - 16.0 g/dL    Hematocrit 29.8 (L) 37.0 - 48.5 %    MCV 79 (L) 82 - 98 fL    MCH 25.6 (L) 27.0 - 31.0 pg    MCHC 32.6 32.0 - 36.0 g/dL    RDW 14.3 11.5 - 14.5 %    Platelets 411 (H) 150 - 350 K/uL    MPV 9.1 (L) 9.2 - 12.9 fL    Immature Granulocytes 0.8 (H) 0.0 - 0.5 %    Gran # (ANC) 10.4 (H) 1.8 - 7.7 K/uL    Immature Grans (Abs) 0.10 (H) 0.00 - 0.04 K/uL    Lymph # 0.6 (L) 1.0 - 4.8 K/uL    Mono # 0.7 0.3 - 1.0 K/uL    Eos # 0.0 0.0 - 0.5 K/uL    Baso # 0.03 0.00 - 0.20 K/uL    nRBC 0 0 /100 WBC    Gran% 87.8 (H) 38.0 - 73.0 %    Lymph% 5.3 (L) 18.0 - 48.0 %    Mono% 5.7 4.0 - 15.0 %    Eosinophil% 0.1 0.0 - 8.0 %    Basophil% 0.3 0.0 - 1.9 %    Differential Method Automated    Magnesium    Collection Time: 04/26/19  1:49 PM   Result Value Ref Range    Magnesium 2.2 1.6 - 2.6 mg/dL   Phosphorus    Collection Time: 04/26/19  1:49 PM   Result Value Ref Range    Phosphorus 3.7 2.7 - 4.5 mg/dL         Pending Diagnostic Studies:     None        Indwelling Lines/Drains at time of discharge:   Lines/Drains/Airways          None          Time spent on the discharge of patient: 40 minutes  Patient was seen and examined on the date of discharge and determined to be suitable for discharge with close PCP f/u as scheduled on 4/29 at 10:30am.      Patricia Lau MD  Department of Hospital Medicine  [facility]

## 2019-04-27 NOTE — H&P
[facility] Hospital Medicine  History & Physical    Patient Name: Shannan Reza  MRN: [id number]  Admission Date: 4/26/2019  Attending Physician: Patricia Lau MD   Primary Care Provider: Primary Doctor [facility] Medicine Team: [facility] D Patricia Lau MD     Patient information was obtained from patient, past medical records and ER records.     Subjective:     Principal Problem:Iron deficiency anemia    Chief Complaint:   Chief Complaint   Patient presents with    Loss of Consciousness     Patient brought in by  EMS from home. Patient had an syncopal episode while using the toilet and fell off. Pt reports she takes blood thinners. HX of CVA        HPI:   68 F with hx of stroke (6/2018) and left hemiparesis, HTN, daily ETOH use who presents to the ED after fall without LOC then syncopal episode when going to the bathroom.  She required her son's assistance after her initial fall.  She is usually wheelchair bound when her son is not in the home.  She had a previous syncopal episode in 7/2018. She drinks beer daily at home mostly out of boredom but states she can stop at any time and does not wish to see psychiatry nor get a resource list for assistance.  She was found to have a hgb of 7.2. She denies melena or BRBPR at home.  Her PCP did give her stool card for home but she did not complete.  She states she has had an EGD in the past which was negative. Na was also low and improved with IVF.  She has been taking her amlodipine and lisinopril daily.     Past Medical History:   Diagnosis Date    Carotid-cavernous fistula     Cerebrovascular accident (CVA) due to thrombosis of right middle cerebral artery 6/26/2018    Essential hypertension 6/27/2018    Lacunar infarct, acute 6/26/2018       Past Surgical History:   Procedure Laterality Date    BRAIN SURGERY         Review of patient's allergies indicates:   Allergen Reactions    Percodan [oxycodone hcl-oxycodone-asa] Itching        No current facility-administered medications on file prior to encounter.      Current Outpatient Medications on File Prior to Encounter   Medication Sig    atorvastatin (LIPITOR) 80 MG tablet Take 1 tablet (80 mg total) by mouth once daily.    folic acid (FOLVITE) 1 MG tablet Take 1 tablet (1 mg total) by mouth once daily.    thiamine 100 MG tablet Take 1 tablet (100 mg total) by mouth once daily.    [DISCONTINUED] aspirin 325 MG tablet Take 1 tablet (325 mg total) by mouth once daily. Talk to your doctor about whether to resume this medication    [DISCONTINUED] clopidogrel (PLAVIX) 75 mg tablet Take 1 tablet (75 mg total) by mouth once daily.    [DISCONTINUED] amLODIPine (NORVASC) 10 MG tablet Take 1 tablet (10 mg total) by mouth once daily.    [DISCONTINUED] lisinopril (PRINIVIL,ZESTRIL) 40 MG tablet Take 1 tablet (40 mg total) by mouth once daily.     Family History     Non-contributory        Tobacco Use    Smoking status: Current Every Day Smoker     Packs/day: 0.50     Years: 25.00     Pack years: 12.50     Types: Cigarettes    Smokeless tobacco: Former User   Substance and Sexual Activity    Alcohol use: Yes     Comment: drank 5 beers today.     Drug use: No    Sexual activity: Never     Review of Systems   Constitutional: Negative for appetite change, fever and unexpected weight change.   HENT: Negative for congestion, nosebleeds and sore throat.    Eyes: Negative for discharge and redness.   Respiratory: Negative for cough and shortness of breath.    Cardiovascular: Negative for chest pain and palpitations.   Gastrointestinal: Negative for abdominal distention, abdominal pain, anal bleeding, blood in stool, nausea and vomiting.   Genitourinary: Negative for dysuria and hematuria.   Musculoskeletal: Positive for gait problem. Negative for arthralgias and myalgias.   Skin: Negative for rash and wound.   Neurological: Positive for syncope and weakness.   Psychiatric/Behavioral: Positive for  dysphoric mood. Negative for sleep disturbance and suicidal ideas. The patient is not nervous/anxious.      Objective:     Vital Signs (Most Recent):  Temp: 98.2 °F (36.8 °C) (04/26/19 1156)  Pulse: 88 (04/26/19 1156)  Resp: 18 (04/26/19 1156)  BP: (!) 129/55 (04/26/19 1156)  SpO2: 97 % (04/26/19 1156) Vital Signs (24h Range):  Temp:  [98.2 °F (36.8 °C)-98.5 °F (36.9 °C)] 98.2 °F (36.8 °C)  Pulse:  [78-88] 88  Resp:  [13-20] 18  SpO2:  [95 %-99 %] 97 %  BP: (114-129)/(55-68) 129/55        There is no height or weight on file to calculate BMI.    Physical Exam   Constitutional: She is oriented to person, place, and time. She appears well-developed and well-nourished. No distress.   HENT:   Head: Normocephalic and atraumatic.   Right Ear: External ear normal.   Left Ear: External ear normal.   Mouth/Throat: No oropharyngeal exudate.   Eyes: No scleral icterus.   Neck: Normal range of motion. Neck supple.   Cardiovascular: Normal rate, regular rhythm and normal heart sounds.   No murmur heard.  Pulmonary/Chest: Effort normal and breath sounds normal.   Abdominal: Soft. Bowel sounds are normal. She exhibits no distension. There is no tenderness.   Neurological: She is alert and oriented to person, place, and time.   3/5 strength LUE and LLE   Skin: Skin is warm and dry. No erythema.   Psychiatric: Her speech is normal. Judgment normal. Her affect is labile. Cognition and memory are normal.          Significant Labs:   Recent Results (from the past 24 hour(s))   CBC auto differential    Collection Time: 04/26/19  1:39 AM   Result Value Ref Range    WBC 8.40 3.90 - 12.70 K/uL    RBC 2.89 (L) 4.00 - 5.40 M/uL    Hemoglobin 7.2 (L) 12.0 - 16.0 g/dL    Hematocrit 22.2 (L) 37.0 - 48.5 %    MCV 77 (L) 82 - 98 fL    MCH 24.9 (L) 27.0 - 31.0 pg    MCHC 32.4 32.0 - 36.0 g/dL    RDW 14.4 11.5 - 14.5 %    Platelets 414 (H) 150 - 350 K/uL    MPV 9.9 9.2 - 12.9 fL    Immature Granulocytes 0.4 0.0 - 0.5 %    Gran # (ANC) 7.1 1.8 - 7.7  K/uL    Immature Grans (Abs) 0.03 0.00 - 0.04 K/uL    Lymph # 0.6 (L) 1.0 - 4.8 K/uL    Mono # 0.6 0.3 - 1.0 K/uL    Eos # 0.0 0.0 - 0.5 K/uL    Baso # 0.04 0.00 - 0.20 K/uL    nRBC 0 0 /100 WBC    Gran% 84.2 (H) 38.0 - 73.0 %    Lymph% 7.1 (L) 18.0 - 48.0 %    Mono% 7.4 4.0 - 15.0 %    Eosinophil% 0.4 0.0 - 8.0 %    Basophil% 0.5 0.0 - 1.9 %    Differential Method Automated    Comprehensive metabolic panel    Collection Time: 04/26/19  1:39 AM   Result Value Ref Range    Sodium 123 (L) 136 - 145 mmol/L    Potassium 4.1 3.5 - 5.1 mmol/L    Chloride 91 (L) 95 - 110 mmol/L    CO2 19 (L) 23 - 29 mmol/L    Glucose 109 70 - 110 mg/dL    BUN, Bld 6 (L) 8 - 23 mg/dL    Creatinine 0.7 0.5 - 1.4 mg/dL    Calcium 9.5 8.7 - 10.5 mg/dL    Total Protein 7.4 6.0 - 8.4 g/dL    Albumin 4.2 3.5 - 5.2 g/dL    Total Bilirubin 0.3 0.1 - 1.0 mg/dL    Alkaline Phosphatase 75 55 - 135 U/L    AST 29 10 - 40 U/L    ALT 22 10 - 44 U/L    Anion Gap 13 8 - 16 mmol/L    eGFR if African American >60.0 >60 mL/min/1.73 m^2    eGFR if non African American >60.0 >60 mL/min/1.73 m^2   Troponin I    Collection Time: 04/26/19  1:39 AM   Result Value Ref Range    Troponin I <0.006 0.000 - 0.026 ng/mL   Urinalysis, Reflex to Urine Culture Urine, Clean Catch    Collection Time: 04/26/19  4:17 AM   Result Value Ref Range    Specimen UA Urine, Clean Catch     Color, UA Straw Yellow, Straw, Sadie    Appearance, UA Clear Clear    pH, UA 6.0 5.0 - 8.0    Specific Gravity, UA 1.005 1.005 - 1.030    Protein, UA Negative Negative    Glucose, UA Negative Negative    Ketones, UA Negative Negative    Bilirubin (UA) Negative Negative    Occult Blood UA Negative Negative    Nitrite, UA Negative Negative    Leukocytes, UA Trace (A) Negative   Urinalysis Microscopic    Collection Time: 04/26/19  4:17 AM   Result Value Ref Range    RBC, UA 2 0 - 4 /hpf    WBC, UA 3 0 - 5 /hpf    Bacteria, UA Occasional None-Occ /hpf    Squam Epithel, UA 2 /hpf    Non-Squam Epith <1  <1/hpf /hpf    Hyaline Casts, UA 3 (A) 0-1/lpf /lpf    Microscopic Comment SEE COMMENT    Prepare RBC 1 Unit    Collection Time: 04/26/19  6:10 AM   Result Value Ref Range    UNIT NUMBER [id number]     PRODUCT CODE J4019L16     DISPENSE STATUS ISSUED     CODING SYSTEM WSQU091     Unit Blood Type Code 5100     Unit Blood Type O POS     Unit Expiration [id number]    Type & Screen    Collection Time: 04/26/19  6:33 AM   Result Value Ref Range    Group & Rh O POS     Indirect North NEG    Iron and TIBC    Collection Time: 04/26/19  6:33 AM   Result Value Ref Range    Iron 21 (L) 30 - 160 ug/dL    Transferrin 320 200 - 375 mg/dL    TIBC 474 (H) 250 - 450 ug/dL    Saturated Iron 4 (L) 20 - 50 %   Vitamin B12    Collection Time: 04/26/19  6:33 AM   Result Value Ref Range    Vitamin B-12 287 210 - 950 pg/mL   Folate    Collection Time: 04/26/19  6:33 AM   Result Value Ref Range    Folate 16.2 4.0 - 24.0 ng/mL   Transferrin    Collection Time: 04/26/19  6:33 AM   Result Value Ref Range    Transferrin 320 200 - 375 mg/dL   Basic metabolic panel    Collection Time: 04/26/19  1:49 PM   Result Value Ref Range    Sodium 128 (L) 136 - 145 mmol/L    Potassium 4.1 3.5 - 5.1 mmol/L    Chloride 94 (L) 95 - 110 mmol/L    CO2 24 23 - 29 mmol/L    Glucose 110 70 - 110 mg/dL    BUN, Bld 6 (L) 8 - 23 mg/dL    Creatinine 0.7 0.5 - 1.4 mg/dL    Calcium 9.9 8.7 - 10.5 mg/dL    Anion Gap 10 8 - 16 mmol/L    eGFR if African American >60.0 >60 mL/min/1.73 m^2    eGFR if non African American >60.0 >60 mL/min/1.73 m^2   CBC auto differential    Collection Time: 04/26/19  1:49 PM   Result Value Ref Range    WBC 11.81 3.90 - 12.70 K/uL    RBC 3.79 (L) 4.00 - 5.40 M/uL    Hemoglobin 9.7 (L) 12.0 - 16.0 g/dL    Hematocrit 29.8 (L) 37.0 - 48.5 %    MCV 79 (L) 82 - 98 fL    MCH 25.6 (L) 27.0 - 31.0 pg    MCHC 32.6 32.0 - 36.0 g/dL    RDW 14.3 11.5 - 14.5 %    Platelets 411 (H) 150 - 350 K/uL    MPV 9.1 (L) 9.2 - 12.9 fL    Immature Granulocytes  0.8 (H) 0.0 - 0.5 %    Gran # (ANC) 10.4 (H) 1.8 - 7.7 K/uL    Immature Grans (Abs) 0.10 (H) 0.00 - 0.04 K/uL    Lymph # 0.6 (L) 1.0 - 4.8 K/uL    Mono # 0.7 0.3 - 1.0 K/uL    Eos # 0.0 0.0 - 0.5 K/uL    Baso # 0.03 0.00 - 0.20 K/uL    nRBC 0 0 /100 WBC    Gran% 87.8 (H) 38.0 - 73.0 %    Lymph% 5.3 (L) 18.0 - 48.0 %    Mono% 5.7 4.0 - 15.0 %    Eosinophil% 0.1 0.0 - 8.0 %    Basophil% 0.3 0.0 - 1.9 %    Differential Method Automated    Magnesium    Collection Time: 04/26/19  1:49 PM   Result Value Ref Range    Magnesium 2.2 1.6 - 2.6 mg/dL   Phosphorus    Collection Time: 04/26/19  1:49 PM   Result Value Ref Range    Phosphorus 3.7 2.7 - 4.5 mg/dL       Significant Imaging:     Bilateral hips 4/26/2019  Right: No fracture or dislocation.  Mild joint space narrowing.  Left: No fracture or dislocation.  Mild joint space narrowing.  Pelvis: No lytic or blastic lesion.    CT cervical spine 4/26/2019  Cervical spine alignment appears within normal limits.  Cervical vertebral body heights are maintained.  No evidence of acute displaced fracture.  Intervertebral disc space heights are relatively well maintained.  There are mild multilevel degenerative changes without evidence of significant bony spinal canal stenosis.  The prevertebral soft tissues appear within normal limits.  There are postoperative changes of the mandible.  Please see separate dictated head CT report for intracranial findings.  Visualized lung apices appear within normal limits.    CT head 4/26/2019  Cervical spine alignment appears within normal limits.  Cervical vertebral body heights are maintained.  No evidence of acute displaced fracture.  Intervertebral disc space heights are relatively well maintained.  There are mild multilevel degenerative changes without evidence of significant bony spinal canal stenosis.  The prevertebral soft tissues appear within normal limits.  There are postoperative changes of the mandible.  Please see separate dictated  head CT report for intracranial findings.  Visualized lung apices appear within normal limits.   Impression     No evidence of acute fracture or traumatic subluxation of the cervical spine.         Impression     No evidence of acute fracture or traumatic subluxation of the cervical spine.       Assessment/Plan:     Active Diagnoses:    Diagnosis Date Noted POA    PRINCIPAL PROBLEM:  Iron deficiency anemia [D50.9] 04/26/2019 Yes    Anemia requiring transfusions [D64.9] 04/26/2019 Yes    Hyponatremia [E87.1] 07/24/2018 Yes    Alcohol abuse [F10.10] 07/24/2018 Yes    Hemiparesis affecting left side as late effect of stroke [I69.354] 06/27/2018 Not Applicable    Essential hypertension [I10] 06/27/2018 Yes      Problems Resolved During this Admission:     VTE Risk Mitigation (From admission, onward)        Ordered     IP VTE LOW RISK PATIENT  Once      04/26/19 0612        Iron deficiency anemia  Anemia requiring transfusions  Syncope   · s/p 1 uPBC  · Start iron supplements with laxative  · Heme stool per ED negative (verbal report)  · patient wishes to pursue outpatient evaluation and request discharge on the day of admit  · encouraged the patient to proceed with Cscope  · Pantoprazole   · Suspect syncope due to hypovolemia from anemia and anti-hypertensive therapy; complete workup with PCP    Hyponatremia  · Improved from 123 to 128 with IVF  · Stop ETOH use  · Improving with IVF but patient refuses to stay in hospital to continue   · F/U with PCP for f/u labs    Essential hypertension  · BP has remained normal since admit  · Stop amlodipine and change lisinopril to 20 mg daily    Left hemiparesis s/p stroke   · Patient has not had f/u with stroke neurology   · discussed with Vascular neuro who recommends changing ASA to 81 mg and continuing plavix until f/u  · Patient does not wish to have PT/OT eval or home denisha    Alcohol abuse  · Encouraged cessation of beer intake and encouraged discussion with PCP if  cessation help needed      Patient discharged after blood completed and repeat labs improved.       Patricia Lau MD  Department of Hospital Medicine   [facility]

## 2019-11-25 ENCOUNTER — OFFICE VISIT (OUTPATIENT)
Dept: INTERNAL MEDICINE | Facility: CLINIC | Age: 69
End: 2019-11-25
Payer: MEDICARE

## 2019-11-25 ENCOUNTER — LAB VISIT (OUTPATIENT)
Dept: LAB | Facility: HOSPITAL | Age: 69
End: 2019-11-25
Attending: INTERNAL MEDICINE
Payer: MEDICARE

## 2019-11-25 VITALS
WEIGHT: 119.25 LBS | DIASTOLIC BLOOD PRESSURE: 80 MMHG | RESPIRATION RATE: 18 BRPM | HEIGHT: 62 IN | SYSTOLIC BLOOD PRESSURE: 180 MMHG | BODY MASS INDEX: 21.94 KG/M2 | TEMPERATURE: 100 F | HEART RATE: 84 BPM

## 2019-11-25 DIAGNOSIS — Z12.39 SCREENING FOR BREAST CANCER: ICD-10-CM

## 2019-11-25 DIAGNOSIS — Z12.31 ENCOUNTER FOR SCREENING MAMMOGRAM FOR MALIGNANT NEOPLASM OF BREAST: ICD-10-CM

## 2019-11-25 DIAGNOSIS — E87.1 HYPONATREMIA: ICD-10-CM

## 2019-11-25 DIAGNOSIS — I10 ESSENTIAL HYPERTENSION: ICD-10-CM

## 2019-11-25 DIAGNOSIS — D64.9 ANEMIA REQUIRING TRANSFUSIONS: ICD-10-CM

## 2019-11-25 DIAGNOSIS — R63.1 PRIMARY POLYDIPSIA: ICD-10-CM

## 2019-11-25 DIAGNOSIS — I67.1 CAROTID-CAVERNOUS FISTULA: ICD-10-CM

## 2019-11-25 DIAGNOSIS — Z12.4 SCREENING FOR CERVICAL CANCER: ICD-10-CM

## 2019-11-25 DIAGNOSIS — I63.311 CEREBROVASCULAR ACCIDENT (CVA) DUE TO THROMBOSIS OF RIGHT MIDDLE CEREBRAL ARTERY: ICD-10-CM

## 2019-11-25 DIAGNOSIS — F54 PRIMARY POLYDIPSIA: ICD-10-CM

## 2019-11-25 DIAGNOSIS — Z00.00 ANNUAL PHYSICAL EXAM: ICD-10-CM

## 2019-11-25 DIAGNOSIS — I67.1 ANTERIOR COMMUNICATING ARTERY ANEURYSM: ICD-10-CM

## 2019-11-25 DIAGNOSIS — Z00.00 ANNUAL PHYSICAL EXAM: Primary | ICD-10-CM

## 2019-11-25 DIAGNOSIS — F17.200 SMOKER: ICD-10-CM

## 2019-11-25 DIAGNOSIS — D50.9 IRON DEFICIENCY ANEMIA, UNSPECIFIED IRON DEFICIENCY ANEMIA TYPE: ICD-10-CM

## 2019-11-25 DIAGNOSIS — I69.354 HEMIPARESIS AFFECTING LEFT SIDE AS LATE EFFECT OF STROKE: ICD-10-CM

## 2019-11-25 DIAGNOSIS — F10.10 ALCOHOL ABUSE: ICD-10-CM

## 2019-11-25 LAB
ALBUMIN SERPL BCP-MCNC: 4.4 G/DL (ref 3.5–5.2)
ALP SERPL-CCNC: 74 U/L (ref 55–135)
ALT SERPL W/O P-5'-P-CCNC: 15 U/L (ref 10–44)
ANION GAP SERPL CALC-SCNC: 8 MMOL/L (ref 8–16)
AST SERPL-CCNC: 26 U/L (ref 10–40)
BASOPHILS # BLD AUTO: 0.05 K/UL (ref 0–0.2)
BASOPHILS NFR BLD: 1 % (ref 0–1.9)
BILIRUB SERPL-MCNC: 0.5 MG/DL (ref 0.1–1)
BUN SERPL-MCNC: 7 MG/DL (ref 8–23)
CALCIUM SERPL-MCNC: 10.1 MG/DL (ref 8.7–10.5)
CHLORIDE SERPL-SCNC: 99 MMOL/L (ref 95–110)
CHOLEST SERPL-MCNC: 270 MG/DL (ref 120–199)
CHOLEST/HDLC SERPL: 2.8 {RATIO} (ref 2–5)
CO2 SERPL-SCNC: 28 MMOL/L (ref 23–29)
CREAT SERPL-MCNC: 0.6 MG/DL (ref 0.5–1.4)
DIFFERENTIAL METHOD: ABNORMAL
EOSINOPHIL # BLD AUTO: 0 K/UL (ref 0–0.5)
EOSINOPHIL NFR BLD: 0.8 % (ref 0–8)
ERYTHROCYTE [DISTWIDTH] IN BLOOD BY AUTOMATED COUNT: 13.4 % (ref 11.5–14.5)
EST. GFR  (AFRICAN AMERICAN): >60 ML/MIN/1.73 M^2
EST. GFR  (NON AFRICAN AMERICAN): >60 ML/MIN/1.73 M^2
FERRITIN SERPL-MCNC: 84 NG/ML (ref 20–300)
GLUCOSE SERPL-MCNC: 101 MG/DL (ref 70–110)
HCT VFR BLD AUTO: 34.4 % (ref 37–48.5)
HDLC SERPL-MCNC: 98 MG/DL (ref 40–75)
HDLC SERPL: 36.3 % (ref 20–50)
HGB BLD-MCNC: 11.2 G/DL (ref 12–16)
IMM GRANULOCYTES # BLD AUTO: 0.01 K/UL (ref 0–0.04)
IMM GRANULOCYTES NFR BLD AUTO: 0.2 % (ref 0–0.5)
IRON SERPL-MCNC: 95 UG/DL (ref 30–160)
LDLC SERPL CALC-MCNC: 161.6 MG/DL (ref 63–159)
LYMPHOCYTES # BLD AUTO: 1.3 K/UL (ref 1–4.8)
LYMPHOCYTES NFR BLD: 25 % (ref 18–48)
MCH RBC QN AUTO: 30.2 PG (ref 27–31)
MCHC RBC AUTO-ENTMCNC: 32.6 G/DL (ref 32–36)
MCV RBC AUTO: 93 FL (ref 82–98)
MONOCYTES # BLD AUTO: 0.5 K/UL (ref 0.3–1)
MONOCYTES NFR BLD: 9.7 % (ref 4–15)
NEUTROPHILS # BLD AUTO: 3.3 K/UL (ref 1.8–7.7)
NEUTROPHILS NFR BLD: 63.3 % (ref 38–73)
NONHDLC SERPL-MCNC: 172 MG/DL
NRBC BLD-RTO: 0 /100 WBC
PLATELET # BLD AUTO: 338 K/UL (ref 150–350)
PMV BLD AUTO: 10.1 FL (ref 9.2–12.9)
POTASSIUM SERPL-SCNC: 4.5 MMOL/L (ref 3.5–5.1)
PROT SERPL-MCNC: 7.7 G/DL (ref 6–8.4)
RBC # BLD AUTO: 3.71 M/UL (ref 4–5.4)
SATURATED IRON: 27 % (ref 20–50)
SODIUM SERPL-SCNC: 135 MMOL/L (ref 136–145)
T4 FREE SERPL-MCNC: 0.99 NG/DL (ref 0.71–1.51)
TOTAL IRON BINDING CAPACITY: 358 UG/DL (ref 250–450)
TRANSFERRIN SERPL-MCNC: 242 MG/DL (ref 200–375)
TRIGL SERPL-MCNC: 52 MG/DL (ref 30–150)
TSH SERPL DL<=0.005 MIU/L-ACNC: 1.43 UIU/ML (ref 0.4–4)
WBC # BLD AUTO: 5.15 K/UL (ref 3.9–12.7)

## 2019-11-25 PROCEDURE — 99499 UNLISTED E&M SERVICE: CPT | Mod: HCNC,S$GLB,, | Performed by: NURSE PRACTITIONER

## 2019-11-25 PROCEDURE — 90662 FLU VACCINE - HIGH DOSE (65+) PRESERVATIVE FREE IM: ICD-10-PCS | Mod: HCNC,S$GLB,, | Performed by: NURSE PRACTITIONER

## 2019-11-25 PROCEDURE — 82728 ASSAY OF FERRITIN: CPT | Mod: HCNC

## 2019-11-25 PROCEDURE — 99499 RISK ADDL DX/OHS AUDIT: ICD-10-PCS | Mod: HCNC,S$GLB,, | Performed by: NURSE PRACTITIONER

## 2019-11-25 PROCEDURE — 80061 LIPID PANEL: CPT | Mod: HCNC

## 2019-11-25 PROCEDURE — 80053 COMPREHEN METABOLIC PANEL: CPT | Mod: HCNC

## 2019-11-25 PROCEDURE — 99387 INIT PM E/M NEW PAT 65+ YRS: CPT | Mod: 25,HCNC,S$GLB, | Performed by: NURSE PRACTITIONER

## 2019-11-25 PROCEDURE — G0008 FLU VACCINE - HIGH DOSE (65+) PRESERVATIVE FREE IM: ICD-10-PCS | Mod: HCNC,S$GLB,, | Performed by: NURSE PRACTITIONER

## 2019-11-25 PROCEDURE — 85025 COMPLETE CBC W/AUTO DIFF WBC: CPT | Mod: HCNC

## 2019-11-25 PROCEDURE — 90662 IIV NO PRSV INCREASED AG IM: CPT | Mod: HCNC,S$GLB,, | Performed by: NURSE PRACTITIONER

## 2019-11-25 PROCEDURE — 99387 PR PREVENTIVE VISIT,NEW,65 & OVER: ICD-10-PCS | Mod: 25,HCNC,S$GLB, | Performed by: NURSE PRACTITIONER

## 2019-11-25 PROCEDURE — 99999 PR PBB SHADOW E&M-EST. PATIENT-LVL III: CPT | Mod: PBBFAC,HCNC,, | Performed by: NURSE PRACTITIONER

## 2019-11-25 PROCEDURE — 36415 COLL VENOUS BLD VENIPUNCTURE: CPT | Mod: HCNC,PO

## 2019-11-25 PROCEDURE — 83540 ASSAY OF IRON: CPT | Mod: HCNC

## 2019-11-25 PROCEDURE — 84439 ASSAY OF FREE THYROXINE: CPT | Mod: HCNC

## 2019-11-25 PROCEDURE — G0008 ADMIN INFLUENZA VIRUS VAC: HCPCS | Mod: HCNC,S$GLB,, | Performed by: NURSE PRACTITIONER

## 2019-11-25 PROCEDURE — 84443 ASSAY THYROID STIM HORMONE: CPT | Mod: HCNC

## 2019-11-25 PROCEDURE — 99999 PR PBB SHADOW E&M-EST. PATIENT-LVL III: ICD-10-PCS | Mod: PBBFAC,HCNC,, | Performed by: NURSE PRACTITIONER

## 2019-11-25 RX ORDER — LISINOPRIL 40 MG/1
20 TABLET ORAL NIGHTLY
Qty: 15 TABLET | Refills: 0 | Status: SHIPPED | OUTPATIENT
Start: 2019-11-25 | End: 2020-02-05 | Stop reason: SDUPTHER

## 2019-11-25 RX ORDER — PANTOPRAZOLE SODIUM 20 MG/1
20 TABLET, DELAYED RELEASE ORAL DAILY
Qty: 30 TABLET | Refills: 11 | Status: SHIPPED | OUTPATIENT
Start: 2019-11-25 | End: 2020-12-23

## 2019-11-25 RX ORDER — FOLIC ACID 1 MG/1
1 TABLET ORAL DAILY
Qty: 30 TABLET | Refills: 3 | Status: SHIPPED | OUTPATIENT
Start: 2019-11-25 | End: 2021-02-05

## 2019-11-25 RX ORDER — FERROUS GLUCONATE 324(38)MG
324 TABLET ORAL
COMMUNITY
Start: 2019-11-25 | End: 2021-02-05

## 2019-11-25 RX ORDER — AMOXICILLIN 250 MG
1 CAPSULE ORAL DAILY
COMMUNITY
Start: 2019-11-25 | End: 2021-02-05

## 2019-11-25 RX ORDER — CLOPIDOGREL BISULFATE 75 MG/1
75 TABLET ORAL DAILY
Qty: 30 TABLET | Refills: 0
Start: 2019-11-25 | End: 2021-02-05 | Stop reason: ALTCHOICE

## 2019-11-25 RX ORDER — ATORVASTATIN CALCIUM 80 MG/1
80 TABLET, FILM COATED ORAL DAILY
Qty: 90 TABLET | Refills: 0
Start: 2019-11-25 | End: 2020-12-23

## 2019-11-25 RX ORDER — NAPROXEN SODIUM 220 MG/1
81 TABLET, FILM COATED ORAL DAILY
Refills: 0 | COMMUNITY
Start: 2019-11-25 | End: 2021-02-02

## 2019-11-25 NOTE — PROGRESS NOTES
[facility]    Chief Complaint      Chief Complaint   Patient presents with    Annual Exam     History of Present Illness      Shannan Reza is a 69 y.o. female patient of Dr. Dacosta with chronic conditions of CVA with left hemiparesis, alcohol abuse, carotid fistuls, HTN, hyponatremia,  Anemia, smoker who presents today to establish care.  Feeling well, no complaints, denies sob or cp. Reviewed meds and history with pt. Pt unsure of med doses, will call back later today to clarify meds.     Flu vaccine-today in office  Labs-ordered  Colonoscopy-ordered  Gyn-refer  dexa scan-ordered  mammo-ordered      Problem List Items Addressed This Visit        Neuro    Cerebrovascular accident (CVA) due to thrombosis of right middle cerebral artery    Relevant Orders    CBC auto differential    Comprehensive metabolic panel    TSH    T4, free    Lipid panel    Iron and TIBC    Ferritin    Urinalysis, Reflex to Urine Culture Urine, Clean Catch    Case request GI: COLONOSCOPY (Completed)    Ambulatory Referral to Gynecology    URINALYSIS    Hemiparesis affecting left side as late effect of stroke    Anterior communicating artery aneurysm       Psychiatric    Alcohol abuse    Relevant Orders    CBC auto differential    Comprehensive metabolic panel    TSH    T4, free    Lipid panel    Iron and TIBC    Ferritin    Urinalysis, Reflex to Urine Culture Urine, Clean Catch    Case request GI: COLONOSCOPY (Completed)    Ambulatory Referral to Gynecology    URINALYSIS       Cardiac/Vascular    Carotid-cavernous fistula    Essential hypertension    Relevant Orders    CBC auto differential    Comprehensive metabolic panel    TSH    T4, free    Lipid panel    Iron and TIBC    Ferritin    Urinalysis, Reflex to Urine Culture Urine, Clean Catch    Case request GI: COLONOSCOPY (Completed)    Ambulatory Referral to Gynecology    URINALYSIS       Renal/    Hyponatremia    Relevant Orders    CBC auto differential     Comprehensive metabolic panel    TSH    T4, free    Lipid panel    Iron and TIBC    Ferritin    Urinalysis, Reflex to Urine Culture Urine, Clean Catch    Case request GI: COLONOSCOPY (Completed)    Ambulatory Referral to Gynecology    URINALYSIS       Oncology    Anemia requiring transfusions    Relevant Orders    CBC auto differential    Comprehensive metabolic panel    TSH    T4, free    Lipid panel    Iron and TIBC    Ferritin    Urinalysis, Reflex to Urine Culture Urine, Clean Catch    Case request GI: COLONOSCOPY (Completed)    Ambulatory Referral to Gynecology    URINALYSIS    Iron deficiency anemia    Relevant Orders    CBC auto differential    Comprehensive metabolic panel    TSH    T4, free    Lipid panel    Iron and TIBC    Ferritin    Urinalysis, Reflex to Urine Culture Urine, Clean Catch    Case request GI: COLONOSCOPY (Completed)    Ambulatory Referral to Gynecology    URINALYSIS       Endocrine    Primary polydipsia    Relevant Orders    CBC auto differential    Comprehensive metabolic panel    TSH    T4, free    Lipid panel    Iron and TIBC    Ferritin    Urinalysis, Reflex to Urine Culture Urine, Clean Catch    Case request GI: COLONOSCOPY (Completed)    Ambulatory Referral to Gynecology    URINALYSIS       Other    Smoker    Relevant Orders    CBC auto differential    Comprehensive metabolic panel    TSH    T4, free    Lipid panel    Iron and TIBC    Ferritin    Urinalysis, Reflex to Urine Culture Urine, Clean Catch    Case request GI: COLONOSCOPY (Completed)    Ambulatory Referral to Gynecology    URINALYSIS      Other Visit Diagnoses     Annual physical exam    -  Primary    Relevant Orders    CBC auto differential    Comprehensive metabolic panel    TSH    T4, free    Lipid panel    Iron and TIBC    Ferritin    Urinalysis, Reflex to Urine Culture Urine, Clean Catch    Case request GI: COLONOSCOPY (Completed)    Ambulatory Referral to Gynecology    URINALYSIS    Screening for breast cancer         Relevant Orders    Mammo Digital Screening Bilat    Case request GI: COLONOSCOPY (Completed)    Ambulatory Referral to Gynecology    URINALYSIS    Screening for cervical cancer        Relevant Orders    Case request GI: COLONOSCOPY (Completed)    Ambulatory Referral to Gynecology    URINALYSIS    Encounter for screening mammogram for malignant neoplasm of breast         Relevant Orders    Mammo Digital Screening Bilat    URINALYSIS          Health Maintenance   Topic Date Due    Hepatitis C Screening  1950    TETANUS VACCINE  11/25/1967    High Dose Statin  11/25/1970    Mammogram  11/25/1989    DEXA SCAN  11/25/1989    Colonoscopy  11/25/1999    Pneumococcal Vaccine (65+ Low/Medium Risk) (1 of 2 - PCV13) 11/25/2014    Aspirin/Antiplatelet Therapy  11/25/2020    Lipid Panel  06/26/2023       Past Medical History:   Diagnosis Date    Carotid-cavernous fistula     Cerebrovascular accident (CVA) due to thrombosis of right middle cerebral artery 6/26/2018    Essential hypertension 6/27/2018    Lacunar infarct, acute 6/26/2018       Past Surgical History:   Procedure Laterality Date    BRAIN SURGERY         family history is not on file.    Social History     Tobacco Use    Smoking status: Current Every Day Smoker     Packs/day: 0.50     Years: 25.00     Pack years: 12.50     Types: Cigarettes    Smokeless tobacco: Former User   Substance Use Topics    Alcohol use: Yes     Comment: drank 5 beers today.     Drug use: No       Review of Systems   Constitutional: Negative for chills and fever.   HENT: Negative for congestion, sinus pain and sore throat.    Eyes: Negative for blurred vision.   Respiratory: Negative for cough, shortness of breath and wheezing.    Cardiovascular: Negative for chest pain, palpitations and leg swelling.   Gastrointestinal: Negative for abdominal pain, constipation, diarrhea, nausea and vomiting.   Genitourinary: Negative for dysuria.   Musculoskeletal: Negative for  "myalgias.   Skin: Negative for rash.   Neurological: Negative for dizziness, weakness and headaches.   Psychiatric/Behavioral: Negative for depression. The patient is not nervous/anxious.         Outpatient Encounter Medications as of 11/25/2019   Medication Sig Dispense Refill    aspirin 81 MG Chew Take 1 tablet (81 mg total) by mouth once daily.  0    clopidogrel (PLAVIX) 75 mg tablet Take 1 tablet (75 mg total) by mouth once daily. 30 tablet 0    ferrous gluconate (FERGON) 324 MG tablet Take 1 tablet (324 mg total) by mouth daily with breakfast.      pantoprazole (PROTONIX) 20 MG tablet Take 1 tablet (20 mg total) by mouth once daily. 30 tablet 11    senna-docusate 8.6-50 mg (SENOKOT-S) 8.6-50 mg per tablet Take 1 tablet by mouth once daily.      atorvastatin (LIPITOR) 80 MG tablet Take 1 tablet (80 mg total) by mouth once daily. 90 tablet 0    folic acid (FOLVITE) 1 MG tablet Take 1 tablet (1 mg total) by mouth once daily. 30 tablet 3    lisinopril (PRINIVIL,ZESTRIL) 40 MG tablet Take 0.5 tablets (20 mg total) by mouth every evening. 15 tablet 0    [DISCONTINUED] thiamine 100 MG tablet Take 1 tablet (100 mg total) by mouth once daily.       No facility-administered encounter medications on file as of 11/25/2019.         Review of patient's allergies indicates:   Allergen Reactions    Percodan [oxycodone hcl-oxycodone-asa] Itching       Physical Exam      Vital Signs  Temp: 99.7 °F (37.6 °C)  Pulse: 84  Resp: 18  BP: (!) 180/80  Pain Score: 0-No pain  Height and Weight  Height: 5' 2" (157.5 cm)  Weight: 54.1 kg (119 lb 4.3 oz)  BSA (Calculated - sq m): 1.54 sq meters  BMI (Calculated): 21.8  Weight in (lb) to have BMI = 25: 136.4]    Physical Exam   Constitutional: She is oriented to person, place, and time. She appears well-developed and well-nourished.   HENT:   Head: Normocephalic and atraumatic.   Right Ear: External ear normal.   Left Ear: External ear normal.   Mouth/Throat: Oropharynx is clear " and moist.   Eyes: Pupils are equal, round, and reactive to light. Conjunctivae and EOM are normal.   Neck: Normal range of motion. Neck supple. No JVD present. No tracheal deviation present. No thyromegaly present.   Cardiovascular: Normal rate, regular rhythm, normal heart sounds and intact distal pulses.   No murmur heard.  Pulmonary/Chest: Effort normal and breath sounds normal. She exhibits no tenderness.   Abdominal: Soft. Bowel sounds are normal. There is no tenderness. There is no guarding.   Musculoskeletal: Normal range of motion.   Lymphadenopathy:     She has no cervical adenopathy.   Neurological: She is alert and oriented to person, place, and time.   Skin: Skin is warm and dry.   Psychiatric: She has a normal mood and affect. Her behavior is normal. Judgment and thought content normal.   Nursing note and vitals reviewed.       Laboratory:  CBC:  No results for input(s): WBC, RBC, HGB, HCT, PLT, MCV, MCH, MCHC in the last 2160 hours.  CMP:  No results for input(s): GLU, CALCIUM, ALBUMIN, PROT, NA, K, CO2, CL, BUN, ALKPHOS, ALT, AST, BILITOT in the last 2160 hours.    Invalid input(s): CREATININ  URINALYSIS:  No results for input(s): COLORU, CLARITYU, SPECGRAV, PHUR, PROTEINUA, GLUCOSEU, BILIRUBINCON, BLOODU, WBCU, RBCU, BACTERIA, MUCUS, NITRITE, LEUKOCYTESUR, UROBILINOGEN, HYALINECASTS in the last 2160 hours.   LIPIDS:  No results for input(s): TSH, HDL, CHOL, TRIG, LDLCALC, CHOLHDL, NONHDLCHOL, TOTALCHOLEST in the last 2160 hours.  TSH:  No results for input(s): TSH in the last 2160 hours.  A1C:  No results for input(s): HGBA1C in the last 2160 hours.      Assessment/Plan     Shannan Reza is a 69 y.o.female with:    1. Annual physical exam  - CBC auto differential; Future  - Comprehensive metabolic panel; Future  - TSH; Future  - T4, free; Future  - Lipid panel; Future  - Iron and TIBC; Future  - Ferritin; Future  - Urinalysis, Reflex to Urine Culture Urine, Clean Catch; Future  - Case request GI:  COLONOSCOPY  - Ambulatory Referral to Gynecology  - URINALYSIS; Future    2. Cerebrovascular accident (CVA) due to thrombosis of right middle cerebral artery  - CBC auto differential; Future  - Comprehensive metabolic panel; Future  - TSH; Future  - T4, free; Future  - Lipid panel; Future  - Iron and TIBC; Future  - Ferritin; Future  - Urinalysis, Reflex to Urine Culture Urine, Clean Catch; Future  - Case request GI: COLONOSCOPY  - Ambulatory Referral to Gynecology  - URINALYSIS; Future    3. Essential hypertension  - CBC auto differential; Future  - Comprehensive metabolic panel; Future  - TSH; Future  - T4, free; Future  - Lipid panel; Future  - Iron and TIBC; Future  - Ferritin; Future  - Urinalysis, Reflex to Urine Culture Urine, Clean Catch; Future  - Case request GI: COLONOSCOPY  - Ambulatory Referral to Gynecology  - URINALYSIS; Future    4. Alcohol abuse  - CBC auto differential; Future  - Comprehensive metabolic panel; Future  - TSH; Future  - T4, free; Future  - Lipid panel; Future  - Iron and TIBC; Future  - Ferritin; Future  - Urinalysis, Reflex to Urine Culture Urine, Clean Catch; Future  - Case request GI: COLONOSCOPY  - Ambulatory Referral to Gynecology  - URINALYSIS; Future    5. Hyponatremia  - CBC auto differential; Future  - Comprehensive metabolic panel; Future  - TSH; Future  - T4, free; Future  - Lipid panel; Future  - Iron and TIBC; Future  - Ferritin; Future  - Urinalysis, Reflex to Urine Culture Urine, Clean Catch; Future  - Case request GI: COLONOSCOPY  - Ambulatory Referral to Gynecology  - URINALYSIS; Future    6. Anemia requiring transfusions  - CBC auto differential; Future  - Comprehensive metabolic panel; Future  - TSH; Future  - T4, free; Future  - Lipid panel; Future  - Iron and TIBC; Future  - Ferritin; Future  - Urinalysis, Reflex to Urine Culture Urine, Clean Catch; Future  - Case request GI: COLONOSCOPY  - Ambulatory Referral to Gynecology  - URINALYSIS; Future    7. Iron  deficiency anemia, unspecified iron deficiency anemia type  - CBC auto differential; Future  - Comprehensive metabolic panel; Future  - TSH; Future  - T4, free; Future  - Lipid panel; Future  - Iron and TIBC; Future  - Ferritin; Future  - Urinalysis, Reflex to Urine Culture Urine, Clean Catch; Future  - Case request GI: COLONOSCOPY  - Ambulatory Referral to Gynecology  - URINALYSIS; Future    8. Smoker  - CBC auto differential; Future  - Comprehensive metabolic panel; Future  - TSH; Future  - T4, free; Future  - Lipid panel; Future  - Iron and TIBC; Future  - Ferritin; Future  - Urinalysis, Reflex to Urine Culture Urine, Clean Catch; Future  - Case request GI: COLONOSCOPY  - Ambulatory Referral to Gynecology  - URINALYSIS; Future    9. Primary polydipsia  - CBC auto differential; Future  - Comprehensive metabolic panel; Future  - TSH; Future  - T4, free; Future  - Lipid panel; Future  - Iron and TIBC; Future  - Ferritin; Future  - Urinalysis, Reflex to Urine Culture Urine, Clean Catch; Future  - Case request GI: COLONOSCOPY  - Ambulatory Referral to Gynecology  - URINALYSIS; Future    10. Screening for breast cancer  - Mammo Digital Screening Bilat; Future  - Case request GI: COLONOSCOPY  - Ambulatory Referral to Gynecology  - URINALYSIS; Future    11. Screening for cervical cancer  - Case request GI: COLONOSCOPY  - Ambulatory Referral to Gynecology  - URINALYSIS; Future    12. Encounter for screening mammogram for malignant neoplasm of breast   - Mammo Digital Screening Bilat; Future  - URINALYSIS; Future    13. Anterior communicating artery aneurysm    14. Hemiparesis affecting left side as late effect of stroke    15. Carotid-cavernous fistula      -Continue current medications and maintain follow up with specialists.  Return to clinic in 6 months with Dr. Chan and as needed for any concerns       Erin Jiminez, NP-C [facility] [place]

## 2019-11-25 NOTE — PROGRESS NOTES
Please let pt know that the last note from hospital discharge from April states that they want her to continue ASA at 81mg daily, plavix 75mg daily, and needs to f/u with vascular neuro as well as stop ETOH use. Reordered all meds as well as atorvastatin 80mg due to medical history and risk w/ stroke.

## 2019-11-26 ENCOUNTER — TELEPHONE (OUTPATIENT)
Dept: INTERNAL MEDICINE | Facility: CLINIC | Age: 69
End: 2019-11-26

## 2019-11-26 NOTE — TELEPHONE ENCOUNTER
Labs look ok, better than last year's. Slight anemia, sodium level borderline low, thyroid wnl, cholesterol panel elevated but better than last years level, will need to restart atorvatatin 80mg daily due to medical issues, iron levels wnl, urine sample looks fine

## 2019-12-03 ENCOUNTER — HOSPITAL ENCOUNTER (OUTPATIENT)
Dept: RADIOLOGY | Facility: HOSPITAL | Age: 69
Discharge: HOME | End: 2019-12-03
Attending: NURSE PRACTITIONER
Payer: MEDICARE

## 2019-12-03 ENCOUNTER — CLINICAL SUPPORT (OUTPATIENT)
Dept: INTERNAL MEDICINE | Facility: CLINIC | Age: 69
End: 2019-12-03
Payer: MEDICARE

## 2019-12-03 VITALS — DIASTOLIC BLOOD PRESSURE: 90 MMHG | SYSTOLIC BLOOD PRESSURE: 178 MMHG

## 2019-12-03 DIAGNOSIS — Z12.39 SCREENING FOR BREAST CANCER: ICD-10-CM

## 2019-12-03 DIAGNOSIS — Z12.31 ENCOUNTER FOR SCREENING MAMMOGRAM FOR MALIGNANT NEOPLASM OF BREAST: ICD-10-CM

## 2019-12-03 DIAGNOSIS — I10 ELEVATED BLOOD PRESSURE READING IN OFFICE WITH DIAGNOSIS OF HYPERTENSION: Primary | ICD-10-CM

## 2019-12-03 PROCEDURE — 77067 SCR MAMMO BI INCL CAD: CPT | Mod: 26,HCNC,, | Performed by: RADIOLOGY

## 2019-12-03 PROCEDURE — 99999 PR PBB SHADOW E&M-EST. PATIENT-LVL I: CPT | Mod: PBBFAC,HCNC,,

## 2019-12-03 PROCEDURE — 77067 SCR MAMMO BI INCL CAD: CPT | Mod: TC,HCNC,PO

## 2019-12-03 PROCEDURE — 77067 MAMMO DIGITAL SCREENING BILAT WITH CAD: ICD-10-PCS | Mod: 26,HCNC,, | Performed by: RADIOLOGY

## 2019-12-03 PROCEDURE — 99999 PR PBB SHADOW E&M-EST. PATIENT-LVL I: ICD-10-PCS | Mod: PBBFAC,HCNC,,

## 2019-12-03 RX ORDER — AMLODIPINE BESYLATE 5 MG/1
5 TABLET ORAL DAILY
Qty: 30 TABLET | Refills: 11 | Status: SHIPPED | OUTPATIENT
Start: 2019-12-03 | End: 2020-11-27 | Stop reason: SDUPTHER

## 2019-12-03 NOTE — PROGRESS NOTES
pts BP today was 178/90.    Ying recommends adding amlodipine 5mg.    Pt aware and expresses understanding.

## 2019-12-09 ENCOUNTER — TELEPHONE (OUTPATIENT)
Dept: RADIOLOGY | Facility: HOSPITAL | Age: 69
End: 2019-12-09

## 2019-12-10 ENCOUNTER — TELEPHONE (OUTPATIENT)
Dept: RADIOLOGY | Facility: HOSPITAL | Age: 69
End: 2019-12-10

## 2019-12-10 NOTE — TELEPHONE ENCOUNTER
Spoke with patient and explained mammogram findings.Patient expressed understanding of results. Patient scheduled abnormal mammogram follow up appointment at [facility] on 12/16/2019.

## 2019-12-23 ENCOUNTER — TELEPHONE (OUTPATIENT)
Dept: RADIOLOGY | Facility: HOSPITAL | Age: 69
End: 2019-12-23

## 2019-12-26 ENCOUNTER — TELEPHONE (OUTPATIENT)
Dept: RADIOLOGY | Facility: HOSPITAL | Age: 69
End: 2019-12-26

## 2019-12-26 NOTE — TELEPHONE ENCOUNTER
Called patient in reference to her breast imaging and rescheduling a abnormal mammogram follow up that the patient canceled. No answer. Left the patient a message with my direct phone number.

## 2019-12-27 ENCOUNTER — TELEPHONE (OUTPATIENT)
Dept: RADIOLOGY | Facility: HOSPITAL | Age: 69
End: 2019-12-27

## 2020-01-23 DIAGNOSIS — F10.10 ALCOHOL ABUSE: ICD-10-CM

## 2020-01-23 DIAGNOSIS — Z12.39 SCREENING FOR BREAST CANCER: ICD-10-CM

## 2020-01-23 DIAGNOSIS — I67.1 CAROTID-CAVERNOUS FISTULA: ICD-10-CM

## 2020-01-23 DIAGNOSIS — F17.200 SMOKER: ICD-10-CM

## 2020-01-23 DIAGNOSIS — I10 ESSENTIAL HYPERTENSION: ICD-10-CM

## 2020-01-23 DIAGNOSIS — I69.354 HEMIPARESIS AFFECTING LEFT SIDE AS LATE EFFECT OF STROKE: ICD-10-CM

## 2020-01-23 DIAGNOSIS — Z12.31 ENCOUNTER FOR SCREENING MAMMOGRAM FOR MALIGNANT NEOPLASM OF BREAST: ICD-10-CM

## 2020-01-23 DIAGNOSIS — D64.9 ANEMIA REQUIRING TRANSFUSIONS: ICD-10-CM

## 2020-01-23 DIAGNOSIS — Z00.00 ANNUAL PHYSICAL EXAM: ICD-10-CM

## 2020-01-23 DIAGNOSIS — I67.1 ANTERIOR COMMUNICATING ARTERY ANEURYSM: ICD-10-CM

## 2020-01-23 DIAGNOSIS — E87.1 HYPONATREMIA: ICD-10-CM

## 2020-01-23 DIAGNOSIS — Z12.4 SCREENING FOR CERVICAL CANCER: ICD-10-CM

## 2020-01-23 DIAGNOSIS — F54 PRIMARY POLYDIPSIA: ICD-10-CM

## 2020-01-23 DIAGNOSIS — I63.311 CEREBROVASCULAR ACCIDENT (CVA) DUE TO THROMBOSIS OF RIGHT MIDDLE CEREBRAL ARTERY: ICD-10-CM

## 2020-01-23 DIAGNOSIS — D50.9 IRON DEFICIENCY ANEMIA, UNSPECIFIED IRON DEFICIENCY ANEMIA TYPE: ICD-10-CM

## 2020-01-23 DIAGNOSIS — R63.1 PRIMARY POLYDIPSIA: ICD-10-CM

## 2020-01-23 RX ORDER — LISINOPRIL 40 MG/1
20 TABLET ORAL NIGHTLY
Qty: 15 TABLET | Refills: 0 | OUTPATIENT
Start: 2020-01-23 | End: 2020-02-22

## 2020-01-23 NOTE — TELEPHONE ENCOUNTER
----- Message from Leslie Kamara sent at 1/23/2020  8:17 AM CST -----  Contact: Patient [phone]  Prescription Request:     Name of medication: lisinopril (PRINIVIL,ZESTRIL) 40 MG tablet    Reason for request: Refill    Pharmacy: [id number] - [place], [place] - [address]    Requesting 90 day supply.    Thank You

## 2020-02-05 DIAGNOSIS — Z00.00 ANNUAL PHYSICAL EXAM: ICD-10-CM

## 2020-02-05 DIAGNOSIS — I63.311 CEREBROVASCULAR ACCIDENT (CVA) DUE TO THROMBOSIS OF RIGHT MIDDLE CEREBRAL ARTERY: ICD-10-CM

## 2020-02-05 DIAGNOSIS — Z12.39 SCREENING FOR BREAST CANCER: ICD-10-CM

## 2020-02-05 DIAGNOSIS — R63.1 PRIMARY POLYDIPSIA: ICD-10-CM

## 2020-02-05 DIAGNOSIS — Z12.4 SCREENING FOR CERVICAL CANCER: ICD-10-CM

## 2020-02-05 DIAGNOSIS — D64.9 ANEMIA REQUIRING TRANSFUSIONS: ICD-10-CM

## 2020-02-05 DIAGNOSIS — I67.1 ANTERIOR COMMUNICATING ARTERY ANEURYSM: ICD-10-CM

## 2020-02-05 DIAGNOSIS — I67.1 CAROTID-CAVERNOUS FISTULA: ICD-10-CM

## 2020-02-05 DIAGNOSIS — F10.10 ALCOHOL ABUSE: ICD-10-CM

## 2020-02-05 DIAGNOSIS — F54 PRIMARY POLYDIPSIA: ICD-10-CM

## 2020-02-05 DIAGNOSIS — F17.200 SMOKER: ICD-10-CM

## 2020-02-05 DIAGNOSIS — I69.354 HEMIPARESIS AFFECTING LEFT SIDE AS LATE EFFECT OF STROKE: ICD-10-CM

## 2020-02-05 DIAGNOSIS — I10 ESSENTIAL HYPERTENSION: ICD-10-CM

## 2020-02-05 DIAGNOSIS — D50.9 IRON DEFICIENCY ANEMIA, UNSPECIFIED IRON DEFICIENCY ANEMIA TYPE: ICD-10-CM

## 2020-02-05 DIAGNOSIS — Z12.31 ENCOUNTER FOR SCREENING MAMMOGRAM FOR MALIGNANT NEOPLASM OF BREAST: ICD-10-CM

## 2020-02-05 DIAGNOSIS — E87.1 HYPONATREMIA: ICD-10-CM

## 2020-02-05 RX ORDER — LISINOPRIL 40 MG/1
20 TABLET ORAL NIGHTLY
Qty: 30 TABLET | Refills: 0 | Status: SHIPPED | OUTPATIENT
Start: 2020-02-05 | End: 2020-04-13 | Stop reason: SDUPTHER

## 2020-02-05 NOTE — TELEPHONE ENCOUNTER
Please sign rx, pt was told to make an appt to est with pcp. Informed pt to call back to make an appt today with pcp we will refill one time but she needs a

## 2020-02-05 NOTE — TELEPHONE ENCOUNTER
----- Message from Felicia Vyas sent at 2/5/2020 10:12 AM CST -----  Contact: Self   Pt is requesting a call back regarding medication. Please call and advise.

## 2020-04-13 DIAGNOSIS — Z12.4 SCREENING FOR CERVICAL CANCER: ICD-10-CM

## 2020-04-13 DIAGNOSIS — I69.354 HEMIPARESIS AFFECTING LEFT SIDE AS LATE EFFECT OF STROKE: ICD-10-CM

## 2020-04-13 DIAGNOSIS — R63.1 PRIMARY POLYDIPSIA: ICD-10-CM

## 2020-04-13 DIAGNOSIS — Z12.39 SCREENING FOR BREAST CANCER: ICD-10-CM

## 2020-04-13 DIAGNOSIS — D64.9 ANEMIA REQUIRING TRANSFUSIONS: ICD-10-CM

## 2020-04-13 DIAGNOSIS — F17.200 SMOKER: ICD-10-CM

## 2020-04-13 DIAGNOSIS — I67.1 CAROTID-CAVERNOUS FISTULA: ICD-10-CM

## 2020-04-13 DIAGNOSIS — F10.10 ALCOHOL ABUSE: ICD-10-CM

## 2020-04-13 DIAGNOSIS — D50.9 IRON DEFICIENCY ANEMIA, UNSPECIFIED IRON DEFICIENCY ANEMIA TYPE: ICD-10-CM

## 2020-04-13 DIAGNOSIS — I67.1 ANTERIOR COMMUNICATING ARTERY ANEURYSM: ICD-10-CM

## 2020-04-13 DIAGNOSIS — E87.1 HYPONATREMIA: ICD-10-CM

## 2020-04-13 DIAGNOSIS — Z00.00 ANNUAL PHYSICAL EXAM: ICD-10-CM

## 2020-04-13 DIAGNOSIS — Z12.31 ENCOUNTER FOR SCREENING MAMMOGRAM FOR MALIGNANT NEOPLASM OF BREAST: ICD-10-CM

## 2020-04-13 DIAGNOSIS — I63.311 CEREBROVASCULAR ACCIDENT (CVA) DUE TO THROMBOSIS OF RIGHT MIDDLE CEREBRAL ARTERY: ICD-10-CM

## 2020-04-13 DIAGNOSIS — F54 PRIMARY POLYDIPSIA: ICD-10-CM

## 2020-04-13 DIAGNOSIS — I10 ESSENTIAL HYPERTENSION: ICD-10-CM

## 2020-04-13 RX ORDER — LISINOPRIL 40 MG/1
20 TABLET ORAL NIGHTLY
Qty: 30 TABLET | Refills: 0 | Status: SHIPPED | OUTPATIENT
Start: 2020-04-13 | End: 2020-06-12 | Stop reason: SDUPTHER

## 2020-04-13 NOTE — TELEPHONE ENCOUNTER
----- Message from Maryann Otero sent at 4/13/2020  4:27 PM CDT -----  Contact: David(son)[phone]  Requesting an RX refill or new RX.  Is this a refill or new RX:  new  RX name and strength: lisinopril (PRINIVIL,ZESTRIL) 40 MG tablet  Directions (copy/paste from chart):   Take 0.5 tablets (20 mg total) by mouth every evening  Is this a 30 day or 90 day RX:    Local pharmacy or mail order pharmacy:  local  Pharmacy name and phone # (copy/paste from chart):  [id number] - [place], [place] - [address] [phone] (Phone)  [phone] (Fax)  Comments:   Pt son is asking if there is a 20 mg b/c it is difficult to cut the 40 mg pill in half  Pt son states his mother is out of medication.

## 2020-06-12 DIAGNOSIS — Z12.31 ENCOUNTER FOR SCREENING MAMMOGRAM FOR MALIGNANT NEOPLASM OF BREAST: ICD-10-CM

## 2020-06-12 DIAGNOSIS — D50.9 IRON DEFICIENCY ANEMIA, UNSPECIFIED IRON DEFICIENCY ANEMIA TYPE: ICD-10-CM

## 2020-06-12 DIAGNOSIS — D64.9 ANEMIA REQUIRING TRANSFUSIONS: ICD-10-CM

## 2020-06-12 DIAGNOSIS — E87.1 HYPONATREMIA: ICD-10-CM

## 2020-06-12 DIAGNOSIS — Z00.00 ANNUAL PHYSICAL EXAM: ICD-10-CM

## 2020-06-12 DIAGNOSIS — F17.200 SMOKER: ICD-10-CM

## 2020-06-12 DIAGNOSIS — R63.1 PRIMARY POLYDIPSIA: ICD-10-CM

## 2020-06-12 DIAGNOSIS — F10.10 ALCOHOL ABUSE: ICD-10-CM

## 2020-06-12 DIAGNOSIS — I67.1 CAROTID-CAVERNOUS FISTULA: ICD-10-CM

## 2020-06-12 DIAGNOSIS — I69.354 HEMIPARESIS AFFECTING LEFT SIDE AS LATE EFFECT OF STROKE: ICD-10-CM

## 2020-06-12 DIAGNOSIS — I10 ESSENTIAL HYPERTENSION: ICD-10-CM

## 2020-06-12 DIAGNOSIS — Z12.4 SCREENING FOR CERVICAL CANCER: ICD-10-CM

## 2020-06-12 DIAGNOSIS — F54 PRIMARY POLYDIPSIA: ICD-10-CM

## 2020-06-12 DIAGNOSIS — I67.1 ANTERIOR COMMUNICATING ARTERY ANEURYSM: ICD-10-CM

## 2020-06-12 DIAGNOSIS — I63.311 CEREBROVASCULAR ACCIDENT (CVA) DUE TO THROMBOSIS OF RIGHT MIDDLE CEREBRAL ARTERY: ICD-10-CM

## 2020-06-12 DIAGNOSIS — Z12.39 SCREENING FOR BREAST CANCER: ICD-10-CM

## 2020-06-12 RX ORDER — LISINOPRIL 40 MG/1
20 TABLET ORAL NIGHTLY
Qty: 30 TABLET | Refills: 0 | Status: SHIPPED | OUTPATIENT
Start: 2020-06-12 | End: 2020-08-11 | Stop reason: SDUPTHER

## 2020-08-11 DIAGNOSIS — I67.1 CAROTID-CAVERNOUS FISTULA: ICD-10-CM

## 2020-08-11 DIAGNOSIS — D64.9 ANEMIA REQUIRING TRANSFUSIONS: ICD-10-CM

## 2020-08-11 DIAGNOSIS — Z12.31 ENCOUNTER FOR SCREENING MAMMOGRAM FOR MALIGNANT NEOPLASM OF BREAST: ICD-10-CM

## 2020-08-11 DIAGNOSIS — Z12.4 SCREENING FOR CERVICAL CANCER: ICD-10-CM

## 2020-08-11 DIAGNOSIS — E87.1 HYPONATREMIA: ICD-10-CM

## 2020-08-11 DIAGNOSIS — I67.1 ANTERIOR COMMUNICATING ARTERY ANEURYSM: ICD-10-CM

## 2020-08-11 DIAGNOSIS — I10 ESSENTIAL HYPERTENSION: ICD-10-CM

## 2020-08-11 DIAGNOSIS — F10.10 ALCOHOL ABUSE: ICD-10-CM

## 2020-08-11 DIAGNOSIS — R63.1 PRIMARY POLYDIPSIA: ICD-10-CM

## 2020-08-11 DIAGNOSIS — I63.311 CEREBROVASCULAR ACCIDENT (CVA) DUE TO THROMBOSIS OF RIGHT MIDDLE CEREBRAL ARTERY: ICD-10-CM

## 2020-08-11 DIAGNOSIS — F17.200 SMOKER: ICD-10-CM

## 2020-08-11 DIAGNOSIS — Z00.00 ANNUAL PHYSICAL EXAM: ICD-10-CM

## 2020-08-11 DIAGNOSIS — Z12.39 SCREENING FOR BREAST CANCER: ICD-10-CM

## 2020-08-11 DIAGNOSIS — F54 PRIMARY POLYDIPSIA: ICD-10-CM

## 2020-08-11 DIAGNOSIS — I69.354 HEMIPARESIS AFFECTING LEFT SIDE AS LATE EFFECT OF STROKE: ICD-10-CM

## 2020-08-11 DIAGNOSIS — D50.9 IRON DEFICIENCY ANEMIA, UNSPECIFIED IRON DEFICIENCY ANEMIA TYPE: ICD-10-CM

## 2020-08-12 RX ORDER — LISINOPRIL 40 MG/1
20 TABLET ORAL NIGHTLY
Qty: 30 TABLET | Refills: 0 | Status: SHIPPED | OUTPATIENT
Start: 2020-08-12 | End: 2020-10-26

## 2020-09-29 ENCOUNTER — PATIENT MESSAGE (OUTPATIENT)
Dept: OTHER | Facility: OTHER | Age: 70
End: 2020-09-29

## 2020-10-26 DIAGNOSIS — E87.1 HYPONATREMIA: ICD-10-CM

## 2020-10-26 DIAGNOSIS — I69.354 HEMIPARESIS AFFECTING LEFT SIDE AS LATE EFFECT OF STROKE: ICD-10-CM

## 2020-10-26 DIAGNOSIS — F10.10 ALCOHOL ABUSE: ICD-10-CM

## 2020-10-26 DIAGNOSIS — I67.1 ANTERIOR COMMUNICATING ARTERY ANEURYSM: ICD-10-CM

## 2020-10-26 DIAGNOSIS — I63.311 CEREBROVASCULAR ACCIDENT (CVA) DUE TO THROMBOSIS OF RIGHT MIDDLE CEREBRAL ARTERY: ICD-10-CM

## 2020-10-26 DIAGNOSIS — F54 PRIMARY POLYDIPSIA: ICD-10-CM

## 2020-10-26 DIAGNOSIS — Z12.4 SCREENING FOR CERVICAL CANCER: ICD-10-CM

## 2020-10-26 DIAGNOSIS — D50.9 IRON DEFICIENCY ANEMIA, UNSPECIFIED IRON DEFICIENCY ANEMIA TYPE: ICD-10-CM

## 2020-10-26 DIAGNOSIS — D64.9 ANEMIA REQUIRING TRANSFUSIONS: ICD-10-CM

## 2020-10-26 DIAGNOSIS — Z12.31 ENCOUNTER FOR SCREENING MAMMOGRAM FOR MALIGNANT NEOPLASM OF BREAST: ICD-10-CM

## 2020-10-26 DIAGNOSIS — F17.200 SMOKER: ICD-10-CM

## 2020-10-26 DIAGNOSIS — R63.1 PRIMARY POLYDIPSIA: ICD-10-CM

## 2020-10-26 DIAGNOSIS — Z12.39 SCREENING FOR BREAST CANCER: ICD-10-CM

## 2020-10-26 DIAGNOSIS — I67.1 CAROTID-CAVERNOUS FISTULA: ICD-10-CM

## 2020-10-26 DIAGNOSIS — I10 ESSENTIAL HYPERTENSION: ICD-10-CM

## 2020-10-26 DIAGNOSIS — Z00.00 ANNUAL PHYSICAL EXAM: ICD-10-CM

## 2020-10-26 RX ORDER — LISINOPRIL 20 MG/1
20 TABLET ORAL NIGHTLY
Qty: 30 TABLET | Refills: 0 | Status: SHIPPED | OUTPATIENT
Start: 2020-10-26 | End: 2020-11-28 | Stop reason: SDUPTHER

## 2020-10-26 NOTE — TELEPHONE ENCOUNTER
Pt supposed to take 20mg of lisinopril; has 40mg tablets. Would like a script for 20mg so she doesn't have to cut tablets daily     Updated rx pended

## 2020-10-26 NOTE — TELEPHONE ENCOUNTER
----- Message from Antonia Salazar sent at 10/26/2020  8:06 AM CDT -----  Regarding: Rx  Contact: [phone] @ David  Requesting an RX refill or new RX.  Is this a refill or new RX: Refill  RX name and strength:lisinopriL (PRINIVIL,ZESTRIL) 40 MG tablet  Is this a 30 day or 90 day RX:   Ph[facility] name and phone # [id number] [place], [place] - [address]  Comments: Son is asking for 20 mg instead of 40 mg . Patient has been cutting them in half

## 2020-10-30 ENCOUNTER — PATIENT MESSAGE (OUTPATIENT)
Dept: ADMINISTRATIVE | Facility: HOSPITAL | Age: 70
End: 2020-10-30

## 2020-11-27 DIAGNOSIS — I10 ELEVATED BLOOD PRESSURE READING IN OFFICE WITH DIAGNOSIS OF HYPERTENSION: ICD-10-CM

## 2020-11-27 RX ORDER — AMLODIPINE BESYLATE 5 MG/1
5 TABLET ORAL DAILY
Qty: 30 TABLET | Refills: 0 | Status: SHIPPED | OUTPATIENT
Start: 2020-11-27 | End: 2020-11-28 | Stop reason: SDUPTHER

## 2020-11-28 DIAGNOSIS — E87.1 HYPONATREMIA: ICD-10-CM

## 2020-11-28 DIAGNOSIS — I10 ESSENTIAL HYPERTENSION: ICD-10-CM

## 2020-11-28 DIAGNOSIS — R63.1 PRIMARY POLYDIPSIA: ICD-10-CM

## 2020-11-28 DIAGNOSIS — F54 PRIMARY POLYDIPSIA: ICD-10-CM

## 2020-11-28 DIAGNOSIS — Z12.39 SCREENING FOR BREAST CANCER: ICD-10-CM

## 2020-11-28 DIAGNOSIS — I69.354 HEMIPARESIS AFFECTING LEFT SIDE AS LATE EFFECT OF STROKE: ICD-10-CM

## 2020-11-28 DIAGNOSIS — D50.9 IRON DEFICIENCY ANEMIA, UNSPECIFIED IRON DEFICIENCY ANEMIA TYPE: ICD-10-CM

## 2020-11-28 DIAGNOSIS — I63.311 CEREBROVASCULAR ACCIDENT (CVA) DUE TO THROMBOSIS OF RIGHT MIDDLE CEREBRAL ARTERY: ICD-10-CM

## 2020-11-28 DIAGNOSIS — D64.9 ANEMIA REQUIRING TRANSFUSIONS: ICD-10-CM

## 2020-11-28 DIAGNOSIS — I67.1 CAROTID-CAVERNOUS FISTULA: ICD-10-CM

## 2020-11-28 DIAGNOSIS — Z00.00 ANNUAL PHYSICAL EXAM: ICD-10-CM

## 2020-11-28 DIAGNOSIS — I10 ELEVATED BLOOD PRESSURE READING IN OFFICE WITH DIAGNOSIS OF HYPERTENSION: ICD-10-CM

## 2020-11-28 DIAGNOSIS — Z12.4 SCREENING FOR CERVICAL CANCER: ICD-10-CM

## 2020-11-28 DIAGNOSIS — Z12.31 ENCOUNTER FOR SCREENING MAMMOGRAM FOR MALIGNANT NEOPLASM OF BREAST: ICD-10-CM

## 2020-11-28 DIAGNOSIS — F17.200 SMOKER: ICD-10-CM

## 2020-11-28 DIAGNOSIS — F10.10 ALCOHOL ABUSE: ICD-10-CM

## 2020-11-28 DIAGNOSIS — I67.1 ANTERIOR COMMUNICATING ARTERY ANEURYSM: ICD-10-CM

## 2020-11-30 DIAGNOSIS — D64.9 ANEMIA REQUIRING TRANSFUSIONS: ICD-10-CM

## 2020-11-30 DIAGNOSIS — F54 PRIMARY POLYDIPSIA: ICD-10-CM

## 2020-11-30 DIAGNOSIS — F17.200 SMOKER: ICD-10-CM

## 2020-11-30 DIAGNOSIS — D50.9 IRON DEFICIENCY ANEMIA, UNSPECIFIED IRON DEFICIENCY ANEMIA TYPE: ICD-10-CM

## 2020-11-30 DIAGNOSIS — I67.1 CAROTID-CAVERNOUS FISTULA: ICD-10-CM

## 2020-11-30 DIAGNOSIS — I10 ELEVATED BLOOD PRESSURE READING IN OFFICE WITH DIAGNOSIS OF HYPERTENSION: ICD-10-CM

## 2020-11-30 DIAGNOSIS — Z12.31 ENCOUNTER FOR SCREENING MAMMOGRAM FOR MALIGNANT NEOPLASM OF BREAST: ICD-10-CM

## 2020-11-30 DIAGNOSIS — I69.354 HEMIPARESIS AFFECTING LEFT SIDE AS LATE EFFECT OF STROKE: ICD-10-CM

## 2020-11-30 DIAGNOSIS — Z00.00 ANNUAL PHYSICAL EXAM: ICD-10-CM

## 2020-11-30 DIAGNOSIS — E87.1 HYPONATREMIA: ICD-10-CM

## 2020-11-30 DIAGNOSIS — I67.1 ANTERIOR COMMUNICATING ARTERY ANEURYSM: ICD-10-CM

## 2020-11-30 DIAGNOSIS — Z12.4 SCREENING FOR CERVICAL CANCER: ICD-10-CM

## 2020-11-30 DIAGNOSIS — Z12.39 SCREENING FOR BREAST CANCER: ICD-10-CM

## 2020-11-30 DIAGNOSIS — F10.10 ALCOHOL ABUSE: ICD-10-CM

## 2020-11-30 DIAGNOSIS — I63.311 CEREBROVASCULAR ACCIDENT (CVA) DUE TO THROMBOSIS OF RIGHT MIDDLE CEREBRAL ARTERY: ICD-10-CM

## 2020-11-30 DIAGNOSIS — I10 ESSENTIAL HYPERTENSION: ICD-10-CM

## 2020-11-30 DIAGNOSIS — R63.1 PRIMARY POLYDIPSIA: ICD-10-CM

## 2020-11-30 RX ORDER — AMLODIPINE BESYLATE 5 MG/1
5 TABLET ORAL DAILY
Qty: 30 TABLET | Refills: 0 | Status: SHIPPED | OUTPATIENT
Start: 2020-11-30 | End: 2020-12-27

## 2020-11-30 RX ORDER — LISINOPRIL 20 MG/1
20 TABLET ORAL NIGHTLY
Qty: 30 TABLET | Refills: 0 | Status: SHIPPED | OUTPATIENT
Start: 2020-11-30 | End: 2020-12-27

## 2020-11-30 NOTE — TELEPHONE ENCOUNTER
----- Message from Marilee Lira sent at 2020  8:39 AM CST -----  Requesting an RX refill or new RX.  Is this a refill or new RX: refill   RX name and strength:lisinopriL (PRINIVIL,ZESTRIL) 20 MG tablet () & amLODIPine (NORVASC) 5 MG tablet    Is this a 30 day or 90 day RX: 30 day   Pharmacy name and phone # (copy/paste from chart):    [facility]/pharmacy #[id number] - [place] - [address]  [address]  [place] [address]  Phone: [phone] Fax: [phone]      Comments: PT would like a refill until she can be seen on

## 2020-12-11 ENCOUNTER — PATIENT MESSAGE (OUTPATIENT)
Dept: OTHER | Facility: OTHER | Age: 70
End: 2020-12-11

## 2020-12-23 ENCOUNTER — LAB VISIT (OUTPATIENT)
Dept: LAB | Facility: HOSPITAL | Age: 70
End: 2020-12-23
Attending: INTERNAL MEDICINE
Payer: MEDICARE

## 2020-12-23 ENCOUNTER — OFFICE VISIT (OUTPATIENT)
Dept: INTERNAL MEDICINE | Facility: CLINIC | Age: 70
End: 2020-12-23
Payer: MEDICARE

## 2020-12-23 VITALS
SYSTOLIC BLOOD PRESSURE: 120 MMHG | BODY MASS INDEX: 22.6 KG/M2 | HEART RATE: 88 BPM | DIASTOLIC BLOOD PRESSURE: 60 MMHG | TEMPERATURE: 98 F | HEIGHT: 62 IN | WEIGHT: 122.81 LBS

## 2020-12-23 DIAGNOSIS — Z87.891 HX OF TOBACCO USE, PRESENTING HAZARDS TO HEALTH: ICD-10-CM

## 2020-12-23 DIAGNOSIS — I69.354 HEMIPARESIS AFFECTING LEFT SIDE AS LATE EFFECT OF STROKE: ICD-10-CM

## 2020-12-23 DIAGNOSIS — I10 ESSENTIAL HYPERTENSION: ICD-10-CM

## 2020-12-23 DIAGNOSIS — I67.1 ANTERIOR COMMUNICATING ARTERY ANEURYSM: ICD-10-CM

## 2020-12-23 DIAGNOSIS — I63.311 CEREBROVASCULAR ACCIDENT (CVA) DUE TO THROMBOSIS OF RIGHT MIDDLE CEREBRAL ARTERY: ICD-10-CM

## 2020-12-23 DIAGNOSIS — Z78.0 POSTMENOPAUSAL: ICD-10-CM

## 2020-12-23 DIAGNOSIS — Z12.31 OTHER SCREENING MAMMOGRAM: ICD-10-CM

## 2020-12-23 DIAGNOSIS — Z00.00 ENCOUNTER FOR PREVENTIVE HEALTH EXAMINATION: Primary | ICD-10-CM

## 2020-12-23 DIAGNOSIS — Z00.00 ENCOUNTER FOR PREVENTIVE HEALTH EXAMINATION: ICD-10-CM

## 2020-12-23 DIAGNOSIS — I67.1 CAROTID-CAVERNOUS FISTULA: ICD-10-CM

## 2020-12-23 LAB
ALBUMIN SERPL BCP-MCNC: 4.2 G/DL (ref 3.5–5.2)
ALP SERPL-CCNC: 94 U/L (ref 55–135)
ALT SERPL W/O P-5'-P-CCNC: 32 U/L (ref 10–44)
ANION GAP SERPL CALC-SCNC: 10 MMOL/L (ref 8–16)
AST SERPL-CCNC: 56 U/L (ref 10–40)
BASOPHILS # BLD AUTO: 0.05 K/UL (ref 0–0.2)
BASOPHILS NFR BLD: 0.9 % (ref 0–1.9)
BILIRUB SERPL-MCNC: 0.5 MG/DL (ref 0.1–1)
BUN SERPL-MCNC: 6 MG/DL (ref 8–23)
CALCIUM SERPL-MCNC: 9.3 MG/DL (ref 8.7–10.5)
CHLORIDE SERPL-SCNC: 95 MMOL/L (ref 95–110)
CHOLEST SERPL-MCNC: 218 MG/DL (ref 120–199)
CHOLEST/HDLC SERPL: 2.4 {RATIO} (ref 2–5)
CO2 SERPL-SCNC: 24 MMOL/L (ref 23–29)
CREAT SERPL-MCNC: 0.7 MG/DL (ref 0.5–1.4)
DIFFERENTIAL METHOD: ABNORMAL
EOSINOPHIL # BLD AUTO: 0.1 K/UL (ref 0–0.5)
EOSINOPHIL NFR BLD: 1.3 % (ref 0–8)
ERYTHROCYTE [DISTWIDTH] IN BLOOD BY AUTOMATED COUNT: 13.5 % (ref 11.5–14.5)
EST. GFR  (AFRICAN AMERICAN): >60 ML/MIN/1.73 M^2
EST. GFR  (NON AFRICAN AMERICAN): >60 ML/MIN/1.73 M^2
GLUCOSE SERPL-MCNC: 87 MG/DL (ref 70–110)
HCT VFR BLD AUTO: 40.6 % (ref 37–48.5)
HDLC SERPL-MCNC: 90 MG/DL (ref 40–75)
HDLC SERPL: 41.3 % (ref 20–50)
HGB BLD-MCNC: 13.6 G/DL (ref 12–16)
IMM GRANULOCYTES # BLD AUTO: 0.02 K/UL (ref 0–0.04)
IMM GRANULOCYTES NFR BLD AUTO: 0.4 % (ref 0–0.5)
LDLC SERPL CALC-MCNC: 118.4 MG/DL (ref 63–159)
LYMPHOCYTES # BLD AUTO: 0.8 K/UL (ref 1–4.8)
LYMPHOCYTES NFR BLD: 14 % (ref 18–48)
MCH RBC QN AUTO: 32.1 PG (ref 27–31)
MCHC RBC AUTO-ENTMCNC: 33.5 G/DL (ref 32–36)
MCV RBC AUTO: 96 FL (ref 82–98)
MONOCYTES # BLD AUTO: 0.6 K/UL (ref 0.3–1)
MONOCYTES NFR BLD: 11.4 % (ref 4–15)
NEUTROPHILS # BLD AUTO: 3.9 K/UL (ref 1.8–7.7)
NEUTROPHILS NFR BLD: 72 % (ref 38–73)
NONHDLC SERPL-MCNC: 128 MG/DL
NRBC BLD-RTO: 0 /100 WBC
PLATELET # BLD AUTO: 261 K/UL (ref 150–350)
PMV BLD AUTO: 10.4 FL (ref 9.2–12.9)
POTASSIUM SERPL-SCNC: 4.8 MMOL/L (ref 3.5–5.1)
PROT SERPL-MCNC: 7.5 G/DL (ref 6–8.4)
RBC # BLD AUTO: 4.24 M/UL (ref 4–5.4)
SODIUM SERPL-SCNC: 129 MMOL/L (ref 136–145)
TRIGL SERPL-MCNC: 48 MG/DL (ref 30–150)
TSH SERPL DL<=0.005 MIU/L-ACNC: 1.16 UIU/ML (ref 0.4–4)
WBC # BLD AUTO: 5.37 K/UL (ref 3.9–12.7)

## 2020-12-23 PROCEDURE — 90732 PNEUMOCOCCAL POLYSACCHARIDE VACCINE 23-VALENT =>2YO SQ IM: ICD-10-PCS | Mod: HCNC,S$GLB,, | Performed by: INTERNAL MEDICINE

## 2020-12-23 PROCEDURE — 86803 HEPATITIS C AB TEST: CPT | Mod: HCNC

## 2020-12-23 PROCEDURE — 84443 ASSAY THYROID STIM HORMONE: CPT | Mod: HCNC

## 2020-12-23 PROCEDURE — 3078F PR MOST RECENT DIASTOLIC BLOOD PRESSURE < 80 MM HG: ICD-10-PCS | Mod: HCNC,CPTII,S$GLB, | Performed by: INTERNAL MEDICINE

## 2020-12-23 PROCEDURE — 80061 LIPID PANEL: CPT | Mod: HCNC

## 2020-12-23 PROCEDURE — 1101F PT FALLS ASSESS-DOCD LE1/YR: CPT | Mod: HCNC,CPTII,S$GLB, | Performed by: INTERNAL MEDICINE

## 2020-12-23 PROCEDURE — 90732 PPSV23 VACC 2 YRS+ SUBQ/IM: CPT | Mod: HCNC,S$GLB,, | Performed by: INTERNAL MEDICINE

## 2020-12-23 PROCEDURE — G0009 PNEUMOCOCCAL POLYSACCHARIDE VACCINE 23-VALENT =>2YO SQ IM: ICD-10-PCS | Mod: HCNC,S$GLB,, | Performed by: INTERNAL MEDICINE

## 2020-12-23 PROCEDURE — 99499 RISK ADDL DX/OHS AUDIT: ICD-10-PCS | Mod: HCNC,S$GLB,, | Performed by: INTERNAL MEDICINE

## 2020-12-23 PROCEDURE — 1126F AMNT PAIN NOTED NONE PRSNT: CPT | Mod: HCNC,S$GLB,, | Performed by: INTERNAL MEDICINE

## 2020-12-23 PROCEDURE — 3288F PR FALLS RISK ASSESSMENT DOCUMENTED: ICD-10-PCS | Mod: HCNC,CPTII,S$GLB, | Performed by: INTERNAL MEDICINE

## 2020-12-23 PROCEDURE — G0009 ADMIN PNEUMOCOCCAL VACCINE: HCPCS | Mod: HCNC,S$GLB,, | Performed by: INTERNAL MEDICINE

## 2020-12-23 PROCEDURE — 3008F BODY MASS INDEX DOCD: CPT | Mod: HCNC,CPTII,S$GLB, | Performed by: INTERNAL MEDICINE

## 2020-12-23 PROCEDURE — 85025 COMPLETE CBC W/AUTO DIFF WBC: CPT | Mod: HCNC

## 2020-12-23 PROCEDURE — 99397 PER PM REEVAL EST PAT 65+ YR: CPT | Mod: HCNC,25,S$GLB, | Performed by: INTERNAL MEDICINE

## 2020-12-23 PROCEDURE — 99499 UNLISTED E&M SERVICE: CPT | Mod: HCNC,S$GLB,, | Performed by: INTERNAL MEDICINE

## 2020-12-23 PROCEDURE — 99999 PR PBB SHADOW E&M-EST. PATIENT-LVL V: ICD-10-PCS | Mod: PBBFAC,HCNC,, | Performed by: INTERNAL MEDICINE

## 2020-12-23 PROCEDURE — 3074F PR MOST RECENT SYSTOLIC BLOOD PRESSURE < 130 MM HG: ICD-10-PCS | Mod: HCNC,CPTII,S$GLB, | Performed by: INTERNAL MEDICINE

## 2020-12-23 PROCEDURE — 80053 COMPREHEN METABOLIC PANEL: CPT | Mod: HCNC

## 2020-12-23 PROCEDURE — 1101F PR PT FALLS ASSESS DOC 0-1 FALLS W/OUT INJ PAST YR: ICD-10-PCS | Mod: HCNC,CPTII,S$GLB, | Performed by: INTERNAL MEDICINE

## 2020-12-23 PROCEDURE — 3078F DIAST BP <80 MM HG: CPT | Mod: HCNC,CPTII,S$GLB, | Performed by: INTERNAL MEDICINE

## 2020-12-23 PROCEDURE — 99999 PR PBB SHADOW E&M-EST. PATIENT-LVL V: CPT | Mod: PBBFAC,HCNC,, | Performed by: INTERNAL MEDICINE

## 2020-12-23 PROCEDURE — 36415 COLL VENOUS BLD VENIPUNCTURE: CPT | Mod: HCNC,PO

## 2020-12-23 PROCEDURE — 3008F PR BODY MASS INDEX (BMI) DOCUMENTED: ICD-10-PCS | Mod: HCNC,CPTII,S$GLB, | Performed by: INTERNAL MEDICINE

## 2020-12-23 PROCEDURE — 1126F PR PAIN SEVERITY QUANTIFIED, NO PAIN PRESENT: ICD-10-PCS | Mod: HCNC,S$GLB,, | Performed by: INTERNAL MEDICINE

## 2020-12-23 PROCEDURE — 99397 PR PREVENTIVE VISIT,EST,65 & OVER: ICD-10-PCS | Mod: HCNC,25,S$GLB, | Performed by: INTERNAL MEDICINE

## 2020-12-23 PROCEDURE — 3074F SYST BP LT 130 MM HG: CPT | Mod: HCNC,CPTII,S$GLB, | Performed by: INTERNAL MEDICINE

## 2020-12-23 PROCEDURE — 3288F FALL RISK ASSESSMENT DOCD: CPT | Mod: HCNC,CPTII,S$GLB, | Performed by: INTERNAL MEDICINE

## 2020-12-23 RX ORDER — INFLUENZA A VIRUS A/MICHIGAN/45/2015 X-275 (H1N1) ANTIGEN (FORMALDEHYDE INACTIVATED), INFLUENZA A VIRUS A/SINGAPORE/INFIMH-16-0019/2016 IVR-186 (H3N2) ANTIGEN (FORMALDEHYDE INACTIVATED), INFLUENZA B VIRUS B/PHUKET/3073/2013 ANTIGEN (FORMALDEHYDE INACTIVATED), AND INFLUENZA B VIRUS B/MARYLAND/15/2016 BX-69A ANTIGEN (FORMALDEHYDE INACTIVATED) 60; 60; 60; 60 UG/.7ML; UG/.7ML; UG/.7ML; UG/.7ML
INJECTION, SUSPENSION INTRAMUSCULAR
COMMUNITY
Start: 2020-10-20 | End: 2021-02-05

## 2020-12-23 NOTE — PROGRESS NOTES
History of present illness:  [age] lady 1st visit with this examiner here for health assessment and to establish care.  Her last visit with any provider was last year.    Current medications  Medications all noted reviewed and updated in the electronic medical record medication list.  Noted that the patient is no longer taking atorvastatin or Protonix.    Review of systems:  General: no fever, chills, generalized body aches. No unexpected weight loss.  Eyes:  No visual disturbances.  HEENT:  No hoarseness, dysphagia, ear pain.  Respiratory:  No cough, no shortness of breath.  Cardiovascular: no chest pain, palpitations, cough, exertional limb pain. No edema.  GI: no nausea, vomiting.  No abdominal pain. No change in bowel habits.  No melena, no hematochezia.  : no dysuria. No change in the color or character of the urine. No urinary frequency.  Musculoskeletal: no joint pain or swelling.  Neurologic:  Chronic left hemiparesis.  She can ambulate with assistance but does not feel comfortable using walker or cane.  Skin:  No rashes or other concerns.  Psych:  No emotional issues    Past medical history, past surgical history, family medical history social history is all noted reviewed and updated in the electronic medical record history sections.    Health screenings:  Due for mammogram.  Has not had bone densitometry.  Has not had pneumococcal vaccine.  No up-to-date colon screenings.    Physical examination:  General:  Pleasant alert appropriately groomed lady no acute distress.  Vital signs:  Noted reviewed is normal.  HEENT:  Normocephalic.  Neck supple no masses no thyromegaly.  Lungs:  Clear to auscultation.  Cardiovascular:  Regular rate rhythm.  No significant murmur.  No JVD.  Trace to 1+ distal left lower extremity edema.  No ischemic changes of lower extremities.  GI:  The abdomen is soft benign no masses or tenderness.  Neurologic:  Left partial joseph paresis.  Mental status:  Alert oriented  affect mood all appear to be appropriate.    Data:  [age] year lady with several chronic medical conditions.    Hypertension appears to be controlled.    History of middle cerebral artery thrombosis with left-sided hemiparesis .    History of carotid-cavernous fistula.    History of middle cerebral artery aneurysm.    History of tobacco use disorder cessation for 2 years.    History of alcohol excess.    History of primary polydipsia.      Plan:  Update lab data to include CBC, chemistry profile, lipid profile, TSH.  Pneumococcal 23 Valent vaccine.  Update mammogram.  Bone densitometry.  Referral for follow-up with vascular Neurology.  For now continue current pharmacologic regimens but anticipate adding statin after lab review.  Return to clinic 2 months follow-up.

## 2020-12-24 LAB — HCV AB SERPL QL IA: NEGATIVE

## 2021-01-07 ENCOUNTER — TELEPHONE (OUTPATIENT)
Dept: ADMINISTRATIVE | Facility: CLINIC | Age: 71
End: 2021-01-07

## 2021-01-20 ENCOUNTER — PES CALL (OUTPATIENT)
Dept: ADMINISTRATIVE | Facility: CLINIC | Age: 71
End: 2021-01-20

## 2021-02-02 ENCOUNTER — HOSPITAL ENCOUNTER (OUTPATIENT)
Dept: RADIOLOGY | Facility: CLINIC | Age: 71
Discharge: HOME | End: 2021-02-02
Attending: INTERNAL MEDICINE
Payer: MEDICARE

## 2021-02-02 ENCOUNTER — OFFICE VISIT (OUTPATIENT)
Dept: NEUROLOGY | Facility: CLINIC | Age: 71
End: 2021-02-02
Payer: MEDICARE

## 2021-02-02 ENCOUNTER — HOSPITAL ENCOUNTER (OUTPATIENT)
Dept: RADIOLOGY | Facility: HOSPITAL | Age: 71
Discharge: HOME | End: 2021-02-02
Attending: INTERNAL MEDICINE
Payer: MEDICARE

## 2021-02-02 VITALS
HEART RATE: 81 BPM | SYSTOLIC BLOOD PRESSURE: 145 MMHG | BODY MASS INDEX: 22.45 KG/M2 | DIASTOLIC BLOOD PRESSURE: 77 MMHG | WEIGHT: 122 LBS | HEIGHT: 62 IN

## 2021-02-02 DIAGNOSIS — I67.1 ANTERIOR COMMUNICATING ARTERY ANEURYSM: ICD-10-CM

## 2021-02-02 DIAGNOSIS — Z87.891 HX OF TOBACCO USE, PRESENTING HAZARDS TO HEALTH: ICD-10-CM

## 2021-02-02 DIAGNOSIS — I63.311 CEREBROVASCULAR ACCIDENT (CVA) DUE TO THROMBOSIS OF RIGHT MIDDLE CEREBRAL ARTERY: ICD-10-CM

## 2021-02-02 DIAGNOSIS — I69.354 HEMIPARESIS AFFECTING LEFT SIDE AS LATE EFFECT OF STROKE: Primary | ICD-10-CM

## 2021-02-02 DIAGNOSIS — I10 ESSENTIAL HYPERTENSION: ICD-10-CM

## 2021-02-02 DIAGNOSIS — Z78.0 POSTMENOPAUSAL: ICD-10-CM

## 2021-02-02 DIAGNOSIS — Z12.31 OTHER SCREENING MAMMOGRAM: ICD-10-CM

## 2021-02-02 DIAGNOSIS — I67.1 CAROTID-CAVERNOUS FISTULA: ICD-10-CM

## 2021-02-02 PROCEDURE — 3077F PR MOST RECENT SYSTOLIC BLOOD PRESSURE >= 140 MM HG: ICD-10-PCS | Mod: HCNC,CPTII,S$GLB, | Performed by: PSYCHIATRY & NEUROLOGY

## 2021-02-02 PROCEDURE — 99499 RISK ADDL DX/OHS AUDIT: ICD-10-PCS | Mod: S$GLB,,, | Performed by: PSYCHIATRY & NEUROLOGY

## 2021-02-02 PROCEDURE — 1126F PR PAIN SEVERITY QUANTIFIED, NO PAIN PRESENT: ICD-10-PCS | Mod: HCNC,S$GLB,, | Performed by: PSYCHIATRY & NEUROLOGY

## 2021-02-02 PROCEDURE — 99214 PR OFFICE/OUTPT VISIT, EST, LEVL IV, 30-39 MIN: ICD-10-PCS | Mod: HCNC,S$GLB,, | Performed by: PSYCHIATRY & NEUROLOGY

## 2021-02-02 PROCEDURE — 77080 DXA BONE DENSITY AXIAL: CPT | Mod: 26,HCNC,, | Performed by: INTERNAL MEDICINE

## 2021-02-02 PROCEDURE — 77080 DXA BONE DENSITY AXIAL: CPT | Mod: TC,HCNC

## 2021-02-02 PROCEDURE — 99499 UNLISTED E&M SERVICE: CPT | Mod: S$GLB,,, | Performed by: PSYCHIATRY & NEUROLOGY

## 2021-02-02 PROCEDURE — 99999 PR PBB SHADOW E&M-EST. PATIENT-LVL IV: ICD-10-PCS | Mod: PBBFAC,HCNC,, | Performed by: PSYCHIATRY & NEUROLOGY

## 2021-02-02 PROCEDURE — 3078F PR MOST RECENT DIASTOLIC BLOOD PRESSURE < 80 MM HG: ICD-10-PCS | Mod: HCNC,CPTII,S$GLB, | Performed by: PSYCHIATRY & NEUROLOGY

## 2021-02-02 PROCEDURE — 99999 PR PBB SHADOW E&M-EST. PATIENT-LVL IV: CPT | Mod: PBBFAC,HCNC,, | Performed by: PSYCHIATRY & NEUROLOGY

## 2021-02-02 PROCEDURE — 1159F MED LIST DOCD IN RCRD: CPT | Mod: HCNC,S$GLB,, | Performed by: PSYCHIATRY & NEUROLOGY

## 2021-02-02 PROCEDURE — 3078F DIAST BP <80 MM HG: CPT | Mod: HCNC,CPTII,S$GLB, | Performed by: PSYCHIATRY & NEUROLOGY

## 2021-02-02 PROCEDURE — 77067 SCR MAMMO BI INCL CAD: CPT | Mod: 26,HCNC,, | Performed by: RADIOLOGY

## 2021-02-02 PROCEDURE — 1126F AMNT PAIN NOTED NONE PRSNT: CPT | Mod: HCNC,S$GLB,, | Performed by: PSYCHIATRY & NEUROLOGY

## 2021-02-02 PROCEDURE — 1159F PR MEDICATION LIST DOCUMENTED IN MEDICAL RECORD: ICD-10-PCS | Mod: HCNC,S$GLB,, | Performed by: PSYCHIATRY & NEUROLOGY

## 2021-02-02 PROCEDURE — 3008F PR BODY MASS INDEX (BMI) DOCUMENTED: ICD-10-PCS | Mod: HCNC,CPTII,S$GLB, | Performed by: PSYCHIATRY & NEUROLOGY

## 2021-02-02 PROCEDURE — 3077F SYST BP >= 140 MM HG: CPT | Mod: HCNC,CPTII,S$GLB, | Performed by: PSYCHIATRY & NEUROLOGY

## 2021-02-02 PROCEDURE — 77080 DEXA BONE DENSITY SPINE HIP: ICD-10-PCS | Mod: 26,HCNC,, | Performed by: INTERNAL MEDICINE

## 2021-02-02 PROCEDURE — 77067 MAMMO DIGITAL SCREENING BILAT: ICD-10-PCS | Mod: 26,HCNC,, | Performed by: RADIOLOGY

## 2021-02-02 PROCEDURE — 3008F BODY MASS INDEX DOCD: CPT | Mod: HCNC,CPTII,S$GLB, | Performed by: PSYCHIATRY & NEUROLOGY

## 2021-02-02 PROCEDURE — 77067 SCR MAMMO BI INCL CAD: CPT | Mod: TC,HCNC

## 2021-02-02 PROCEDURE — 99214 OFFICE O/P EST MOD 30 MIN: CPT | Mod: HCNC,S$GLB,, | Performed by: PSYCHIATRY & NEUROLOGY

## 2021-02-02 RX ORDER — ATORVASTATIN CALCIUM 20 MG/1
20 TABLET, FILM COATED ORAL DAILY
Qty: 90 TABLET | Refills: 3 | Status: SHIPPED | OUTPATIENT
Start: 2021-02-02 | End: 2022-02-02

## 2021-02-04 ENCOUNTER — TELEPHONE (OUTPATIENT)
Dept: INTERVENTIONAL RADIOLOGY/VASCULAR | Facility: CLINIC | Age: 71
End: 2021-02-04

## 2021-02-04 ENCOUNTER — TELEPHONE (OUTPATIENT)
Dept: RADIOLOGY | Facility: HOSPITAL | Age: 71
End: 2021-02-04

## 2021-02-05 PROBLEM — F17.200 SMOKER: Status: RESOLVED | Noted: 2018-06-29 | Resolved: 2021-02-05

## 2021-02-05 PROBLEM — E87.1 HYPONATREMIA: Status: RESOLVED | Noted: 2018-07-24 | Resolved: 2021-02-05

## 2021-02-05 PROBLEM — R55 SYNCOPE, VASOVAGAL: Status: RESOLVED | Noted: 2018-07-24 | Resolved: 2021-02-05

## 2021-02-09 ENCOUNTER — TELEPHONE (OUTPATIENT)
Dept: RADIOLOGY | Facility: HOSPITAL | Age: 71
End: 2021-02-09

## 2021-02-10 ENCOUNTER — TELEPHONE (OUTPATIENT)
Dept: RADIOLOGY | Facility: HOSPITAL | Age: 71
End: 2021-02-10

## 2021-02-12 DIAGNOSIS — F54 PRIMARY POLYDIPSIA: ICD-10-CM

## 2021-02-12 DIAGNOSIS — F17.200 SMOKER: ICD-10-CM

## 2021-02-12 DIAGNOSIS — Z00.00 ANNUAL PHYSICAL EXAM: ICD-10-CM

## 2021-02-12 DIAGNOSIS — I67.1 ANTERIOR COMMUNICATING ARTERY ANEURYSM: ICD-10-CM

## 2021-02-12 DIAGNOSIS — I69.354 HEMIPARESIS AFFECTING LEFT SIDE AS LATE EFFECT OF STROKE: ICD-10-CM

## 2021-02-12 DIAGNOSIS — Z12.4 SCREENING FOR CERVICAL CANCER: ICD-10-CM

## 2021-02-12 DIAGNOSIS — E87.1 HYPONATREMIA: ICD-10-CM

## 2021-02-12 DIAGNOSIS — I63.311 CEREBROVASCULAR ACCIDENT (CVA) DUE TO THROMBOSIS OF RIGHT MIDDLE CEREBRAL ARTERY: ICD-10-CM

## 2021-02-12 DIAGNOSIS — Z12.31 ENCOUNTER FOR SCREENING MAMMOGRAM FOR MALIGNANT NEOPLASM OF BREAST: ICD-10-CM

## 2021-02-12 DIAGNOSIS — I67.1 CAROTID-CAVERNOUS FISTULA: ICD-10-CM

## 2021-02-12 DIAGNOSIS — R63.1 PRIMARY POLYDIPSIA: ICD-10-CM

## 2021-02-12 DIAGNOSIS — I10 ESSENTIAL HYPERTENSION: ICD-10-CM

## 2021-02-12 DIAGNOSIS — D50.9 IRON DEFICIENCY ANEMIA, UNSPECIFIED IRON DEFICIENCY ANEMIA TYPE: ICD-10-CM

## 2021-02-12 DIAGNOSIS — D64.9 ANEMIA REQUIRING TRANSFUSIONS: ICD-10-CM

## 2021-02-12 DIAGNOSIS — F10.10 ALCOHOL ABUSE: ICD-10-CM

## 2021-02-12 DIAGNOSIS — Z12.39 SCREENING FOR BREAST CANCER: ICD-10-CM

## 2021-02-12 RX ORDER — LISINOPRIL 20 MG/1
20 TABLET ORAL NIGHTLY
Qty: 30 TABLET | Refills: 0 | Status: SHIPPED | OUTPATIENT
Start: 2021-02-12 | End: 2021-03-10

## 2021-02-15 ENCOUNTER — TELEPHONE (OUTPATIENT)
Dept: INTERVENTIONAL RADIOLOGY/VASCULAR | Facility: CLINIC | Age: 71
End: 2021-02-15

## 2021-03-10 DIAGNOSIS — I10 ELEVATED BLOOD PRESSURE READING IN OFFICE WITH DIAGNOSIS OF HYPERTENSION: ICD-10-CM

## 2021-03-10 RX ORDER — AMLODIPINE BESYLATE 5 MG/1
5 TABLET ORAL DAILY
Qty: 30 TABLET | Refills: 6 | Status: SHIPPED | OUTPATIENT
Start: 2021-03-10 | End: 2022-03-24 | Stop reason: SDUPTHER

## 2021-03-15 ENCOUNTER — PATIENT OUTREACH (OUTPATIENT)
Dept: ADMINISTRATIVE | Facility: HOSPITAL | Age: 71
End: 2021-03-15

## 2021-03-30 ENCOUNTER — IMMUNIZATION (OUTPATIENT)
Dept: PRIMARY CARE CLINIC | Facility: CLINIC | Age: 71
End: 2021-03-30
Payer: MEDICARE

## 2021-03-30 DIAGNOSIS — Z23 NEED FOR VACCINATION: Primary | ICD-10-CM

## 2021-03-30 PROCEDURE — 0011A PR IMMUNIZ ADMIN, SARS-COV-2 COVID-19 VACC, 100MCG/0.5ML, 1ST DOSE: CPT | Mod: CV19,S$GLB,, | Performed by: INTERNAL MEDICINE

## 2021-03-30 PROCEDURE — 0011A PR IMMUNIZ ADMIN, SARS-COV-2 COVID-19 VACC, 100MCG/0.5ML, 1ST DOSE: ICD-10-PCS | Mod: CV19,S$GLB,, | Performed by: INTERNAL MEDICINE

## 2021-03-30 PROCEDURE — 91301 PR SARS-COV-2 COVID-19 VACCINE, NO PRSV, 100MCG/0.5ML, IM: CPT | Mod: S$GLB,,, | Performed by: INTERNAL MEDICINE

## 2021-03-30 PROCEDURE — 91301 PR SARS-COV-2 COVID-19 VACCINE, NO PRSV, 100MCG/0.5ML, IM: ICD-10-PCS | Mod: S$GLB,,, | Performed by: INTERNAL MEDICINE

## 2021-03-30 RX ADMIN — Medication 0.5 ML: at 05:03

## 2021-04-23 ENCOUNTER — PES CALL (OUTPATIENT)
Dept: ADMINISTRATIVE | Facility: CLINIC | Age: 71
End: 2021-04-23

## 2021-04-28 ENCOUNTER — IMMUNIZATION (OUTPATIENT)
Dept: PRIMARY CARE CLINIC | Facility: CLINIC | Age: 71
End: 2021-04-28
Payer: MEDICARE

## 2021-04-28 DIAGNOSIS — D64.9 ANEMIA REQUIRING TRANSFUSIONS: ICD-10-CM

## 2021-04-28 DIAGNOSIS — Z12.39 SCREENING FOR BREAST CANCER: ICD-10-CM

## 2021-04-28 DIAGNOSIS — Z12.31 ENCOUNTER FOR SCREENING MAMMOGRAM FOR MALIGNANT NEOPLASM OF BREAST: ICD-10-CM

## 2021-04-28 DIAGNOSIS — I63.311 CEREBROVASCULAR ACCIDENT (CVA) DUE TO THROMBOSIS OF RIGHT MIDDLE CEREBRAL ARTERY: ICD-10-CM

## 2021-04-28 DIAGNOSIS — I69.354 HEMIPARESIS AFFECTING LEFT SIDE AS LATE EFFECT OF STROKE: ICD-10-CM

## 2021-04-28 DIAGNOSIS — E87.1 HYPONATREMIA: ICD-10-CM

## 2021-04-28 DIAGNOSIS — F54 PRIMARY POLYDIPSIA: ICD-10-CM

## 2021-04-28 DIAGNOSIS — D50.9 IRON DEFICIENCY ANEMIA, UNSPECIFIED IRON DEFICIENCY ANEMIA TYPE: ICD-10-CM

## 2021-04-28 DIAGNOSIS — Z23 NEED FOR VACCINATION: Primary | ICD-10-CM

## 2021-04-28 DIAGNOSIS — F17.200 SMOKER: ICD-10-CM

## 2021-04-28 DIAGNOSIS — I67.1 CAROTID-CAVERNOUS FISTULA: ICD-10-CM

## 2021-04-28 DIAGNOSIS — I10 ESSENTIAL HYPERTENSION: ICD-10-CM

## 2021-04-28 DIAGNOSIS — Z00.00 ANNUAL PHYSICAL EXAM: ICD-10-CM

## 2021-04-28 DIAGNOSIS — R63.1 PRIMARY POLYDIPSIA: ICD-10-CM

## 2021-04-28 DIAGNOSIS — Z12.4 SCREENING FOR CERVICAL CANCER: ICD-10-CM

## 2021-04-28 DIAGNOSIS — I67.1 ANTERIOR COMMUNICATING ARTERY ANEURYSM: ICD-10-CM

## 2021-04-28 DIAGNOSIS — F10.10 ALCOHOL ABUSE: ICD-10-CM

## 2021-04-28 PROCEDURE — 0012A PR IMMUNIZ ADMIN, SARS-COV-2 COVID-19 VACC, 100MCG/0.5ML, 2ND DOSE: CPT | Mod: CV19,S$GLB,, | Performed by: INTERNAL MEDICINE

## 2021-04-28 PROCEDURE — 91301 PR SARS-COV-2 COVID-19 VACCINE, NO PRSV, 100MCG/0.5ML, IM: ICD-10-PCS | Mod: S$GLB,,, | Performed by: INTERNAL MEDICINE

## 2021-04-28 PROCEDURE — 0012A PR IMMUNIZ ADMIN, SARS-COV-2 COVID-19 VACC, 100MCG/0.5ML, 2ND DOSE: ICD-10-PCS | Mod: CV19,S$GLB,, | Performed by: INTERNAL MEDICINE

## 2021-04-28 PROCEDURE — 91301 PR SARS-COV-2 COVID-19 VACCINE, NO PRSV, 100MCG/0.5ML, IM: CPT | Mod: S$GLB,,, | Performed by: INTERNAL MEDICINE

## 2021-04-28 RX ORDER — LISINOPRIL 20 MG/1
20 TABLET ORAL NIGHTLY
Qty: 30 TABLET | Refills: 0 | Status: SHIPPED | OUTPATIENT
Start: 2021-04-28 | End: 2021-05-12

## 2021-04-28 RX ADMIN — Medication 0.5 ML: at 07:04

## 2021-05-28 ENCOUNTER — HOSPITAL ENCOUNTER (EMERGENCY)
Facility: HOSPITAL | Age: 71
Discharge: HOME | End: 2021-05-29
Attending: EMERGENCY MEDICINE
Payer: MEDICARE

## 2021-05-28 DIAGNOSIS — S42.309A HUMERUS FRACTURE: ICD-10-CM
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
## 2021-05-28 DIAGNOSIS — S00.511A ABRASION OF LIP, INITIAL ENCOUNTER: ICD-10-CM

## 2021-05-28 DIAGNOSIS — S42.202A CLOSED FRACTURE OF PROXIMAL END OF LEFT HUMERUS, UNSPECIFIED FRACTURE MORPHOLOGY, INITIAL ENCOUNTER: Primary | ICD-10-CM

## 2021-05-28 PROCEDURE — 99285 PR EMERGENCY DEPT VISIT,LEVEL V: ICD-10-PCS | Mod: ,,, | Performed by: PHYSICIAN ASSISTANT

## 2021-05-28 PROCEDURE — 99285 EMERGENCY DEPT VISIT HI MDM: CPT | Mod: ,,, | Performed by: PHYSICIAN ASSISTANT

## 2021-05-28 PROCEDURE — 99284 EMERGENCY DEPT VISIT MOD MDM: CPT

## 2021-05-29 VITALS
SYSTOLIC BLOOD PRESSURE: 121 MMHG | HEART RATE: 96 BPM | WEIGHT: 140 LBS | HEIGHT: 62 IN | BODY MASS INDEX: 25.76 KG/M2 | TEMPERATURE: 99 F | DIASTOLIC BLOOD PRESSURE: 59 MMHG | OXYGEN SATURATION: 96 % | RESPIRATION RATE: 18 BRPM

## 2021-05-29 PROCEDURE — 90715 TDAP VACCINE 7 YRS/> IM: CPT | Performed by: PHYSICIAN ASSISTANT

## 2021-05-29 PROCEDURE — 90471 IMMUNIZATION ADMIN: CPT | Performed by: PHYSICIAN ASSISTANT

## 2021-05-29 PROCEDURE — 63600175 PHARM REV CODE 636 W HCPCS: Performed by: PHYSICIAN ASSISTANT

## 2021-05-29 RX ADMIN — CLOSTRIDIUM TETANI TOXOID ANTIGEN (FORMALDEHYDE INACTIVATED), CORYNEBACTERIUM DIPHTHERIAE TOXOID ANTIGEN (FORMALDEHYDE INACTIVATED), BORDETELLA PERTUSSIS TOXOID ANTIGEN (GLUTARALDEHYDE INACTIVATED), BORDETELLA PERTUSSIS FILAMENTOUS HEMAGGLUTININ ANTIGEN (FORMALDEHYDE INACTIVATED), BORDETELLA PERTUSSIS PERTACTIN ANTIGEN, AND BORDETELLA PERTUSSIS FIMBRIAE 2/3 ANTIGEN 0.5 ML: 5; 2; 2.5; 5; 3; 5 INJECTION, SUSPENSION INTRAMUSCULAR at 02:05

## 2021-05-31 ENCOUNTER — PATIENT OUTREACH (OUTPATIENT)
Dept: ADMINISTRATIVE | Facility: OTHER | Age: 71
End: 2021-05-31

## 2021-05-31 DIAGNOSIS — Z12.11 ENCOUNTER FOR FIT (FECAL IMMUNOCHEMICAL TEST) SCREENING: Primary | ICD-10-CM

## 2021-06-02 ENCOUNTER — HOSPITAL ENCOUNTER (OUTPATIENT)
Dept: RADIOLOGY | Facility: HOSPITAL | Age: 71
Discharge: HOME | End: 2021-06-02
Attending: ORTHOPAEDIC SURGERY
Payer: MEDICARE

## 2021-06-02 ENCOUNTER — OFFICE VISIT (OUTPATIENT)
Dept: SPORTS MEDICINE | Facility: CLINIC | Age: 71
End: 2021-06-02
Payer: MEDICARE

## 2021-06-02 VITALS
WEIGHT: 140 LBS | HEIGHT: 62 IN | DIASTOLIC BLOOD PRESSURE: 73 MMHG | HEART RATE: 82 BPM | SYSTOLIC BLOOD PRESSURE: 123 MMHG | BODY MASS INDEX: 25.76 KG/M2

## 2021-06-02 DIAGNOSIS — S42.202A CLOSED FRACTURE OF PROXIMAL END OF LEFT HUMERUS, UNSPECIFIED FRACTURE MORPHOLOGY, INITIAL ENCOUNTER: ICD-10-CM

## 2021-06-02 DIAGNOSIS — M25.512 CHRONIC LEFT SHOULDER PAIN: Primary | ICD-10-CM

## 2021-06-02 DIAGNOSIS — G89.29 CHRONIC LEFT SHOULDER PAIN: Primary | ICD-10-CM

## 2021-06-02 DIAGNOSIS — M25.512 LEFT SHOULDER PAIN, UNSPECIFIED CHRONICITY: ICD-10-CM

## 2021-06-02 PROCEDURE — 1125F PR PAIN SEVERITY QUANTIFIED, PAIN PRESENT: ICD-10-PCS | Mod: S$GLB,,, | Performed by: ORTHOPAEDIC SURGERY

## 2021-06-02 PROCEDURE — 3288F PR FALLS RISK ASSESSMENT DOCUMENTED: ICD-10-PCS | Mod: CPTII,S$GLB,, | Performed by: ORTHOPAEDIC SURGERY

## 2021-06-02 PROCEDURE — 99999 PR PBB SHADOW E&M-EST. PATIENT-LVL III: ICD-10-PCS | Mod: PBBFAC,,, | Performed by: ORTHOPAEDIC SURGERY

## 2021-06-02 PROCEDURE — 3288F FALL RISK ASSESSMENT DOCD: CPT | Mod: CPTII,S$GLB,, | Performed by: ORTHOPAEDIC SURGERY

## 2021-06-02 PROCEDURE — 73030 XR SHOULDER COMPLETE 2 OR MORE VIEWS LEFT: ICD-10-PCS | Mod: 26,LT,, | Performed by: RADIOLOGY

## 2021-06-02 PROCEDURE — 1125F AMNT PAIN NOTED PAIN PRSNT: CPT | Mod: S$GLB,,, | Performed by: ORTHOPAEDIC SURGERY

## 2021-06-02 PROCEDURE — 73030 X-RAY EXAM OF SHOULDER: CPT | Mod: TC,LT

## 2021-06-02 PROCEDURE — 1159F PR MEDICATION LIST DOCUMENTED IN MEDICAL RECORD: ICD-10-PCS | Mod: S$GLB,,, | Performed by: ORTHOPAEDIC SURGERY

## 2021-06-02 PROCEDURE — 3008F PR BODY MASS INDEX (BMI) DOCUMENTED: ICD-10-PCS | Mod: CPTII,S$GLB,, | Performed by: ORTHOPAEDIC SURGERY

## 2021-06-02 PROCEDURE — 1100F PR PT FALLS ASSESS DOC 2+ FALLS/FALL W/INJURY/YR: ICD-10-PCS | Mod: CPTII,S$GLB,, | Performed by: ORTHOPAEDIC SURGERY

## 2021-06-02 PROCEDURE — 3008F BODY MASS INDEX DOCD: CPT | Mod: CPTII,S$GLB,, | Performed by: ORTHOPAEDIC SURGERY

## 2021-06-02 PROCEDURE — 1100F PTFALLS ASSESS-DOCD GE2>/YR: CPT | Mod: CPTII,S$GLB,, | Performed by: ORTHOPAEDIC SURGERY

## 2021-06-02 PROCEDURE — 1159F MED LIST DOCD IN RCRD: CPT | Mod: S$GLB,,, | Performed by: ORTHOPAEDIC SURGERY

## 2021-06-02 PROCEDURE — 99203 PR OFFICE/OUTPT VISIT, NEW, LEVL III, 30-44 MIN: ICD-10-PCS | Mod: S$GLB,,, | Performed by: ORTHOPAEDIC SURGERY

## 2021-06-02 PROCEDURE — 99203 OFFICE O/P NEW LOW 30 MIN: CPT | Mod: S$GLB,,, | Performed by: ORTHOPAEDIC SURGERY

## 2021-06-02 PROCEDURE — 73030 X-RAY EXAM OF SHOULDER: CPT | Mod: 26,LT,, | Performed by: RADIOLOGY

## 2021-06-02 PROCEDURE — 99999 PR PBB SHADOW E&M-EST. PATIENT-LVL III: CPT | Mod: PBBFAC,,, | Performed by: ORTHOPAEDIC SURGERY

## 2021-06-02 RX ORDER — TRAMADOL HYDROCHLORIDE 50 MG/1
50 TABLET ORAL EVERY 8 HOURS PRN
Qty: 30 TABLET | Refills: 0 | Status: SHIPPED | OUTPATIENT
Start: 2021-06-02 | End: 2021-09-16 | Stop reason: ALTCHOICE

## 2021-07-06 ENCOUNTER — PATIENT MESSAGE (OUTPATIENT)
Dept: ADMINISTRATIVE | Facility: HOSPITAL | Age: 71
End: 2021-07-06

## 2021-07-30 ENCOUNTER — PES CALL (OUTPATIENT)
Dept: ADMINISTRATIVE | Facility: CLINIC | Age: 71
End: 2021-07-30

## 2021-08-17 ENCOUNTER — PATIENT MESSAGE (OUTPATIENT)
Dept: INTERNAL MEDICINE | Facility: CLINIC | Age: 71
End: 2021-08-17

## 2021-08-17 DIAGNOSIS — L29.9 ITCHING: Primary | ICD-10-CM

## 2021-08-17 RX ORDER — HYDROXYZINE HYDROCHLORIDE 25 MG/1
25 TABLET, FILM COATED ORAL 3 TIMES DAILY PRN
Qty: 30 TABLET | Refills: 1 | Status: SHIPPED | OUTPATIENT
Start: 2021-08-17 | End: 2021-09-16 | Stop reason: SDUPTHER

## 2021-09-02 ENCOUNTER — PATIENT MESSAGE (OUTPATIENT)
Dept: NEUROLOGY | Facility: CLINIC | Age: 71
End: 2021-09-02

## 2021-09-14 ENCOUNTER — PATIENT MESSAGE (OUTPATIENT)
Dept: INTERNAL MEDICINE | Facility: CLINIC | Age: 71
End: 2021-09-14

## 2021-09-15 ENCOUNTER — TELEPHONE (OUTPATIENT)
Dept: INTERNAL MEDICINE | Facility: CLINIC | Age: 71
End: 2021-09-15

## 2021-09-16 ENCOUNTER — LAB VISIT (OUTPATIENT)
Dept: LAB | Facility: HOSPITAL | Age: 71
End: 2021-09-16
Attending: INTERNAL MEDICINE
Payer: MEDICARE

## 2021-09-16 ENCOUNTER — OFFICE VISIT (OUTPATIENT)
Dept: INTERNAL MEDICINE | Facility: CLINIC | Age: 71
End: 2021-09-16
Payer: MEDICARE

## 2021-09-16 VITALS
TEMPERATURE: 98 F | BODY MASS INDEX: 21.99 KG/M2 | OXYGEN SATURATION: 98 % | SYSTOLIC BLOOD PRESSURE: 110 MMHG | WEIGHT: 119.5 LBS | HEIGHT: 62 IN | DIASTOLIC BLOOD PRESSURE: 60 MMHG | HEART RATE: 89 BPM

## 2021-09-16 DIAGNOSIS — R21 RASH: ICD-10-CM

## 2021-09-16 DIAGNOSIS — I10 ESSENTIAL HYPERTENSION: ICD-10-CM

## 2021-09-16 DIAGNOSIS — R21 RASH: Primary | ICD-10-CM

## 2021-09-16 DIAGNOSIS — L29.9 PRURITUS: ICD-10-CM

## 2021-09-16 LAB — CRP SERPL-MCNC: 18.8 MG/L (ref 0–8.2)

## 2021-09-16 PROCEDURE — 1100F PR PT FALLS ASSESS DOC 2+ FALLS/FALL W/INJURY/YR: ICD-10-PCS | Mod: HCNC,CPTII,S$GLB, | Performed by: INTERNAL MEDICINE

## 2021-09-16 PROCEDURE — 99999 PR PBB SHADOW E&M-EST. PATIENT-LVL III: CPT | Mod: PBBFAC,HCNC,, | Performed by: INTERNAL MEDICINE

## 2021-09-16 PROCEDURE — 3074F SYST BP LT 130 MM HG: CPT | Mod: HCNC,CPTII,S$GLB, | Performed by: INTERNAL MEDICINE

## 2021-09-16 PROCEDURE — 99214 OFFICE O/P EST MOD 30 MIN: CPT | Mod: HCNC,S$GLB,, | Performed by: INTERNAL MEDICINE

## 2021-09-16 PROCEDURE — 3074F PR MOST RECENT SYSTOLIC BLOOD PRESSURE < 130 MM HG: ICD-10-PCS | Mod: HCNC,CPTII,S$GLB, | Performed by: INTERNAL MEDICINE

## 2021-09-16 PROCEDURE — 99999 PR PBB SHADOW E&M-EST. PATIENT-LVL III: ICD-10-PCS | Mod: PBBFAC,HCNC,, | Performed by: INTERNAL MEDICINE

## 2021-09-16 PROCEDURE — 1126F AMNT PAIN NOTED NONE PRSNT: CPT | Mod: HCNC,CPTII,S$GLB, | Performed by: INTERNAL MEDICINE

## 2021-09-16 PROCEDURE — 4010F PR ACE/ARB THEARPY RXD/TAKEN: ICD-10-PCS | Mod: HCNC,CPTII,S$GLB, | Performed by: INTERNAL MEDICINE

## 2021-09-16 PROCEDURE — 1159F MED LIST DOCD IN RCRD: CPT | Mod: HCNC,CPTII,S$GLB, | Performed by: INTERNAL MEDICINE

## 2021-09-16 PROCEDURE — 99214 PR OFFICE/OUTPT VISIT, EST, LEVL IV, 30-39 MIN: ICD-10-PCS | Mod: HCNC,S$GLB,, | Performed by: INTERNAL MEDICINE

## 2021-09-16 PROCEDURE — 1160F PR REVIEW ALL MEDS BY PRESCRIBER/CLIN PHARMACIST DOCUMENTED: ICD-10-PCS | Mod: HCNC,CPTII,S$GLB, | Performed by: INTERNAL MEDICINE

## 2021-09-16 PROCEDURE — 3008F PR BODY MASS INDEX (BMI) DOCUMENTED: ICD-10-PCS | Mod: HCNC,CPTII,S$GLB, | Performed by: INTERNAL MEDICINE

## 2021-09-16 PROCEDURE — 3078F DIAST BP <80 MM HG: CPT | Mod: HCNC,CPTII,S$GLB, | Performed by: INTERNAL MEDICINE

## 2021-09-16 PROCEDURE — 1126F PR PAIN SEVERITY QUANTIFIED, NO PAIN PRESENT: ICD-10-PCS | Mod: HCNC,CPTII,S$GLB, | Performed by: INTERNAL MEDICINE

## 2021-09-16 PROCEDURE — 36415 COLL VENOUS BLD VENIPUNCTURE: CPT | Mod: HCNC | Performed by: INTERNAL MEDICINE

## 2021-09-16 PROCEDURE — 86140 C-REACTIVE PROTEIN: CPT | Mod: HCNC | Performed by: INTERNAL MEDICINE

## 2021-09-16 PROCEDURE — 4010F ACE/ARB THERAPY RXD/TAKEN: CPT | Mod: HCNC,CPTII,S$GLB, | Performed by: INTERNAL MEDICINE

## 2021-09-16 PROCEDURE — 3288F PR FALLS RISK ASSESSMENT DOCUMENTED: ICD-10-PCS | Mod: HCNC,CPTII,S$GLB, | Performed by: INTERNAL MEDICINE

## 2021-09-16 PROCEDURE — 3078F PR MOST RECENT DIASTOLIC BLOOD PRESSURE < 80 MM HG: ICD-10-PCS | Mod: HCNC,CPTII,S$GLB, | Performed by: INTERNAL MEDICINE

## 2021-09-16 PROCEDURE — 1100F PTFALLS ASSESS-DOCD GE2>/YR: CPT | Mod: HCNC,CPTII,S$GLB, | Performed by: INTERNAL MEDICINE

## 2021-09-16 PROCEDURE — 1160F RVW MEDS BY RX/DR IN RCRD: CPT | Mod: HCNC,CPTII,S$GLB, | Performed by: INTERNAL MEDICINE

## 2021-09-16 PROCEDURE — 3008F BODY MASS INDEX DOCD: CPT | Mod: HCNC,CPTII,S$GLB, | Performed by: INTERNAL MEDICINE

## 2021-09-16 PROCEDURE — 3288F FALL RISK ASSESSMENT DOCD: CPT | Mod: HCNC,CPTII,S$GLB, | Performed by: INTERNAL MEDICINE

## 2021-09-16 PROCEDURE — 1159F PR MEDICATION LIST DOCUMENTED IN MEDICAL RECORD: ICD-10-PCS | Mod: HCNC,CPTII,S$GLB, | Performed by: INTERNAL MEDICINE

## 2021-09-16 RX ORDER — DOXYCYCLINE HYCLATE 100 MG
100 TABLET ORAL 2 TIMES DAILY
Qty: 14 TABLET | Refills: 0 | Status: SHIPPED | OUTPATIENT
Start: 2021-09-16 | End: 2022-08-02 | Stop reason: ALTCHOICE

## 2021-09-16 RX ORDER — CLOSTRIDIUM TETANI TOXOID ANTIGEN (FORMALDEHYDE INACTIVATED), CORYNEBACTERIUM DIPHTHERIAE TOXOID ANTIGEN (FORMALDEHYDE INACTIVATED), BORDETELLA PERTUSSIS TOXOID ANTIGEN (GLUTARALDEHYDE INACTIVATED), BORDETELLA PERTUSSIS FILAMENTOUS HEMAGGLUTININ ANTIGEN (FORMALDEHYDE INACTIVATED), BORDETELLA PERTUSSIS PERTACTIN ANTIGEN, AND BORDETELLA PERTUSSIS FIMBRIAE 2/3 ANTIGEN 5; 2; 2.5; 5; 3; 5 [LF]/.5ML; [LF]/.5ML; UG/.5ML; UG/.5ML; UG/.5ML; UG/.5ML
INJECTION, SUSPENSION INTRAMUSCULAR
COMMUNITY
Start: 2021-05-29 | End: 2022-08-02 | Stop reason: ALTCHOICE

## 2021-09-16 RX ORDER — HYDROXYZINE HYDROCHLORIDE 25 MG/1
25 TABLET, FILM COATED ORAL 3 TIMES DAILY PRN
Qty: 90 TABLET | Refills: 3 | Status: SHIPPED | OUTPATIENT
Start: 2021-09-16 | End: 2021-12-09

## 2021-09-17 ENCOUNTER — OFFICE VISIT (OUTPATIENT)
Dept: DERMATOLOGY | Facility: CLINIC | Age: 71
End: 2021-09-17
Payer: MEDICARE

## 2021-09-17 ENCOUNTER — PATIENT OUTREACH (OUTPATIENT)
Dept: ADMINISTRATIVE | Facility: OTHER | Age: 71
End: 2021-09-17

## 2021-09-17 DIAGNOSIS — L85.3 XEROSIS CUTIS: ICD-10-CM

## 2021-09-17 DIAGNOSIS — L29.9 PRURITUS: Primary | ICD-10-CM

## 2021-09-17 DIAGNOSIS — I87.2 VENOUS STASIS DERMATITIS, UNSPECIFIED LATERALITY: ICD-10-CM

## 2021-09-17 PROCEDURE — 1126F AMNT PAIN NOTED NONE PRSNT: CPT | Mod: HCNC,CPTII,S$GLB, | Performed by: STUDENT IN AN ORGANIZED HEALTH CARE EDUCATION/TRAINING PROGRAM

## 2021-09-17 PROCEDURE — 99999 PR PBB SHADOW E&M-EST. PATIENT-LVL III: CPT | Mod: PBBFAC,HCNC,, | Performed by: STUDENT IN AN ORGANIZED HEALTH CARE EDUCATION/TRAINING PROGRAM

## 2021-09-17 PROCEDURE — 99999 PR PBB SHADOW E&M-EST. PATIENT-LVL III: ICD-10-PCS | Mod: PBBFAC,HCNC,, | Performed by: STUDENT IN AN ORGANIZED HEALTH CARE EDUCATION/TRAINING PROGRAM

## 2021-09-17 PROCEDURE — 3288F FALL RISK ASSESSMENT DOCD: CPT | Mod: HCNC,CPTII,S$GLB, | Performed by: STUDENT IN AN ORGANIZED HEALTH CARE EDUCATION/TRAINING PROGRAM

## 2021-09-17 PROCEDURE — 3288F PR FALLS RISK ASSESSMENT DOCUMENTED: ICD-10-PCS | Mod: HCNC,CPTII,S$GLB, | Performed by: STUDENT IN AN ORGANIZED HEALTH CARE EDUCATION/TRAINING PROGRAM

## 2021-09-17 PROCEDURE — 99204 PR OFFICE/OUTPT VISIT, NEW, LEVL IV, 45-59 MIN: ICD-10-PCS | Mod: HCNC,S$GLB,, | Performed by: STUDENT IN AN ORGANIZED HEALTH CARE EDUCATION/TRAINING PROGRAM

## 2021-09-17 PROCEDURE — 4010F PR ACE/ARB THEARPY RXD/TAKEN: ICD-10-PCS | Mod: HCNC,CPTII,S$GLB, | Performed by: STUDENT IN AN ORGANIZED HEALTH CARE EDUCATION/TRAINING PROGRAM

## 2021-09-17 PROCEDURE — 1100F PR PT FALLS ASSESS DOC 2+ FALLS/FALL W/INJURY/YR: ICD-10-PCS | Mod: HCNC,CPTII,S$GLB, | Performed by: STUDENT IN AN ORGANIZED HEALTH CARE EDUCATION/TRAINING PROGRAM

## 2021-09-17 PROCEDURE — 1100F PTFALLS ASSESS-DOCD GE2>/YR: CPT | Mod: HCNC,CPTII,S$GLB, | Performed by: STUDENT IN AN ORGANIZED HEALTH CARE EDUCATION/TRAINING PROGRAM

## 2021-09-17 PROCEDURE — 1126F PR PAIN SEVERITY QUANTIFIED, NO PAIN PRESENT: ICD-10-PCS | Mod: HCNC,CPTII,S$GLB, | Performed by: STUDENT IN AN ORGANIZED HEALTH CARE EDUCATION/TRAINING PROGRAM

## 2021-09-17 PROCEDURE — 99204 OFFICE O/P NEW MOD 45 MIN: CPT | Mod: HCNC,S$GLB,, | Performed by: STUDENT IN AN ORGANIZED HEALTH CARE EDUCATION/TRAINING PROGRAM

## 2021-09-17 PROCEDURE — 1159F MED LIST DOCD IN RCRD: CPT | Mod: HCNC,CPTII,S$GLB, | Performed by: STUDENT IN AN ORGANIZED HEALTH CARE EDUCATION/TRAINING PROGRAM

## 2021-09-17 PROCEDURE — 4010F ACE/ARB THERAPY RXD/TAKEN: CPT | Mod: HCNC,CPTII,S$GLB, | Performed by: STUDENT IN AN ORGANIZED HEALTH CARE EDUCATION/TRAINING PROGRAM

## 2021-09-17 PROCEDURE — 1159F PR MEDICATION LIST DOCUMENTED IN MEDICAL RECORD: ICD-10-PCS | Mod: HCNC,CPTII,S$GLB, | Performed by: STUDENT IN AN ORGANIZED HEALTH CARE EDUCATION/TRAINING PROGRAM

## 2021-09-17 RX ORDER — TRIAMCINOLONE ACETONIDE 1 MG/G
CREAM TOPICAL 2 TIMES DAILY
Qty: 454 G | Refills: 3 | Status: SHIPPED | OUTPATIENT
Start: 2021-09-17 | End: 2022-08-02 | Stop reason: ALTCHOICE

## 2021-10-04 ENCOUNTER — PATIENT MESSAGE (OUTPATIENT)
Dept: ADMINISTRATIVE | Facility: HOSPITAL | Age: 71
End: 2021-10-04

## 2021-10-18 ENCOUNTER — PATIENT MESSAGE (OUTPATIENT)
Dept: INTERNAL MEDICINE | Facility: CLINIC | Age: 71
End: 2021-10-18
Payer: MEDICARE

## 2021-11-15 ENCOUNTER — PES CALL (OUTPATIENT)
Dept: ADMINISTRATIVE | Facility: CLINIC | Age: 71
End: 2021-11-15
Payer: MEDICARE

## 2021-12-03 ENCOUNTER — TELEPHONE (OUTPATIENT)
Dept: DERMATOLOGY | Facility: CLINIC | Age: 71
End: 2021-12-03
Payer: MEDICARE

## 2022-01-18 ENCOUNTER — PATIENT MESSAGE (OUTPATIENT)
Dept: ADMINISTRATIVE | Facility: HOSPITAL | Age: 72
End: 2022-01-18
Payer: MEDICARE

## 2022-03-24 DIAGNOSIS — I10 ELEVATED BLOOD PRESSURE READING IN OFFICE WITH DIAGNOSIS OF HYPERTENSION: ICD-10-CM

## 2022-03-24 RX ORDER — AMLODIPINE BESYLATE 5 MG/1
5 TABLET ORAL DAILY
Qty: 30 TABLET | Refills: 6 | Status: SHIPPED | OUTPATIENT
Start: 2022-03-24 | End: 2022-08-02 | Stop reason: SDUPTHER

## 2022-03-24 NOTE — TELEPHONE ENCOUNTER
----- Message from Johanny Menon sent at 3/24/2022  1:53 PM CDT -----  Contact: dutch/yuliya/[phone]  Requesting an RX refill or new RX.  Is this a refill or new RX: refill  RX name and strength (copy/paste from chart):  amLODIPine (NORVASC) 5 MG tablet 30 tablet 6 3/10/2021  No  Sig - Route: Take 1 tablet (5 mg total) by mouth once daily.   Is this a 30 day or 90 day RX: 90  Pharmacy name and phone # (copy/paste from chart):    [facility]/pharmacy #[id number] - [place], [place] - [address]  [address]  [place] [address]  Phone: [phone] Fax: [phone]    The doctors have asked that we provide their patients with the following 2 reminders -- prescription refills can take up to 72 hours, and a friendly reminder that in the future you can use your [facility] account to request refills: call back    Please advise

## 2022-04-21 ENCOUNTER — PES CALL (OUTPATIENT)
Dept: ADMINISTRATIVE | Facility: CLINIC | Age: 72
End: 2022-04-21
Payer: MEDICARE

## 2022-05-30 ENCOUNTER — PATIENT MESSAGE (OUTPATIENT)
Dept: ADMINISTRATIVE | Facility: HOSPITAL | Age: 72
End: 2022-05-30
Payer: MEDICARE

## 2022-07-06 ENCOUNTER — HOSPITAL ENCOUNTER (INPATIENT)
Facility: HOSPITAL | Age: 72
LOS: 2 days | Discharge: HOME | DRG: 065 | End: 2022-07-08
Attending: INTERNAL MEDICINE | Admitting: INTERNAL MEDICINE
Payer: MEDICARE

## 2022-07-06 ENCOUNTER — CLINICAL SUPPORT (OUTPATIENT)
Dept: CARDIOLOGY | Facility: HOSPITAL | Age: 72
DRG: 065 | End: 2022-07-06
Attending: INTERNAL MEDICINE
Payer: MEDICARE

## 2022-07-06 VITALS — HEIGHT: 64 IN | BODY MASS INDEX: 24.07 KG/M2 | WEIGHT: 141 LBS

## 2022-07-06 DIAGNOSIS — I63.9 STROKE: ICD-10-CM

## 2022-07-06 DIAGNOSIS — I63.9 CVA (CEREBRAL VASCULAR ACCIDENT): ICD-10-CM

## 2022-07-06 DIAGNOSIS — R47.01 APHASIA: Primary | ICD-10-CM

## 2022-07-06 LAB
AMPHET+METHAMPHET UR QL: NEGATIVE
ANION GAP SERPL CALC-SCNC: 9 MMOL/L (ref 8–16)
APTT PPP: 39.1 SEC (ref 23.3–35.1)
AV INDEX (PROSTH): 1.04
AV MEAN GRADIENT: 3 MMHG
AV VALVE AREA: 3.09 CM2
BACTERIA #/AREA URNS HPF: NEGATIVE /HPF
BARBITURATES UR QL SCN>200 NG/ML: NEGATIVE
BASOPHILS # BLD AUTO: 0.04 K/UL (ref 0–0.2)
BASOPHILS NFR BLD: 0.6 % (ref 0–1.9)
BENZODIAZ UR QL SCN>200 NG/ML: NEGATIVE
BILIRUB UR QL STRIP: NEGATIVE
BSA FOR ECHO PROCEDURE: 1.7 M2
BUN SERPL-MCNC: 6 MG/DL (ref 8–23)
BZE UR QL SCN: NEGATIVE
CALCIUM SERPL-MCNC: 8.7 MG/DL (ref 8.7–10.5)
CANNABINOIDS UR QL SCN: NEGATIVE
CHLORIDE SERPL-SCNC: 97 MMOL/L (ref 95–110)
CHOLEST SERPL-MCNC: 253 MG/DL (ref 120–199)
CHOLEST/HDLC SERPL: 3.6 {RATIO} (ref 2–5)
CK MB SERPL-MCNC: 1.5 NG/ML (ref 0.1–6.5)
CLARITY UR: CLEAR
CO2 SERPL-SCNC: 25 MMOL/L (ref 23–29)
COLOR UR: COLORLESS
CREAT SERPL-MCNC: 0.5 MG/DL (ref 0.5–1.4)
CREAT UR-MCNC: 12 MG/DL (ref 15–325)
CV ECHO LV RWT: 0.47 CM
DIFFERENTIAL METHOD: ABNORMAL
DOP CALC AO VTI: 21 CM
DOP CALC LVOT AREA: 3 CM2
DOP CALC LVOT DIAMETER: 1.94 CM
DOP CALC LVOT PEAK VEL: 103.89 M/S
DOP CALC LVOT STROKE VOLUME: 64.82 CM3
DOP CALCLVOT PEAK VEL VTI: 21.94 CM
E WAVE DECELERATION TIME: 232.58 MSEC
E/A RATIO: 0.59
E/E' RATIO: 8.24 M/S
ECHO LV POSTERIOR WALL: 0.95 CM (ref 0.6–1.1)
EJECTION FRACTION: 70 %
EOSINOPHIL # BLD AUTO: 0 K/UL (ref 0–0.5)
EOSINOPHIL NFR BLD: 0.5 % (ref 0–8)
ERYTHROCYTE [DISTWIDTH] IN BLOOD BY AUTOMATED COUNT: 12.3 % (ref 11.5–14.5)
EST. GFR  (AFRICAN AMERICAN): >60 ML/MIN/1.73 M^2
EST. GFR  (NON AFRICAN AMERICAN): >60 ML/MIN/1.73 M^2
ETHANOL SERPL-MCNC: <5 MG/DL
FRACTIONAL SHORTENING: 44 % (ref 28–44)
GLUCOSE SERPL-MCNC: 107 MG/DL (ref 70–110)
GLUCOSE UR QL STRIP: NEGATIVE
HCT VFR BLD AUTO: 35.2 % (ref 37–48.5)
HDLC SERPL-MCNC: 71 MG/DL (ref 40–75)
HDLC SERPL: 28.1 % (ref 20–50)
HGB BLD-MCNC: 12.1 G/DL (ref 12–16)
HGB UR QL STRIP: ABNORMAL
HYALINE CASTS #/AREA URNS LPF: 3 /LPF
IMM GRANULOCYTES # BLD AUTO: 0.02 K/UL (ref 0–0.04)
IMM GRANULOCYTES NFR BLD AUTO: 0.3 % (ref 0–0.5)
INR PPP: 1.5
INTERVENTRICULAR SEPTUM: 0.93 CM (ref 0.6–1.1)
KETONES UR QL STRIP: NEGATIVE
LDLC SERPL CALC-MCNC: 166.4 MG/DL (ref 63–159)
LEFT ATRIUM SIZE: 3.21 CM
LEFT INTERNAL DIMENSION IN SYSTOLE: 2.28 CM (ref 2.1–4)
LEFT VENTRICLE DIASTOLIC VOLUME INDEX: 39.91 ML/M2
LEFT VENTRICLE DIASTOLIC VOLUME: 67.44 ML
LEFT VENTRICLE MASS INDEX: 71 G/M2
LEFT VENTRICLE SYSTOLIC VOLUME INDEX: 7 ML/M2
LEFT VENTRICLE SYSTOLIC VOLUME: 11.84 ML
LEFT VENTRICULAR INTERNAL DIMENSION IN DIASTOLE: 4.07 CM (ref 3.5–6)
LEFT VENTRICULAR MASS: 119.76 G
LEUKOCYTE ESTERASE UR QL STRIP: ABNORMAL
LV LATERAL E/E' RATIO: 8.75 M/S
LV SEPTAL E/E' RATIO: 7.78 M/S
LYMPHOCYTES # BLD AUTO: 1.2 K/UL (ref 1–4.8)
LYMPHOCYTES NFR BLD: 19.1 % (ref 18–48)
MAGNESIUM SERPL-MCNC: 2.2 MG/DL (ref 1.6–2.6)
MCH RBC QN AUTO: 30.1 PG (ref 27–31)
MCHC RBC AUTO-ENTMCNC: 34.4 G/DL (ref 32–36)
MCV RBC AUTO: 88 FL (ref 82–98)
MICROSCOPIC COMMENT: ABNORMAL
MONOCYTES # BLD AUTO: 0.5 K/UL (ref 0.3–1)
MONOCYTES NFR BLD: 8.1 % (ref 4–15)
MV PEAK A VEL: 1.19 M/S
MV PEAK E VEL: 0.7 M/S
NEUTROPHILS # BLD AUTO: 4.4 K/UL (ref 1.8–7.7)
NEUTROPHILS NFR BLD: 71.4 % (ref 38–73)
NITRITE UR QL STRIP: NEGATIVE
NONHDLC SERPL-MCNC: 182 MG/DL
NRBC BLD-RTO: 0 /100 WBC
OPIATES UR QL SCN: NEGATIVE
PCP UR QL SCN>25 NG/ML: NEGATIVE
PH UR STRIP: 7 [PH] (ref 5–8)
PHOSPHATE SERPL-MCNC: 3.3 MG/DL (ref 2.7–4.5)
PLATELET # BLD AUTO: 292 K/UL (ref 150–450)
PMV BLD AUTO: 10.7 FL (ref 9.2–12.9)
POTASSIUM SERPL-SCNC: 3.3 MMOL/L (ref 3.5–5.1)
POTASSIUM SERPL-SCNC: 3.9 MMOL/L (ref 3.5–5.1)
PROT UR QL STRIP: NEGATIVE
PROTHROMBIN TIME: 17.4 SEC (ref 11.4–13.7)
RBC # BLD AUTO: 4.02 M/UL (ref 4–5.4)
RBC #/AREA URNS HPF: 6 /HPF (ref 0–4)
SODIUM SERPL-SCNC: 131 MMOL/L (ref 136–145)
SP GR UR STRIP: 1 (ref 1–1.03)
SQUAMOUS #/AREA URNS HPF: 4 /HPF
TDI LATERAL: 0.08 M/S
TDI SEPTAL: 0.09 M/S
TDI: 0.09 M/S
TOXICOLOGY INFORMATION: ABNORMAL
TRICUSPID ANNULAR PLANE SYSTOLIC EXCURSION: 262 CM
TRIGL SERPL-MCNC: 78 MG/DL (ref 30–150)
TSH SERPL DL<=0.005 MIU/L-ACNC: 1.15 UIU/ML (ref 0.34–5.6)
URN SPEC COLLECT METH UR: ABNORMAL
UROBILINOGEN UR STRIP-ACNC: NEGATIVE EU/DL
VIT B12 SERPL-MCNC: 150 PG/ML (ref 210–950)
WBC # BLD AUTO: 6.18 K/UL (ref 3.9–12.7)
WBC #/AREA URNS HPF: 9 /HPF (ref 0–5)

## 2022-07-06 PROCEDURE — 80048 BASIC METABOLIC PNL TOTAL CA: CPT | Performed by: INTERNAL MEDICINE

## 2022-07-06 PROCEDURE — 84443 ASSAY THYROID STIM HORMONE: CPT | Performed by: INTERNAL MEDICINE

## 2022-07-06 PROCEDURE — 82077 ASSAY SPEC XCP UR&BREATH IA: CPT | Performed by: INTERNAL MEDICINE

## 2022-07-06 PROCEDURE — 92610 EVALUATE SWALLOWING FUNCTION: CPT

## 2022-07-06 PROCEDURE — 85025 COMPLETE CBC W/AUTO DIFF WBC: CPT | Performed by: INTERNAL MEDICINE

## 2022-07-06 PROCEDURE — 36415 COLL VENOUS BLD VENIPUNCTURE: CPT | Performed by: INTERNAL MEDICINE

## 2022-07-06 PROCEDURE — 99900031 HC PATIENT EDUCATION (STAT)

## 2022-07-06 PROCEDURE — 25000003 PHARM REV CODE 250: Performed by: INTERNAL MEDICINE

## 2022-07-06 PROCEDURE — 83036 HEMOGLOBIN GLYCOSYLATED A1C: CPT | Performed by: INTERNAL MEDICINE

## 2022-07-06 PROCEDURE — 97530 THERAPEUTIC ACTIVITIES: CPT

## 2022-07-06 PROCEDURE — 82553 CREATINE MB FRACTION: CPT | Performed by: INTERNAL MEDICINE

## 2022-07-06 PROCEDURE — 84132 ASSAY OF SERUM POTASSIUM: CPT | Performed by: INTERNAL MEDICINE

## 2022-07-06 PROCEDURE — 63600175 PHARM REV CODE 636 W HCPCS: Performed by: INTERNAL MEDICINE

## 2022-07-06 PROCEDURE — 83735 ASSAY OF MAGNESIUM: CPT | Performed by: INTERNAL MEDICINE

## 2022-07-06 PROCEDURE — 84100 ASSAY OF PHOSPHORUS: CPT | Performed by: INTERNAL MEDICINE

## 2022-07-06 PROCEDURE — 82607 VITAMIN B-12: CPT | Performed by: INTERNAL MEDICINE

## 2022-07-06 PROCEDURE — 97161 PT EVAL LOW COMPLEX 20 MIN: CPT

## 2022-07-06 PROCEDURE — 25500020 PHARM REV CODE 255: Performed by: INTERNAL MEDICINE

## 2022-07-06 PROCEDURE — 85730 THROMBOPLASTIN TIME PARTIAL: CPT | Performed by: INTERNAL MEDICINE

## 2022-07-06 PROCEDURE — 20000000 HC ICU ROOM

## 2022-07-06 PROCEDURE — 93306 ECHO (CUPID ONLY): ICD-10-PCS | Mod: 26,,, | Performed by: INTERNAL MEDICINE

## 2022-07-06 PROCEDURE — 85610 PROTHROMBIN TIME: CPT | Performed by: INTERNAL MEDICINE

## 2022-07-06 PROCEDURE — 99900035 HC TECH TIME PER 15 MIN (STAT)

## 2022-07-06 PROCEDURE — 80307 DRUG TEST PRSMV CHEM ANLYZR: CPT | Performed by: INTERNAL MEDICINE

## 2022-07-06 PROCEDURE — 81001 URINALYSIS AUTO W/SCOPE: CPT | Performed by: INTERNAL MEDICINE

## 2022-07-06 PROCEDURE — 97165 OT EVAL LOW COMPLEX 30 MIN: CPT

## 2022-07-06 PROCEDURE — 94761 N-INVAS EAR/PLS OXIMETRY MLT: CPT

## 2022-07-06 PROCEDURE — 80061 LIPID PANEL: CPT | Performed by: INTERNAL MEDICINE

## 2022-07-06 PROCEDURE — 25000003 PHARM REV CODE 250

## 2022-07-06 PROCEDURE — 93306 TTE W/DOPPLER COMPLETE: CPT

## 2022-07-06 PROCEDURE — 93306 TTE W/DOPPLER COMPLETE: CPT | Mod: 26,,, | Performed by: INTERNAL MEDICINE

## 2022-07-06 RX ORDER — NAPROXEN SODIUM 220 MG/1
81 TABLET, FILM COATED ORAL DAILY
Status: ON HOLD | COMMUNITY
End: 2022-07-08 | Stop reason: SDUPTHER

## 2022-07-06 RX ORDER — FOLIC ACID 1 MG/1
1 TABLET ORAL DAILY
Status: DISCONTINUED | OUTPATIENT
Start: 2022-07-07 | End: 2022-07-09 | Stop reason: HOSPADM

## 2022-07-06 RX ORDER — POTASSIUM CHLORIDE 20 MEQ/1
40 TABLET, EXTENDED RELEASE ORAL
Status: DISCONTINUED | OUTPATIENT
Start: 2022-07-06 | End: 2022-07-09 | Stop reason: HOSPADM

## 2022-07-06 RX ORDER — POTASSIUM CHLORIDE 7.45 MG/ML
20 INJECTION INTRAVENOUS
Status: DISCONTINUED | OUTPATIENT
Start: 2022-07-06 | End: 2022-07-09 | Stop reason: HOSPADM

## 2022-07-06 RX ORDER — POTASSIUM CHLORIDE 7.45 MG/ML
40 INJECTION INTRAVENOUS
Status: DISCONTINUED | OUTPATIENT
Start: 2022-07-06 | End: 2022-07-09 | Stop reason: HOSPADM

## 2022-07-06 RX ORDER — LABETALOL HYDROCHLORIDE 5 MG/ML
10 INJECTION, SOLUTION INTRAVENOUS
Status: DISCONTINUED | OUTPATIENT
Start: 2022-07-06 | End: 2022-07-07

## 2022-07-06 RX ORDER — ATORVASTATIN CALCIUM 40 MG/1
40 TABLET, FILM COATED ORAL NIGHTLY
Status: DISCONTINUED | OUTPATIENT
Start: 2022-07-06 | End: 2022-07-06

## 2022-07-06 RX ORDER — MAGNESIUM SULFATE HEPTAHYDRATE 40 MG/ML
4 INJECTION, SOLUTION INTRAVENOUS
Status: DISCONTINUED | OUTPATIENT
Start: 2022-07-06 | End: 2022-07-09 | Stop reason: HOSPADM

## 2022-07-06 RX ORDER — SODIUM CHLORIDE 0.9 % (FLUSH) 0.9 %
10 SYRINGE (ML) INJECTION
Status: DISCONTINUED | OUTPATIENT
Start: 2022-07-06 | End: 2022-07-09 | Stop reason: HOSPADM

## 2022-07-06 RX ORDER — ATORVASTATIN CALCIUM 40 MG/1
40 TABLET, FILM COATED ORAL DAILY
COMMUNITY
End: 2022-08-02 | Stop reason: SDUPTHER

## 2022-07-06 RX ORDER — MAGNESIUM SULFATE HEPTAHYDRATE 40 MG/ML
2 INJECTION, SOLUTION INTRAVENOUS
Status: DISCONTINUED | OUTPATIENT
Start: 2022-07-06 | End: 2022-07-09 | Stop reason: HOSPADM

## 2022-07-06 RX ORDER — MAGNESIUM SULFATE 1 G/100ML
1 INJECTION INTRAVENOUS
Status: DISCONTINUED | OUTPATIENT
Start: 2022-07-06 | End: 2022-07-09 | Stop reason: HOSPADM

## 2022-07-06 RX ORDER — MUPIROCIN 20 MG/G
OINTMENT TOPICAL 2 TIMES DAILY
Status: DISCONTINUED | OUTPATIENT
Start: 2022-07-06 | End: 2022-07-09 | Stop reason: HOSPADM

## 2022-07-06 RX ORDER — LANOLIN ALCOHOL/MO/W.PET/CERES
800 CREAM (GRAM) TOPICAL
Status: DISCONTINUED | OUTPATIENT
Start: 2022-07-06 | End: 2022-07-09 | Stop reason: HOSPADM

## 2022-07-06 RX ORDER — POTASSIUM CHLORIDE 20 MEQ/1
20 TABLET, EXTENDED RELEASE ORAL
Status: DISCONTINUED | OUTPATIENT
Start: 2022-07-06 | End: 2022-07-09 | Stop reason: HOSPADM

## 2022-07-06 RX ORDER — CHLORHEXIDINE GLUCONATE ORAL RINSE 1.2 MG/ML
15 SOLUTION DENTAL 2 TIMES DAILY
Status: DISCONTINUED | OUTPATIENT
Start: 2022-07-06 | End: 2022-07-09 | Stop reason: HOSPADM

## 2022-07-06 RX ORDER — ASPIRIN 81 MG/1
81 TABLET ORAL DAILY
Status: DISCONTINUED | OUTPATIENT
Start: 2022-07-06 | End: 2022-07-06

## 2022-07-06 RX ORDER — ATORVASTATIN CALCIUM 40 MG/1
40 TABLET, FILM COATED ORAL NIGHTLY
Status: DISCONTINUED | OUTPATIENT
Start: 2022-07-06 | End: 2022-07-09 | Stop reason: HOSPADM

## 2022-07-06 RX ADMIN — MUPIROCIN: 20 OINTMENT TOPICAL at 08:07

## 2022-07-06 RX ADMIN — MUPIROCIN: 20 OINTMENT TOPICAL at 09:07

## 2022-07-06 RX ADMIN — IOHEXOL 75 ML: 350 INJECTION, SOLUTION INTRAVENOUS at 07:07

## 2022-07-06 RX ADMIN — POTASSIUM CHLORIDE 40 MEQ: 7.46 INJECTION, SOLUTION INTRAVENOUS at 08:07

## 2022-07-06 RX ADMIN — ATORVASTATIN CALCIUM 40 MG: 40 TABLET, FILM COATED ORAL at 08:07

## 2022-07-06 RX ADMIN — LABETALOL HYDROCHLORIDE 10 MG: 5 INJECTION, SOLUTION INTRAVENOUS at 11:07

## 2022-07-06 RX ADMIN — CHLORHEXIDINE GLUCONATE 15 ML: 1.2 RINSE ORAL at 09:07

## 2022-07-06 RX ADMIN — CHLORHEXIDINE GLUCONATE 15 ML: 1.2 RINSE ORAL at 08:07

## 2022-07-06 RX ADMIN — POTASSIUM CHLORIDE 20 MEQ: 1500 TABLET, EXTENDED RELEASE ORAL at 10:07

## 2022-07-06 RX ADMIN — THIAMINE HYDROCHLORIDE 100 MG: 100 INJECTION, SOLUTION INTRAMUSCULAR; INTRAVENOUS at 04:07

## 2022-07-06 RX ADMIN — LABETALOL HYDROCHLORIDE 10 MG: 5 INJECTION, SOLUTION INTRAVENOUS at 05:07

## 2022-07-06 NOTE — PT/OT/SLP EVAL
Physical Therapy Evaluation    Patient Name:  Shannan Ventura   MRN:  [id number]    Recommendations:     Discharge Recommendations:  home   Discharge Equipment Recommendations: none   Barriers to discharge: increase assist with mobility     Assessment:     Shannan Ventura is a 71 y.o. female admitted with a medical diagnosis of Aphasia.  She presents with the following impairments/functional limitations:  weakness, impaired endurance, impaired self care skills, impaired functional mobilty, gait instability, impaired balance, decreased lower extremity function, decreased safety awareness, impaired cardiopulmonary response to activity.    Pt HOB elevated at the beginning of session and presents with impaired speech due to aphasia. Pt asked yes/no questions to complete evaluation. Pt ambulated 3 steps forward and 3 steps backward min A x 2 with HHA. Pt demonstrated decreased balance and fear to fall while stepping forward. Pt required verbal cueing for proper upright posture and proper hand placements with sit to stand transfer. Pt's son was called to gather more information on prior level of function. Son expressed interest in training to care for his mom.     Rehab Prognosis: Fair; patient would benefit from acute skilled PT services to address these deficits and reach maximum level of function.    Recent Surgery: * No surgery found *      Plan:     During this hospitalization, patient to be seen 5 x/week to address the identified rehab impairments via gait training, therapeutic activities, therapeutic exercises, neuromuscular re-education and progress toward the following goals:    · Plan of Care Expires:  08/06/22    Subjective     Chief Complaint: none   Patient/Family Comments/goals: get better to go home   Pain/Comfort:  Pain Rating 1: 0/10    Patients cultural, spiritual, Anabaptist conflicts given the current situation: no    Living Environment:  Pt lives in a 2-story home, with a ramp, with son. Pt reports  staying on the 1st floor for everything she needs. Pt reports using her power chair daily to mobilize around home. Pt reports being independent with dressing and bathing. (-) , son drives and does all community errands According to son, pt does not ambulate much at home and pt is independent with BM and transfers. Pt's son helps with ambulation using HHA around home. Son is with her 24/7.     Prior to admission, patients level of function was mod I using power chair.  Equipment used at home: power chair.  DME owned (not currently used): none.  Upon discharge, patient will have assistance from son.    Objective:     Communicated with RN prior to session.  Patient found HOB elevated with peripheral IV, telemetry, pulse ox (continuous), armstrong catheter, blood pressure cuff  upon PT entry to room.    General Precautions: Standard, aphasia   Orthopedic Precautions:N/A   Braces: N/A  Respiratory Status: Room air    Exams:  · RLE ROM: WFL  · RLE Strength: 5/5 throughout   · LLE ROM: WFL  · LLE Strength: 3+/5 hip flexion, 4/5 ankle df and knee extension     Functional Mobility:  · Bed Mobility:     · Supine to Sit: minimum assistance  · Sit to Supine: minimum assistance  · Transfers:     · Sit to Stand:  minimum assistance with hand-held assist  · Gait: 3 steps forward, 3 steps backward min A x 2 with HHA    Therapeutic Activities and Exercises:   Pt educated on plan of care, importance of time out of bed, proper hand placement with transfers, need for use of call bell for assistance and to prevent falls, need for assistance with mobility, and discharge recommendations      AM-PAC 6 CLICK MOBILITY  Total Score:15     Patient left HOB elevated with all lines intact, call button in reach and RN notified.    GOALS:   Multidisciplinary Problems     Physical Therapy Goals        Problem: Physical Therapy    Goal Priority Disciplines Outcome Goal Variances Interventions   Physical Therapy Goal     PT, PT/OT       Description: Goals to be met by: discharge      Patient will increase functional independence with mobility by performin. Supine to sit with Stand-by Assistance  2. Sit to supine with Stand-by Assistance  3. Sit to stand transfer with Stand-by Assistance  4. Bed to chair transfer with Stand-by Assistance using least restrictive assistive device.                      History:     No past medical history on file.    No past surgical history on file.    Time Tracking:     PT Received On: 22  PT Start Time: 926     PT Stop Time: 940  PT Total Time (min): 14 min     Billable Minutes: Evaluation 6 and Therapeutic Activity 8      2022

## 2022-07-06 NOTE — PROGRESS NOTES
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      Overview/Hospital Course:  Patient was admitted with new onset stroke with aphasia status post tPA.  Patient had history of stroke in the same  left side and difficult to determine the extent the weakness  On examination he has expressive aphasia and cannot read the sentence.  Blood pressure in range status post tPA patient  MRI not able to do because of metal in the body.  CTA ordered      Interval History:     Review of Systems  As per subjective   Objective:     Vital Signs (Most Recent):  Temp: 98.4 °F (36.9 °C) (07/06/22 1101)  Pulse: 80 (07/06/22 1400)  Resp: (!) 33 (07/06/22 1400)  BP: (!) 176/72 (07/06/22 1400)  SpO2: 96 % (07/06/22 1400)   Vital Signs (24h Range):  Temp:  [98 °F (36.7 °C)-98.4 °F (36.9 °C)] 98.4 °F (36.9 °C)  Pulse:  [] 80  Resp:  [14-36] 33  SpO2:  [94 %-98 %] 96 %  BP: (125-198)/() 176/72     Weight: 64.1 kg (141 lb 5 oz)  Body mass index is 24.26 kg/m².    Intake/Output Summary (Last 24 hours) at 7/6/2022 7158  Last data filed at 7/6/2022 1300  Gross per 24 hour    Intake 100 ml   Output 1800 ml   Net -1700 ml      Physical Exam  Vitals and nursing note reviewed.   HENT:      Head: Normocephalic and atraumatic.   Eyes:      Conjunctiva/sclera: Conjunctivae normal.   Neck:      Vascular: No JVD.   Cardiovascular:      Heart sounds: Normal heart sounds.   Pulmonary:      Effort: Pulmonary effort is normal.      Breath sounds: Normal breath sounds.   Abdominal:      Palpations: Abdomen is soft.   Skin:     General: Skin is warm.   Neurological:      Mental Status: She is alert and oriented to person, place, and time.   Psychiatric:         Mood and Affect: Mood normal.       Significant Labs: All pertinent labs within the past 24 hours have been reviewed.  BMP:   Recent Labs   Lab 07/06/22  0350      *   K 3.3*   CL 97   CO2 25   BUN 6*   CREATININE 0.5   CALCIUM 8.7   MG 2.2     CBC:   Recent Labs   Lab 07/06/22  0350   WBC 6.18   HGB 12.1   HCT 35.2*        CMP:   Recent Labs   Lab 07/06/22  0350   *   K 3.3*   CL 97   CO2 25      BUN 6*   CREATININE 0.5   CALCIUM 8.7   ANIONGAP 9   EGFRNONAA >60.0       Significant Imaging: I have reviewed all pertinent imaging results/findings within the past 24 hours.      Assessment/Plan:       New onset persistent  Aphasia with possible new CVA  Previous history of left CVA       PLAN    status post tPA and keep BP in range   Labetalol IV 10 mg p.r.n. for blood pressure go of 180/105 or less  MRI of the brain cannot be done because of metal . CTA brain   Echo with bubble to follow  . Reported no mentioning of bubble study . Will clarify with cardiology   PT OT speech therapy  Bedside swallow eval and advance diet   Continue care in ICU   D/w RN  ASA or plavix if repeat CTA is negative for stroke  DVT prophylaxis   Possible UTI . But no symptoms       VTE Risk Mitigation (From admission, onward)         Ordered     IP VTE HIGH RISK PATIENT  Once         07/06/22 0305     Place sequential compression device   Until discontinued         07/06/22 0305                Discharge Planning   SHAJI: 7/8/2022     Code Status: Full Code   Is the patient medically ready for discharge?:     Reason for patient still in hospital (select all that apply): Treatment                     Andres Park MD  Department of Hospital Medicine   [facility]

## 2022-07-06 NOTE — PLAN OF CARE
CM attempted to complete initial discharge assessment. Pt aphasic and became tearful. CM requested to call pt's son to complete dc assessment- son coming to hospital this afternoon. Will re-attempt dc assesment this afternoon with son per pt request.

## 2022-07-06 NOTE — PROGRESS NOTES
"Critical access hospital  Adult Nutrition   Progress Note (Initial Assessment)     SUMMARY     Recommendations  Recommendation/Intervention: 1. Advance diet as tolerated to Cardiac. 2. Continue statin. 3. Suggest to MD: start Alcohol Withdrawal Protocol Prophylactic Nutrition( IV Daily x 3 days: 250  mg Thiamine,1 mg Folate, and 10ml MVI),  Goals: 1. Patient to consume >/=75% estimated needs. 2. Lab values trend to target range.  Nutrition Goal Status: new  Communication of RD Recs: reviewed with physician    Dietitian Rounds Brief  Patient Diet advanced per SLP. RD to add Ensure with meals. Recommend order ETOH prophylactic--awaiting MD answer. Continue po / IV combo per MD.    Diet order:   Current Diet Order:Mechanical Soft (IDDSI Level 6) Cardiac     Evaluation of Received Nutrient/Fluid Intake  Energy Calories Required: not meeting needs  Protein Required: not meeting needs  Fluid Required: not meeting needs  Tolerance: tolerating     % Intake of Estimated Energy Needs: 25 - 50 %  % Meal Intake: 25 - 50 %      Intake/Output Summary (Last 24 hours) at 7/6/2022 1026  Last data filed at 7/6/2022 0710  Gross per 24 hour   Intake 100 ml   Output 1300 ml   Net -1200 ml        Anthropometrics  Temp: 98.3 °F (36.8 °C)  Height: 5' 4" (162.6 cm)  Height (inches): 64 in  Weight Method: Bed Scale  Weight: 64.1 kg (141 lb 5 oz)  Weight (lb): 141.32 lb  Ideal Body Weight (IBW), Female: 120 lb  % Ideal Body Weight, Female (lb): 117.77 %  BMI (Calculated): 24.2       Estimated/Assessed Needs  Weight Used For Calorie Calculations: 64.1 kg (141 lb 5 oz)  Energy Calorie Requirements (kcal): 6917-5699 kcal/day (25-30 kcal/kg)     Protein Requirements:  gm/day (1.5-2.0 gm/kg)  Weight Used For Protein Calculations: 64.1 kg (141 lb 5 oz)     Estimated Fluid Requirement Method: RDA Method  RDA Method (mL): 1602       Reason for Assessment  Relevant Medical History: HTN, CVA with left-sided residual  Interdisciplinary Rounds: " did not attend    Nutrition/Diet History  Food Allergies: NKFA  Factors Affecting Nutritional Intake: NPO    Nutrition Risk Screen  Nutrition Risk Screen: other (see comments) (new CVA pending SLP evaluation)             Weight History:  Wt Readings from Last 5 Encounters:   07/06/22 64.1 kg (141 lb 5 oz)        Lab/Procedures/Meds: Pertinent Labs/Meds Reviewed    Medications:Pertinent Medications Reviewed  Scheduled Meds:   atorvastatin  40 mg Oral QHS    chlorhexidine  15 mL Mouth/Throat BID    [START ON 7/7/2022] folic acid  1 mg Oral Daily    [START ON 7/7/2022] multivitamin  1 tablet Oral Daily    mupirocin   Nasal BID    thiamine (VITAMIN B1) IVPB  100 mg Intravenous Daily     Continuous Infusions:  PRN Meds:.calcium chloride IVPB, calcium chloride IVPB, calcium chloride IVPB, calcium gluconate IVPB, calcium gluconate IVPB, calcium gluconate IVPB, labetalol, magnesium oxide, magnesium sulfate IVPB, magnesium sulfate IVPB, magnesium sulfate IVPB, magnesium sulfate IVPB, magnesium sulfate IVPB, magnesium sulfate IVPB, potassium chloride in water, potassium chloride in water, potassium chloride in water, potassium chloride in water, potassium chloride, potassium chloride, potassium chloride, potassium chloride, sodium chloride 0.9%, sodium phosphate IVPB, sodium phosphate IVPB, sodium phosphate IVPB, sodium phosphate IVPB, sodium phosphate IVPB, sodium phosphate IVPB, sodium phosphate IVPB, sodium phosphate IVPB    Labs: Pertinent Labs Reviewed  Clinical Chemistry:  Recent Labs   Lab 07/06/22  0350   *   K 3.3*   CL 97   CO2 25      BUN 6*   CREATININE 0.5   CALCIUM 8.7   ANIONGAP 9   ESTGFRAFRICA >60.0   EGFRNONAA >60.0   MG 2.2   PHOS 3.3     CBC:   Recent Labs   Lab 07/06/22  0350   WBC 6.18   RBC 4.02   HGB 12.1   HCT 35.2*      MCV 88   MCH 30.1   MCHC 34.4     Lipid Panel:  Recent Labs   Lab 07/06/22  0350   CHOL 253*   HDL 71   LDLCALC 166.4*   TRIG 78   CHOLHDL 28.1     Cardiac  Profile:  Recent Labs   Lab 07/06/22  0350   CPKMB 1.5     Inflammatory Labs:  No results for input(s): CRP in the last 168 hours.  Diabetes:  No results for input(s): HGBA1C, POCTGLUCOSE in the last 168 hours.  Thyroid & Parathyroid:  Recent Labs   Lab 07/06/22  0350   TSH 1.150       Monitor and Evaluation  Food and Nutrient Intake: energy intake, food and beverage intake  Food and Nutrient Adminstration: diet order  Knowledge/Beliefs/Attitudes: food and nutrition knowledge/skill  Physical Activity and Function: nutrition-related ADLs and IADLs  Anthropometric Measurements: weight change, weight, body mass index  Biochemical Data, Medical Tests and Procedures: electrolyte and renal panel, inflammatory profile, gastrointestinal profile, lipid profile, glucose/endocrine profile  Nutrition-Focused Physical Findings: overall appearance     Nutrition Risk  Level of Risk/Frequency of Follow-up: moderate - high     Nutrition Follow-Up  RD Follow-up?: Yes      Nicole Villar RD, CATE 07/06/2022 10:26 AM

## 2022-07-06 NOTE — PROVIDER TRANSFER
Outside Transfer Acceptance Note / Regional Referral Center    Referring facility: [facility]   Referring provider: [facility]  Accepting facility: [facility]  Accepting provider: SOFIE MOTA DIPTI G.  Admitting provider: Dr. Mota   Reason for transfer: Higher Level of Care   Transfer diagnosis: CVA, receiving TPA  Transfer specialty requested: Neurology  Transfer specialty notified: yes  Transfer level: NUMBER 1-5: 2  Bed type requested: ICU  Isolation status: No active isolations   Admission class or status: IP- Inpatient      Narrative     Ms. Ventura is a 71 yoF, PMhx of CVA. Who presented with difficulty speech and unable to mouth words. EMS was activated and brought to the [facility] ED, noted to have right sided facial droop and left leg weakness. Vitals - 142/91, HR 98, RR 19, 97% on 2L O2. Labs - CBC and CMP wnl, UA non acute. K 2.8. , trop wnl. CT head demonstrated no acute bleeding and no acute findings, MRI was unable to be done given metal plate. S/p TPA noted to have improved strength LLE, however persistent slurred speech. Requesting transfer for neurology assessment.       Objective     Vitals:    Recent Labs: All pertinent labs within the past 24 hours have been reviewed.  CBC: No results for input(s): WBC, HGB, HCT, PLT in the last 48 hours.  CMP: No results for input(s): NA, K, CL, CO2, GLU, BUN, CREATININE, CALCIUM, PROT, ALBUMIN, BILITOT, ALKPHOS, AST, ALT, ANIONGAP, EGFRNONAA in the last 48 hours.    Invalid input(s): ESTGFAFRICA  Cardiac Markers: No results for input(s): CKMB, MYOGLOBIN, BNP, TROPISTAT in the last 48 hours.  Coagulation: No results for input(s): PT, INR, APTT in the last 48 hours.  Lactic Acid: No results for input(s): LACTATE in the last 48 hours.  Recent imaging:     - CT head: no acute findings. Chronic changes        Airway:     Vent settings:     IV access:    Infusions:  TPA  Allergies: Review of patient's allergies indicates:  Not on File   NPO: No      Anticoagulation:   Anticoagulants     None           Instructions      [facility]  Admit to Hospital Medicine  Upon patient arrival to floor, please contact Hospital Medicine on call.

## 2022-07-06 NOTE — PLAN OF CARE
Pt awake, alert, and oriented.Room air. Expressive aphasia noted. Pt able to state some words slowly. No neuro changes since shift change, only improvement. Pt ambulated with PT in room with x2 assist. Pt and son educated on call light, fall precautions, plan of care. Pt nodded head yes and son verbalized understanding. Call light in reach, bed rails up x2, bed alarm active (zone 2). Continue to monitor and treat pain with PRN meds. Goal 4> (1-10). Maintain SBP less than 180 with PRN meds. Continue to monitor closely.

## 2022-07-06 NOTE — H&P
[facility] Medicine  History & Physical    Patient Name: Shannan Ventura  MRN: [id number]  Patient Class: IP- Inpatient  Admission Date: 7/6/2022  Attending Physician: Pasquale Garcia MD   Primary Care Provider: Primary Doctor No         Patient information was obtained from patient, past medical records and ER records.     Subjective:     Principal Problem:Aphasia    Chief Complaint: No chief complaint on file.       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        Past medical history:  Hypertension previous CVA      Past surgical history:  Patient states she had the surgery in the past but is not able to tell me which procedures likely due to aphasia.      Allergies:  From her records she has no known drug allergies however she states that she is allergic to a medication however this time she is not able to speak so we will contact family later.    Medications:  Amlodipine 5 mg a day per records.    Family history:  Patient denies history of stroke in the family      Social history:  Patient quit smoking 4 years ago  Drink 4 beers a day  Denies drug use      Review of Systems   Reason unable to perform ROS: Ten point systems reviewed with patient nodding since she has aphasia at this time and is not able to speak, and they are negative pertinent positives present in HPI.   Objective:     Vital Signs (Most Recent):  Temp: 98.2 °F (36.8 °C)  (07/06/22 0130)  Pulse: 97 (07/06/22 0244)  Resp: (!) 28 (07/06/22 0244)  BP: (!) 149/65 (07/06/22 0230)  SpO2: 95 % (07/06/22 0244)   Vital Signs (24h Range):  Temp:  [98.2 °F (36.8 °C)] 98.2 °F (36.8 °C)  Pulse:  [] 97  Resp:  [19-28] 28  SpO2:  [95 %-97 %] 95 %  BP: (125-158)/(65-91) 149/65     Weight: 64.1 kg (141 lb 5 oz)  Body mass index is 24.26 kg/m².    Physical Exam  Constitutional:       General: She is not in acute distress.     Appearance: Normal appearance. She is not ill-appearing, toxic-appearing or diaphoretic.   HENT:      Head: Normocephalic and atraumatic.      Nose: Nose normal.   Cardiovascular:      Rate and Rhythm: Normal rate and regular rhythm.      Heart sounds: Normal heart sounds. No murmur heard.    No friction rub. No gallop.   Pulmonary:      Effort: Pulmonary effort is normal. No respiratory distress.      Breath sounds: Normal breath sounds. No stridor. No wheezing, rhonchi or rales.   Abdominal:      General: Abdomen is flat. Bowel sounds are normal. There is no distension.      Tenderness: There is no abdominal tenderness. There is no guarding or rebound.   Neurological:      Mental Status: She is alert.      Comments: Patient with left side weakness that she states it is old  + aphasia  She follows commands             Significant Labs: All pertinent labs within the past 24 hours have been reviewed.  CBC: No results for input(s): WBC, HGB, HCT, PLT in the last 48 hours.  CMP: No results for input(s): NA, K, CL, CO2, GLU, BUN, CREATININE, CALCIUM, PROT, ALBUMIN, BILITOT, ALKPHOS, AST, ALT, ANIONGAP, EGFRNONAA in the last 48 hours.    Invalid input(s): ESTGFAFRICA  Cardiac Markers: No results for input(s): CKMB, MYOGLOBIN, BNP, TROPISTAT in the last 48 hours.  Troponin: No results for input(s): TROPONINI in the last 48 hours.  TSH: No results for input(s): TSH in the last 4320 hours.  Urine Culture: No results for input(s): LABURIN in the last 48 hours.  Urine Studies: No  "results for input(s): COLORU, APPEARANCEUA, PHUR, SPECGRAV, PROTEINUA, GLUCUA, KETONESU, BILIRUBINUA, OCCULTUA, NITRITE, UROBILINOGEN, LEUKOCYTESUR, RBCUA, WBCUA, BACTERIA, SQUAMEPITHEL, HYALINECASTS in the last 48 hours.    Invalid input(s): SAMANTHA    Significant Imaging: I have reviewed all pertinent imaging results/findings within the past 24 hours.    Patient with hyponatremia and hypokalemia mild both.    Assessment/Plan:     * Aphasia      Likely due to stroke she is status post tPA  We will continue to follow Dr. Joyce recommendations  Labetalol IV 10 mg p.r.n. for blood pressure go of 180/105 or less  MRI of the brain echo lipid panel TSH A1c vitamin B12  PT OT speech therapy  BMP CBC magnesium phosphorus now  CT head in 24 hours after tPA  Thiamine IV 1 mg a day study now multivitamins folic acid  Bedside swallow eval        VTE Risk Mitigation (From admission, onward)         Ordered     IP VTE HIGH RISK PATIENT  Once         22     Place sequential compression device  Until discontinued         22                   Pasquale Garcia MD  Department of Hospital Medicine   [facility]      Addendum: Patient name: Shannan Reza                                             : 1950    Addendum to "Assessment/Plan" : Thiamine  mg a day starting now.     "

## 2022-07-06 NOTE — CONSULTS
[facility]  Department of Neurology  Neurology Consultation Note        PATIENT NAME: Shannan Ventura  MRN: [id number]  CSN: [id number]      TODAY'S DATE: 07/06/2022  ADMIT DATE: 7/6/2022                            CONSULTING PROVIDER: Fabio Joyce MD  CONSULT REQUESTED BY: Pasquale Garcia MD      Reason for consult: CVA       History obtained from chart review and the patient.    HPI: Shannan Ventura is a 71 y.o. female with a history of hypertension, CVA with residual left-sided weakness, hypertension, initially presented to an outside ER with episode of aphasia and right-sided weakness.    She was evaluated in the ER, CT head was done did not reveal any acute abnormality.  She received tPA at outside hospital.  I was contacted by the transfer center after patient received tPA for transfer to post tPA care.  Patient was transferred to [facility] ICU for post tPA care and workup and management for stroke.    On exam, she has LUE and LLE weakness, aphasia. She is not able to provide much history due to her aphasia      PREVIOUS MEDICAL HISTORY:  No past medical history on file.  PREVIOUS SURGICAL HISTORY:  No past surgical history on file.  FAMILY MEDICAL HISTORY:  No family history on file.  SOCIAL HISTORY:     ALLERGIES:  Review of patient's allergies indicates:   Allergen Reactions    Codeine Itching     HOME MEDICATIONS:  Prior to Admission medications    Not on File     CURRENT SCHEDULED MEDICATIONS:   atorvastatin  40 mg Oral QHS    chlorhexidine  15 mL Mouth/Throat BID    [START ON 7/7/2022] folic acid  1 mg Oral Daily    [START ON 7/7/2022] multivitamin  1 tablet Oral Daily    mupirocin   Nasal BID    thiamine (VITAMIN B1) IVPB  100 mg Intravenous Daily     CURRENT INFUSIONS:    CURRENT PRN MEDICATIONS:  calcium chloride IVPB, calcium chloride IVPB, calcium chloride IVPB, calcium gluconate IVPB, calcium gluconate IVPB, calcium gluconate IVPB, labetalol, magnesium oxide,  "magnesium sulfate IVPB, magnesium sulfate IVPB, magnesium sulfate IVPB, magnesium sulfate IVPB, magnesium sulfate IVPB, magnesium sulfate IVPB, potassium chloride in water, potassium chloride in water, potassium chloride in water, potassium chloride in water, potassium chloride, potassium chloride, potassium chloride, potassium chloride, sodium chloride 0.9%, sodium phosphate IVPB, sodium phosphate IVPB, sodium phosphate IVPB, sodium phosphate IVPB, sodium phosphate IVPB, sodium phosphate IVPB, sodium phosphate IVPB, sodium phosphate IVPB    REVIEW OF SYSTEMS:  Please refer to the HPI for all pertinent positive and negative findings. A comprehensive review of all other systems was negative.       PHYSICAL EXAM:  Patient Vitals for the past 24 hrs:   BP Temp Temp src Pulse Resp SpO2 Height Weight   07/06/22 0945 (!) 170/79 -- -- 87 (!) 36 97 % -- --   07/06/22 0830 (!) 164/83 -- -- 82 (!) 22 96 % -- --   07/06/22 0800 (!) 151/67 -- -- 81 (!) 21 96 % -- --   07/06/22 0737 -- -- -- 79 (!) 35 95 % -- --   07/06/22 0730 (!) 159/74 -- -- 80 (!) 28 95 % -- --   07/06/22 0710 -- 98.3 °F (36.8 °C) Oral -- -- -- -- --   07/06/22 0700 (!) 153/69 -- -- 80 (!) 28 95 % -- --   07/06/22 0500 (!) 182/75 98.2 °F (36.8 °C) -- 93 (!) 23 95 % -- --   07/06/22 0430 (!) 167/75 -- -- 100 (!) 30 96 % -- --   07/06/22 0400 (!) 166/69 98 °F (36.7 °C) -- 99 (!) 27 95 % -- --   07/06/22 0330 (!) 153/69 -- -- 100 (!) 29 95 % -- --   07/06/22 0300 (!) 148/64 98.2 °F (36.8 °C) -- 96 (!) 23 (!) 94 % -- --   07/06/22 0244 -- -- -- 97 (!) 28 95 % -- --   07/06/22 0230 (!) 149/65 98.1 °F (36.7 °C) -- 98 (!) 27 95 % -- --   07/06/22 0200 (!) 155/65 98 °F (36.7 °C) -- 97 (!) 27 95 % -- --   07/06/22 0130 (!) 158/70 98.2 °F (36.8 °C) Oral 100 (!) 27 95 % 5' 4" (1.626 m) 64.1 kg (141 lb 5 oz)       GENERAL APPEARANCE: Alert, well-developed, well-nourished female in no acute distress.  HEENT: Normocephalic and atraumatic. PERRL. Oropharynx " unremarkable.  PULM: Normal respiratory effort. No accessory muscle use.  CV: RRR.  ABDOMEN: Soft, nontender.  EXTREMITIES: No obvious signs of vascular compromise. Pulses present. No cyanosis, clubbing or edema.  SKIN: Clear; no rashes, lesions or skin breaks in exposed areas.    NEURO:  MENTAL STATUS: Patient awake and oriented to time, place, and person, recent/remote memory normal, attention span/concentration normal and fund of knowledge appropriate for patient's level of education.  Affect euthymic.    CRANIAL NERVES:  CN I: Not tested.  CN II: Fundoscopic exam deferred.  CN III, IV, VI: Pupils equal, round and reactive to light.  Extraocular movements full and intact.  CN V: Facial sensation normal.  CN VII: Facial asymmetry noted for left facial droop.  CN VIII: Hearing grossly normal and equal bilaterally.  No skew deviation or pathologic nystagmus.  CN IX, X: Palate elevates symmetrically. Speech/articulation is dysarthric.  CN XI: Shoulder shrug and chin rotation equal with good strength.  CN XII: Tongue protrusion midline.    MOTOR:  Bulk normal. Tone normal and symmetric throughout.  Abnormal movements absent.  Tremor: none present.  Strength 5/5 in RUE and RLE. 3+/5 LUE and LLE.    REFLEXES:  DTRs 2+ throughout.  Plantar response downgoing bilaterally.  SENSATION: grossly intact throughout.  COORDINATION: normal finger-to-nose.  STATION: not tested.  GAIT: not tested.      NIHSS:  1a      Level of Consciousness (alert, drowsy, etc.):   0=alert; keenly responsive  1b.     Level of Consciousness Questions (month, age): 0= able to answer both questions  1c.     Level of Consciousness Commands (open, close eyes, make fist, let go):  0=Answers both tasks correctly  2.      Best Gaze (eyes open - patient follows examiner's finger or face):      0=normal  3.      Visual (introduce visual stimulus/threat to patient's visual field quadrants):  0=No visual loss  4.      Facial Palsy (show teeth, raise eyebrows  and squeeze eyes shut):        1=Minor paralysis (flattened nasolabial fold, asymmetric on smiling)  5a.     Motor Arm - Left (elevate extremity 90 degrees and score drift/movement):       2=Some effort against gravity, limb cannot get to or maintain (if cured) 90 (or 45) degrees, drifts down to bed, but has some effort against gravity  5b.     Motor Arm - Right (elevate extremity 90 degrees and score drift/movement):      0=No drift, limb holds 90 (or 45) degrees for full 10 seconds  6a.     Motor Leg - Left (elevate extremity 30 degrees and score drift/movement):       2=Some effort against gravity, limb cannot get to or maintain (if cured) 90 (or 45) degrees, drifts down to bed, but has some effort against gravity  6b      Motor Leg - Right (elevate extremity 30 degrees and score drift/movement):      0=No drift, limb holds 90 (or 45) degrees for full 10 seconds  7.      Limb Ataxia (finger-nose, heel down shin):      0=Absent  8.      Sensory (pin prick to face, arm, trunk, and leg - compare side to side):        0=Normal; no sensory loss  9.      Best Language (name items, describe a picture and read sentences):      1=Mild to moderate aphasia; some obvious loss of fluency or facility of comprehension without significant limitation on ideas expressed or form of expression.  10.     Dysarthria (evaluate speech clarity by patient repeating listed words): 1=Mild to moderate, patient slurs at least some words and at worst, can be understood with some difficulty  11.     Extinction and Inattention (use prior testing to identify neglect or double simultaneous stimuli testing):      0=No abnormality          NIH Stroke Scale Total:         7      Labs:  Recent Labs   Lab 07/06/22  0350   *   K 3.3*   CL 97   CO2 25   BUN 6*   CREATININE 0.5      CALCIUM 8.7   PHOS 3.3   MG 2.2     Recent Labs   Lab 07/06/22  0350   WBC 6.18   HGB 12.1   HCT 35.2*        No results for input(s): ALBUMIN, PROT,  BILITOT, ALKPHOS, ALT, AST in the last 168 hours.  Lab Results   Component Value Date    INR 1.5 07/06/2022     Lab Results   Component Value Date    TRIG 78 07/06/2022    HDL 71 07/06/2022    CHOLHDL 28.1 07/06/2022     No results found for: HGBA1C  No results found for: PROTEINCSF, GLUCCSF, WBCCSF    Imaging:  I have reviewed and interpreted the pertinent imaging and lab results.      US Carotid Bilateral  CAROTID DOPPLER ULTRASOUND    CLINICAL DATA:Aphasia    CMS MANDATED QUALITY DATA - CAROTID - 195    All measurements and percent stenosis described below were determined using NASCET criteria or criteria similar to NASCET, as defined by the Society of Radiologists in Ultrasound Consensus Conference, Radiology, 2003    FINDINGS: Grayscale, color, and spectral Doppler analysis was performed.    Mild calcified plaque is noted in both carotid systems.    Peak systolic velocity in the right ICA is 88 cm/s, with ICA/CCA  systolic ratio of 1.1.    Peak systolic velocity in the left ICA is 104 cm/s, with ICA/CCA systolic ratio of 1.2.    Antegrade flow is noted in both vertebral arteries.    IMPRESSION:    1. No hemodynamically significant stenosis.  2. Antegrade flow in both vertebral arteries.    Electronically signed by:  Matt Serrano MD  7/6/2022 7:11 AM CDT Workstation: [id number]         ASSESSMENT & PLAN:    Acute ischemic stroke    Workup  · CTH: No acute intracranial abnormality.   · CTA head and neck:  No intracranial large vessel occlusion.  Right MCA is not well opacified compared to the left MCA.  Right ICA occlusion proximally at the origin and reconstitution of low back cavernous/supraclinoid segments with significant dilated and tortuous anatomy. 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 Previous clip involving the right supraclinoid ICA.   · ECHO:  pending   · LDL: 166  · HbA1c: pending       Plan  · Admitted for  further stroke workup  · Etiology:  Further workup pending  · Holding antiplatelet/anticoagulation therapy in the setting of tPA for 24 hours.  Continue with Lipitor 80mg  · BP goal less than 180/100 for post tPA care.  · Repeat CT head in 24 hours from tPA to look for any hemorrhage.  If no hemorrhage, start patient with home aspirin 81 mg daily and add plavix 75mg. DAPT for 3 weeks followed by Plavix alone  · PT OT  · Speech therapy  · DVT prophylaxis with chemo/SCD prophylaxis  · Discussed lifestyle modifications as prophylactic measures for stroke prevention including smoking cessation, adequate blood pressure management, healthy diet and regular exercise.        Thank you kindly for including us in the care of this patient. Please do not hesitate to contact us with any questions.      Critical Care:  48 minutes of critical care time has been spent evaluating with the patient. Time includes chart review not limited to diagnostic imaging, labs, and vitals, patient assessment, discussion with family and nursing, current order evaluations, and new order entries.       Fabio Joyce MD  Neurology/vascular Neurology  Date of Service: 07/06/2022  10:13 AM    --------------------------------------------------------------------------------------------------------------------------------------------------------------------------------------------------------------------------------------------------------------  Please note: This note was transcribed using voice recognition software. Because of this technology there are often uinintended grammatical, spelling, and other transcription errors. Please disregard these errors.

## 2022-07-06 NOTE — HPI
[age] years old female with past medical history of CVA in the past with residual left-sided weakness hypertension she was taken to outside facility ER due to episode of aphasia there was weakness said by her son when it 1st started once patient arrived at the ER she had CT of the head done that did not show any signs of acute pathologies so she received tPA for possible new stroke and also neurologist on-call  was consulted patient was transferred to our facility she denies chest pain shortness a breath recent fever or UTI or upper respiratory infections like symptoms.

## 2022-07-06 NOTE — PLAN OF CARE
Critical access hospital  Initial Discharge Assessment       Primary Care Provider: Primary Doctor No    Admission Diagnosis: CVA (cerebral vascular accident) [I63.9]    Admission Date: 7/6/2022    Discharge assessment completed at bedside with pt and pt's son. Pt aphasic with slurred speech. Information verified as correct on facesheet. Pt's son denies HH/HD. Pt currently has a wheelchair, joseph walker, cane, and raised toilet seats. Ambulates with gait belt and assistance from son. Discharge plan is home with son- son to provide transportation at discharge. No discharge needs identified at this time.      Discharge Barriers Identified: (P) None    Payor: [employer] MANAGED MEDICARE / Plan: [employer] TOTAL CARE ADVANTAGE / Product Type: Medicare Advantage /     Extended Emergency Contact Information  Primary Emergency Contact: David Reza  Mobile Phone: [phone]  Relation: Son  Preferred language: English   needed? No    Discharge Plan A: (P) Home with family  Discharge Plan B: (P) Home with family, Home Health    No Pharmacies Listed    Initial Assessment (most recent)       Adult Discharge Assessment - 07/06/22 1532          Discharge Assessment    Assessment Type Discharge Planning Assessment (P)      Confirmed/corrected address, phone number and insurance Yes (P)      Confirmed Demographics Correct on Facesheet (P)      Source of Information patient;family (P)      If unable to respond/provide information was family/caregiver contacted? Yes (P)      Contact Name/Number David judd, [phone] (P)      Reason For Admission slurred speech/stroke like symptoms (P)      Lives With child(sandi), adult (P)      Facility Arrived From: home (P)      Do you expect to return to your current living situation? Yes (P)      Do you have help at home or someone to help you manage your care at home? Yes (P)      Who are your caregiver(s) and their phone number(s)? David judd, [phone] (P)      Prior to  hospitilization cognitive status: Alert/Oriented (P)      Current cognitive status: Alert/Oriented (P)      Walking or Climbing Stairs Difficulty transferring difficulty, assistance 1 person;ambulation difficulty, assistance 1 person (P)      Dressing/Bathing Difficulty bathing difficulty, requires equipment (P)      Equipment Currently Used at Home raised toilet;wheelchair;walker, standard;walker, joseph (P)      Readmission within 30 days? No (P)      Patient currently being followed by outpatient case management? No (P)      Do you currently have service(s) that help you manage your care at home? No (P)      Do you take prescription medications? Yes (P)      Do you have prescription coverage? Yes (P)      Coverage [employer] (P)      Do you have any problems affording any of your prescribed medications? No (P)      Is the patient taking medications as prescribed? yes (P)      Who is going to help you get home at discharge? David judd, [phone] (P)      How do you get to doctors appointments? family or friend will provide (P)      Are you on dialysis? No (P)      Do you take coumadin? No (P)      Discharge Plan A Home with family (P)      Discharge Plan B Home with family;Home Health (P)      DME Needed Upon Discharge  other (see comments) (P)    TBD    Discharge Plan discussed with: Patient;Adult children (P)      Discharge Barriers Identified None (P)

## 2022-07-06 NOTE — PT/OT/SLP EVAL
Occupational Therapy   Evaluation    Name: Shannan Reza  MRN: [id number]  Admitting Diagnosis:  Aphasia  Recent Surgery: * No surgery found *      Recommendations:     Discharge Recommendations: home  Discharge Equipment Recommendations:  none  Barriers to discharge:  None    Assessment:     Shannan Reza is a 71 y.o. female with a medical diagnosis of Aphasia.   Performance deficits affecting function: weakness, impaired endurance, impaired self care skills, impaired functional mobilty, gait instability, impaired balance, abnormal tone, decreased ROM, impaired coordination, impaired fine motor, decreased upper extremity function, decreased safety awareness.      Rehab Prognosis: Fair; patient would benefit from acute skilled OT services to address these deficits and reach maximum level of function.       Plan:     Patient to be seen 3 x/week to address the above listed problems via self-care/home management, therapeutic activities, therapeutic exercises, neuromuscular re-education, cognitive retraining  · Plan of Care Expires: 08/05/22  · Plan of Care Reviewed with: patient    Subjective     Chief Complaint: speech imapairment  Patient/Family Comments/goals: return home    Occupational Profile:  Pt livse in a 2 story home with son; ramp to enter; pt stays on 1st floor; walk-in shower with seat  Previous level of function: Uses power chair for mobility; reports being Mod (I) with ADLs including bathing/dressing; pt does not drive; son reports that pt does not ambulate much but is independent with bed mobility and transfers; pt's son provides HHA with ambulation as needed and is with the patient 24/7.    Pain/Comfort:  · Pain Rating 1: 0/10  · Pain Rating Post-Intervention 1: 0/10    Objective:     Communicated with: nurse prior to session.  Patient found HOB elevated with telemetry, pulse ox (continuous), peripheral IV, blood pressure cuff upon OT entry to room.    General Precautions: Standard, fall, aspiration      Occupational Performance:    Functional Mobility/Transfers:  Pt declined; eating lunch     Activities of Daily Living:  · Feeding:  independence      Cognitive/Visual Perceptual:  Cognitive/Psychosocial Skills:  Pt follows simple commands and answers yes/no questions with 100% accuracy    Physical Exam:  Pt has baseline LUE weakness and ROM limitations from prior CVA and pt also reports an old injury to the left shoulder; pt is not able to raise the left arm against gravity but can use the left hand as an assist during feeding  RUE AROM, strength, and coordination are WNL    Treatment & Education:  Pt educated on OT role/POC  Education:    Patient left HOB elevated with all lines intact, call button in reach and bed alarm on    GOALS:   Multidisciplinary Problems     Occupational Therapy Goals        Problem: Occupational Therapy    Goal Priority Disciplines Outcome Interventions   Occupational Therapy Goal     OT, PT/OT     Description: Goals to be met by: d/c     Patient will increase functional independence with ADLs by performing:    UE Dressing with Stand-by Assistance.  LE Dressing with Stand-by Assistance.  Grooming while seated at sink with Supervision.  Toileting from bedside commode with Stand-by Assistance for hygiene and clothing management.   Toilet transfer to bedside commode with Stand-by Assistance.                     History:     No past medical history on file.    No past surgical history on file.    Time Tracking:     OT Date of Treatment: 07/06/22  OT Start Time: 1221  OT Stop Time: 1229  OT Total Time (min): 8 min    Billable Minutes:Evaluation 08 7/6/2022

## 2022-07-06 NOTE — HOSPITAL COURSE
Patient was admitted with new onset stroke with aphasia status post tPA.  Patient had history of stroke in the same  left side and difficult to determine the extent the weakness  On examination he has expressive aphasia and cannot read the sentence.  Blood pressure in range status post tPA patient  MRI not able to do because of metal in the body.  CTA ordered

## 2022-07-06 NOTE — ASSESSMENT & PLAN NOTE
Likely due to stroke she is status post tPA  We will continue to follow Dr. Joyce recommendations  Labetalol IV 10 mg p.r.n. for blood pressure go of 180/105 or less  MRI of the brain echo lipid panel TSH A1c vitamin B12  PT OT speech therapy  BMP CBC magnesium phosphorus now  CT head in 24 hours after tPA  Thiamine IV 1 mg a day study now multivitamins folic acid  Bedside swallow eval

## 2022-07-06 NOTE — SUBJECTIVE & OBJECTIVE
Interval History:     Review of Systems  As per subjective   Objective:     Vital Signs (Most Recent):  Temp: 98.4 °F (36.9 °C) (07/06/22 1101)  Pulse: 80 (07/06/22 1400)  Resp: (!) 33 (07/06/22 1400)  BP: (!) 176/72 (07/06/22 1400)  SpO2: 96 % (07/06/22 1400)   Vital Signs (24h Range):  Temp:  [98 °F (36.7 °C)-98.4 °F (36.9 °C)] 98.4 °F (36.9 °C)  Pulse:  [] 80  Resp:  [14-36] 33  SpO2:  [94 %-98 %] 96 %  BP: (125-198)/() 176/72     Weight: 64.1 kg (141 lb 5 oz)  Body mass index is 24.26 kg/m².    Intake/Output Summary (Last 24 hours) at 7/6/2022 1458  Last data filed at 7/6/2022 1300  Gross per 24 hour   Intake 100 ml   Output 1800 ml   Net -1700 ml      Physical Exam  Vitals and nursing note reviewed.   HENT:      Head: Normocephalic and atraumatic.   Eyes:      Conjunctiva/sclera: Conjunctivae normal.   Neck:      Vascular: No JVD.   Cardiovascular:      Heart sounds: Normal heart sounds.   Pulmonary:      Effort: Pulmonary effort is normal.      Breath sounds: Normal breath sounds.   Abdominal:      Palpations: Abdomen is soft.   Skin:     General: Skin is warm.   Neurological:      Mental Status: She is alert and oriented to person, place, and time.   Psychiatric:         Mood and Affect: Mood normal.       Significant Labs: All pertinent labs within the past 24 hours have been reviewed.  BMP:   Recent Labs   Lab 07/06/22  0350      *   K 3.3*   CL 97   CO2 25   BUN 6*   CREATININE 0.5   CALCIUM 8.7   MG 2.2     CBC:   Recent Labs   Lab 07/06/22  0350   WBC 6.18   HGB 12.1   HCT 35.2*        CMP:   Recent Labs   Lab 07/06/22  0350   *   K 3.3*   CL 97   CO2 25      BUN 6*   CREATININE 0.5   CALCIUM 8.7   ANIONGAP 9   EGFRNONAA >60.0       Significant Imaging: I have reviewed all pertinent imaging results/findings within the past 24 hours.

## 2022-07-06 NOTE — RESPIRATORY THERAPY
07/06/22 0244   PRE-TX-O2   O2 Device (Oxygen Therapy) room air   SpO2 95 %   Pulse Oximetry Type Continuous   $ Pulse Oximetry - Multiple Charge Pulse Oximetry - Multiple   Pulse 97   Resp (!) 28   Education   $ Education 15 min   Respiratory Evaluation   $ Care Plan Tech Time 15 min

## 2022-07-07 ENCOUNTER — CLINICAL SUPPORT (OUTPATIENT)
Dept: CARDIOLOGY | Facility: HOSPITAL | Age: 72
DRG: 065 | End: 2022-07-07
Attending: INTERNAL MEDICINE
Payer: MEDICARE

## 2022-07-07 LAB
ANION GAP SERPL CALC-SCNC: 7 MMOL/L (ref 8–16)
BSA FOR ECHO PROCEDURE: 1.7 M2
BUN SERPL-MCNC: 7 MG/DL (ref 8–23)
CALCIUM SERPL-MCNC: 9 MG/DL (ref 8.7–10.5)
CHLORIDE SERPL-SCNC: 104 MMOL/L (ref 95–110)
CO2 SERPL-SCNC: 27 MMOL/L (ref 23–29)
CREAT SERPL-MCNC: 0.5 MG/DL (ref 0.5–1.4)
EST. GFR  (AFRICAN AMERICAN): >60 ML/MIN/1.73 M^2
EST. GFR  (NON AFRICAN AMERICAN): >60 ML/MIN/1.73 M^2
ESTIMATED AVG GLUCOSE: 103 MG/DL (ref 68–131)
GLUCOSE SERPL-MCNC: 108 MG/DL (ref 70–110)
HBA1C MFR BLD: 5.2 % (ref 4.5–6.2)
MAGNESIUM SERPL-MCNC: 2.2 MG/DL (ref 1.6–2.6)
PHOSPHATE SERPL-MCNC: 2.9 MG/DL (ref 2.7–4.5)
POTASSIUM SERPL-SCNC: 3.4 MMOL/L (ref 3.5–5.1)
SODIUM SERPL-SCNC: 138 MMOL/L (ref 136–145)

## 2022-07-07 PROCEDURE — 25000003 PHARM REV CODE 250: Performed by: INTERNAL MEDICINE

## 2022-07-07 PROCEDURE — 25000003 PHARM REV CODE 250

## 2022-07-07 PROCEDURE — 93308 TTE F-UP OR LMTD: CPT | Mod: 26,,, | Performed by: INTERNAL MEDICINE

## 2022-07-07 PROCEDURE — 83735 ASSAY OF MAGNESIUM: CPT | Performed by: INTERNAL MEDICINE

## 2022-07-07 PROCEDURE — 99900031 HC PATIENT EDUCATION (STAT)

## 2022-07-07 PROCEDURE — 36415 COLL VENOUS BLD VENIPUNCTURE: CPT | Performed by: INTERNAL MEDICINE

## 2022-07-07 PROCEDURE — 80048 BASIC METABOLIC PNL TOTAL CA: CPT | Performed by: INTERNAL MEDICINE

## 2022-07-07 PROCEDURE — 84100 ASSAY OF PHOSPHORUS: CPT | Performed by: INTERNAL MEDICINE

## 2022-07-07 PROCEDURE — 94761 N-INVAS EAR/PLS OXIMETRY MLT: CPT

## 2022-07-07 PROCEDURE — 63600175 PHARM REV CODE 636 W HCPCS: Performed by: INTERNAL MEDICINE

## 2022-07-07 PROCEDURE — 97530 THERAPEUTIC ACTIVITIES: CPT

## 2022-07-07 PROCEDURE — 93308 ECHO (CUPID ONLY): ICD-10-PCS | Mod: 26,,, | Performed by: INTERNAL MEDICINE

## 2022-07-07 PROCEDURE — 99900035 HC TECH TIME PER 15 MIN (STAT)

## 2022-07-07 PROCEDURE — 92523 SPEECH SOUND LANG COMPREHEN: CPT

## 2022-07-07 PROCEDURE — 20000000 HC ICU ROOM

## 2022-07-07 PROCEDURE — 93308 TTE F-UP OR LMTD: CPT

## 2022-07-07 RX ORDER — NAPROXEN SODIUM 220 MG/1
81 TABLET, FILM COATED ORAL DAILY
Status: DISCONTINUED | OUTPATIENT
Start: 2022-07-07 | End: 2022-07-08

## 2022-07-07 RX ORDER — CYANOCOBALAMIN 1000 UG/ML
1000 INJECTION, SOLUTION INTRAMUSCULAR; SUBCUTANEOUS
Status: DISCONTINUED | OUTPATIENT
Start: 2022-07-07 | End: 2022-07-09 | Stop reason: HOSPADM

## 2022-07-07 RX ORDER — CHLORDIAZEPOXIDE HYDROCHLORIDE 5 MG/1
5 CAPSULE, GELATIN COATED ORAL 3 TIMES DAILY
Status: DISCONTINUED | OUTPATIENT
Start: 2022-07-07 | End: 2022-07-09 | Stop reason: HOSPADM

## 2022-07-07 RX ORDER — LABETALOL HYDROCHLORIDE 5 MG/ML
10 INJECTION, SOLUTION INTRAVENOUS
Status: DISCONTINUED | OUTPATIENT
Start: 2022-07-07 | End: 2022-07-09 | Stop reason: HOSPADM

## 2022-07-07 RX ORDER — CHLORDIAZEPOXIDE HYDROCHLORIDE 5 MG/1
5 CAPSULE, GELATIN COATED ORAL 3 TIMES DAILY
Status: DISCONTINUED | OUTPATIENT
Start: 2022-07-07 | End: 2022-07-07

## 2022-07-07 RX ORDER — CLOPIDOGREL BISULFATE 75 MG/1
75 TABLET ORAL DAILY
Status: DISCONTINUED | OUTPATIENT
Start: 2022-07-07 | End: 2022-07-08

## 2022-07-07 RX ADMIN — POTASSIUM CHLORIDE 40 MEQ: 1500 TABLET, EXTENDED RELEASE ORAL at 06:07

## 2022-07-07 RX ADMIN — CLOPIDOGREL BISULFATE 75 MG: 75 TABLET, FILM COATED ORAL at 09:07

## 2022-07-07 RX ADMIN — CYANOCOBALAMIN 1000 MCG: 1000 INJECTION, SOLUTION INTRAMUSCULAR at 09:07

## 2022-07-07 RX ADMIN — FOLIC ACID 1 MG: 1 TABLET ORAL at 09:07

## 2022-07-07 RX ADMIN — ATORVASTATIN CALCIUM 40 MG: 40 TABLET, FILM COATED ORAL at 09:07

## 2022-07-07 RX ADMIN — ASPIRIN 81 MG CHEWABLE TABLET 81 MG: 81 TABLET CHEWABLE at 09:07

## 2022-07-07 RX ADMIN — MUPIROCIN: 20 OINTMENT TOPICAL at 09:07

## 2022-07-07 RX ADMIN — CHLORHEXIDINE GLUCONATE 15 ML: 1.2 RINSE ORAL at 09:07

## 2022-07-07 RX ADMIN — THERA TABS 1 TABLET: TAB at 09:07

## 2022-07-07 RX ADMIN — THIAMINE HYDROCHLORIDE 100 MG: 100 INJECTION, SOLUTION INTRAMUSCULAR; INTRAVENOUS at 09:07

## 2022-07-07 RX ADMIN — CEFTRIAXONE 1 G: 1 INJECTION, POWDER, FOR SOLUTION INTRAMUSCULAR; INTRAVENOUS at 09:07

## 2022-07-07 RX ADMIN — LABETALOL HYDROCHLORIDE 10 MG: 5 INJECTION, SOLUTION INTRAVENOUS at 04:07

## 2022-07-07 RX ADMIN — CHLORDIAZEPOXIDE HYDROCHLORIDE 5 MG: 5 CAPSULE ORAL at 08:07

## 2022-07-07 NOTE — PT/OT/SLP PROGRESS
Physical Therapy Treatment    Patient Name:  Shannan Reza   MRN:  [id number]    Recommendations:     Discharge Recommendations:  home   Discharge Equipment Recommendations: none   Barriers to discharge: increase assist with mobility     Assessment:     Shannan Reza is a 71 y.o. female admitted with a medical diagnosis of Aphasia.  She presents with the following impairments/functional limitations:  weakness, impaired endurance, impaired functional mobilty, impaired self care skills, gait instability, impaired balance, decreased lower extremity function, decreased safety awareness, impaired cardiopulmonary response to activity.    Pt found up in chair, son present throughout session. Son verbalizes pt is at baseline and no concerns with caring for pt at home. Sit to stand transfer from chair, min A to decrease fall risk, no AD. Pt used proper hand placement to transfer, did not want assistance to stand. Pt performed stand pivot from chair to bed CGA, no AD, HHA. Son assisted with chair to bed transfer, CGA. Pt independent with seated scoot back in bed, required min A from son once supine to position better at HOB. Son reports feeling confident and comfortable to care for mom and perform transfers.     Rehab Prognosis: Fair; patient would benefit from acute skilled PT services to address these deficits and reach maximum level of function.    Recent Surgery: * No surgery found *      Plan:     During this hospitalization, patient to be seen 5 x/week to address the identified rehab impairments via gait training, therapeutic activities, therapeutic exercises, neuromuscular re-education and progress toward the following goals:    · Plan of Care Expires:  08/07/22    Subjective     Chief Complaint: none stated   Patient/Family Comments/goals: go home   Pain/Comfort:  · Pain Rating 1: 0/10      Objective:     Communicated with RN prior to session.  Patient found up in chair with telemetry, pulse ox (continuous),  peripheral IV, blood pressure cuff upon PT entry to room.     General Precautions: Standard, fall   Orthopedic Precautions:N/A   Braces: N/A  Respiratory Status: Room air     Functional Mobility:  · Bed Mobility:     · Scooting: minimum assistance  · Sit to Supine: minimum assistance  · Transfers:     · Bed to Chair: contact guard assistance with  hand-held assist  using  Stand Pivot      AM-PAC 6 CLICK MOBILITY          Therapeutic Activities and Exercises:  Pt educated on discharge from skilled PT as pt is at baseline, importance of time out of bed, assistance with transfers, need for use of call bell for assistance and to prevent falls, need for assistance with mobility, and discharge recommendations       Patient left HOB elevated with all lines intact, call button in reach and son present..    GOALS:   Multidisciplinary Problems     Physical Therapy Goals        Problem: Physical Therapy    Goal Priority Disciplines Outcome Goal Variances Interventions   Physical Therapy Goal     PT, PT/OT      Description: Goals to be met by: discharge      Patient will increase functional independence with mobility by performin. Supine to sit with Stand-by Assistance  2. Sit to supine with Stand-by Assistance  3. Sit to stand transfer with Stand-by Assistance  4. Bed to chair transfer with Stand-by Assistance using least restrictive assistive device.                      Time Tracking:     PT Received On: 22  PT Start Time: 1305     PT Stop Time: 1318  PT Total Time (min): 13 min     Billable Minutes: Therapeutic Activity 13    Treatment Type: Treatment  PT/PTA: PT     PTA Visit Number: 0     2022

## 2022-07-07 NOTE — PROGRESS NOTES
[facility]  Department of Neurology  Neurology Progress Note        PATIENT NAME: Shannan Reza  MRN: [id number]  CSN: [id number]      TODAY'S DATE: 07/07/2022  ADMIT DATE: 7/6/2022                            CONSULTING PROVIDER: Gudelia Hernandez DNP  CONSULT REQUESTED BY: Zechariah Dia MD      Reason for consult: CVA       History obtained from chart review and the patient.    HPI: Shannan Reza is a 71 y.o. female with a history of hypertension, CVA with residual left-sided weakness, hypertension, initially presented to an outside ER with episode of aphasia and right-sided weakness.    She was evaluated in the ER, CT head was done did not reveal any acute abnormality.  She received tPA at outside hospital.  I was contacted by the transfer center after patient received tPA for transfer to post tPA care.  Patient was transferred to [facility] ICU for post tPA care and workup and management for stroke.    On exam, she has LUE and LLE weakness, aphasia. She is not able to provide much history due to her aphasia    7/7: Pt seen and examined. POC discussed with Dr. MELINA Camara. Pt's son at bedside. Pt reports improvement in her weakness and speech. She is sitting up eating dinner and verbally responsive.           PREVIOUS MEDICAL HISTORY:  Past Medical History:   Diagnosis Date    Hypertension      PREVIOUS SURGICAL HISTORY:  No past surgical history on file.  FAMILY MEDICAL HISTORY:  No family history on file.  SOCIAL HISTORY:     ALLERGIES:  Review of patient's allergies indicates:   Allergen Reactions    Codeine Itching     HOME MEDICATIONS:  Prior to Admission medications    Not on File     CURRENT SCHEDULED MEDICATIONS:   aspirin  81 mg Oral Daily    atorvastatin  40 mg Oral QHS    chlorhexidine  15 mL Mouth/Throat BID    clopidogreL  75 mg Oral Daily    folic acid  1 mg Oral Daily    multivitamin  1 tablet Oral Daily    mupirocin   Nasal BID    thiamine (VITAMIN B1)  IVPB  100 mg Intravenous Daily     CURRENT INFUSIONS:    CURRENT PRN MEDICATIONS:  calcium chloride IVPB, calcium chloride IVPB, calcium chloride IVPB, calcium gluconate IVPB, calcium gluconate IVPB, calcium gluconate IVPB, labetalol, magnesium oxide, magnesium sulfate IVPB, magnesium sulfate IVPB, magnesium sulfate IVPB, magnesium sulfate IVPB, magnesium sulfate IVPB, magnesium sulfate IVPB, potassium chloride in water, potassium chloride in water, potassium chloride in water, potassium chloride in water, potassium chloride, potassium chloride, potassium chloride, potassium chloride, sodium chloride 0.9%, sodium phosphate IVPB, sodium phosphate IVPB, sodium phosphate IVPB, sodium phosphate IVPB, sodium phosphate IVPB, sodium phosphate IVPB, sodium phosphate IVPB, sodium phosphate IVPB    REVIEW OF SYSTEMS:  Please refer to the HPI for all pertinent positive and negative findings. A comprehensive review of all other systems was negative.       PHYSICAL EXAM:  Patient Vitals for the past 24 hrs:   BP Temp Temp src Pulse Resp SpO2   07/07/22 1542 (!) 166/73 98.9 °F (37.2 °C) -- 90 (!) 33 96 %   07/07/22 1245 (!) 142/102 -- -- 101 (!) 36 96 %   07/07/22 1215 -- 98.9 °F (37.2 °C) -- -- -- --   07/07/22 1200 (!) 172/89 -- -- 98 (!) 25 (!) 94 %   07/07/22 1100 (!) 172/65 -- -- 94 (!) 29 97 %   07/07/22 1000 (!) 159/67 -- -- 94 (!) 33 97 %   07/07/22 0953 -- -- -- 89 (!) 25 98 %   07/07/22 0901 (!) 166/70 99 °F (37.2 °C) -- 86 20 97 %   07/07/22 0900 (!) 166/70 -- -- 86 (!) 23 97 %   07/07/22 0800 (!) 153/65 -- -- 90 (!) 33 98 %   07/07/22 0700 (!) 167/74 -- -- 75 (!) 25 98 %   07/07/22 0400 (!) 170/69 -- -- 80 18 97 %   07/07/22 0301 (!) 141/61 99.1 °F (37.3 °C) Oral 68 18 97 %   07/07/22 0300 (!) 141/61 -- -- 73 20 96 %   07/07/22 0200 (!) 154/66 -- -- 75 18 96 %   07/07/22 0100 (!) 129/58 -- -- 72 20 95 %   07/07/22 0000 (!) 151/63 -- -- 76 18 95 %   07/06/22 2301 -- 98.9 °F (37.2 °C) Oral 79 18 97 %   07/06/22 2300 (!)  181/72 -- -- 81 18 (!) 91 %   07/06/22 2200 (!) 165/70 -- -- 79 20 96 %   07/06/22 2100 (!) 165/67 -- -- 80 19 95 %   07/06/22 2000 (!) 144/65 -- -- 85 20 96 %   07/06/22 1935 136/62 -- -- 80 18 97 %   07/06/22 1901 (!) 129/59 99 °F (37.2 °C) Oral 83 20 96 %   07/06/22 1900 (!) 129/59 -- -- 83 18 96 %       GENERAL APPEARANCE: Alert, well-developed, well-nourished female in no acute distress.  HEENT: Normocephalic and atraumatic. PERRL. Oropharynx unremarkable.  PULM: Normal respiratory effort. No accessory muscle use.  CV: RRR.  ABDOMEN: Soft, nontender.  EXTREMITIES: No obvious signs of vascular compromise. Pulses present. No cyanosis, clubbing or edema.  SKIN: Clear; no rashes, lesions or skin breaks in exposed areas.    NEURO:  MENTAL STATUS: Patient awake and oriented to time, place, and person, recent/remote memory normal, attention span/concentration normal and fund of knowledge appropriate for patient's level of education.  Affect euthymic.    CRANIAL NERVES:  CN I: Not tested.  CN II: Fundoscopic exam deferred.  CN III, IV, VI: Pupils equal, round and reactive to light.  Extraocular movements full and intact.  CN V: Facial sensation normal.  CN VII: Facial asymmetry noted for left facial droop.  CN VIII: Hearing grossly normal and equal bilaterally.  No skew deviation or pathologic nystagmus.  CN IX, X: Palate elevates symmetrically. Speech/articulation is dysarthric.  CN XI: Shoulder shrug and chin rotation equal with good strength.  CN XII: Tongue protrusion midline.    MOTOR:  Bulk normal. Tone normal and symmetric throughout.  Abnormal movements absent.  Tremor: none present.  Strength 5/5 in RUE and RLE. 3+/5 LUE and LLE.    REFLEXES:  DTRs 2+ throughout.  Plantar response downgoing bilaterally.  SENSATION: grossly intact throughout.  COORDINATION: normal finger-to-nose.  STATION: not tested.  GAIT: not tested.      NIHSS:  1a      Level of Consciousness (alert, drowsy, etc.):   0=alert; keenly  responsive  1b.     Level of Consciousness Questions (month, age): 0= able to answer both questions  1c.     Level of Consciousness Commands (open, close eyes, make fist, let go):  0=Answers both tasks correctly  2.      Best Gaze (eyes open - patient follows examiner's finger or face):      0=normal  3.      Visual (introduce visual stimulus/threat to patient's visual field quadrants):  0=No visual loss  4.      Facial Palsy (show teeth, raise eyebrows and squeeze eyes shut):        1=Minor paralysis (flattened nasolabial fold, asymmetric on smiling)  5a.     Motor Arm - Left (elevate extremity 90 degrees and score drift/movement):       2=Some effort against gravity, limb cannot get to or maintain (if cured) 90 (or 45) degrees, drifts down to bed, but has some effort against gravity  5b.     Motor Arm - Right (elevate extremity 90 degrees and score drift/movement):      0=No drift, limb holds 90 (or 45) degrees for full 10 seconds  6a.     Motor Leg - Left (elevate extremity 30 degrees and score drift/movement):       2=Some effort against gravity, limb cannot get to or maintain (if cured) 90 (or 45) degrees, drifts down to bed, but has some effort against gravity  6b      Motor Leg - Right (elevate extremity 30 degrees and score drift/movement):      0=No drift, limb holds 90 (or 45) degrees for full 10 seconds  7.      Limb Ataxia (finger-nose, heel down shin):      0=Absent  8.      Sensory (pin prick to face, arm, trunk, and leg - compare side to side):        0=Normal; no sensory loss  9.      Best Language (name items, describe a picture and read sentences):      0=No aphasia, normal  10.     Dysarthria (evaluate speech clarity by patient repeating listed words): 1=Mild to moderate, patient slurs at least some words and at worst, can be understood with some difficulty  11.     Extinction and Inattention (use prior testing to identify neglect or double simultaneous stimuli testing):      0=No abnormality           NIH Stroke Scale Total:         6      Labs:  Recent Labs   Lab 07/06/22  0350 07/06/22  1836 07/07/22  0404   *  --  138   K 3.3* 3.9 3.4*   CL 97  --  104   CO2 25  --  27   BUN 6*  --  7*   CREATININE 0.5  --  0.5     --  108   CALCIUM 8.7  --  9.0   PHOS 3.3  --  2.9   MG 2.2  --  2.2     Recent Labs   Lab 07/06/22  0350   WBC 6.18   HGB 12.1   HCT 35.2*        No results for input(s): ALBUMIN, PROT, BILITOT, ALKPHOS, ALT, AST in the last 168 hours.  Lab Results   Component Value Date    INR 1.5 07/06/2022     Lab Results   Component Value Date    TRIG 78 07/06/2022    HDL 71 07/06/2022    CHOLHDL 28.1 07/06/2022     Lab Results   Component Value Date    HGBA1C 5.2 07/06/2022     No results found for: PROTEINCSF, GLUCCSF, WBCCSF    Imaging:  I have reviewed and interpreted the pertinent imaging and lab results.      Echo Saline Bubble? Yes  · Unable to evaluate for PFO or ASD with bubble study given extremely   limited visualization. If clinical concerns persist consider alternative   imaging.            ASSESSMENT & PLAN:    Acute ischemic stroke    Workup  · CTH: No acute intracranial abnormality.   · CTA head and neck:  No intracranial large vessel occlusion.  Right MCA is not well opacified compared to the left MCA.  Right ICA occlusion proximally at the origin and reconstitution of low back cavernous/supraclinoid segments with significant dilated and tortuous anatomy. 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 Previous clip involving the right supraclinoid ICA.   · ECHO:  EF 70%, Normal diastolic function  · LDL: 166  · HbA1c: 5.2      Plan  · Admitted for further stroke workup  · Etiology:  Further workup pending  · Holding antiplatelet/anticoagulation therapy in the setting of tPA for 24 hours.  Continue with Lipitor 80mg  · BP goal less than 180/100 for post tPA care.  · Repeat CT head Thursday at 1900:  pending ( 24 hours from tPA to look for any hemorrhage) If no hemorrhage, start patient with home aspirin 81 mg daily and add plavix 75mg. DAPT for 3 weeks followed by Plavix alone  · PT OT  · Speech therapy  · DVT prophylaxis with chemo/SCD prophylaxis  · Discussed lifestyle modifications as prophylactic measures for stroke prevention including smoking cessation, adequate blood pressure management, healthy diet and regular exercise.        Thank you kindly for including us in the care of this patient. Please do not hesitate to contact us with any questions.      Critical Care:  36 minutes of critical care time has been spent evaluating with the patient. Time includes chart review not limited to diagnostic imaging, labs, and vitals, patient assessment, discussion with family and nursing, current order evaluations, and new order entries.       Gudelia Hernandez DNP  Neurology/vascular Neurology    --------------------------------------------------------------------------------------------------------------------------------------------------------------------------------------------------------------------------------------------------------------  Please note: This note was transcribed using voice recognition software. Because of this technology there are often uinintended grammatical, spelling, and other transcription errors. Please disregard these errors.

## 2022-07-07 NOTE — CARE UPDATE
07/07/22 0953   PRE-TX-O2   O2 Device (Oxygen Therapy) room air   Pulse Oximetry Type Continuous   $ Pulse Oximetry - Multiple Charge Pulse Oximetry - Multiple   Respiratory Evaluation   $ Care Plan Tech Time 15 min

## 2022-07-07 NOTE — PT/OT/SLP DISCHARGE
Physical Therapy Discharge Summary    Name: Shannan Reza  MRN: [id number]   Principal Problem: Aphasia     Patient Discharged from acute Physical Therapy on 2022.  Please refer to prior PT noted date on 2022 for functional status.     Assessment:     Patient has met all goals and is not appropriate for therapy.    Objective:     GOALS:   Multidisciplinary Problems     Physical Therapy Goals        Problem: Physical Therapy    Goal Priority Disciplines Outcome Goal Variances Interventions   Physical Therapy Goal     PT, PT/OT      Description: Goals to be met by: discharge      Patient will increase functional independence with mobility by performin. Supine to sit with Stand-by Assistance  2. Sit to supine with Stand-by Assistance  3. Sit to stand transfer with Stand-by Assistance  4. Bed to chair transfer with Stand-by Assistance using least restrictive assistive device.                      Reasons for Discontinuation of Therapy Services  Satisfactory goal achievement.      Plan:     Patient Discharged to: Home no PT services needed.      2022

## 2022-07-07 NOTE — PT/OT/SLP EVAL
Speech Language Pathology Evaluation  Cognitive Communication    Patient Name:  Shannan Reza   MRN:  [id number]  Admitting Diagnosis: Aphasia    Recommendations:     Recommendations:                General Recommendations:  Cognitive-linguistic evaluation, motor speech therapy     Diet recommendations:  Regular, Thin     Aspiration Precautions: Assistance with meals   General Precautions: Standard, aphasia  Communication strategies:  none    History:     Past Medical History:   Diagnosis Date    Hypertension      HPI:   [age] years old female with past medical history of CVA in the past with residual left-sided weakness hypertension she was taken to outside facility ER due to episode of aphasia there was weakness said by her son when it 1st started once patient arrived at the ER she had CT of the head done that did not show any signs of acute pathologies so she received tPA for possible new stroke and also neurologist on-call  was consulted patient was transferred to our facility she denies chest pain shortness a breath recent fever or UTI or upper respiratory infections like symptoms.        Past medical history:  Hypertension previous CVA        Past surgical history:  Patient states she had the surgery in the past but is not able to tell me which procedures likely due to aphasia.        Allergies:  From her records she has no known drug allergies however she states that she is allergic to a medication however this time she is not able to speak so we will contact family later.     Medications:  Amlodipine 5 mg a day per records.     Family history:  Patient denies history of stroke in the family     Social history:  Patient quit smoking 4 years ago  Drink 4 beers a day  Denies drug use    Subjective     Pt alert, son present at bedside   Patient goals: none stated      Pain/Comfort:  · Pain Rating 1: 0/10    Respiratory Status: Room air    Objective:     Completed with western aphasia battery  bedside     RECEPTIVE LANGUAGE:   · Y/N questions: 100% acc simple to complex   · Sequential commands: 100% acc simple to complex     EXPRESSIVE LANGUAGE:  · Spontaneous speech, content:   · 10/10   · Spontaneous speech, fluency:   · 10/10  · Normal speech: fluent without hesitation, dysfluency. Able to construct lengthy, organized sentences with appropriate structure.   · Repetition:   · 10/10   · 100% acc progressing from word to sentence   · Object naming:   · 10/10  · Writing:   · 10/10     COGNITIVE COMMUNICATION STATUS:   · Alert, attentive  · No redirection throughout session   · Oriented x4  · Engages in equal topic sharing in conversation; joking, interprets and engages in humor  · Accurately describes events leading to admit     MOTOR SPEECH:   · 100% intelligible  · Mild imprecision     Assessment:   Shannan Reza is a 71 y.o. female with an SLP diagnosis of Dysarthria.      Plan:   · Patient to be seen:  5 x/week   · Plan of Care expires:     · Plan of Care reviewed with:  patient, son   · SLP Follow-Up:  Yes       Discharge recommendations:    OP SLP   Barriers to Discharge:  None    Time Tracking:   SLP Treatment Date:   07/07/22  Speech Start Time:  1115  Speech Stop Time:  1140     Speech Total Time (min):  25 min    Billable Minutes: Eval 25     07/07/2022

## 2022-07-07 NOTE — PT/OT/SLP PROGRESS
Occupational Therapy   Treatment/Discharge    Name: Shannan Reza  MRN: [id number]  Admitting Diagnosis:  Aphasia       Recommendations:     Discharge Recommendations: home  Discharge Equipment Recommendations:  none  Barriers to discharge:  None    Assessment:     Shannan Reza is a 71 y.o. female with a medical diagnosis of Aphasia.   Performance deficits affecting function are weakness, impaired endurance, impaired self care skills, impaired functional mobilty, gait instability, impaired balance, abnormal tone, decreased ROM, impaired coordination, impaired fine motor, decreased upper extremity function, decreased safety awareness.     Pt seen with her son/caregiver present; pt and son feel pt is at her baseline with regards to ADLs/functional mobility; d/c acute OT services.     Plan:     · D/C Acute OT services    Subjective     Pain/Comfort:  · Pain Rating 1: 0/10  · Pain Rating Post-Intervention 1: 0/10    Objective:     Communicated with: nurse prior to session.  Patient found supine with telemetry, pulse ox (continuous), peripheral IV, blood pressure cuff, armstrong catheter upon OT entry to room.    General Precautions: Standard, fall      Occupational Performance:     Bed Mobility:    · Patient completed Supine to Sit with minimum assistance     Functional Mobility/Transfers:  · Pt completed SPT EOB to bedside chair with assist of son     Activities of Daily Living:  · Pt declined; reports no concerns regarding ADL performance and feels she is at her baseliene; son agrees    Treatment & Education:  Discussed pt's bathroom set-up; OT provided education/recommendations on how to increase safety and maximize pt's independence with bathing via environmental modifications; pt and son verbalized understanding     Patient left up in chair with all lines intact, call button in reach and son presentEducation:      Time Tracking:     OT Date of Treatment: 07/07/22  OT Start Time: 1234  OT Stop Time: 1301  OT Total  Time (min): 27 min    Billable Minutes:Therapeutic Activity 27    OT/LEONARD: OT          7/7/2022

## 2022-07-07 NOTE — PLAN OF CARE
Problem: Physical Therapy  Goal: Physical Therapy Goal  Description: Goals to be met by: discharge      Patient will increase functional independence with mobility by performin. Supine to sit with Stand-by Assistance  2. Sit to supine with Stand-by Assistance  3. Sit to stand transfer with Stand-by Assistance  4. Bed to chair transfer with Stand-by Assistance using least restrictive assistive device.     Outcome: Met

## 2022-07-07 NOTE — PROGRESS NOTES
"[facility] Medicine  Progress Note    Patient Name: Shannan Reza  MRN: [id number]  Patient Class: IP- Inpatient   Admission Date: 7/6/2022  Length of Stay: 1 days  Attending Physician: Zechariah Dia MD  Primary Care Provider: Primary Doctor No        Subjective:     Principal Problem:Aphasia        HPI:    [age] years old female with past medical history of CVA in the past with residual left-sided weakness hypertension she was taken to outside facility ER due to episode of aphasia there was weakness said by her son when it 1st started once patient arrived at the ER she had CT of the head done that did not show any signs of acute pathologies so she received tPA for possible new stroke and also neurologist on-call  was consulted patient was transferred to our facility she denies chest pain shortness a breath recent fever or UTI or upper respiratory infections like symptoms.      Overview/Hospital Course:  No neurologic changes must 24 hours.  Blood pressure goal.  Neurosurgery consult pending regarding abnormal CTA of the head, " Appearance of significant dilated and tortuous right cavernous and supraclinoid internal carotid artery. 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 There is no obvious enlargement of the ophthalmic vein but there is a venous structure that extends inferiorly posterior to the right clivus and possibly towards the right internal jugular vein. Suggest neurosurgical and neuro interventional radiology follow-up".  Family at bedside states patient drinks 8-12 beers a day, she is interested in quitting  No new subjective & objective note has been filed under this hospital service since the last note was generated.      Assessment/Plan:       New onset persistent  Aphasia with possible new CVA  Previous history of left CVA       PLAN   - status post tPA and keep BP in range   -Labetalol IV 10 mg p.r.n. " "for blood pressure go of 180/105 or less  -MRI of the brain cannot be done because of metal . CTA brain with " Appearance of significant dilated and tortuous right cavernous and supraclinoid internal carotid artery. 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 There is no obvious enlargement of the ophthalmic vein but there is a venous structure that extends inferiorly posterior to the right clivus and possibly towards the right internal jugular vein."-f/u NS  Echo with bubble with poor image quality  PT OT speech therapy  Bedside swallow eval and advance diet   Continue care in ICU   D/w RN.  Start Librium.  Nutritional support for alcohol abuse.  DAPT x 3 weeks, then Plavix thereafter for stroke occuring while on aspirin.  DVT prophylaxis    B12 deficiency-replace, monitor as OP  Possible UTI . But no symptoms.  Will treat InCase of re-expression of old stroke.      VTE Risk Mitigation (From admission, onward)         Ordered     IP VTE HIGH RISK PATIENT  Once         07/06/22 0305     Place sequential compression device  Until discontinued         07/06/22 0305                Discharge Planning   SHAJI: 7/8/2022     Code Status: Full Code   Is the patient medically ready for discharge?:     Reason for patient still in hospital (select all that apply): Treatment  Discharge Plan A: Home with family                  Zechariah Dia MD  Department of Hospital Medicine   [facility]  "

## 2022-07-07 NOTE — PLAN OF CARE
Problem: Adult Inpatient Plan of Care  Goal: Plan of Care Review  Outcome: Ongoing, Progressing  Goal: Patient-Specific Goal (Individualized)  Outcome: Ongoing, Progressing  Goal: Absence of Hospital-Acquired Illness or Injury  Outcome: Ongoing, Progressing  Goal: Optimal Comfort and Wellbeing  Outcome: Ongoing, Progressing  Goal: Readiness for Transition of Care  Outcome: Ongoing, Progressing     Problem: Fall Injury Risk  Goal: Absence of Fall and Fall-Related Injury  Outcome: Ongoing, Progressing     Problem: Skin Injury Risk Increased  Goal: Skin Health and Integrity  Outcome: Ongoing, Progressing     Problem: Adjustment to Illness (Stroke, Ischemic/Transient Ischemic Attack)  Goal: Optimal Coping  Outcome: Ongoing, Progressing     Problem: Functional Ability Impaired (Stroke, Ischemic/Transient Ischemic Attack)  Goal: Optimal Functional Ability  Outcome: Ongoing, Progressing     Problem: Swallowing Impairment (Stroke, Ischemic/Transient Ischemic Attack)  Goal: Optimal Eating and Swallowing without Aspiration  Outcome: Ongoing, Progressing     Problem: Urinary Elimination Impaired (Stroke, Ischemic/Transient Ischemic Attack)  Goal: Effective Urinary Elimination  Outcome: Ongoing, Progressing

## 2022-07-08 VITALS
SYSTOLIC BLOOD PRESSURE: 139 MMHG | RESPIRATION RATE: 22 BRPM | TEMPERATURE: 99 F | OXYGEN SATURATION: 98 % | WEIGHT: 141.31 LBS | DIASTOLIC BLOOD PRESSURE: 63 MMHG | HEIGHT: 64 IN | BODY MASS INDEX: 24.13 KG/M2 | HEART RATE: 73 BPM

## 2022-07-08 LAB
ERYTHROCYTE [DISTWIDTH] IN BLOOD BY AUTOMATED COUNT: 13.1 % (ref 11.5–14.5)
HCT VFR BLD AUTO: 40.2 % (ref 37–48.5)
HGB BLD-MCNC: 13.1 G/DL (ref 12–16)
MCH RBC QN AUTO: 30.4 PG (ref 27–31)
MCHC RBC AUTO-ENTMCNC: 32.6 G/DL (ref 32–36)
MCV RBC AUTO: 93 FL (ref 82–98)
PLATELET # BLD AUTO: 297 K/UL (ref 150–450)
PMV BLD AUTO: 10.1 FL (ref 9.2–12.9)
RBC # BLD AUTO: 4.31 M/UL (ref 4–5.4)
WBC # BLD AUTO: 7.31 K/UL (ref 3.9–12.7)

## 2022-07-08 PROCEDURE — 36415 COLL VENOUS BLD VENIPUNCTURE: CPT | Performed by: INTERNAL MEDICINE

## 2022-07-08 PROCEDURE — 85027 COMPLETE CBC AUTOMATED: CPT | Performed by: INTERNAL MEDICINE

## 2022-07-08 PROCEDURE — 25000003 PHARM REV CODE 250: Performed by: INTERNAL MEDICINE

## 2022-07-08 PROCEDURE — 94761 N-INVAS EAR/PLS OXIMETRY MLT: CPT

## 2022-07-08 PROCEDURE — 63600175 PHARM REV CODE 636 W HCPCS: Performed by: INTERNAL MEDICINE

## 2022-07-08 PROCEDURE — 25000003 PHARM REV CODE 250

## 2022-07-08 RX ORDER — NAPROXEN SODIUM 220 MG/1
81 TABLET, FILM COATED ORAL DAILY
Status: DISCONTINUED | OUTPATIENT
Start: 2022-07-08 | End: 2022-07-08

## 2022-07-08 RX ORDER — NAPROXEN SODIUM 220 MG/1
81 TABLET, FILM COATED ORAL DAILY
Qty: 19 TABLET | Refills: 0 | Status: SHIPPED | OUTPATIENT
Start: 2022-07-09 | End: 2022-07-08 | Stop reason: HOSPADM

## 2022-07-08 RX ORDER — FOLIC ACID 1 MG/1
1 TABLET ORAL DAILY
Qty: 30 TABLET | Refills: 0 | Status: SHIPPED | OUTPATIENT
Start: 2022-07-09 | End: 2022-08-02 | Stop reason: SDUPTHER

## 2022-07-08 RX ORDER — LANOLIN ALCOHOL/MO/W.PET/CERES
100 CREAM (GRAM) TOPICAL DAILY
Qty: 30 TABLET | Refills: 1 | Status: SHIPPED | OUTPATIENT
Start: 2022-07-08 | End: 2022-08-02

## 2022-07-08 RX ORDER — CYANOCOBALAMIN 1000 UG/ML
1000 INJECTION, SOLUTION INTRAMUSCULAR; SUBCUTANEOUS
Qty: 1 ML | Refills: 8 | Status: SHIPPED | OUTPATIENT
Start: 2022-07-08 | End: 2022-08-02

## 2022-07-08 RX ORDER — CLOPIDOGREL BISULFATE 75 MG/1
75 TABLET ORAL DAILY
Qty: 30 TABLET | Refills: 1 | Status: SHIPPED | OUTPATIENT
Start: 2022-07-09 | End: 2022-07-08 | Stop reason: HOSPADM

## 2022-07-08 RX ORDER — MAGNESIUM 200 MG
1000 TABLET ORAL DAILY
Qty: 60 TABLET | Refills: 0 | Status: SHIPPED | OUTPATIENT
Start: 2022-07-08 | End: 2022-08-02

## 2022-07-08 RX ORDER — NITROFURANTOIN 25; 75 MG/1; MG/1
100 CAPSULE ORAL 2 TIMES DAILY
Qty: 10 CAPSULE | Refills: 0 | Status: SHIPPED | OUTPATIENT
Start: 2022-07-08 | End: 2022-08-02 | Stop reason: ALTCHOICE

## 2022-07-08 RX ORDER — CHLORDIAZEPOXIDE HYDROCHLORIDE 5 MG/1
5 CAPSULE, GELATIN COATED ORAL 3 TIMES DAILY
Qty: 18 CAPSULE | Refills: 0 | Status: SHIPPED | OUTPATIENT
Start: 2022-07-08 | End: 2022-08-02 | Stop reason: ALTCHOICE

## 2022-07-08 RX ORDER — CLOPIDOGREL BISULFATE 75 MG/1
75 TABLET ORAL DAILY
Status: DISCONTINUED | OUTPATIENT
Start: 2022-07-08 | End: 2022-07-08

## 2022-07-08 RX ADMIN — THERA TABS 1 TABLET: TAB at 09:07

## 2022-07-08 RX ADMIN — THIAMINE HYDROCHLORIDE 100 MG: 100 INJECTION, SOLUTION INTRAMUSCULAR; INTRAVENOUS at 09:07

## 2022-07-08 RX ADMIN — MUPIROCIN: 20 OINTMENT TOPICAL at 09:07

## 2022-07-08 RX ADMIN — POTASSIUM CHLORIDE 40 MEQ: 1500 TABLET, EXTENDED RELEASE ORAL at 05:07

## 2022-07-08 RX ADMIN — CHLORDIAZEPOXIDE HYDROCHLORIDE 5 MG: 5 CAPSULE ORAL at 09:07

## 2022-07-08 RX ADMIN — FOLIC ACID 1 MG: 1 TABLET ORAL at 09:07

## 2022-07-08 RX ADMIN — CHLORHEXIDINE GLUCONATE 15 ML: 1.2 RINSE ORAL at 09:07

## 2022-07-08 RX ADMIN — ASPIRIN 81 MG CHEWABLE TABLET 81 MG: 81 TABLET CHEWABLE at 11:07

## 2022-07-08 RX ADMIN — CLOPIDOGREL BISULFATE 75 MG: 75 TABLET, FILM COATED ORAL at 11:07

## 2022-07-08 RX ADMIN — LABETALOL HYDROCHLORIDE 10 MG: 5 INJECTION, SOLUTION INTRAVENOUS at 05:07

## 2022-07-08 NOTE — PROGRESS NOTES
[facility]  Department of Neurology  Neurology Progress Note        PATIENT NAME: Shannan Reza  MRN: [id number]  CSN: [id number]      TODAY'S DATE: 07/08/2022  ADMIT DATE: 7/6/2022                            CONSULTING PROVIDER: Gudelia Hernandez DNP  CONSULT REQUESTED BY: Zechariah Dia MD      Reason for consult: CVA       History obtained from chart review and the patient.    HPI: Shannan Reza is a 71 y.o. female with a history of hypertension, CVA with residual left-sided weakness, hypertension, initially presented to an outside ER with episode of aphasia and right-sided weakness.    She was evaluated in the ER, CT head was done did not reveal any acute abnormality.  She received tPA at outside hospital.  I was contacted by the transfer center after patient received tPA for transfer to post tPA care.  Patient was transferred to [facility] ICU for post tPA care and workup and management for stroke.    On exam, she has LUE and LLE weakness, aphasia. She is not able to provide much history due to her aphasia    7/7: Pt seen and examined. POC discussed with Dr. SREE Camara. Pt's son at bedside. Pt reports improvement in her weakness and speech. She is sitting up eating dinner and verbally responsive.     7/8: Pt seen and examined. POC discussed with Dr. Sree Camara.  PT up in bed. No changes in neurological status. Pt refused repeat CT head due to radiation concerns. Son at bedside. Explained importance and indications for the repeat CT head. Pt adamant about not having another CT head.             PREVIOUS MEDICAL HISTORY:  Past Medical History:   Diagnosis Date    Hypertension      PREVIOUS SURGICAL HISTORY:  No past surgical history on file.  FAMILY MEDICAL HISTORY:  No family history on file.  SOCIAL HISTORY:     ALLERGIES:  Review of patient's allergies indicates:   Allergen Reactions    Codeine Itching     HOME MEDICATIONS:  Prior to Admission medications    Not on File      CURRENT SCHEDULED MEDICATIONS:   aspirin  81 mg Oral Daily    atorvastatin  40 mg Oral QHS    cefTRIAXone (ROCEPHIN) IVPB  1 g Intravenous Q24H    chlordiazepoxide  5 mg Oral TID    chlorhexidine  15 mL Mouth/Throat BID    clopidogreL  75 mg Oral Daily    cyanocobalamin  1,000 mcg Intramuscular Q7 Days    folic acid  1 mg Oral Daily    multivitamin  1 tablet Oral Daily    mupirocin   Nasal BID    thiamine (VITAMIN B1) IVPB  100 mg Intravenous Daily     CURRENT INFUSIONS:    CURRENT PRN MEDICATIONS:  calcium chloride IVPB, calcium chloride IVPB, calcium chloride IVPB, calcium gluconate IVPB, calcium gluconate IVPB, calcium gluconate IVPB, labetalol, magnesium oxide, magnesium sulfate IVPB, magnesium sulfate IVPB, magnesium sulfate IVPB, magnesium sulfate IVPB, magnesium sulfate IVPB, magnesium sulfate IVPB, potassium chloride in water, potassium chloride in water, potassium chloride in water, potassium chloride in water, potassium chloride, potassium chloride, potassium chloride, potassium chloride, sodium chloride 0.9%, sodium phosphate IVPB, sodium phosphate IVPB, sodium phosphate IVPB, sodium phosphate IVPB, sodium phosphate IVPB, sodium phosphate IVPB, sodium phosphate IVPB, sodium phosphate IVPB    REVIEW OF SYSTEMS:  Please refer to the HPI for all pertinent positive and negative findings. A comprehensive review of all other systems was negative.       PHYSICAL EXAM:  Patient Vitals for the past 24 hrs:   BP Temp Temp src Pulse Resp SpO2   07/08/22 1545 -- -- -- 78 (!) 38 98 %   07/08/22 1530 -- -- -- 78 (!) 28 97 %   07/08/22 1515 -- -- -- 76 (!) 32 97 %   07/08/22 1501 139/63 98.6 °F (37 °C) Oral 76 (!) 21 95 %   07/08/22 1500 139/63 -- -- 78 19 95 %   07/08/22 1430 -- -- -- 80 (!) 24 97 %   07/08/22 1415 -- -- -- 76 18 96 %   07/08/22 1400 (!) 141/65 -- -- 77 (!) 24 96 %   07/08/22 1345 -- -- -- 78 (!) 24 97 %   07/08/22 1330 -- -- -- 84 (!) 22 97 %   07/08/22 1315 -- -- -- 76 (!) 21 97 %    07/08/22 1300 (!) 160/69 -- -- 82 (!) 23 98 %   07/08/22 1245 -- -- -- 88 (!) 21 98 %   07/08/22 1230 -- -- -- 80 (!) 32 97 %   07/08/22 1215 -- -- -- 82 (!) 36 97 %   07/08/22 1200 (!) 181/80 -- -- 80 (!) 24 98 %   07/08/22 1145 -- -- -- 74 15 96 %   07/08/22 1130 -- -- -- 75 (!) 21 97 %   07/08/22 1115 -- -- -- 79 (!) 41 97 %   07/08/22 1101 (!) 171/72 98.4 °F (36.9 °C) Oral 76 (!) 35 97 %   07/08/22 1100 (!) 171/72 -- -- 74 (!) 30 97 %   07/08/22 1045 -- -- -- 78 (!) 31 97 %   07/08/22 1030 -- -- -- 80 17 97 %   07/08/22 1015 -- -- -- 78 18 97 %   07/08/22 1000 (!) 145/62 -- -- 79 (!) 26 97 %   07/08/22 0945 -- -- -- 79 15 97 %   07/08/22 0930 -- -- -- 80 20 96 %   07/08/22 0915 -- -- -- 84 (!) 26 98 %   07/08/22 0900 (!) 159/71 -- -- 82 (!) 30 96 %   07/08/22 0845 -- -- -- 83 (!) 30 98 %   07/08/22 0830 -- -- -- 85 (!) 28 98 %   07/08/22 0815 -- -- -- 85 14 98 %   07/08/22 0800 (!) 161/71 -- -- 89 (!) 32 98 %   07/08/22 0745 -- -- -- 84 (!) 37 97 %   07/08/22 0730 -- -- -- 80 20 97 %   07/08/22 0715 -- -- -- 83 (!) 28 95 %   07/08/22 0701 (!) 168/77 98.6 °F (37 °C) Oral 74 14 96 %   07/08/22 0600 (!) 155/77 -- -- 80 19 95 %   07/08/22 0500 (!) 177/79 -- -- 76 18 95 %   07/08/22 0400 (!) 155/70 -- -- 76 18 (!) 94 %   07/08/22 0301 (!) 164/69 98.7 °F (37.1 °C) Oral 69 20 (!) 94 %   07/08/22 0300 (!) 164/69 -- -- 70 (!) 21 (!) 94 %   07/08/22 0200 (!) 157/73 -- -- 73 19 (!) 94 %   07/08/22 0100 (!) 153/67 -- -- 76 18 (!) 92 %   07/08/22 0000 (!) 158/66 -- -- 74 (!) 21 (!) 93 %   07/07/22 2301 -- 98.7 °F (37.1 °C) Axillary 75 18 95 %   07/07/22 2300 139/64 -- -- 76 20 (!) 94 %   07/07/22 2200 (!) 156/71 -- -- 75 20 95 %   07/07/22 2105 -- -- -- 78 (!) 28 95 %   07/07/22 2100 (!) 145/67 -- -- 79 20 95 %   07/07/22 2000 (!) 154/74 -- -- 79 (!) 21 95 %   07/07/22 1901 (!) 160/73 98.5 °F (36.9 °C) -- 84 18 95 %   07/07/22 1900 (!) 160/73 -- -- 81 18 (!) 94 %   07/07/22 1845 -- -- -- 77 (!) 24 95 %   07/07/22 1830 --  98.1 °F (36.7 °C) -- 83 (!) 24 (!) 94 %   07/07/22 1815 -- -- -- 81 (!) 24 95 %   07/07/22 1800 (!) 146/91 -- -- 88 (!) 24 96 %   07/07/22 1745 (!) 162/69 -- -- 80 (!) 27 96 %   07/07/22 1730 (!) 155/72 -- -- 78 (!) 28 96 %   07/07/22 1715 (!) 159/70 -- -- 81 (!) 32 95 %   07/07/22 1700 (!) 161/74 -- -- 83 (!) 26 95 %       GENERAL APPEARANCE: Alert, well-developed, well-nourished female in no acute distress.  HEENT: Normocephalic and atraumatic. PERRL. Oropharynx unremarkable.  PULM: Normal respiratory effort. No accessory muscle use.  CV: RRR.  ABDOMEN: Soft, nontender.  EXTREMITIES: No obvious signs of vascular compromise. Pulses present. No cyanosis, clubbing or edema.  SKIN: Clear; no rashes, lesions or skin breaks in exposed areas.    NEURO:  MENTAL STATUS: Patient awake and oriented to time, place, and person, recent/remote memory normal, attention span/concentration normal and fund of knowledge appropriate for patient's level of education.  Affect euthymic.    CRANIAL NERVES:  CN I: Not tested.  CN II: Fundoscopic exam deferred.  CN III, IV, VI: Pupils equal, round and reactive to light.  Extraocular movements full and intact.  CN V: Facial sensation normal.  CN VII: Facial asymmetry noted for left facial droop.  CN VIII: Hearing grossly normal and equal bilaterally.  No skew deviation or pathologic nystagmus.  CN IX, X: Palate elevates symmetrically. Speech/articulation is dysarthric.  CN XI: Shoulder shrug and chin rotation equal with good strength.  CN XII: Tongue protrusion midline.    MOTOR:  Bulk normal. Tone normal and symmetric throughout.  Abnormal movements absent.  Tremor: none present.  Strength 5/5 in RUE and RLE. 3+/5 LUE and LLE.    REFLEXES:  DTRs 2+ throughout.  Plantar response downgoing bilaterally.  SENSATION: grossly intact throughout.  COORDINATION: normal finger-to-nose.  STATION: not tested.  GAIT: not tested.      NIHSS:  1a      Level of Consciousness (alert, drowsy, etc.):    0=alert; keenly responsive  1b.     Level of Consciousness Questions (month, age): 0= able to answer both questions  1c.     Level of Consciousness Commands (open, close eyes, make fist, let go):  0=Answers both tasks correctly  2.      Best Gaze (eyes open - patient follows examiner's finger or face):      0=normal  3.      Visual (introduce visual stimulus/threat to patient's visual field quadrants):  0=No visual loss  4.      Facial Palsy (show teeth, raise eyebrows and squeeze eyes shut):        1=Minor paralysis (flattened nasolabial fold, asymmetric on smiling)  5a.     Motor Arm - Left (elevate extremity 90 degrees and score drift/movement):       2=Some effort against gravity, limb cannot get to or maintain (if cured) 90 (or 45) degrees, drifts down to bed, but has some effort against gravity  5b.     Motor Arm - Right (elevate extremity 90 degrees and score drift/movement):      0=No drift, limb holds 90 (or 45) degrees for full 10 seconds  6a.     Motor Leg - Left (elevate extremity 30 degrees and score drift/movement):       2=Some effort against gravity, limb cannot get to or maintain (if cured) 90 (or 45) degrees, drifts down to bed, but has some effort against gravity  6b      Motor Leg - Right (elevate extremity 30 degrees and score drift/movement):      0=No drift, limb holds 90 (or 45) degrees for full 10 seconds  7.      Limb Ataxia (finger-nose, heel down shin):      0=Absent  8.      Sensory (pin prick to face, arm, trunk, and leg - compare side to side):        0=Normal; no sensory loss  9.      Best Language (name items, describe a picture and read sentences):      0=No aphasia, normal  10.     Dysarthria (evaluate speech clarity by patient repeating listed words): 1=Mild to moderate, patient slurs at least some words and at worst, can be understood with some difficulty  11.     Extinction and Inattention (use prior testing to identify neglect or double simultaneous stimuli testing):      0=No  abnormality          NIH Stroke Scale Total:         6      Labs:  Recent Labs   Lab 07/06/22  0350 07/06/22  1836 07/07/22  0404   *  --  138   K 3.3* 3.9 3.4*   CL 97  --  104   CO2 25  --  27   BUN 6*  --  7*   CREATININE 0.5  --  0.5     --  108   CALCIUM 8.7  --  9.0   PHOS 3.3  --  2.9   MG 2.2  --  2.2     Recent Labs   Lab 07/06/22  0350 07/08/22  0900   WBC 6.18 7.31   HGB 12.1 13.1   HCT 35.2* 40.2    297     No results for input(s): ALBUMIN, PROT, BILITOT, ALKPHOS, ALT, AST in the last 168 hours.  Lab Results   Component Value Date    INR 1.5 07/06/2022     Lab Results   Component Value Date    TRIG 78 07/06/2022    HDL 71 07/06/2022    CHOLHDL 28.1 07/06/2022     Lab Results   Component Value Date    HGBA1C 5.2 07/06/2022     No results found for: PROTEINCSF, GLUCCSF, WBCCSF    Imaging:  I have reviewed and interpreted the pertinent imaging and lab results.      Echo Saline Bubble? Yes  · Unable to evaluate for PFO or ASD with bubble study given extremely   limited visualization. If clinical concerns persist consider alternative   imaging.            ASSESSMENT & PLAN:    Acute ischemic stroke    Workup  · CTH: No acute intracranial abnormality.   · CTA head and neck:  No intracranial large vessel occlusion.  Right MCA is not well opacified compared to the left MCA.  Right ICA occlusion proximally at the origin and reconstitution of low back cavernous/supraclinoid segments with significant dilated and tortuous anatomy. 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 Previous clip involving the right supraclinoid ICA.   · ECHO:  EF 70%, Normal diastolic function  · LDL: 166  · HbA1c: 5.2      Plan  · Admitted for further stroke workup  · Etiology:  Workup incomplete  · Holding antiplatelet/anticoagulation therapy in the setting of tPA for 24 hours.  Continue with Lipitor 80mg  · BP goal less than 180/100 for post  tPA care.  · Repeat CT head Thursday at 1900: pending ( 24 hours from tPA to look for any hemorrhage) If no hemorrhage, start patient with home aspirin 81 mg daily and add plavix 75mg. DAPT for 3 weeks followed by Plavix alone  · PT OT  · Speech therapy  · DVT prophylaxis with chemo/SCD prophylaxis  · Patient refused repeat CT head.  Do not restart antiplatelets due to the possibility of micro hemorrhages secondary to tPA.  Follow up with Neurology outpatient within 2 weeks for re-evaluation.  · Follow up outpatient with Neurosurgery within 2 weeks of discharge due to CT a 5 day 6.   · Discussed lifestyle modifications as prophylactic measures for stroke prevention including smoking cessation, adequate blood pressure management, healthy diet and regular exercise.        Thank you kindly for including us in the care of this patient. Please do not hesitate to contact us with any questions.      Critical Care:  34 minutes of critical care time has been spent evaluating with the patient. Time includes chart review not limited to diagnostic imaging, labs, and vitals, patient assessment, discussion with family and nursing, current order evaluations, and new order entries.         Active Diagnoses:    Diagnosis Date Noted POA    PRINCIPAL PROBLEM:  Aphasia [R47.01] 07/06/2022 Yes      Problems Resolved During this Admission:       VTE Risk Mitigation (From admission, onward)         Ordered     IP VTE HIGH RISK PATIENT  Once         07/06/22 0305     Place sequential compression device  Until discontinued         07/06/22 0305                Gudelia Hernandez DNP  Neurology  [facility]

## 2022-07-08 NOTE — NURSING
Pt refusing follow-up CT at this time. States she does not want to expose herself to more radiation, and her neurological exam has actually been improving. I explained to patient that it is just for follow-up reasons and to make sure there is no bleeding, and patient understands this. I notified pt that if she changes her mind, the option is still there. Will re-assess in AM.

## 2022-07-08 NOTE — PLAN OF CARE
Pt and patient's son both refusing home health at this time. They both stated they just moved to [place], [place] and boxes are everywhere, but patient will notify PCP when she is ready for home health.      07/08/22 1620   Post-Acute Status   Post-Acute Authorization Home Health   Home Health Status Patient declined/refused

## 2022-07-08 NOTE — CARE UPDATE
CM spoke with patient and patient's son- they would like to follow up with doctors in [place]. Per pt's son, he will schedule all follow up appointments on discharge (PCP, neurology, and neurosurgeon)      Ambulatory referral for outpatient speech therapy requested from Dr. Dia- stated he will put in order.

## 2022-07-08 NOTE — PLAN OF CARE
Discharge orders and chart reviewed. No other discharge needs noted at this time. Pt is clear for discharge from case management. Pt is discharging to home with son. Pt refusing for CM to schedule follow up appointments and refusing home health at this time.        07/08/22 1622   Final Note   Assessment Type Final Discharge Note   Anticipated Discharge Disposition Home   What phone number can be called within the next 1-3 days to see how you are doing after discharge? [phone]

## 2022-07-08 NOTE — CARE UPDATE
07/07/22 1109   PRE-TX-O2   O2 Device (Oxygen Therapy) room air   SpO2 95 %   Pulse Oximetry Type Continuous   $ Pulse Oximetry - Multiple Charge Pulse Oximetry - Multiple   Pulse 78   Resp (!) 28   Education   $ Education 15 min   Respiratory Evaluation   $ Care Plan Tech Time 15 min

## 2022-07-08 NOTE — NURSING
Pt transported via wheelchair to son's vehicle by Roxie with all personal belongings and discharge folder.

## 2022-07-08 NOTE — PLAN OF CARE
Plan of Care and Education reviewed with patient and Son. Patient cleared for discharge and follow up outpatient once home. Verbalization of understanding expressed.   Problem: Adult Inpatient Plan of Care  Goal: Plan of Care Review  7/8/2022 1722 by Dee Dee Aguayo RN  Outcome: Ongoing, Progressing  7/8/2022 1722 by Dee Dee Aguayo RN  Outcome: Adequate for Care Transition  Goal: Patient-Specific Goal (Individualized)  7/8/2022 1722 by Dee Dee Aguayo RN  Outcome: Ongoing, Progressing  7/8/2022 1722 by Dee Dee Aguayo RN  Outcome: Adequate for Care Transition  Goal: Absence of Hospital-Acquired Illness or Injury  7/8/2022 1722 by Dee Dee Aguayo RN  Outcome: Ongoing, Progressing  7/8/2022 1722 by Dee Dee Aguayo RN  Outcome: Adequate for Care Transition  Goal: Optimal Comfort and Wellbeing  7/8/2022 1722 by Dee Dee Aguayo RN  Outcome: Ongoing, Progressing  7/8/2022 1722 by Dee Dee Aguayo RN  Outcome: Adequate for Care Transition  Goal: Readiness for Transition of Care  7/8/2022 1722 by Dee Dee Aguayo RN  Outcome: Ongoing, Progressing  7/8/2022 1722 by Dee Dee Aguayo RN  Outcome: Adequate for Care Transition     Problem: Fall Injury Risk  Goal: Absence of Fall and Fall-Related Injury  7/8/2022 1722 by Dee Dee Aguayo RN  Outcome: Ongoing, Progressing  7/8/2022 1722 by Dee Dee Aguayo RN  Outcome: Adequate for Care Transition     Problem: Skin Injury Risk Increased  Goal: Skin Health and Integrity  7/8/2022 1722 by Dee Dee Aguayo RN  Outcome: Ongoing, Progressing  7/8/2022 1722 by Dee Dee Aguayo RN  Outcome: Adequate for Care Transition     Problem: Adjustment to Illness (Stroke, Ischemic/Transient Ischemic Attack)  Goal: Optimal Coping  7/8/2022 1722 by Dee Dee Aguayo RN  Outcome: Ongoing, Progressing  7/8/2022 1722 by Dee Dee Aguayo RN  Outcome: Adequate for Care Transition     Problem: Functional Ability Impaired (Stroke, Ischemic/Transient Ischemic Attack)  Goal: Optimal Functional Ability  7/8/2022 1722 by Dee Dee  TRACEY Aguayo  Outcome: Ongoing, Progressing  7/8/2022 1722 by Dee Dee Aguayo RN  Outcome: Adequate for Care Transition     Problem: Swallowing Impairment (Stroke, Ischemic/Transient Ischemic Attack)  Goal: Optimal Eating and Swallowing without Aspiration  7/8/2022 1722 by Dee Dee Aguayo RN  Outcome: Ongoing, Progressing  7/8/2022 1722 by Dee Dee Aguayo RN  Outcome: Adequate for Care Transition     Problem: Urinary Elimination Impaired (Stroke, Ischemic/Transient Ischemic Attack)  Goal: Effective Urinary Elimination  7/8/2022 1722 by Dee Dee Aguayo RN  Outcome: Ongoing, Progressing  7/8/2022 1722 by Dee Dee Aguayo RN  Outcome: Adequate for Care Transition

## 2022-07-09 NOTE — DISCHARGE SUMMARY
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    * No surgery found *      Hospital Course:  Patient was admitted for suspected acute CVA, received tPA at [facility].  Item number MRI of the brain was not feasible given history of previous clips involving the right supraclinoid ICA.  Neurology plan to treat with aspirin Plavix however patient refused repeat CT head to monitor for any bleeding from tPA.  Neurology felt it was to habitus to continue treating with aspirin or Plavix given risk of microhemorrhages, plan to reconsider restarting as an outpatient if patient is more agreeable.  Also patient was found to B12 deficiency, given IM B12 rx. Patient will try to get IM injections through pharmacy. If cannot get injections will get B12 SL tabs. D/w pt's son.     She also had an abnormal CTA head; "Appearance of significant dilated and tortuous right cavernous and supraclinoid internal carotid artery. 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 There is no obvious enlargement of " "the ophthalmic vein but there is a venous structure that extends inferiorly posterior to the right clivus and possibly towards the right internal jugular vein. Suggest neurosurgical and neuro interventional radiology follow-up. Consider a conventional angiogram   4.  Previous clip involving the right supraclinoid ICA. Patent bilateral A1 segments of the anterior circulation."   -neuro felt could be f/u OP with NS.     Patient prescribed Librium for alcohol withdrawal.  Patient instructed not to drink while taking Librium.  Patient follow-up discharged on call.  Answered all questions.  Return precautions given.  Patient is to resume home health. OP ST written.           Physical Exam  VS reviewed  Constitutional:       General: She is not in acute distress.     Appearance: Normal appearance. She is not ill-appearing, toxic-appearing or diaphoretic.   HENT:      Head: Normocephalic and atraumatic.      Nose: Nose normal.   Cardiovascular:      Rate and Rhythm: Normal rate and regular rhythm.      Heart sounds: Normal heart sounds. No murmur heard.    No friction rub. No gallop.   Pulmonary:      Effort: Pulmonary effort is normal. No respiratory distress.      Breath sounds: Normal breath sounds. No stridor. No wheezing, rhonchi or rales.   Abdominal:      General: Abdomen is flat. Bowel sounds are normal. There is no distension.      Tenderness: There is no abdominal tenderness. There is no guarding or rebound.   Neurological:      Mental Status: She is alert.      Comments: Patient with left side weakness that she states it is old  + aphasia  She follows commands         Consults:   Consults (From admission, onward)        Status Ordering Provider     Inpatient consult to Neurosurgery  Once        Provider:  DO Janeen Josue ZAW W. IP consult to case management/social work  Once        Provider:  (Not yet assigned)    Completed SOFIE MOTA     Inpatient consult to Neurology  " "Once        Provider:  Fabio Joyce MD    Completed SOFIE MOTA          Final Active Diagnoses:    Diagnosis Date Noted POA    PRINCIPAL PROBLEM:  Aphasia [R47.01] 07/06/2022 Yes      Problems Resolved During this Admission:      Discharged Condition: good    Disposition: Home or Self Care    Follow Up:   Follow-up Information     Naif Camara MD Follow up.    Specialties: Vascular Neurology, Neurology  Contact information:  [address]  [place] [address] [phone]             neurosurgery in 1 week Follow up.    Contact information:  for abnormal CTA head: "Appearance of significant dilated and tortuous right cavernous and supraclinoid internal carotid artery. 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 There is no obvious enlarg...           Primary Doctor No Follow up in 1 week(s).    Why: For post hospitalization follow-up, alcohol cessation                     Patient Instructions:      Ambulatory referral/consult to Speech Therapy   Standing Status: Future   Referral Priority: Routine Referral Type: Speech Therapy   Referral Reason: Specialty Services Required   Requested Specialty: Speech Pathology   Number of Visits Requested: 1     Diet Cardiac     Notify your health care provider if you experience any of the following:  temperature >100.4     Notify your health care provider if you experience any of the following:  persistent nausea and vomiting or diarrhea     Notify your health care provider if you experience any of the following:  severe uncontrolled pain     Notify your health care provider if you experience any of the following:  severe persistent headache     Notify your health care provider if you experience any of the following:  difficulty breathing or increased cough     Notify your health care provider if you experience any of the following:  persistent dizziness, light-headedness, or visual disturbances     Notify your " health care provider if you experience any of the following:  increased confusion or weakness     Activity as tolerated     Medications:  Reconciled Home Medications:      Medication List      START taking these medications    chlordiazepoxide 5 MG capsule  Commonly known as: LIBRIUM  Take 1 capsule (5 mg total) by mouth 3 (three) times daily. Take 1 tab tid for 3 days then 1 tab bid for 3 days then 1 tab at night time x3 days, then stop. Hold if sedation. for 9 days     folic acid 1 MG tablet  Commonly known as: FOLVITE  Take 1 tablet (1 mg total) by mouth once daily.  Start taking on: July 9, 2022     multivitamin Tab  Take 1 tablet by mouth once daily.  Start taking on: July 9, 2022     nitrofurantoin (macrocrystal-monohydrate) 100 MG capsule  Commonly known as: MACROBID  Take 1 capsule (100 mg total) by mouth 2 (two) times daily.     thiamine 100 MG tablet  Take 1 tablet (100 mg total) by mouth once daily.        CONTINUE taking these medications    atorvastatin 40 MG tablet  Commonly known as: LIPITOR  Take 40 mg by mouth once daily.        STOP taking these medications    aspirin 81 MG Chew                Pending Diagnostic Studies:     None        Indwelling Lines/Drains at time of discharge:   Lines/Drains/Airways     None                 Time spent on the discharge of patient: 40 minutes         Zechariah Dia MD  Department of Hospital Medicine  [facility]

## 2022-07-09 NOTE — PLAN OF CARE
07/08/22 2023   Patient Assessment/Suction   Level of Consciousness (AVPU) alert   Respiratory Effort Normal   Expansion/Accessory Muscles/Retractions expansion symmetric   All Lung Fields Breath Sounds coarse   Rhythm/Pattern, Respiratory depth regular;pattern regular   Cough Frequency infrequent   Cough Type good;nonproductive   PRE-TX-O2   O2 Device (Oxygen Therapy) room air   SpO2: Pre-Ductal (Right Hand) 96 %   Pulse Oximetry Type Continuous   $ Pulse Oximetry - Multiple Charge Pulse Oximetry - Multiple   Pulse 73   Resp (!) 22

## 2022-07-12 NOTE — PT/OT/SLP DISCHARGE
Occupational Therapy Discharge Summary    Shannan Reza  MRN: [id number]   Principal Problem: Aphasia      Patient Discharged from acute Occupational Therapy on 7/8/22.  Please refer to prior OT note dated 7/7/22 for functional status.    Assessment:      Patient has not met goals.    Objective:     GOALS:   Multidisciplinary Problems     Occupational Therapy Goals     Not on file          Multidisciplinary Problems (Resolved)        Problem: Occupational Therapy    Goal Priority Disciplines Outcome Interventions   Occupational Therapy Goal   (Resolved)     OT, PT/OT Met    Description: Goals to be met by: d/c     Patient will increase functional independence with ADLs by performing:    UE Dressing with Stand-by Assistance.  LE Dressing with Stand-by Assistance.  Grooming while seated at sink with Supervision.  Toileting from bedside commode with Stand-by Assistance for hygiene and clothing management.   Toilet transfer to bedside commode with Stand-by Assistance.                     Reasons for Discontinuation of Therapy Services  Transfer to alternate level of care.      Plan:     Patient Discharged to: Home    7/12/2022

## 2022-07-15 ENCOUNTER — TELEPHONE (OUTPATIENT)
Dept: INTERNAL MEDICINE | Facility: CLINIC | Age: 72
End: 2022-07-15
Payer: MEDICARE

## 2022-07-15 NOTE — TELEPHONE ENCOUNTER
----- Message from Irma Morales sent at 7/15/2022 12:10 PM CDT -----  Contact: David (son) [phone]  Would like to receive medical advice.     Pharmacy name/number (copy/paste from chart):      [id number] - [place] - [address]  [address] [place] [address]  Phone: [phone] Fax: [phone]    [id number] - [place] - [address]  [address] [facility] [address] [facility] [address]  Phone: [phone] Fax: [phone]    Would they like a call back or a response via [facility]:  call back    Additional information:  Calling to speak with the nurse regarding if pt should still take amLODIPine (NORVASC) 5 MG tablet after having a stroke. Son states pt pressure has been high and was recently discharged form [facility] in [place]. Son also states he did not have instructions rather or not for pt to take amLODIPine (NORVASC) 5 MG tablet or stop.

## 2022-07-15 NOTE — TELEPHONE ENCOUNTER
Hospital data not available for review.    Continue to monitor BP--if the systolic is persistently above 140 over next 2-3 days then restart the amlodipine

## 2022-07-15 NOTE — TELEPHONE ENCOUNTER
I have no data from recent hospitalization thus it is not certain.  waht are her home BP readings  Currently ?

## 2022-07-15 NOTE — TELEPHONE ENCOUNTER
Spoke with pt's son who advises that his mother's BP this morning 140/73.    Advises that it seems that pt has 2 charts and they are marked to be merged.    But neither chart indicates what to do regarding the Amlodipine.    Please advise.    Thank you.

## 2022-08-02 ENCOUNTER — OFFICE VISIT (OUTPATIENT)
Dept: PRIMARY CARE CLINIC | Facility: CLINIC | Age: 72
End: 2022-08-02
Payer: MEDICARE

## 2022-08-02 VITALS
DIASTOLIC BLOOD PRESSURE: 70 MMHG | SYSTOLIC BLOOD PRESSURE: 136 MMHG | OXYGEN SATURATION: 97 % | BODY MASS INDEX: 24.66 KG/M2 | HEART RATE: 80 BPM | WEIGHT: 134 LBS | RESPIRATION RATE: 18 BRPM | HEIGHT: 62 IN

## 2022-08-02 DIAGNOSIS — I63.9 CEREBROVASCULAR ACCIDENT (CVA), UNSPECIFIED MECHANISM: ICD-10-CM

## 2022-08-02 DIAGNOSIS — E78.5 HYPERLIPIDEMIA, UNSPECIFIED HYPERLIPIDEMIA TYPE: ICD-10-CM

## 2022-08-02 DIAGNOSIS — E78.2 MIXED HYPERLIPIDEMIA: ICD-10-CM

## 2022-08-02 DIAGNOSIS — E53.8 VITAMIN B 12 DEFICIENCY: ICD-10-CM

## 2022-08-02 DIAGNOSIS — D53.9 MACROCYTIC ANEMIA: ICD-10-CM

## 2022-08-02 DIAGNOSIS — I69.354 HEMIPARESIS AFFECTING LEFT SIDE AS LATE EFFECT OF STROKE: ICD-10-CM

## 2022-08-02 DIAGNOSIS — I10 ESSENTIAL HYPERTENSION: Primary | ICD-10-CM

## 2022-08-02 DIAGNOSIS — Z00.00 MEDICARE ANNUAL WELLNESS VISIT, SUBSEQUENT: Primary | ICD-10-CM

## 2022-08-02 PROBLEM — D50.9 IRON DEFICIENCY ANEMIA: Status: RESOLVED | Noted: 2019-04-26 | Resolved: 2022-08-02

## 2022-08-02 PROBLEM — R63.1 PRIMARY POLYDIPSIA: Status: RESOLVED | Noted: 2018-07-25 | Resolved: 2022-08-02

## 2022-08-02 PROBLEM — F54 PRIMARY POLYDIPSIA: Status: RESOLVED | Noted: 2018-07-25 | Resolved: 2022-08-02

## 2022-08-02 PROCEDURE — 3075F PR MOST RECENT SYSTOLIC BLOOD PRESS GE 130-139MM HG: ICD-10-PCS | Mod: CPTII,,, | Performed by: STUDENT IN AN ORGANIZED HEALTH CARE EDUCATION/TRAINING PROGRAM

## 2022-08-02 PROCEDURE — 1111F PR DISCHARGE MEDS RECONCILED W/ CURRENT OUTPATIENT MED LIST: ICD-10-PCS | Mod: CPTII,,, | Performed by: STUDENT IN AN ORGANIZED HEALTH CARE EDUCATION/TRAINING PROGRAM

## 2022-08-02 PROCEDURE — 1101F PT FALLS ASSESS-DOCD LE1/YR: CPT | Mod: CPTII,,, | Performed by: STUDENT IN AN ORGANIZED HEALTH CARE EDUCATION/TRAINING PROGRAM

## 2022-08-02 PROCEDURE — 3075F SYST BP GE 130 - 139MM HG: CPT | Mod: CPTII,,, | Performed by: STUDENT IN AN ORGANIZED HEALTH CARE EDUCATION/TRAINING PROGRAM

## 2022-08-02 PROCEDURE — 1160F RVW MEDS BY RX/DR IN RCRD: CPT | Mod: CPTII,,, | Performed by: STUDENT IN AN ORGANIZED HEALTH CARE EDUCATION/TRAINING PROGRAM

## 2022-08-02 PROCEDURE — 1159F PR MEDICATION LIST DOCUMENTED IN MEDICAL RECORD: ICD-10-PCS | Mod: CPTII,,, | Performed by: STUDENT IN AN ORGANIZED HEALTH CARE EDUCATION/TRAINING PROGRAM

## 2022-08-02 PROCEDURE — 3288F FALL RISK ASSESSMENT DOCD: CPT | Mod: CPTII,,, | Performed by: STUDENT IN AN ORGANIZED HEALTH CARE EDUCATION/TRAINING PROGRAM

## 2022-08-02 PROCEDURE — 3078F DIAST BP <80 MM HG: CPT | Mod: CPTII,,, | Performed by: STUDENT IN AN ORGANIZED HEALTH CARE EDUCATION/TRAINING PROGRAM

## 2022-08-02 PROCEDURE — 3288F PR FALLS RISK ASSESSMENT DOCUMENTED: ICD-10-PCS | Mod: CPTII,,, | Performed by: STUDENT IN AN ORGANIZED HEALTH CARE EDUCATION/TRAINING PROGRAM

## 2022-08-02 PROCEDURE — 3008F PR BODY MASS INDEX (BMI) DOCUMENTED: ICD-10-PCS | Mod: CPTII,,, | Performed by: STUDENT IN AN ORGANIZED HEALTH CARE EDUCATION/TRAINING PROGRAM

## 2022-08-02 PROCEDURE — 99214 PR OFFICE/OUTPT VISIT, EST, LEVL IV, 30-39 MIN: ICD-10-PCS | Mod: ,,, | Performed by: STUDENT IN AN ORGANIZED HEALTH CARE EDUCATION/TRAINING PROGRAM

## 2022-08-02 PROCEDURE — 1160F PR REVIEW ALL MEDS BY PRESCRIBER/CLIN PHARMACIST DOCUMENTED: ICD-10-PCS | Mod: CPTII,,, | Performed by: STUDENT IN AN ORGANIZED HEALTH CARE EDUCATION/TRAINING PROGRAM

## 2022-08-02 PROCEDURE — 3044F PR MOST RECENT HEMOGLOBIN A1C LEVEL <7.0%: ICD-10-PCS | Mod: CPTII,,, | Performed by: STUDENT IN AN ORGANIZED HEALTH CARE EDUCATION/TRAINING PROGRAM

## 2022-08-02 PROCEDURE — 3008F BODY MASS INDEX DOCD: CPT | Mod: CPTII,,, | Performed by: STUDENT IN AN ORGANIZED HEALTH CARE EDUCATION/TRAINING PROGRAM

## 2022-08-02 PROCEDURE — 1111F DSCHRG MED/CURRENT MED MERGE: CPT | Mod: CPTII,,, | Performed by: STUDENT IN AN ORGANIZED HEALTH CARE EDUCATION/TRAINING PROGRAM

## 2022-08-02 PROCEDURE — 3044F HG A1C LEVEL LT 7.0%: CPT | Mod: CPTII,,, | Performed by: STUDENT IN AN ORGANIZED HEALTH CARE EDUCATION/TRAINING PROGRAM

## 2022-08-02 PROCEDURE — 99214 OFFICE O/P EST MOD 30 MIN: CPT | Mod: ,,, | Performed by: STUDENT IN AN ORGANIZED HEALTH CARE EDUCATION/TRAINING PROGRAM

## 2022-08-02 PROCEDURE — 1101F PR PT FALLS ASSESS DOC 0-1 FALLS W/OUT INJ PAST YR: ICD-10-PCS | Mod: CPTII,,, | Performed by: STUDENT IN AN ORGANIZED HEALTH CARE EDUCATION/TRAINING PROGRAM

## 2022-08-02 PROCEDURE — 1159F MED LIST DOCD IN RCRD: CPT | Mod: CPTII,,, | Performed by: STUDENT IN AN ORGANIZED HEALTH CARE EDUCATION/TRAINING PROGRAM

## 2022-08-02 PROCEDURE — 3078F PR MOST RECENT DIASTOLIC BLOOD PRESSURE < 80 MM HG: ICD-10-PCS | Mod: CPTII,,, | Performed by: STUDENT IN AN ORGANIZED HEALTH CARE EDUCATION/TRAINING PROGRAM

## 2022-08-02 RX ORDER — FOLIC ACID 1 MG/1
1 TABLET ORAL DAILY
Qty: 90 TABLET | Refills: 3 | Status: SHIPPED | OUTPATIENT
Start: 2022-08-02 | End: 2023-08-02

## 2022-08-02 RX ORDER — ATORVASTATIN CALCIUM 40 MG/1
40 TABLET, FILM COATED ORAL DAILY
Qty: 90 TABLET | Refills: 3 | Status: SHIPPED | OUTPATIENT
Start: 2022-08-02 | End: 2022-10-10 | Stop reason: DRUGHIGH

## 2022-08-02 RX ORDER — CYANOCOBALAMIN 1000 UG/ML
1000 INJECTION, SOLUTION INTRAMUSCULAR; SUBCUTANEOUS
Qty: 1 ML | Refills: 8 | Status: SHIPPED | OUTPATIENT
Start: 2022-08-02 | End: 2022-10-10 | Stop reason: ALTCHOICE

## 2022-08-02 RX ORDER — AMLODIPINE BESYLATE 5 MG/1
5 TABLET ORAL DAILY
Qty: 90 TABLET | Refills: 3 | Status: SHIPPED | OUTPATIENT
Start: 2022-08-02 | End: 2023-09-11

## 2022-08-02 NOTE — PROGRESS NOTES
Chief Complaint  Chief Complaint   Patient presents with    Establish Care    Medication Refill     Recent move from [place]     Hypertension    Hyperlipidemia       HPI  Shannan Reza is a 71 y.o. female with medical diagnoses as listed in the medical history who presents to clinic with her son, David, who established care with a new provider.  They just moved from [place].  Patient has significant history of  CVA x2  with the most recent one happened on 07/06/2022.  She received tPA then and was told to stop antiplatelets, Plavix, for 2-3 months.  She has significant left-sided body weakness requiring wheelchair for ambulation.  Her son is the main caretaker.  Patient feels good today with no complaints.  She seems to be at baseline.  Requests refills for several medications.  Health Maintenance       Date Due Completion Date    Colorectal Cancer Screening Never done ---    Shingles Vaccine (1 of 2) Never done ---    COVID-19 Vaccine (3 - Booster for Moderna series) 09/28/2021 4/28/2021    Pneumococcal Vaccines (Age 65+) (2 - PCV) 12/23/2021 12/23/2020    Mammogram 02/02/2022 2/2/2021    Influenza Vaccine (1) 09/01/2022 9/16/2021    DEXA Scan 02/02/2023 2/2/2021    High Dose Statin 08/02/2023 8/2/2022    Lipid Panel 07/06/2027 7/6/2022    TETANUS VACCINE 05/29/2031 5/29/2021          PAST MEDICAL HISTORY:  Past Medical History:   Diagnosis Date    Carotid-cavernous fistula     Cerebrovascular accident (CVA) due to thrombosis of right middle cerebral artery 6/26/2018    Essential hypertension 6/27/2018    Hemiparesis affecting left side as late effect of cerebrovascular accident     Hx of tobacco use, presenting hazards to health     Hypertension     Lacunar infarct, acute 6/26/2018    Syncope and collapse        PAST SURGICAL HISTORY:  Past Surgical History:   Procedure Laterality Date    AUGMENTATION OF BREAST      BRAIN SURGERY         SOCIAL HISTORY:  Social History     Socioeconomic  History    Marital status: Unknown   Tobacco Use    Smoking status: Former Smoker     Packs/day: 0.50     Years: 25.00     Pack years: 12.50     Types: Cigarettes     Quit date: 2018     Years since quittin.1    Smokeless tobacco: Former User   Substance and Sexual Activity    Alcohol use: Not Currently    Drug use: No    Sexual activity: Never   Social History Narrative    ** Merged History Encounter **            FAMILY HISTORY:  Family History   Problem Relation Age of Onset    Melanoma Neg Hx        ALLERGIES AND MEDICATIONS: updated and reviewed.  Review of patient's allergies indicates:   Allergen Reactions    Percodan [oxycodone hcl-oxycodone-asa] Itching     Current Outpatient Medications   Medication Sig Dispense Refill    hydrOXYzine HCL (ATARAX) 25 MG tablet TAKE 1 TABLET (25 MG TOTAL) BY MOUTH 3 (THREE) TIMES DAILY AS NEEDED FOR ITCHING. 270 tablet 1    multivitamin Tab Take 1 tablet by mouth once daily. 30 tablet 0    amLODIPine (NORVASC) 5 MG tablet Take 1 tablet (5 mg total) by mouth once daily. 90 tablet 3    atorvastatin (LIPITOR) 40 MG tablet Take 1 tablet (40 mg total) by mouth once daily. 90 tablet 3    cyanocobalamin 1,000 mcg/mL injection Inject 1 mL (1,000 mcg total) into the muscle every 7 days. 1 mL 8    doxycycline (VIBRA-TABS) 100 MG tablet Take 1 tablet (100 mg total) by mouth 2 (two) times daily. (Patient not taking: Reported on 2022) 14 tablet 0    folic acid (FOLVITE) 1 MG tablet Take 1 tablet (1 mg total) by mouth once daily. 90 tablet 3     No current facility-administered medications for this visit.         ROS  Review of Systems   Constitutional: Negative for activity change, appetite change, fatigue and unexpected weight change.   Respiratory: Negative for cough, shortness of breath and wheezing.    Cardiovascular: Negative for chest pain and palpitations.   Gastrointestinal: Negative for abdominal pain, constipation and diarrhea.   Genitourinary:  "Negative for difficulty urinating and dysuria.   Musculoskeletal: Negative for arthralgias and myalgias.   Skin: Negative for color change and rash.   Neurological: Negative for dizziness, weakness, numbness and headaches.   Psychiatric/Behavioral: Negative for confusion and dysphoric mood.           Physical Exam  Vitals:    08/02/22 1423   BP: 136/70   BP Location: Left arm   Pulse: 80   Resp: 18   SpO2: 97%   Weight: 60.8 kg (134 lb)   Height: 5' 2" (1.575 m)    Body mass index is 24.51 kg/m².  Weight: 60.8 kg (134 lb)   Height: 5' 2" (157.5 cm)   Physical Exam  Vitals and nursing note reviewed.   Constitutional:       General: She is not in acute distress.     Appearance: Normal appearance.   HENT:      Head: Normocephalic and atraumatic.      Mouth/Throat:      Mouth: Mucous membranes are moist.      Pharynx: Oropharynx is clear.   Eyes:      Extraocular Movements: Extraocular movements intact.      Conjunctiva/sclera: Conjunctivae normal.   Cardiovascular:      Rate and Rhythm: Normal rate and regular rhythm.   Pulmonary:      Effort: Pulmonary effort is normal.      Breath sounds: Normal breath sounds.   Abdominal:      General: There is no distension.      Palpations: Abdomen is soft.      Tenderness: There is no abdominal tenderness.   Musculoskeletal:         General: No swelling or deformity. Normal range of motion.   Skin:     General: Skin is warm and dry.   Neurological:      General: No focal deficit present.      Mental Status: She is alert and oriented to person, place, and time. Mental status is at baseline.      Motor: No weakness.      Gait: Gait normal.   Psychiatric:         Mood and Affect: Mood normal.         Behavior: Behavior normal.           Assessment & Plan  Problem List Items Addressed This Visit        Neuro    Hemiparesis affecting left side as late effect of stroke       Cardiac/Vascular    Essential hypertension - Primary    Relevant Medications    amLODIPine (NORVASC) 5 MG tablet "       Oncology    Macrocytic anemia    Relevant Medications    folic acid (FOLVITE) 1 MG tablet      Other Visit Diagnoses     Mixed hyperlipidemia        Relevant Medications    atorvastatin (LIPITOR) 40 MG tablet    Vitamin B 12 deficiency        Relevant Medications    cyanocobalamin 1,000 mcg/mL injection    folic acid (FOLVITE) 1 MG tablet      Patient is considering about mammogram due to her age  Never had DEXA    Follow-up: RTC in Oct for wellness with labs prior

## 2022-10-05 ENCOUNTER — LAB VISIT (OUTPATIENT)
Dept: LAB | Facility: HOSPITAL | Age: 72
End: 2022-10-05
Attending: STUDENT IN AN ORGANIZED HEALTH CARE EDUCATION/TRAINING PROGRAM
Payer: MEDICARE

## 2022-10-05 DIAGNOSIS — Z00.00 MEDICARE ANNUAL WELLNESS VISIT, SUBSEQUENT: ICD-10-CM

## 2022-10-05 DIAGNOSIS — E78.5 HYPERLIPIDEMIA, UNSPECIFIED HYPERLIPIDEMIA TYPE: ICD-10-CM

## 2022-10-05 DIAGNOSIS — L29.9 ITCHING: ICD-10-CM

## 2022-10-05 DIAGNOSIS — I63.9 CEREBROVASCULAR ACCIDENT (CVA), UNSPECIFIED MECHANISM: ICD-10-CM

## 2022-10-05 LAB
ALBUMIN SERPL-MCNC: 4 GM/DL (ref 3.4–4.8)
ALBUMIN/GLOB SERPL: 1.2 RATIO (ref 1.1–2)
ALP SERPL-CCNC: 106 UNIT/L (ref 40–150)
ALT SERPL-CCNC: 16 UNIT/L (ref 0–55)
APPEARANCE UR: CLEAR
AST SERPL-CCNC: 18 UNIT/L (ref 5–34)
BACTERIA #/AREA URNS AUTO: NORMAL /HPF
BILIRUB UR QL STRIP.AUTO: NEGATIVE MG/DL
BILIRUBIN DIRECT+TOT PNL SERPL-MCNC: 0.5 MG/DL
BUN SERPL-MCNC: 7.2 MG/DL (ref 9.8–20.1)
CALCIUM SERPL-MCNC: 10.2 MG/DL (ref 8.4–10.2)
CHLORIDE SERPL-SCNC: 103 MMOL/L (ref 98–107)
CHOLEST SERPL-MCNC: 165 MG/DL
CHOLEST/HDLC SERPL: 4 {RATIO} (ref 0–5)
CO2 SERPL-SCNC: 28 MMOL/L (ref 23–31)
COLOR UR AUTO: YELLOW
CREAT SERPL-MCNC: 0.65 MG/DL (ref 0.55–1.02)
ERYTHROCYTE [SEDIMENTATION RATE] IN BLOOD: 50 MM/HR (ref 0–20)
GFR SERPLBLD CREATININE-BSD FMLA CKD-EPI: >60 MLS/MIN/1.73/M2
GLOBULIN SER-MCNC: 3.4 GM/DL (ref 2.4–3.5)
GLUCOSE SERPL-MCNC: 99 MG/DL (ref 82–115)
GLUCOSE UR QL STRIP.AUTO: NEGATIVE MG/DL
HDLC SERPL-MCNC: 39 MG/DL (ref 35–60)
KETONES UR QL STRIP.AUTO: NEGATIVE MG/DL
LDLC SERPL CALC-MCNC: 105 MG/DL (ref 50–140)
LEUKOCYTE ESTERASE UR QL STRIP.AUTO: NEGATIVE UNIT/L
NITRITE UR QL STRIP.AUTO: NEGATIVE
PH UR STRIP.AUTO: 6.5 [PH]
POTASSIUM SERPL-SCNC: 4.5 MMOL/L (ref 3.5–5.1)
PROT SERPL-MCNC: 7.4 GM/DL (ref 5.8–7.6)
PROT UR QL STRIP.AUTO: NEGATIVE MG/DL
RBC #/AREA URNS AUTO: <5 /HPF
RBC UR QL AUTO: NEGATIVE UNIT/L
SODIUM SERPL-SCNC: 138 MMOL/L (ref 136–145)
SP GR UR STRIP.AUTO: 1.01 (ref 1–1.03)
SQUAMOUS #/AREA URNS AUTO: <5 /HPF
TRIGL SERPL-MCNC: 105 MG/DL (ref 37–140)
TSH SERPL-ACNC: 1.13 UIU/ML (ref 0.35–4.94)
UROBILINOGEN UR STRIP-ACNC: 0.2 MG/DL
VLDLC SERPL CALC-MCNC: 21 MG/DL
WBC #/AREA URNS AUTO: <5 /HPF

## 2022-10-05 PROCEDURE — 36415 COLL VENOUS BLD VENIPUNCTURE: CPT

## 2022-10-05 PROCEDURE — 80061 LIPID PANEL: CPT

## 2022-10-05 PROCEDURE — 84443 ASSAY THYROID STIM HORMONE: CPT

## 2022-10-05 PROCEDURE — 85651 RBC SED RATE NONAUTOMATED: CPT

## 2022-10-05 PROCEDURE — 80053 COMPREHEN METABOLIC PANEL: CPT

## 2022-10-05 PROCEDURE — 81001 URINALYSIS AUTO W/SCOPE: CPT

## 2022-10-10 ENCOUNTER — OFFICE VISIT (OUTPATIENT)
Dept: PRIMARY CARE CLINIC | Facility: CLINIC | Age: 72
End: 2022-10-10
Payer: MEDICARE

## 2022-10-10 VITALS
WEIGHT: 134 LBS | RESPIRATION RATE: 18 BRPM | HEIGHT: 62 IN | DIASTOLIC BLOOD PRESSURE: 70 MMHG | HEART RATE: 72 BPM | BODY MASS INDEX: 24.66 KG/M2 | OXYGEN SATURATION: 97 % | TEMPERATURE: 98 F | SYSTOLIC BLOOD PRESSURE: 136 MMHG

## 2022-10-10 DIAGNOSIS — I69.354 HEMIPARESIS AFFECTING LEFT SIDE AS LATE EFFECT OF CEREBROVASCULAR ACCIDENT: ICD-10-CM

## 2022-10-10 DIAGNOSIS — I10 ESSENTIAL HYPERTENSION: ICD-10-CM

## 2022-10-10 DIAGNOSIS — Z12.31 BREAST CANCER SCREENING BY MAMMOGRAM: ICD-10-CM

## 2022-10-10 DIAGNOSIS — I63.311 CEREBROVASCULAR ACCIDENT (CVA) DUE TO THROMBOSIS OF RIGHT MIDDLE CEREBRAL ARTERY: ICD-10-CM

## 2022-10-10 DIAGNOSIS — Z00.00 MEDICARE ANNUAL WELLNESS VISIT, SUBSEQUENT: Primary | ICD-10-CM

## 2022-10-10 PROBLEM — F10.10 ALCOHOL ABUSE: Status: RESOLVED | Noted: 2018-07-24 | Resolved: 2022-10-10

## 2022-10-10 PROBLEM — R47.01 APHASIA: Status: RESOLVED | Noted: 2022-07-06 | Resolved: 2022-10-10

## 2022-10-10 PROCEDURE — 3044F PR MOST RECENT HEMOGLOBIN A1C LEVEL <7.0%: ICD-10-PCS | Mod: CPTII,,, | Performed by: STUDENT IN AN ORGANIZED HEALTH CARE EDUCATION/TRAINING PROGRAM

## 2022-10-10 PROCEDURE — G0439 PPPS, SUBSEQ VISIT: HCPCS | Mod: ,,, | Performed by: STUDENT IN AN ORGANIZED HEALTH CARE EDUCATION/TRAINING PROGRAM

## 2022-10-10 PROCEDURE — 3044F HG A1C LEVEL LT 7.0%: CPT | Mod: CPTII,,, | Performed by: STUDENT IN AN ORGANIZED HEALTH CARE EDUCATION/TRAINING PROGRAM

## 2022-10-10 PROCEDURE — 1160F RVW MEDS BY RX/DR IN RCRD: CPT | Mod: CPTII,,, | Performed by: STUDENT IN AN ORGANIZED HEALTH CARE EDUCATION/TRAINING PROGRAM

## 2022-10-10 PROCEDURE — 3075F SYST BP GE 130 - 139MM HG: CPT | Mod: CPTII,,, | Performed by: STUDENT IN AN ORGANIZED HEALTH CARE EDUCATION/TRAINING PROGRAM

## 2022-10-10 PROCEDURE — 3288F PR FALLS RISK ASSESSMENT DOCUMENTED: ICD-10-PCS | Mod: CPTII,,, | Performed by: STUDENT IN AN ORGANIZED HEALTH CARE EDUCATION/TRAINING PROGRAM

## 2022-10-10 PROCEDURE — 1101F PR PT FALLS ASSESS DOC 0-1 FALLS W/OUT INJ PAST YR: ICD-10-PCS | Mod: CPTII,,, | Performed by: STUDENT IN AN ORGANIZED HEALTH CARE EDUCATION/TRAINING PROGRAM

## 2022-10-10 PROCEDURE — 1101F PT FALLS ASSESS-DOCD LE1/YR: CPT | Mod: CPTII,,, | Performed by: STUDENT IN AN ORGANIZED HEALTH CARE EDUCATION/TRAINING PROGRAM

## 2022-10-10 PROCEDURE — 3078F PR MOST RECENT DIASTOLIC BLOOD PRESSURE < 80 MM HG: ICD-10-PCS | Mod: CPTII,,, | Performed by: STUDENT IN AN ORGANIZED HEALTH CARE EDUCATION/TRAINING PROGRAM

## 2022-10-10 PROCEDURE — 1159F PR MEDICATION LIST DOCUMENTED IN MEDICAL RECORD: ICD-10-PCS | Mod: CPTII,,, | Performed by: STUDENT IN AN ORGANIZED HEALTH CARE EDUCATION/TRAINING PROGRAM

## 2022-10-10 PROCEDURE — 1160F PR REVIEW ALL MEDS BY PRESCRIBER/CLIN PHARMACIST DOCUMENTED: ICD-10-PCS | Mod: CPTII,,, | Performed by: STUDENT IN AN ORGANIZED HEALTH CARE EDUCATION/TRAINING PROGRAM

## 2022-10-10 PROCEDURE — 3288F FALL RISK ASSESSMENT DOCD: CPT | Mod: CPTII,,, | Performed by: STUDENT IN AN ORGANIZED HEALTH CARE EDUCATION/TRAINING PROGRAM

## 2022-10-10 PROCEDURE — 1159F MED LIST DOCD IN RCRD: CPT | Mod: CPTII,,, | Performed by: STUDENT IN AN ORGANIZED HEALTH CARE EDUCATION/TRAINING PROGRAM

## 2022-10-10 PROCEDURE — 3078F DIAST BP <80 MM HG: CPT | Mod: CPTII,,, | Performed by: STUDENT IN AN ORGANIZED HEALTH CARE EDUCATION/TRAINING PROGRAM

## 2022-10-10 PROCEDURE — G0439 PR MEDICARE ANNUAL WELLNESS SUBSEQUENT VISIT: ICD-10-PCS | Mod: ,,, | Performed by: STUDENT IN AN ORGANIZED HEALTH CARE EDUCATION/TRAINING PROGRAM

## 2022-10-10 PROCEDURE — 3075F PR MOST RECENT SYSTOLIC BLOOD PRESS GE 130-139MM HG: ICD-10-PCS | Mod: CPTII,,, | Performed by: STUDENT IN AN ORGANIZED HEALTH CARE EDUCATION/TRAINING PROGRAM

## 2022-10-10 RX ORDER — ROSUVASTATIN CALCIUM 40 MG/1
40 TABLET, COATED ORAL NIGHTLY
Qty: 90 TABLET | Refills: 3 | Status: SHIPPED | OUTPATIENT
Start: 2022-10-10 | End: 2023-10-16

## 2022-10-10 RX ORDER — ASPIRIN 81 MG/1
81 TABLET ORAL DAILY
COMMUNITY

## 2022-10-10 NOTE — PROGRESS NOTES
Patient ID: [id number]     Chief Complaint: Medicare AWV (Labs done, Pneumonia vac 2020, Bone Density 2/13/21, Mammogram due, will send order to [facility], declined Colonoscopy/FIT testing )      HPI:     Shannan Reza is a 71 y.o. female here today for a Medicare Wellness.     Patient reports feeling at baseline. No fall or hospitalization since last visit. She lives with her son and has enough help. Denies depression. They are still trying to settle in the new place.     Of note, patient has recurrent CVA with the latest one in 07/2022 resulting left side weakness. Patient never attend physical therapy after the second event. States that she has handle and bar in the bathroom to ambulate. Patient does have electric wheelchair to move around the house.     Denies being neglect or abused at home. Feels safe.     Opioid Screening: Patient medication list reviewed, patient is not taking prescription opioids. Patient is not using additional opioids than prescribed. Patient is at low risk of substance abuse based on this opioid use history.       ----------------------------  Carotid-cavernous fistula  Cerebrovascular accident (CVA) due to thrombosis of right middle   cerebral artery  Essential hypertension  Hemiparesis affecting left side as late effect of cerebrovascular   accident  Hx of tobacco use, presenting hazards to health  Lacunar infarct, acute  Syncope and collapse     Past Surgical History:   Procedure Laterality Date    AUGMENTATION OF BREAST      BRAIN SURGERY         Review of patient's allergies indicates:   Allergen Reactions    Percodan [oxycodone hcl-oxycodone-asa] Itching       Outpatient Medications Marked as Taking for the 10/10/22 encounter (Office Visit) with Kiana Marie MD   Medication Sig Dispense Refill    amLODIPine (NORVASC) 5 MG tablet Take 1 tablet (5 mg total) by mouth once daily. 90 tablet 3    aspirin (ECOTRIN) 81 MG EC tablet Take 81 mg by mouth once daily.       folic acid (FOLVITE) 1 MG tablet Take 1 tablet (1 mg total) by mouth once daily. 90 tablet 3    multivitamin Tab Take 1 tablet by mouth once daily. 30 tablet 0    [DISCONTINUED] atorvastatin (LIPITOR) 40 MG tablet Take 1 tablet (40 mg total) by mouth once daily. 90 tablet 3       Social History     Socioeconomic History    Marital status: Unknown   Tobacco Use    Smoking status: Former     Packs/day: 0.50     Years: 25.00     Pack years: 12.50     Types: Cigarettes     Quit date: 2018     Years since quittin.3    Smokeless tobacco: Former   Substance and Sexual Activity    Alcohol use: Not Currently    Drug use: No    Sexual activity: Never   Social History Narrative    ** Merged History Encounter **             Family History   Problem Relation Age of Onset    Deep vein thrombosis Father     Melanoma Neg Hx         Patient Care Team:  Kiana Marie MD as PCP - General (Family Medicine)  CARLOS Chan MD (Internal Medicine)       Subjective:     ROS      Patient Reported Health Risk Assessment  [age] 70-79  Are you male or female?: Female  During the past four weeks, how much have you been bothered by emotional problems such as feeling anxious, depressed, irritable, sad, or downhearted and blue?: Not at all  During the past five weeks, has your physical and/or emotional health limited your social activities with family, friends, neighbors, or groups?: Not at all  During the past four weeks, how much bodily pain have you generally had?: Mild pain  During the past four weeks, was someone available to help if you needed and wanted help?: Yes, as much as I wanted  During the past four weeks, what was the hardest physical activity you could do for at least two minutes?: Very light  Can you get to places out of walking distance without help?  (For example, can you travel alone on buses or taxis, or drive your own car?): No  Can you go shopping for groceries or clothes without someone's help?:  No  Can you prepare your own meals?: No  Can you do your own housework without help?: No  Because of any health problems, do you need the help of another person with your personal care needs such as eating, bathing, dressing, or getting around the house?: Yes  Can you handle your own money without help?: Yes  During the past four weeks, how would you rate your health in general?: Good  How have things been going for you during the past four weeks?: Pretty well  Are you having difficulties driving your car?: Not applicable, I do not use a car  Do you always fasten your seat belt when you are in a car?: Yes, usually  How often in the past four weeks have you been bothered by falling or dizzy when standing up?: Seldom  How often in the past four weeks have you been bothered by sexual problems?: Never  How often in the past four weeks have you been bothered by trouble eating well?: Never  How often in the past four weeks have you been bothered by teeth or denture problems?: Never  How often in the past four weeks have you been bothered with problems using the telephone?: Never  How often in the past four weeks have you been bothered by tiredness or fatigue?: Seldom  Have you fallen two or more times in the past year?: No  Are you afraid of falling?: No  Are you a smoker?: No  During the past four weeks, how many drinks of wine, beer, or other alcoholic beverages did you have?: No alcohol at all  Do you exercise for about 20 minutes three or more days a week?: No, I usually do not exercise this much  Have you been given any information to help you with hazards in your house that might hurt you?: No  Have you been given any information to help you with keeping track of your medications?: No  How often do you have trouble taking medicines the way you've been told to take them?: I always take them as prescribed  How confident are you that you can control and manage most of your health problems?: Somewhat confident  What is  "your race? (Check all that apply.):     Objective:     /70 (BP Location: Right arm, Patient Position: Sitting, BP Method: Large (Manual))   Pulse 72   Temp 97.6 °F (36.4 °C) (Oral)   Resp 18   Ht 5' 2" (1.575 m)   Wt 60.8 kg (134 lb)   LMP  (LMP Unknown)   SpO2 97%   BMI 24.51 kg/m²     Physical Exam  Vitals and nursing note reviewed.   Constitutional:       General: She is not in acute distress.     Appearance: Normal appearance.      Comments: Patient looks comfortable in wheelchair    HENT:      Head: Normocephalic and atraumatic.      Nose: Nose normal.      Mouth/Throat:      Mouth: Mucous membranes are moist.      Pharynx: Oropharynx is clear.   Eyes:      Extraocular Movements: Extraocular movements intact.      Conjunctiva/sclera: Conjunctivae normal.      Pupils: Pupils are equal, round, and reactive to light.   Cardiovascular:      Rate and Rhythm: Normal rate and regular rhythm.      Pulses: Normal pulses.      Heart sounds: Normal heart sounds.   Pulmonary:      Effort: Pulmonary effort is normal.      Breath sounds: Normal breath sounds. No stridor. No wheezing.   Abdominal:      General: Abdomen is flat.      Palpations: Abdomen is soft.   Musculoskeletal:         General: Normal range of motion.      Cervical back: Normal range of motion and neck supple.      Comments: 3/5 weakness of left side of body.    Skin:     General: Skin is warm and dry.   Neurological:      General: No focal deficit present.      Mental Status: She is alert and oriented to person, place, and time.   Psychiatric:         Mood and Affect: Mood normal.         Behavior: Behavior normal.         No flowsheet data found.  Fall Risk Assessment - Outpatient 10/10/2022 8/2/2022 9/17/2021 9/16/2021 6/2/2021 12/23/2020 11/25/2019   Mobility Status Wheelchair Bound Wheelchair Bound Ambulatory w/ assistance Wheelchair Bound Ambulatory w/ assistance Ambulatory Wheelchair Bound   Number of falls 0 0 1 with injury 1 " with injury 1 with injury 0 0   Identified as fall risk 1 1 1 1 1 0 1           Depression Screening  Over the past two weeks, has the patient felt down, depressed, or hopeless?: No  Over the past two weeks, has the patient felt little interest or pleasure in doing things?: No  Functional Ability/Safety Screening  Was the patient's timed Up & Go test unsteady or longer than 30 seconds?: Yes  Does the patient need help with phone, transportation, shopping, preparing meals, housework, laundry, meds, or managing money?: Yes  Does the patient's home have rugs in the hallway, lack grab bars in the bathroom, lack handrails on the stairs or have poor lighting?: No  Have you noticed any hearing difficulties?: No  Assessment/Plan:     1. Medicare annual wellness visit, subsequent  Overall health status was reviewed   Good health habits reinforced   Fall precaution .  Appropriate recommendations and preventative care medical information provided, with annual wellness exam encouraged.     2. Breast cancer screening by mammogram  -     Mammo Digital Screening [facility] w/ [facility]; Future; Expected date: 10/24/2022    3. Cerebrovascular accident (CVA) due to thrombosis of right middle cerebral artery  -     rosuvastatin (CRESTOR) 40 MG Tab; Take 1 tablet (40 mg total) by mouth every evening.  Dispense: 90 tablet; Refill: 3  -     Patient stopped Plavix after 90 days.   -     Ambulatory referral/consult to Home Health; Future; Expected date: 10/11/2022  Despite multiple CVA, patient would be beneficial to have PT/OT at her new place.     4. Hemiparesis affecting left side as late effect of cerebrovascular accident  -     Ambulatory referral/consult to Home Health; Future; Expected date: 10/11/2022    5. Essential hypertension  - Stable on amlodipine.          Medicare Annual Wellness and Personalized Prevention Plan:   Fall Risk + Home Safety + Hearing Impairment + Depression Screen + Opioid and Substance Abuse Screening + Cognitive  Impairment Screen + Health Risk Assessment all reviewed.     Health Maintenance Topics with due status: Not Due       Topic Last Completion Date    DEXA Scan 02/02/2021    TETANUS VACCINE 05/29/2021    Lipid Panel 10/05/2022    High Dose Statin 10/10/2022      The patient's Health Maintenance was reviewed and the following appears to be due at this time:   Health Maintenance Due   Topic Date Due    Colorectal Cancer Screening  Never done    Shingles Vaccine (1 of 2) Never done    COVID-19 Vaccine (3 - Booster for Moderna series) 06/23/2021    Pneumococcal Vaccines (Age 65+) (2 - PCV) 12/23/2021    Mammogram  02/02/2022    Influenza Vaccine (1) 09/01/2022       Advance Care Planning   I attest to discussing Advance Care Planning with patient and/or family member.  Education was provided including the importance of the Health Care Power of , Advance Directives, and/or [place] documentation. She is working on Power of ,   The patient expressed understanding to the importance of this information and discussion.         Follow up in 6 months. In addition to their scheduled follow up, the patient has also been instructed to follow up on as needed basis.

## 2022-10-13 ENCOUNTER — TELEPHONE (OUTPATIENT)
Dept: PRIMARY CARE CLINIC | Facility: CLINIC | Age: 72
End: 2022-10-13
Payer: MEDICARE

## 2022-10-13 NOTE — TELEPHONE ENCOUNTER
----- Message from Melody Nava LPN sent at 10/12/2022  9:51 AM CDT -----  Will fax office note when complete   ----- Message -----  From: Colton Hernandes  Sent: 10/12/2022   9:35 AM CDT  To: Cynthia Canales Staff    .Type:  Needs Medical Advice    Who Called: [facility]  Would the patient rather a call back or a response via [facility]? Call back  Best Call Back Number: [phone]  Additional Information:  needs more information on referral needs office visit notes

## 2022-11-23 ENCOUNTER — TELEPHONE (OUTPATIENT)
Dept: PRIMARY CARE CLINIC | Facility: CLINIC | Age: 72
End: 2022-11-23
Payer: MEDICARE

## 2022-11-23 RX ORDER — HYDROXYZINE HYDROCHLORIDE 25 MG/1
25 TABLET, FILM COATED ORAL 3 TIMES DAILY PRN
Qty: 30 TABLET | Refills: 2 | Status: SHIPPED | OUTPATIENT
Start: 2022-11-23 | End: 2022-12-05 | Stop reason: SDUPTHER

## 2022-11-23 NOTE — TELEPHONE ENCOUNTER
----- Message from Lee Avalos sent at 11/23/2022 12:23 PM CST -----  Regarding: rx request  Type:  RX Refill Request    Who Called: caregiver  - David (son)  Refill or New Rx: refill  RX Name and Strength: Hydroxyzine 25 mg  How is the patient currently taking it? (ex. 1XDay): 3x daily as needed for itching   Is this a 30 day or 90 day RX: ---  Preferred Pharmacy with phone number: [id number]   Local or Mail Order: local  Ordering Provider: Dr. Marie   Would the patient rather a call back or a response via [facility]? Call back  Best Call Back Number:[phone]  Additional Information:

## 2023-01-23 ENCOUNTER — PES CALL (OUTPATIENT)
Dept: ADMINISTRATIVE | Facility: CLINIC | Age: 73
End: 2023-01-23
Payer: MEDICARE

## 2023-01-23 ENCOUNTER — PATIENT MESSAGE (OUTPATIENT)
Dept: ADMINISTRATIVE | Facility: HOSPITAL | Age: 73
End: 2023-01-23
Payer: MEDICARE

## 2023-02-27 ENCOUNTER — DOCUMENTATION ONLY (OUTPATIENT)
Dept: ADMINISTRATIVE | Facility: HOSPITAL | Age: 73
End: 2023-02-27
Payer: MEDICARE

## 2023-03-02 ENCOUNTER — PATIENT MESSAGE (OUTPATIENT)
Dept: ADMINISTRATIVE | Facility: HOSPITAL | Age: 73
End: 2023-03-02
Payer: MEDICARE

## 2023-04-03 ENCOUNTER — PATIENT MESSAGE (OUTPATIENT)
Dept: ADMINISTRATIVE | Facility: HOSPITAL | Age: 73
End: 2023-04-03
Payer: MEDICARE

## 2023-04-10 ENCOUNTER — OFFICE VISIT (OUTPATIENT)
Dept: PRIMARY CARE CLINIC | Facility: CLINIC | Age: 73
End: 2023-04-10
Payer: MEDICARE

## 2023-04-10 VITALS
OXYGEN SATURATION: 96 % | SYSTOLIC BLOOD PRESSURE: 138 MMHG | BODY MASS INDEX: 24.11 KG/M2 | WEIGHT: 131 LBS | HEIGHT: 62 IN | DIASTOLIC BLOOD PRESSURE: 82 MMHG | RESPIRATION RATE: 18 BRPM | HEART RATE: 84 BPM

## 2023-04-10 DIAGNOSIS — K04.7 INFECTED TOOTH: ICD-10-CM

## 2023-04-10 DIAGNOSIS — I69.354 HEMIPARESIS AFFECTING LEFT SIDE AS LATE EFFECT OF CEREBROVASCULAR ACCIDENT: ICD-10-CM

## 2023-04-10 DIAGNOSIS — Z13.6 SCREENING FOR CARDIOVASCULAR CONDITION: ICD-10-CM

## 2023-04-10 DIAGNOSIS — Z00.00 ENCOUNTER FOR WELLNESS EXAMINATION IN ADULT: ICD-10-CM

## 2023-04-10 DIAGNOSIS — I10 ESSENTIAL HYPERTENSION: Primary | ICD-10-CM

## 2023-04-10 PROCEDURE — 1160F RVW MEDS BY RX/DR IN RCRD: CPT | Mod: CPTII,,, | Performed by: STUDENT IN AN ORGANIZED HEALTH CARE EDUCATION/TRAINING PROGRAM

## 2023-04-10 PROCEDURE — 3075F SYST BP GE 130 - 139MM HG: CPT | Mod: CPTII,,, | Performed by: STUDENT IN AN ORGANIZED HEALTH CARE EDUCATION/TRAINING PROGRAM

## 2023-04-10 PROCEDURE — 3079F DIAST BP 80-89 MM HG: CPT | Mod: CPTII,,, | Performed by: STUDENT IN AN ORGANIZED HEALTH CARE EDUCATION/TRAINING PROGRAM

## 2023-04-10 PROCEDURE — 1159F PR MEDICATION LIST DOCUMENTED IN MEDICAL RECORD: ICD-10-PCS | Mod: CPTII,,, | Performed by: STUDENT IN AN ORGANIZED HEALTH CARE EDUCATION/TRAINING PROGRAM

## 2023-04-10 PROCEDURE — 1101F PR PT FALLS ASSESS DOC 0-1 FALLS W/OUT INJ PAST YR: ICD-10-PCS | Mod: CPTII,,, | Performed by: STUDENT IN AN ORGANIZED HEALTH CARE EDUCATION/TRAINING PROGRAM

## 2023-04-10 PROCEDURE — 3079F PR MOST RECENT DIASTOLIC BLOOD PRESSURE 80-89 MM HG: ICD-10-PCS | Mod: CPTII,,, | Performed by: STUDENT IN AN ORGANIZED HEALTH CARE EDUCATION/TRAINING PROGRAM

## 2023-04-10 PROCEDURE — 3008F BODY MASS INDEX DOCD: CPT | Mod: CPTII,,, | Performed by: STUDENT IN AN ORGANIZED HEALTH CARE EDUCATION/TRAINING PROGRAM

## 2023-04-10 PROCEDURE — 1126F AMNT PAIN NOTED NONE PRSNT: CPT | Mod: CPTII,,, | Performed by: STUDENT IN AN ORGANIZED HEALTH CARE EDUCATION/TRAINING PROGRAM

## 2023-04-10 PROCEDURE — 3288F FALL RISK ASSESSMENT DOCD: CPT | Mod: CPTII,,, | Performed by: STUDENT IN AN ORGANIZED HEALTH CARE EDUCATION/TRAINING PROGRAM

## 2023-04-10 PROCEDURE — 99214 OFFICE O/P EST MOD 30 MIN: CPT | Mod: ,,, | Performed by: STUDENT IN AN ORGANIZED HEALTH CARE EDUCATION/TRAINING PROGRAM

## 2023-04-10 PROCEDURE — 1159F MED LIST DOCD IN RCRD: CPT | Mod: CPTII,,, | Performed by: STUDENT IN AN ORGANIZED HEALTH CARE EDUCATION/TRAINING PROGRAM

## 2023-04-10 PROCEDURE — 1101F PT FALLS ASSESS-DOCD LE1/YR: CPT | Mod: CPTII,,, | Performed by: STUDENT IN AN ORGANIZED HEALTH CARE EDUCATION/TRAINING PROGRAM

## 2023-04-10 PROCEDURE — 3008F PR BODY MASS INDEX (BMI) DOCUMENTED: ICD-10-PCS | Mod: CPTII,,, | Performed by: STUDENT IN AN ORGANIZED HEALTH CARE EDUCATION/TRAINING PROGRAM

## 2023-04-10 PROCEDURE — 3075F PR MOST RECENT SYSTOLIC BLOOD PRESS GE 130-139MM HG: ICD-10-PCS | Mod: CPTII,,, | Performed by: STUDENT IN AN ORGANIZED HEALTH CARE EDUCATION/TRAINING PROGRAM

## 2023-04-10 PROCEDURE — 99214 PR OFFICE/OUTPT VISIT, EST, LEVL IV, 30-39 MIN: ICD-10-PCS | Mod: ,,, | Performed by: STUDENT IN AN ORGANIZED HEALTH CARE EDUCATION/TRAINING PROGRAM

## 2023-04-10 PROCEDURE — 1160F PR REVIEW ALL MEDS BY PRESCRIBER/CLIN PHARMACIST DOCUMENTED: ICD-10-PCS | Mod: CPTII,,, | Performed by: STUDENT IN AN ORGANIZED HEALTH CARE EDUCATION/TRAINING PROGRAM

## 2023-04-10 PROCEDURE — 1126F PR PAIN SEVERITY QUANTIFIED, NO PAIN PRESENT: ICD-10-PCS | Mod: CPTII,,, | Performed by: STUDENT IN AN ORGANIZED HEALTH CARE EDUCATION/TRAINING PROGRAM

## 2023-04-10 PROCEDURE — 3288F PR FALLS RISK ASSESSMENT DOCUMENTED: ICD-10-PCS | Mod: CPTII,,, | Performed by: STUDENT IN AN ORGANIZED HEALTH CARE EDUCATION/TRAINING PROGRAM

## 2023-04-10 RX ORDER — AMOXICILLIN 500 MG/1
500 TABLET, FILM COATED ORAL EVERY 12 HOURS
Qty: 20 TABLET | Refills: 0 | Status: SHIPPED | OUTPATIENT
Start: 2023-04-10 | End: 2023-04-20

## 2023-04-10 NOTE — PROGRESS NOTES
Chief Complaint  Chief Complaint   Patient presents with    6 Month Chronic Condition Follow Up     Hypertension       HPI  Shannan Reza is a 72 y.o. female with medical diagnoses as listed in the medical history who presents to clinic with her son for chronic conditions follow up. Patient is compliant with all medications without any side effect. Feels good. Normal appetite, urinary and bowel habits.   Today she complains about tooth pain left lower molar. States that her teeth are getting bad after the stroke and she is awaiting for extraction to have denture. Next available appt is in May with the dentist. Denies fever or chill. She is not taking any meds for pain yet.     Health Maintenance         Date Due Completion Date    Colorectal Cancer Screening Never done ---    Shingles Vaccine (1 of 2) Never done ---    COVID-19 Vaccine (3 - Booster for Moderna series) 06/23/2021 4/28/2021    Pneumococcal Vaccines (Age 65+) (2 - PCV) 12/23/2021 12/23/2020    Mammogram 02/02/2022 2/2/2021    DEXA Scan 02/02/2023 2/2/2021    High Dose Statin 10/10/2023 10/10/2022    Lipid Panel 10/05/2027 10/5/2022    TETANUS VACCINE 05/29/2031 5/29/2021            ALLERGIES AND MEDICATIONS: updated and reviewed.  Review of patient's allergies indicates:   Allergen Reactions    Percodan [oxycodone hcl-oxycodone-asa] Itching     Current Outpatient Medications   Medication Sig Dispense Refill    amLODIPine (NORVASC) 5 MG tablet Take 1 tablet (5 mg total) by mouth once daily. 90 tablet 3    aspirin (ECOTRIN) 81 MG EC tablet Take 81 mg by mouth once daily.      folic acid (FOLVITE) 1 MG tablet Take 1 tablet (1 mg total) by mouth once daily. 90 tablet 3    hydrOXYzine HCL (ATARAX) 25 MG tablet Take 1 tablet (25 mg total) by mouth 3 (three) times daily as needed for Itching or Anxiety. 270 tablet 0    multivitamin Tab Take 1 tablet by mouth once daily. 30 tablet 0    rosuvastatin (CRESTOR) 40 MG Tab Take 1 tablet (40 mg total) by mouth  "every evening. 90 tablet 3    amoxicillin (AMOXIL) 500 MG Tab Take 1 tablet (500 mg total) by mouth every 12 (twelve) hours. for 10 days 20 tablet 0     No current facility-administered medications for this visit.       Histories are reviewed and updated as appropriate     Review of Systems  Comprehensive review of system performed- negative except noted in HPI       Objective:   Vitals:    04/10/23 1404   BP: 138/82   BP Location: Left arm   Pulse: 84   Resp: 18   SpO2: 96%   Weight: 59.4 kg (131 lb)   Height: 5' 2" (1.575 m)    Body mass index is 23.96 kg/m².  Physical Exam  Vitals and nursing note reviewed.   Constitutional:       General: She is not in acute distress.     Appearance: Normal appearance.      Comments: Sitting comfortably in wheelchair    HENT:      Head: Normocephalic and atraumatic.      Mouth/Throat:      Mouth: Mucous membranes are moist.      Comments: Bad dentition of lower jaw with multiple metal fillings. No abscess noted. Mild swelling of left lower face with no tenderness   Eyes:      Extraocular Movements: Extraocular movements intact.      Conjunctiva/sclera: Conjunctivae normal.   Cardiovascular:      Rate and Rhythm: Normal rate and regular rhythm.   Pulmonary:      Effort: Pulmonary effort is normal.      Breath sounds: Normal breath sounds.   Abdominal:      General: Abdomen is flat.      Palpations: Abdomen is soft.   Musculoskeletal:         General: No swelling or tenderness. Normal range of motion.   Skin:     General: Skin is warm and dry.   Neurological:      General: No focal deficit present.      Mental Status: She is alert and oriented to person, place, and time. Mental status is at baseline.   Psychiatric:         Mood and Affect: Mood normal.         Behavior: Behavior normal.         Assessment & Plan  1. Essential hypertension  Well controlled with amlodipine     2. Hemiparesis affecting left side as late effect of cerebrovascular accident  Stable     3. Infected " tooth  -     amoxicillin (AMOXIL) 500 MG Tab; Take 1 tablet (500 mg total) by mouth every 12 (twelve) hours. for 10 days  Dispense: 20 tablet; Refill: 0      RTC in 6 months for wellness    Performed Resulted

## 2023-05-01 ENCOUNTER — PATIENT MESSAGE (OUTPATIENT)
Dept: ADMINISTRATIVE | Facility: HOSPITAL | Age: 73
End: 2023-05-01
Payer: MEDICARE

## 2023-05-30 ENCOUNTER — PATIENT MESSAGE (OUTPATIENT)
Dept: ADMINISTRATIVE | Facility: HOSPITAL | Age: 73
End: 2023-05-30
Payer: MEDICARE

## 2023-07-14 DIAGNOSIS — L50.9 URTICARIA: ICD-10-CM

## 2023-07-14 RX ORDER — HYDROXYZINE HYDROCHLORIDE 25 MG/1
TABLET, FILM COATED ORAL
Qty: 270 TABLET | Refills: 0 | Status: SHIPPED | OUTPATIENT
Start: 2023-07-14 | End: 2023-10-12

## 2023-07-25 ENCOUNTER — PATIENT MESSAGE (OUTPATIENT)
Dept: ADMINISTRATIVE | Facility: HOSPITAL | Age: 73
End: 2023-07-25
Payer: MEDICARE

## 2023-07-26 ENCOUNTER — PATIENT MESSAGE (OUTPATIENT)
Dept: PRIMARY CARE CLINIC | Facility: CLINIC | Age: 73
End: 2023-07-26
Payer: MEDICARE

## 2023-09-11 DIAGNOSIS — I10 ESSENTIAL HYPERTENSION: ICD-10-CM

## 2023-09-11 RX ORDER — AMLODIPINE BESYLATE 5 MG/1
5 TABLET ORAL
Qty: 90 TABLET | Refills: 3 | Status: SHIPPED | OUTPATIENT
Start: 2023-09-11 | End: 2023-10-23 | Stop reason: SDUPTHER

## 2023-09-19 ENCOUNTER — PATIENT MESSAGE (OUTPATIENT)
Dept: ADMINISTRATIVE | Facility: HOSPITAL | Age: 73
End: 2023-09-19
Payer: MEDICARE

## 2023-09-27 ENCOUNTER — DOCUMENTATION ONLY (OUTPATIENT)
Dept: ADMINISTRATIVE | Facility: HOSPITAL | Age: 73
End: 2023-09-27
Payer: MEDICARE

## 2023-09-28 ENCOUNTER — PATIENT OUTREACH (OUTPATIENT)
Dept: ADMINISTRATIVE | Facility: HOSPITAL | Age: 73
End: 2023-09-28
Payer: MEDICARE

## 2023-09-28 NOTE — PROGRESS NOTES
SOURAV called back, Per Leana pt stated she would call to schedule she has not called back.      Population Health Outreach.  Fax request for Mammogram Call to Leana as per 7/26 note patient had a reminder from Dr. Marie's office to schedule her Mammogram, It has not been read as of 9/28/23.   Note stated SOURAV called patient in April 2023 and she declined.   Spoke (per team messaging) with Melody Marie's nurse patient still declining.

## 2023-10-05 ENCOUNTER — DOCUMENTATION ONLY (OUTPATIENT)
Dept: PRIMARY CARE CLINIC | Facility: CLINIC | Age: 73
End: 2023-10-05
Payer: MEDICARE

## 2023-10-12 DIAGNOSIS — L50.9 URTICARIA: ICD-10-CM

## 2023-10-12 RX ORDER — HYDROXYZINE HYDROCHLORIDE 25 MG/1
TABLET, FILM COATED ORAL
Qty: 270 TABLET | Refills: 0 | Status: SHIPPED | OUTPATIENT
Start: 2023-10-12 | End: 2024-02-20 | Stop reason: SDUPTHER

## 2023-10-16 DIAGNOSIS — I63.311 CEREBROVASCULAR ACCIDENT (CVA) DUE TO THROMBOSIS OF RIGHT MIDDLE CEREBRAL ARTERY: ICD-10-CM

## 2023-10-16 RX ORDER — ROSUVASTATIN CALCIUM 40 MG/1
40 TABLET, COATED ORAL NIGHTLY
Qty: 90 TABLET | Refills: 3 | Status: SHIPPED | OUTPATIENT
Start: 2023-10-16 | End: 2023-10-23 | Stop reason: SDUPTHER

## 2023-10-23 ENCOUNTER — OFFICE VISIT (OUTPATIENT)
Dept: PRIMARY CARE CLINIC | Facility: CLINIC | Age: 73
End: 2023-10-23
Payer: MEDICARE

## 2023-10-23 VITALS
SYSTOLIC BLOOD PRESSURE: 139 MMHG | WEIGHT: 133.81 LBS | OXYGEN SATURATION: 95 % | HEIGHT: 62 IN | BODY MASS INDEX: 24.63 KG/M2 | HEART RATE: 74 BPM | DIASTOLIC BLOOD PRESSURE: 71 MMHG | RESPIRATION RATE: 18 BRPM

## 2023-10-23 DIAGNOSIS — I10 ESSENTIAL HYPERTENSION: ICD-10-CM

## 2023-10-23 DIAGNOSIS — I63.311 CEREBROVASCULAR ACCIDENT (CVA) DUE TO THROMBOSIS OF RIGHT MIDDLE CEREBRAL ARTERY: ICD-10-CM

## 2023-10-23 DIAGNOSIS — Z00.00 MEDICARE ANNUAL WELLNESS VISIT, SUBSEQUENT: Primary | ICD-10-CM

## 2023-10-23 DIAGNOSIS — Z13.6 SCREENING FOR CARDIOVASCULAR CONDITION: ICD-10-CM

## 2023-10-23 DIAGNOSIS — I69.354 HEMIPARESIS AFFECTING LEFT SIDE AS LATE EFFECT OF CEREBROVASCULAR ACCIDENT: ICD-10-CM

## 2023-10-23 LAB
ALBUMIN SERPL-MCNC: 4.2 G/DL (ref 3.4–4.8)
ALBUMIN/GLOB SERPL: 1.3 RATIO (ref 1.1–2)
ALP SERPL-CCNC: 92 UNIT/L (ref 40–150)
ALT SERPL-CCNC: 34 UNIT/L (ref 0–55)
AST SERPL-CCNC: 39 UNIT/L (ref 5–34)
BASOPHILS # BLD AUTO: 0.07 X10(3)/MCL
BASOPHILS NFR BLD AUTO: 0.9 %
BILIRUB SERPL-MCNC: 0.4 MG/DL
BUN SERPL-MCNC: 5.4 MG/DL (ref 9.8–20.1)
CALCIUM SERPL-MCNC: 9.5 MG/DL (ref 8.4–10.2)
CHLORIDE SERPL-SCNC: 105 MMOL/L (ref 98–107)
CHOLEST SERPL-MCNC: 155 MG/DL
CHOLEST/HDLC SERPL: 3 {RATIO} (ref 0–5)
CO2 SERPL-SCNC: 23 MMOL/L (ref 23–31)
CREAT SERPL-MCNC: 0.77 MG/DL (ref 0.55–1.02)
EOSINOPHIL # BLD AUTO: 0.17 X10(3)/MCL (ref 0–0.9)
EOSINOPHIL NFR BLD AUTO: 2.1 %
ERYTHROCYTE [DISTWIDTH] IN BLOOD BY AUTOMATED COUNT: 13.8 % (ref 11.5–17)
GFR SERPLBLD CREATININE-BSD FMLA CKD-EPI: >60 MLS/MIN/1.73/M2
GLOBULIN SER-MCNC: 3.2 GM/DL (ref 2.4–3.5)
GLUCOSE SERPL-MCNC: 101 MG/DL (ref 82–115)
HCT VFR BLD AUTO: 38.9 % (ref 37–47)
HDLC SERPL-MCNC: 49 MG/DL (ref 35–60)
HGB BLD-MCNC: 13.3 G/DL (ref 12–16)
IMM GRANULOCYTES # BLD AUTO: 0.03 X10(3)/MCL (ref 0–0.04)
IMM GRANULOCYTES NFR BLD AUTO: 0.4 %
LDLC SERPL CALC-MCNC: 85 MG/DL (ref 50–140)
LYMPHOCYTES # BLD AUTO: 1.44 X10(3)/MCL (ref 0.6–4.6)
LYMPHOCYTES NFR BLD AUTO: 17.9 %
MCH RBC QN AUTO: 29 PG (ref 27–31)
MCHC RBC AUTO-ENTMCNC: 34.2 G/DL (ref 33–36)
MCV RBC AUTO: 84.9 FL (ref 80–94)
MONOCYTES # BLD AUTO: 0.56 X10(3)/MCL (ref 0.1–1.3)
MONOCYTES NFR BLD AUTO: 7 %
NEUTROPHILS # BLD AUTO: 5.78 X10(3)/MCL (ref 2.1–9.2)
NEUTROPHILS NFR BLD AUTO: 71.7 %
NRBC BLD AUTO-RTO: 0 %
PLATELET # BLD AUTO: 331 X10(3)/MCL (ref 130–400)
PMV BLD AUTO: 11.1 FL (ref 7.4–10.4)
POTASSIUM SERPL-SCNC: 3.8 MMOL/L (ref 3.5–5.1)
PROT SERPL-MCNC: 7.4 GM/DL (ref 5.8–7.6)
RBC # BLD AUTO: 4.58 X10(6)/MCL (ref 4.2–5.4)
SODIUM SERPL-SCNC: 138 MMOL/L (ref 136–145)
TRIGL SERPL-MCNC: 103 MG/DL (ref 37–140)
TSH SERPL-ACNC: 1.13 UIU/ML (ref 0.35–4.94)
VLDLC SERPL CALC-MCNC: 21 MG/DL
WBC # SPEC AUTO: 8.05 X10(3)/MCL (ref 4.5–11.5)

## 2023-10-23 PROCEDURE — 1160F RVW MEDS BY RX/DR IN RCRD: CPT | Mod: CPTII,,, | Performed by: STUDENT IN AN ORGANIZED HEALTH CARE EDUCATION/TRAINING PROGRAM

## 2023-10-23 PROCEDURE — 36415 COLL VENOUS BLD VENIPUNCTURE: CPT | Performed by: STUDENT IN AN ORGANIZED HEALTH CARE EDUCATION/TRAINING PROGRAM

## 2023-10-23 PROCEDURE — 1159F PR MEDICATION LIST DOCUMENTED IN MEDICAL RECORD: ICD-10-PCS | Mod: CPTII,,, | Performed by: STUDENT IN AN ORGANIZED HEALTH CARE EDUCATION/TRAINING PROGRAM

## 2023-10-23 PROCEDURE — 85025 COMPLETE CBC W/AUTO DIFF WBC: CPT | Performed by: STUDENT IN AN ORGANIZED HEALTH CARE EDUCATION/TRAINING PROGRAM

## 2023-10-23 PROCEDURE — 3078F DIAST BP <80 MM HG: CPT | Mod: CPTII,,, | Performed by: STUDENT IN AN ORGANIZED HEALTH CARE EDUCATION/TRAINING PROGRAM

## 2023-10-23 PROCEDURE — G0439 PR MEDICARE ANNUAL WELLNESS SUBSEQUENT VISIT: ICD-10-PCS | Mod: ,,, | Performed by: STUDENT IN AN ORGANIZED HEALTH CARE EDUCATION/TRAINING PROGRAM

## 2023-10-23 PROCEDURE — 80053 COMPREHEN METABOLIC PANEL: CPT | Performed by: STUDENT IN AN ORGANIZED HEALTH CARE EDUCATION/TRAINING PROGRAM

## 2023-10-23 PROCEDURE — 3075F SYST BP GE 130 - 139MM HG: CPT | Mod: CPTII,,, | Performed by: STUDENT IN AN ORGANIZED HEALTH CARE EDUCATION/TRAINING PROGRAM

## 2023-10-23 PROCEDURE — 80061 LIPID PANEL: CPT | Performed by: STUDENT IN AN ORGANIZED HEALTH CARE EDUCATION/TRAINING PROGRAM

## 2023-10-23 PROCEDURE — 84443 ASSAY THYROID STIM HORMONE: CPT | Performed by: STUDENT IN AN ORGANIZED HEALTH CARE EDUCATION/TRAINING PROGRAM

## 2023-10-23 PROCEDURE — 3075F PR MOST RECENT SYSTOLIC BLOOD PRESS GE 130-139MM HG: ICD-10-PCS | Mod: CPTII,,, | Performed by: STUDENT IN AN ORGANIZED HEALTH CARE EDUCATION/TRAINING PROGRAM

## 2023-10-23 PROCEDURE — 1159F MED LIST DOCD IN RCRD: CPT | Mod: CPTII,,, | Performed by: STUDENT IN AN ORGANIZED HEALTH CARE EDUCATION/TRAINING PROGRAM

## 2023-10-23 PROCEDURE — 3078F PR MOST RECENT DIASTOLIC BLOOD PRESSURE < 80 MM HG: ICD-10-PCS | Mod: CPTII,,, | Performed by: STUDENT IN AN ORGANIZED HEALTH CARE EDUCATION/TRAINING PROGRAM

## 2023-10-23 PROCEDURE — 1160F PR REVIEW ALL MEDS BY PRESCRIBER/CLIN PHARMACIST DOCUMENTED: ICD-10-PCS | Mod: CPTII,,, | Performed by: STUDENT IN AN ORGANIZED HEALTH CARE EDUCATION/TRAINING PROGRAM

## 2023-10-23 PROCEDURE — G0439 PPPS, SUBSEQ VISIT: HCPCS | Mod: ,,, | Performed by: STUDENT IN AN ORGANIZED HEALTH CARE EDUCATION/TRAINING PROGRAM

## 2023-10-23 RX ORDER — AMLODIPINE BESYLATE 5 MG/1
5 TABLET ORAL DAILY
Qty: 90 TABLET | Refills: 3 | Status: SHIPPED | OUTPATIENT
Start: 2023-10-23 | End: 2023-12-08 | Stop reason: SDUPTHER

## 2023-10-23 RX ORDER — ROSUVASTATIN CALCIUM 40 MG/1
40 TABLET, COATED ORAL NIGHTLY
Qty: 90 TABLET | Refills: 3 | Status: SHIPPED | OUTPATIENT
Start: 2023-10-23 | End: 2023-12-08 | Stop reason: SDUPTHER

## 2023-10-23 NOTE — PROGRESS NOTES
Patient ID: [id number]     Chief Complaint: Medicare AWV (/)      HPI:     Shannan Reza is a 72 y.o. female here today for a Medicare Wellness.     Current and past medical history reviewed.  Pertinent family and social history reviewed.  - Patient lives at home with her son who is the main caretaker. Denies any falls or difficulties performing ADLs.   Colorectal cancer screening:  refuses   Breast cancer and DEXA: refuses   Vaccinations due:  All vaccinations due and/or desired have been addressed and given    No complaints today.    Opioid Screening: Patient medication list reviewed, patient is not taking prescription opioids. Patient is at low risk of substance abuse based on this opioid use history.   Denies urine leakage     ----------------------------  Carotid-cavernous fistula  Cerebrovascular accident (CVA) due to thrombosis of right middle   cerebral artery  Essential hypertension  Hemiparesis affecting left side as late effect of cerebrovascular   accident  Hx of tobacco use, presenting hazards to health     Past Surgical History:   Procedure Laterality Date    AUGMENTATION OF BREAST      BRAIN SURGERY         Review of patient's allergies indicates:   Allergen Reactions    Percodan [oxycodone hcl-oxycodone-asa] Itching       No outpatient medications have been marked as taking for the 10/23/23 encounter (Office Visit) with Kiana Marie MD.       Social History     Socioeconomic History    Marital status: Unknown   Tobacco Use    Smoking status: Former     Current packs/day: 0.00     Average packs/day: 0.5 packs/day for 25.0 years (12.5 ttl pk-yrs)     Types: Cigarettes     Start date: 1993     Quit date: 2018     Years since quittin.4    Smokeless tobacco: Former   Substance and Sexual Activity    Alcohol use: Not Currently    Drug use: No    Sexual activity: Never   Social History Narrative    ** Merged History Encounter **             Family History   Problem Relation Age of Onset     Deep vein thrombosis Father     Melanoma Neg Hx         Patient Care Team:  Kiana Marie MD as PCP - General (Family Medicine)  CARLOS Chan MD (Internal Medicine)  [facility] -       Subjective:     Review of Systems   Constitutional:  Negative for chills, fever and weight loss.   HENT:  Negative for hearing loss.    Eyes:  Negative for blurred vision and double vision.   Respiratory:  Negative for cough, shortness of breath and wheezing.    Cardiovascular:  Negative for chest pain, palpitations and leg swelling.   Gastrointestinal:  Negative for blood in stool, constipation, diarrhea, melena, nausea and vomiting.   Genitourinary:  Negative for dysuria, frequency and urgency.   Musculoskeletal:  Negative for falls.   Neurological:  Negative for dizziness, weakness and headaches.   Psychiatric/Behavioral:  Negative for depression, hallucinations and memory loss.          Patient Reported Health Risk Assessment  What [age] 70-79  Are you male or female?: Female  During the past four weeks, how much have you been bothered by emotional problems such as feeling anxious, depressed, irritable, sad, or downhearted and blue?: Slightly  During the past five weeks, has your physical and/or emotional health limited your social activities with family, friends, neighbors, or groups?: Not at all  During the past four weeks, how much bodily pain have you generally had?: No pain  During the past four weeks, was someone available to help if you needed and wanted help?: Yes, as much as I wanted  During the past four weeks, what was the hardest physical activity you could do for at least two minutes?: Light  Can you get to places out of walking distance without help?  (For example, can you travel alone on buses or taxis, or drive your own car?): No  Can you go shopping for groceries or clothes without someone's help?: No  Can you prepare your own meals?: No  Can you do your own housework  without help?: No  Because of any health problems, do you need the help of another person with your personal care needs such as eating, bathing, dressing, or getting around the house?: Yes  Can you handle your own money without help?: Yes  During the past four weeks, how would you rate your health in general?: Very good  How have things been going for you during the past four weeks?: Pretty well  Are you having difficulties driving your car?: Not applicable, I do not use a car  Do you always fasten your seat belt when you are in a car?: Yes, usually  How often in the past four weeks have you been bothered by falling or dizzy when standing up?: Never  How often in the past four weeks have you been bothered by sexual problems?: Never  How often in the past four weeks have you been bothered by trouble eating well?: Never  How often in the past four weeks have you been bothered by teeth or denture problems?: Never  How often in the past four weeks have you been bothered with problems using the telephone?: Never  How often in the past four weeks have you been bothered by tiredness or fatigue?: Never  Have you fallen two or more times in the past year?: No  Are you afraid of falling?: Yes  Are you a smoker?: No  During the past four weeks, how many drinks of wine, beer, or other alcoholic beverages did you have?: No alcohol at all  Do you exercise for about 20 minutes three or more days a week?: Yes, most of the time  Have you been given any information to help you with hazards in your house that might hurt you?: No  Have you been given any information to help you with keeping track of your medications?: No  How often do you have trouble taking medicines the way you've been told to take them?: I always take them as prescribed  How confident are you that you can control and manage most of your health problems?: I do not have any health problems  What is your race? (Check all that apply.):     Objective:     BP  "139/71 (BP Location: Right arm, Patient Position: Sitting)   Pulse 74   Resp 18   Ht 5' 2" (1.575 m)   Wt 60.7 kg (133 lb 12.8 oz)   LMP  (LMP Unknown)   SpO2 95%   BMI 24.47 kg/m²     Physical Exam  Vitals and nursing note reviewed.   Constitutional:       General: She is not in acute distress.     Appearance: Normal appearance. She is not ill-appearing.   HENT:      Head: Normocephalic and atraumatic.      Mouth/Throat:      Mouth: Mucous membranes are moist.      Pharynx: Oropharynx is clear.   Eyes:      Extraocular Movements: Extraocular movements intact.      Conjunctiva/sclera: Conjunctivae normal.   Cardiovascular:      Rate and Rhythm: Normal rate and regular rhythm.   Pulmonary:      Effort: Pulmonary effort is normal.      Breath sounds: Normal breath sounds.   Abdominal:      General: Abdomen is flat. Bowel sounds are normal.      Palpations: Abdomen is soft.   Musculoskeletal:         General: No swelling. Normal range of motion.   Skin:     General: Skin is warm and dry.   Neurological:      General: No focal deficit present.      Mental Status: She is alert and oriented to person, place, and time. Mental status is at baseline.      Comments: Ambulate with wheelchair when being out   Psychiatric:         Mood and Affect: Mood normal.         Behavior: Behavior normal.                No data to display                  4/10/2023     2:00 PM 10/10/2022     2:00 PM 8/2/2022     2:00 PM 9/17/2021     8:15 AM 9/16/2021     1:00 PM 6/2/2021    10:00 AM 12/23/2020     8:30 AM   Fall Risk Assessment - Outpatient   Mobility Status Ambulatory Wheelchair Bound Wheelchair Bound Ambulatory w/ assistance Wheelchair Bound Ambulatory w/ assistance Ambulatory   Number of falls 0 0 0 1 with injury 1 with injury 1 with injury 0   Identified as fall risk False True True True True True False           Depression Screening  Over the past two weeks, has the patient felt down, depressed, or hopeless?: No  Over the past " two weeks, has the patient felt little interest or pleasure in doing things?: No  Functional Ability/Safety Screening  Was the patient's timed Up & Go test unsteady or longer than 30 seconds?: No  Does the patient need help with phone, transportation, shopping, preparing meals, housework, laundry, meds, or managing money?: Yes  Does the patient's home have rugs in the hallway, lack grab bars in the bathroom, lack handrails on the stairs or have poor lighting?: No  Have you noticed any hearing difficulties?: No  Cognitive Function (Assessed through direct observation with due consideration of information obtained by way of patient reports and/or concerns raised by family, friends, caretakers, or others)    Does the patient repeat questions/statements in the same day?: No  Does the patient have trouble remembering the date, year, and time?: No  Does the patient have difficulty managing finances?: No  Does the patient have a decreased sense of direction?: No  Assessment/Plan:     1. Medicare annual wellness visit, subsequent  Overall health status was reviewed   Good health habits reinforced   Appropriate recommendations and preventative care medical information provided, with annual wellness exam encouraged.     2. Cerebrovascular accident (CVA) due to thrombosis of right middle cerebral artery  -    Refill rosuvastatin (CRESTOR) 40 MG Tab; Take 1 tablet (40 mg total) by mouth every evening.  Dispense: 90 tablet; Refill: 3  -    Patient  is ambulate with help at home. Her son is the main caretaker. Denies falls     3. Essential hypertension  -    Stable- refill amLODIPine (NORVASC) 5 MG tablet; Take 1 tablet (5 mg total) by mouth once daily.  Dispense: 90 tablet; Refill: 3     Patient denies C-scope, MMG or dexa at this time   Medicare Annual Wellness and Personalized Prevention Plan:   Fall Risk + Home Safety + Hearing Impairment + Depression Screen + Opioid and Substance Abuse Screening + Cognitive Impairment Screen  + Health Risk Assessment all reviewed.     Health Maintenance Topics with due status: Not Due       Topic Last Completion Date    TETANUS VACCINE 05/29/2021    High Dose Statin 10/23/2023    Lipid Panel 10/23/2023      The patient's Health Maintenance was reviewed and the following appears to be due at this time:   Health Maintenance Due   Topic Date Due    Colorectal Cancer Screening  Never done    Shingles Vaccine (1 of 2) Never done    RSV Vaccine (Age 60+) (1 - 1-dose 60+ series) Never done    Pneumococcal Vaccines (Age 65+) (2 - PCV) 12/23/2021    Mammogram  02/02/2022    DEXA Scan  02/02/2023    Influenza Vaccine (1) 09/01/2023    COVID-19 Vaccine (3 - 2023-24 season) 09/01/2023       Advance Care Planning   I attest to discussing Advance Care Planning with patient and/or family member.  Education was provided including the importance of the Health Care Power of , Advance Directives, and/or [place] documentation.  The patient expressed understanding to the importance of this information and discussion.       RTC in 6 months for chronic conditions follow up. In addition to their scheduled follow up, the patient has also been instructed to follow up on as needed basis.

## 2023-10-23 NOTE — PROGRESS NOTES
Wellness labs drawn from the right arm with butterfly needle. Pt expressed no pain and tolerated well.

## 2024-02-20 DIAGNOSIS — L50.9 URTICARIA: ICD-10-CM

## 2024-02-20 RX ORDER — HYDROXYZINE HYDROCHLORIDE 25 MG/1
TABLET, FILM COATED ORAL
Qty: 270 TABLET | Refills: 0 | Status: SHIPPED | OUTPATIENT
Start: 2024-02-20

## 2024-04-25 ENCOUNTER — PATIENT OUTREACH (OUTPATIENT)
Dept: ADMINISTRATIVE | Facility: HOSPITAL | Age: 74
End: 2024-04-25
Payer: MEDICARE

## 2024-04-25 NOTE — PROGRESS NOTES
Health Maintenance Topic(s) Outreach Outcomes & Actions Taken:  Spoke with the patient , refusing the following, states she had a stroke and she is just not interested in pursuing any of these Topics.      Breast Cancer Screening - Outreach Outcomes & Actions Taken  : Refusing states too painful.    Osteoporosis Screening - Outreach Outcomes & Actions Taken  : Refusing    Colorectal Cancer Screening - Outreach Outcomes & Actions Taken  : Refusing not a process she wants to go thru Cologuard refused as well.     Additional Notes:           Care Management, Digital Medicine, and/or Education Referrals      No concerns at the present.

## 2024-08-07 ENCOUNTER — HOSPITAL ENCOUNTER (INPATIENT)
Facility: HOSPITAL | Age: 74
LOS: 17 days | Discharge: HOSPICE | DRG: 064 | End: 2024-08-24
Attending: INTERNAL MEDICINE | Admitting: INTERNAL MEDICINE
Payer: MEDICARE

## 2024-08-07 DIAGNOSIS — I63.9 STROKE: ICD-10-CM

## 2024-08-07 DIAGNOSIS — R56.9 SEIZURE: ICD-10-CM

## 2024-08-07 DIAGNOSIS — I63.9 CEREBROVASCULAR ACCIDENT (CVA), UNSPECIFIED MECHANISM: Primary | ICD-10-CM

## 2024-08-07 DIAGNOSIS — G93.40 ENCEPHALOPATHY: ICD-10-CM

## 2024-08-07 DIAGNOSIS — I63.9 CVA (CEREBRAL VASCULAR ACCIDENT): ICD-10-CM

## 2024-08-07 DIAGNOSIS — I50.9 HEART FAILURE: ICD-10-CM

## 2024-08-07 LAB
BASOPHILS # BLD AUTO: 0.06 X10(3)/MCL
BASOPHILS NFR BLD AUTO: 0.3 %
EOSINOPHIL # BLD AUTO: 0 X10(3)/MCL (ref 0–0.9)
EOSINOPHIL NFR BLD AUTO: 0 %
ERYTHROCYTE [DISTWIDTH] IN BLOOD BY AUTOMATED COUNT: 14.3 % (ref 11.5–17)
HCT VFR BLD AUTO: 35.9 % (ref 37–47)
HGB BLD-MCNC: 11.9 G/DL (ref 12–16)
IMM GRANULOCYTES # BLD AUTO: 0.1 X10(3)/MCL (ref 0–0.04)
IMM GRANULOCYTES NFR BLD AUTO: 0.5 %
LYMPHOCYTES # BLD AUTO: 1.48 X10(3)/MCL (ref 0.6–4.6)
LYMPHOCYTES NFR BLD AUTO: 7.7 %
MCH RBC QN AUTO: 29.2 PG (ref 27–31)
MCHC RBC AUTO-ENTMCNC: 33.1 G/DL (ref 33–36)
MCV RBC AUTO: 88 FL (ref 80–94)
MONOCYTES # BLD AUTO: 0.72 X10(3)/MCL (ref 0.1–1.3)
MONOCYTES NFR BLD AUTO: 3.8 %
NEUTROPHILS # BLD AUTO: 16.83 X10(3)/MCL (ref 2.1–9.2)
NEUTROPHILS NFR BLD AUTO: 87.7 %
NRBC BLD AUTO-RTO: 0 %
PLATELET # BLD AUTO: 331 X10(3)/MCL (ref 130–400)
PLATELETS.RETICULATED NFR BLD AUTO: 3.8 % (ref 0.9–11.2)
PMV BLD AUTO: 10.8 FL (ref 7.4–10.4)
RBC # BLD AUTO: 4.08 X10(6)/MCL (ref 4.2–5.4)
WBC # BLD AUTO: 19.19 X10(3)/MCL (ref 4.5–11.5)

## 2024-08-07 PROCEDURE — 80307 DRUG TEST PRSMV CHEM ANLYZR: CPT

## 2024-08-07 PROCEDURE — 80053 COMPREHEN METABOLIC PANEL: CPT

## 2024-08-07 PROCEDURE — 51702 INSERT TEMP BLADDER CATH: CPT

## 2024-08-07 PROCEDURE — 83735 ASSAY OF MAGNESIUM: CPT

## 2024-08-07 PROCEDURE — 20000000 HC ICU ROOM

## 2024-08-07 PROCEDURE — 99223 1ST HOSP IP/OBS HIGH 75: CPT | Mod: ,,, | Performed by: PSYCHIATRY & NEUROLOGY

## 2024-08-07 PROCEDURE — 99900035 HC TECH TIME PER 15 MIN (STAT)

## 2024-08-07 PROCEDURE — 83605 ASSAY OF LACTIC ACID: CPT

## 2024-08-07 PROCEDURE — 94002 VENT MGMT INPAT INIT DAY: CPT

## 2024-08-07 PROCEDURE — 27200966 HC CLOSED SUCTION SYSTEM

## 2024-08-07 PROCEDURE — 85610 PROTHROMBIN TIME: CPT

## 2024-08-07 PROCEDURE — 85025 COMPLETE CBC W/AUTO DIFF WBC: CPT

## 2024-08-07 PROCEDURE — 84100 ASSAY OF PHOSPHORUS: CPT

## 2024-08-07 PROCEDURE — 81001 URINALYSIS AUTO W/SCOPE: CPT

## 2024-08-07 PROCEDURE — 25500020 PHARM REV CODE 255: Performed by: INTERNAL MEDICINE

## 2024-08-07 PROCEDURE — 99900031 HC PATIENT EDUCATION (STAT)

## 2024-08-07 PROCEDURE — 94760 N-INVAS EAR/PLS OXIMETRY 1: CPT

## 2024-08-07 PROCEDURE — 27100171 HC OXYGEN HIGH FLOW UP TO 24 HOURS

## 2024-08-07 RX ORDER — MUPIROCIN 20 MG/G
OINTMENT TOPICAL 2 TIMES DAILY
Status: COMPLETED | OUTPATIENT
Start: 2024-08-09 | End: 2024-08-13

## 2024-08-07 RX ORDER — DEXAMETHASONE SODIUM PHOSPHATE 4 MG/ML
4 INJECTION, SOLUTION INTRA-ARTICULAR; INTRALESIONAL; INTRAMUSCULAR; INTRAVENOUS; SOFT TISSUE EVERY 12 HOURS
Status: COMPLETED | OUTPATIENT
Start: 2024-08-07 | End: 2024-08-08

## 2024-08-07 RX ADMIN — IOHEXOL 100 ML: 350 INJECTION, SOLUTION INTRAVENOUS at 10:08

## 2024-08-08 PROBLEM — I63.9 CVA (CEREBRAL VASCULAR ACCIDENT): Status: ACTIVE | Noted: 2024-08-08

## 2024-08-08 PROBLEM — I63.521 ACUTE ISCHEMIC RIGHT ACA STROKE: Status: ACTIVE | Noted: 2024-08-08

## 2024-08-08 PROBLEM — G93.40 ENCEPHALOPATHY: Status: ACTIVE | Noted: 2024-08-08

## 2024-08-08 LAB
ALBUMIN SERPL-MCNC: 2.6 G/DL (ref 3.4–4.8)
ALBUMIN SERPL-MCNC: 2.9 G/DL (ref 3.4–4.8)
ALBUMIN/GLOB SERPL: 1.1 RATIO (ref 1.1–2)
ALBUMIN/GLOB SERPL: 1.1 RATIO (ref 1.1–2)
ALLENS TEST BLOOD GAS (OHS): ABNORMAL
ALLENS TEST BLOOD GAS (OHS): ABNORMAL
ALP SERPL-CCNC: 69 UNIT/L (ref 40–150)
ALP SERPL-CCNC: 77 UNIT/L (ref 40–150)
ALT SERPL-CCNC: 17 UNIT/L (ref 0–55)
ALT SERPL-CCNC: 19 UNIT/L (ref 0–55)
AMPHET UR QL SCN: NEGATIVE
ANION GAP SERPL CALC-SCNC: 10 MEQ/L
ANION GAP SERPL CALC-SCNC: 14 MEQ/L
AST SERPL-CCNC: 44 UNIT/L (ref 5–34)
AST SERPL-CCNC: 46 UNIT/L (ref 5–34)
AV INDEX (PROSTH): 0.44
AV MEAN GRADIENT: 4 MMHG
AV PEAK GRADIENT: 7 MMHG
AV VALVE AREA BY VELOCITY RATIO: 1.38 CM²
AV VALVE AREA: 1.26 CM²
AV VELOCITY RATIO: 0.49
BACTERIA #/AREA URNS AUTO: ABNORMAL /HPF
BARBITURATE SCN PRESENT UR: NEGATIVE
BASE EXCESS BLD CALC-SCNC: -12.6 MMOL/L
BASE EXCESS BLD CALC-SCNC: -8 MMOL/L
BASOPHILS # BLD AUTO: 0.04 X10(3)/MCL
BASOPHILS NFR BLD AUTO: 0.2 %
BENZODIAZ UR QL SCN: NEGATIVE
BILIRUB SERPL-MCNC: 0.4 MG/DL
BILIRUB SERPL-MCNC: 0.5 MG/DL
BILIRUB UR QL STRIP.AUTO: NEGATIVE
BLOOD GAS SAMPLE TYPE (OHS): ABNORMAL
BLOOD GAS SAMPLE TYPE (OHS): ABNORMAL
BUN SERPL-MCNC: 9.2 MG/DL (ref 9.8–20.1)
BUN SERPL-MCNC: 9.7 MG/DL (ref 9.8–20.1)
CA-I BLD-SCNC: 1.1 MMOL/L (ref 1.12–1.23)
CA-I BLD-SCNC: 1.12 MMOL/L (ref 1.12–1.23)
CALCIUM SERPL-MCNC: 7.8 MG/DL (ref 8.4–10.2)
CALCIUM SERPL-MCNC: 7.9 MG/DL (ref 8.4–10.2)
CANNABINOIDS UR QL SCN: NEGATIVE
CHLORIDE SERPL-SCNC: 114 MMOL/L (ref 98–107)
CHLORIDE SERPL-SCNC: 114 MMOL/L (ref 98–107)
CHOLEST SERPL-MCNC: 94 MG/DL
CHOLEST/HDLC SERPL: 2 {RATIO} (ref 0–5)
CLARITY UR: CLEAR
CO2 BLDA-SCNC: 13.8 MMOL/L
CO2 BLDA-SCNC: 17.9 MMOL/L
CO2 SERPL-SCNC: 11 MMOL/L (ref 23–31)
CO2 SERPL-SCNC: 16 MMOL/L (ref 23–31)
COCAINE UR QL SCN: NEGATIVE
COLOR UR AUTO: ABNORMAL
CREAT SERPL-MCNC: 0.75 MG/DL (ref 0.55–1.02)
CREAT SERPL-MCNC: 0.82 MG/DL (ref 0.55–1.02)
CREAT/UREA NIT SERPL: 12
CREAT/UREA NIT SERPL: 12
CV ECHO LV RWT: 0.45 CM
DOP CALC AO PEAK VEL: 1.33 M/S
DOP CALC AO VTI: 19.8 CM
DOP CALC LVOT AREA: 2.8 CM2
DOP CALC LVOT DIAMETER: 1.9 CM
DOP CALC LVOT PEAK VEL: 0.65 M/S
DOP CALC LVOT STROKE VOLUME: 24.94 CM3
DOP CALC MV VTI: 10.9 CM
DOP CALCLVOT PEAK VEL VTI: 8.8 CM
DRAWN BY BLOOD GAS (OHS): ABNORMAL
DRAWN BY BLOOD GAS (OHS): ABNORMAL
E WAVE DECELERATION TIME: 161 MSEC
E/A RATIO: 1.35
E/E' RATIO: 8.67 M/S
ECHO LV POSTERIOR WALL: 0.86 CM (ref 0.6–1.1)
EOSINOPHIL # BLD AUTO: 0 X10(3)/MCL (ref 0–0.9)
EOSINOPHIL NFR BLD AUTO: 0 %
ERYTHROCYTE [DISTWIDTH] IN BLOOD BY AUTOMATED COUNT: 14.3 % (ref 11.5–17)
EST. AVERAGE GLUCOSE BLD GHB EST-MCNC: 116.9 MG/DL
FENTANYL UR QL SCN: POSITIVE
FLOW (OHS): 50 LPM
FRACTIONAL SHORTENING: 22 % (ref 28–44)
GFR SERPLBLD CREATININE-BSD FMLA CKD-EPI: >60 ML/MIN/1.73/M2
GFR SERPLBLD CREATININE-BSD FMLA CKD-EPI: >60 ML/MIN/1.73/M2
GLOBULIN SER-MCNC: 2.4 GM/DL (ref 2.4–3.5)
GLOBULIN SER-MCNC: 2.6 GM/DL (ref 2.4–3.5)
GLUCOSE SERPL-MCNC: 213 MG/DL (ref 82–115)
GLUCOSE SERPL-MCNC: 215 MG/DL (ref 82–115)
GLUCOSE UR QL STRIP: NORMAL
HBA1C MFR BLD: 5.7 %
HCO3 BLDA-SCNC: 12.9 MMOL/L (ref 22–26)
HCO3 BLDA-SCNC: 16.9 MMOL/L (ref 22–26)
HCT VFR BLD AUTO: 35.8 % (ref 37–47)
HDLC SERPL-MCNC: 42 MG/DL (ref 35–60)
HGB BLD-MCNC: 12 G/DL (ref 12–16)
HGB UR QL STRIP: ABNORMAL
IMM GRANULOCYTES # BLD AUTO: 0.11 X10(3)/MCL (ref 0–0.04)
IMM GRANULOCYTES NFR BLD AUTO: 0.5 %
INHALED O2 CONCENTRATION: 60 %
INHALED O2 CONCENTRATION: 75 %
INR PPP: 1.3
INTERVENTRICULAR SEPTUM: 0.89 CM (ref 0.6–1.1)
KETONES UR QL STRIP: ABNORMAL
LACTATE SERPL-SCNC: 4.4 MMOL/L (ref 0.5–2.2)
LACTATE SERPL-SCNC: 4.5 MMOL/L (ref 0.5–2.2)
LACTATE SERPL-SCNC: 4.5 MMOL/L (ref 0.5–2.2)
LDLC SERPL CALC-MCNC: 33 MG/DL (ref 50–140)
LEFT ATRIUM AREA SYSTOLIC (APICAL 2 CHAMBER): 16.8 CM2
LEFT ATRIUM SIZE: 3.1 CM
LEFT CCA DIST DIAS: 13 CM/S
LEFT CCA DIST SYS: 52 CM/S
LEFT CCA PROX DIAS: 14 CM/S
LEFT CCA PROX SYS: 59 CM/S
LEFT ECA DIAS: 4 CM/S
LEFT ECA SYS: 29 CM/S
LEFT ICA DIST DIAS: 12 CM/S
LEFT ICA DIST SYS: 31 CM/S
LEFT ICA MID DIAS: 14 CM/S
LEFT ICA MID SYS: 38 CM/S
LEFT ICA PROX DIAS: 14 CM/S
LEFT ICA PROX SYS: 40 CM/S
LEFT INTERNAL DIMENSION IN SYSTOLE: 2.98 CM (ref 2.1–4)
LEFT VENTRICLE DIASTOLIC VOLUME: 62.3 ML
LEFT VENTRICLE END SYSTOLIC VOLUME APICAL 2 CHAMBER: 41.2 ML
LEFT VENTRICLE SYSTOLIC VOLUME: 34.4 ML
LEFT VENTRICULAR INTERNAL DIMENSION IN DIASTOLE: 3.81 CM (ref 3.5–6)
LEFT VENTRICULAR MASS: 97.59 G
LEFT VERTEBRAL DIAS: 13 CM/S
LEFT VERTEBRAL SYS: 34 CM/S
LEUKOCYTE ESTERASE UR QL STRIP: NEGATIVE
LV LATERAL E/E' RATIO: 7.22 M/S
LV SEPTAL E/E' RATIO: 10.83 M/S
LVED V (TEICH): 62.3 ML
LVES V (TEICH): 34.4 ML
LVOT MG: 1 MMHG
LVOT MV: 0.43 CM/S
LYMPHOCYTES # BLD AUTO: 1.36 X10(3)/MCL (ref 0.6–4.6)
LYMPHOCYTES NFR BLD AUTO: 6.3 %
MAGNESIUM SERPL-MCNC: 1.7 MG/DL (ref 1.6–2.6)
MCH RBC QN AUTO: 29.2 PG (ref 27–31)
MCHC RBC AUTO-ENTMCNC: 33.5 G/DL (ref 33–36)
MCV RBC AUTO: 87.1 FL (ref 80–94)
MDMA UR QL SCN: NEGATIVE
MECH RR (OHS): 14 B/MIN
MECH RR (OHS): 14 B/MIN
MODE (OHS): AC
MODE (OHS): AC
MONOCYTES # BLD AUTO: 0.77 X10(3)/MCL (ref 0.1–1.3)
MONOCYTES NFR BLD AUTO: 3.6 %
MUCOUS THREADS URNS QL MICRO: ABNORMAL /LPF
MV MEAN GRADIENT: 1 MMHG
MV PEAK A VEL: 0.48 M/S
MV PEAK E VEL: 0.65 M/S
MV PEAK GRADIENT: 2 MMHG
MV VALVE AREA BY CONTINUITY EQUATION: 2.29 CM2
NEUTROPHILS # BLD AUTO: 19.32 X10(3)/MCL (ref 2.1–9.2)
NEUTROPHILS NFR BLD AUTO: 89.4 %
NITRITE UR QL STRIP: NEGATIVE
NRBC BLD AUTO-RTO: 0 %
OHS CV CAROTID ULTRASOUND LEFT ICA/CCA RATIO: 0.77
OHS CV PV CAROTID LEFT HIGHEST CCA: 59
OHS CV PV CAROTID LEFT HIGHEST ICA: 40
OHS CV RV/LV RATIO: 0.73 CM
OHS CV US CAROTID LEFT HIGHEST EDV: 14
OPIATES UR QL SCN: NEGATIVE
OXYGEN DEVICE BLOOD GAS (OHS): ABNORMAL
OXYGEN DEVICE BLOOD GAS (OHS): ABNORMAL
PCO2 BLDA: 28 MMHG (ref 35–45)
PCO2 BLDA: 32 MMHG (ref 35–45)
PCP UR QL: NEGATIVE
PEEP RESPIRATORY: 8 CMH2O
PEEP RESPIRATORY: 8 CMH2O
PH BLDA: 7.27 [PH] (ref 7.35–7.45)
PH BLDA: 7.33 [PH] (ref 7.35–7.45)
PH UR STRIP: 6 [PH]
PH UR: 6 [PH] (ref 3–11)
PHOSPHATE SERPL-MCNC: 2.8 MG/DL (ref 2.3–4.7)
PISA TR MAX VEL: 2.39 M/S
PLATELET # BLD AUTO: 309 X10(3)/MCL (ref 130–400)
PLATELETS.RETICULATED NFR BLD AUTO: 4.1 % (ref 0.9–11.2)
PMV BLD AUTO: 11.3 FL (ref 7.4–10.4)
PO2 BLDA: 64 MMHG (ref 80–100)
PO2 BLDA: 82 MMHG (ref 80–100)
POCT GLUCOSE: 172 MG/DL (ref 70–110)
POCT GLUCOSE: 216 MG/DL (ref 70–110)
POCT GLUCOSE: 269 MG/DL (ref 70–110)
POTASSIUM BLOOD GAS (OHS): 3.2 MMOL/L (ref 3.5–5)
POTASSIUM BLOOD GAS (OHS): 3.8 MMOL/L (ref 3.5–5)
POTASSIUM SERPL-SCNC: 3.3 MMOL/L (ref 3.5–5.1)
POTASSIUM SERPL-SCNC: 4.3 MMOL/L (ref 3.5–5.1)
PROT SERPL-MCNC: 5 GM/DL (ref 5.8–7.6)
PROT SERPL-MCNC: 5.5 GM/DL (ref 5.8–7.6)
PROT UR QL STRIP: ABNORMAL
PROTHROMBIN TIME: 16.2 SECONDS (ref 12.5–14.5)
PV PEAK GRADIENT: 3 MMHG
PV PEAK VELOCITY: 0.8 M/S
RBC # BLD AUTO: 4.11 X10(6)/MCL (ref 4.2–5.4)
RBC #/AREA URNS AUTO: ABNORMAL /HPF
RIGHT VENTRICULAR END-DIASTOLIC DIMENSION: 2.79 CM
SAMPLE SITE BLOOD GAS (OHS): ABNORMAL
SAMPLE SITE BLOOD GAS (OHS): ABNORMAL
SAO2 % BLDA: 89 %
SAO2 % BLDA: 95 %
SODIUM BLOOD GAS (OHS): 139 MMOL/L (ref 137–145)
SODIUM BLOOD GAS (OHS): 139 MMOL/L (ref 137–145)
SODIUM SERPL-SCNC: 139 MMOL/L (ref 136–145)
SODIUM SERPL-SCNC: 140 MMOL/L (ref 136–145)
SP GR UR STRIP.AUTO: >1.05 (ref 1–1.03)
SPECIFIC GRAVITY, URINE AUTO (.000) (OHS): >1.05 (ref 1–1.03)
SPONT+MECH VT ON VENT: 450 ML
SPONT+MECH VT ON VENT: 450 ML
SQUAMOUS #/AREA URNS LPF: ABNORMAL /HPF
TDI LATERAL: 0.09 M/S
TDI SEPTAL: 0.06 M/S
TDI: 0.08 M/S
TR MAX PG: 23 MMHG
TRICUSPID ANNULAR PLANE SYSTOLIC EXCURSION: 1.76 CM
TRIGL SERPL-MCNC: 95 MG/DL (ref 37–140)
TSH SERPL-ACNC: 0.61 UIU/ML (ref 0.35–4.94)
UROBILINOGEN UR STRIP-ACNC: NORMAL
VLDLC SERPL CALC-MCNC: 19 MG/DL
WBC # BLD AUTO: 21.6 X10(3)/MCL (ref 4.5–11.5)
WBC #/AREA URNS AUTO: ABNORMAL /HPF

## 2024-08-08 PROCEDURE — 80061 LIPID PANEL: CPT | Performed by: NURSE PRACTITIONER

## 2024-08-08 PROCEDURE — 25000003 PHARM REV CODE 250: Performed by: PSYCHIATRY & NEUROLOGY

## 2024-08-08 PROCEDURE — 83605 ASSAY OF LACTIC ACID: CPT

## 2024-08-08 PROCEDURE — 82803 BLOOD GASES ANY COMBINATION: CPT

## 2024-08-08 PROCEDURE — 83036 HEMOGLOBIN GLYCOSYLATED A1C: CPT | Performed by: NURSE PRACTITIONER

## 2024-08-08 PROCEDURE — 27200966 HC CLOSED SUCTION SYSTEM

## 2024-08-08 PROCEDURE — 94003 VENT MGMT INPAT SUBQ DAY: CPT

## 2024-08-08 PROCEDURE — 25000003 PHARM REV CODE 250

## 2024-08-08 PROCEDURE — 36415 COLL VENOUS BLD VENIPUNCTURE: CPT

## 2024-08-08 PROCEDURE — 99900031 HC PATIENT EDUCATION (STAT)

## 2024-08-08 PROCEDURE — 94760 N-INVAS EAR/PLS OXIMETRY 1: CPT | Mod: XB

## 2024-08-08 PROCEDURE — 27000513 HC SENSOR FLOTRAC

## 2024-08-08 PROCEDURE — 36415 COLL VENOUS BLD VENIPUNCTURE: CPT | Performed by: NURSE PRACTITIONER

## 2024-08-08 PROCEDURE — 85025 COMPLETE CBC W/AUTO DIFF WBC: CPT

## 2024-08-08 PROCEDURE — 63600175 PHARM REV CODE 636 W HCPCS: Performed by: PSYCHIATRY & NEUROLOGY

## 2024-08-08 PROCEDURE — 87040 BLOOD CULTURE FOR BACTERIA: CPT

## 2024-08-08 PROCEDURE — 80053 COMPREHEN METABOLIC PANEL: CPT

## 2024-08-08 PROCEDURE — 99900026 HC AIRWAY MAINTENANCE (STAT)

## 2024-08-08 PROCEDURE — 63600175 PHARM REV CODE 636 W HCPCS

## 2024-08-08 PROCEDURE — 99900035 HC TECH TIME PER 15 MIN (STAT)

## 2024-08-08 PROCEDURE — 84443 ASSAY THYROID STIM HORMONE: CPT | Performed by: NURSE PRACTITIONER

## 2024-08-08 PROCEDURE — 63600175 PHARM REV CODE 636 W HCPCS: Performed by: INTERNAL MEDICINE

## 2024-08-08 PROCEDURE — 27100171 HC OXYGEN HIGH FLOW UP TO 24 HOURS

## 2024-08-08 PROCEDURE — 94761 N-INVAS EAR/PLS OXIMETRY MLT: CPT | Mod: XB

## 2024-08-08 PROCEDURE — 93005 ELECTROCARDIOGRAM TRACING: CPT

## 2024-08-08 PROCEDURE — 36620 INSERTION CATHETER ARTERY: CPT

## 2024-08-08 PROCEDURE — C1751 CATH, INF, PER/CENT/MIDLINE: HCPCS

## 2024-08-08 PROCEDURE — 37799 UNLISTED PX VASCULAR SURGERY: CPT

## 2024-08-08 PROCEDURE — 5A1955Z RESPIRATORY VENTILATION, GREATER THAN 96 CONSECUTIVE HOURS: ICD-10-PCS | Performed by: INTERNAL MEDICINE

## 2024-08-08 PROCEDURE — 20000000 HC ICU ROOM

## 2024-08-08 PROCEDURE — 95711 VEEG 2-12 HR UNMONITORED: CPT

## 2024-08-08 PROCEDURE — 02HV33Z INSERTION OF INFUSION DEVICE INTO SUPERIOR VENA CAVA, PERCUTANEOUS APPROACH: ICD-10-PCS | Performed by: INTERNAL MEDICINE

## 2024-08-08 RX ORDER — SODIUM CHLORIDE, SODIUM LACTATE, POTASSIUM CHLORIDE, CALCIUM CHLORIDE 600; 310; 30; 20 MG/100ML; MG/100ML; MG/100ML; MG/100ML
INJECTION, SOLUTION INTRAVENOUS CONTINUOUS
Status: DISCONTINUED | OUTPATIENT
Start: 2024-08-08 | End: 2024-08-09

## 2024-08-08 RX ORDER — INDOMETHACIN 25 MG/1
100 CAPSULE ORAL ONCE
Status: COMPLETED | OUTPATIENT
Start: 2024-08-08 | End: 2024-08-08

## 2024-08-08 RX ORDER — SODIUM CHLORIDE 9 MG/ML
INJECTION, SOLUTION INTRAVENOUS CONTINUOUS
Status: DISCONTINUED | OUTPATIENT
Start: 2024-08-08 | End: 2024-08-14

## 2024-08-08 RX ORDER — ASPIRIN 325 MG
325 TABLET, DELAYED RELEASE (ENTERIC COATED) ORAL ONCE
Status: COMPLETED | OUTPATIENT
Start: 2024-08-08 | End: 2024-08-08

## 2024-08-08 RX ORDER — POTASSIUM CHLORIDE 750 MG/1
30 TABLET, EXTENDED RELEASE ORAL ONCE
Status: COMPLETED | OUTPATIENT
Start: 2024-08-08 | End: 2024-08-08

## 2024-08-08 RX ORDER — SODIUM CHLORIDE 0.9 % (FLUSH) 0.9 %
10 SYRINGE (ML) INJECTION
Status: DISCONTINUED | OUTPATIENT
Start: 2024-08-08 | End: 2024-08-24 | Stop reason: HOSPADM

## 2024-08-08 RX ORDER — INSULIN ASPART 100 [IU]/ML
0-5 INJECTION, SOLUTION INTRAVENOUS; SUBCUTANEOUS EVERY 6 HOURS PRN
Status: DISCONTINUED | OUTPATIENT
Start: 2024-08-08 | End: 2024-08-17

## 2024-08-08 RX ORDER — NAPROXEN SODIUM 220 MG/1
81 TABLET, FILM COATED ORAL DAILY
Status: DISCONTINUED | OUTPATIENT
Start: 2024-08-09 | End: 2024-08-23

## 2024-08-08 RX ORDER — POTASSIUM CHLORIDE 14.9 MG/ML
20 INJECTION INTRAVENOUS
Status: DISPENSED | OUTPATIENT
Start: 2024-08-08 | End: 2024-08-08

## 2024-08-08 RX ORDER — BISACODYL 10 MG/1
10 SUPPOSITORY RECTAL DAILY PRN
Status: DISCONTINUED | OUTPATIENT
Start: 2024-08-08 | End: 2024-08-16

## 2024-08-08 RX ORDER — GLUCAGON 1 MG
1 KIT INJECTION
Status: DISCONTINUED | OUTPATIENT
Start: 2024-08-08 | End: 2024-08-17

## 2024-08-08 RX ORDER — PROPOFOL 10 MG/ML
0-50 INJECTION, EMULSION INTRAVENOUS CONTINUOUS
Status: DISCONTINUED | OUTPATIENT
Start: 2024-08-08 | End: 2024-08-09

## 2024-08-08 RX ADMIN — INSULIN ASPART 2 UNITS: 100 INJECTION, SOLUTION INTRAVENOUS; SUBCUTANEOUS at 02:08

## 2024-08-08 RX ADMIN — SODIUM CHLORIDE, POTASSIUM CHLORIDE, SODIUM LACTATE AND CALCIUM CHLORIDE: 600; 310; 30; 20 INJECTION, SOLUTION INTRAVENOUS at 02:08

## 2024-08-08 RX ADMIN — LEVETIRACETAM 500 MG: 100 INJECTION, SOLUTION INTRAVENOUS at 08:08

## 2024-08-08 RX ADMIN — POTASSIUM CHLORIDE 30 MEQ: 750 TABLET, FILM COATED, EXTENDED RELEASE ORAL at 01:08

## 2024-08-08 RX ADMIN — SODIUM CHLORIDE, POTASSIUM CHLORIDE, SODIUM LACTATE AND CALCIUM CHLORIDE 1000 ML: 600; 310; 30; 20 INJECTION, SOLUTION INTRAVENOUS at 01:08

## 2024-08-08 RX ADMIN — SODIUM CHLORIDE, POTASSIUM CHLORIDE, SODIUM LACTATE AND CALCIUM CHLORIDE 500 ML: 600; 310; 30; 20 INJECTION, SOLUTION INTRAVENOUS at 05:08

## 2024-08-08 RX ADMIN — NOREPINEPHRINE BITARTRATE 0.62 MCG/KG/MIN: 1 INJECTION, SOLUTION, CONCENTRATE INTRAVENOUS at 12:08

## 2024-08-08 RX ADMIN — VASOPRESSIN 0.04 UNITS/MIN: 20 INJECTION INTRAVENOUS at 12:08

## 2024-08-08 RX ADMIN — NOREPINEPHRINE BITARTRATE 0.46 MCG/KG/MIN: 1 INJECTION, SOLUTION, CONCENTRATE INTRAVENOUS at 01:08

## 2024-08-08 RX ADMIN — VASOPRESSIN 0.04 UNITS/MIN: 20 INJECTION INTRAVENOUS at 04:08

## 2024-08-08 RX ADMIN — INSULIN ASPART 3 UNITS: 100 INJECTION, SOLUTION INTRAVENOUS; SUBCUTANEOUS at 09:08

## 2024-08-08 RX ADMIN — VASOPRESSIN 0.04 UNITS/MIN: 20 INJECTION INTRAVENOUS at 08:08

## 2024-08-08 RX ADMIN — PROPOFOL 15 MCG/KG/MIN: 10 INJECTION, EMULSION INTRAVENOUS at 04:08

## 2024-08-08 RX ADMIN — DEXAMETHASONE SODIUM PHOSPHATE 4 MG: 4 INJECTION, SOLUTION INTRA-ARTICULAR; INTRALESIONAL; INTRAMUSCULAR; INTRAVENOUS; SOFT TISSUE at 08:08

## 2024-08-08 RX ADMIN — LEVETIRACETAM 500 MG: 100 INJECTION, SOLUTION INTRAVENOUS at 12:08

## 2024-08-08 RX ADMIN — SODIUM BICARBONATE 100 MEQ: 84 INJECTION, SOLUTION INTRAVENOUS at 01:08

## 2024-08-08 RX ADMIN — POTASSIUM CHLORIDE 20 MEQ: 14.9 INJECTION, SOLUTION INTRAVENOUS at 02:08

## 2024-08-08 RX ADMIN — DEXAMETHASONE SODIUM PHOSPHATE 4 MG: 4 INJECTION, SOLUTION INTRA-ARTICULAR; INTRALESIONAL; INTRAMUSCULAR; INTRAVENOUS; SOFT TISSUE at 12:08

## 2024-08-08 RX ADMIN — ASPIRIN 325 MG: 325 TABLET, COATED ORAL at 01:08

## 2024-08-08 RX ADMIN — PROPOFOL 10 MCG/KG/MIN: 10 INJECTION, EMULSION INTRAVENOUS at 08:08

## 2024-08-08 NOTE — PLAN OF CARE
08/08/24 1415   Discharge Assessment   Assessment Type Discharge Planning Assessment   Confirmed/corrected address, phone number and insurance Yes   Confirmed Demographics Correct on Facesheet   Source of Information family   When was your last doctors appointment? 04/23/24   Communicated SHAJI with patient/caregiver Date not available/Unable to determine   Reason For Admission CVA   People in Home child(sandi), adult   Facility Arrived From: home   Do you expect to return to your current living situation? Yes   Do you have help at home or someone to help you manage your care at home? Yes   Who are your caregiver(s) and their phone number(s)? sonDavid ([phone]   Prior to hospitilization cognitive status: Alert/Oriented   Current cognitive status: Alert/Oriented   Walking or Climbing Stairs Difficulty yes   Walking or Climbing Stairs transferring difficulty, assistance 1 person   Mobility Management uses scooter, has walker and wheelchair   Dressing/Bathing Difficulty yes   Dressing/Bathing bathing difficulty, requires equipment;bathing difficulty, assistance 1 person   Dressing/Bathing Management shower chair and assist X1   Home Accessibility wheelchair accessible   Equipment Currently Used at Home wheelchair;walker, rolling;power chair;shower chair   Patient currently being followed by outpatient case management? No   Do you currently have service(s) that help you manage your care at home? No   Do you take prescription medications? Yes   Do you have prescription coverage? Yes   Coverage [employer] managed medicare   Who is going to help you get home at discharge? family   How do you get to doctors appointments? family or friend will provide   Are you on dialysis? No   Do you take coumadin? No   Discharge Plan A Other  (to be determined)   Discharge Plan B Other  (to be determined)   DME Needed Upon Discharge  none   Discharge Plan discussed with: Adult children   Transition of Care Barriers None    Financial Resource Strain   How hard is it for you to pay for the very basics like food, housing, medical care, and heating? Not very   Housing Stability   In the last 12 months, was there a time when you were not able to pay the mortgage or rent on time? N   At any time in the past 12 months, were you homeless or living in a shelter (including now)? N   Transportation Needs   Has the lack of transportation kept you from medical appointments, meetings, work or from getting things needed for daily living? No   Food Insecurity   Within the past 12 months, you worried that your food would run out before you got the money to buy more. Never true   Within the past 12 months, the food you bought just didn't last and you didn't have money to get more. Never true   Utilities   In the past 12 months has the electric, gas, oil, or water company threatened to shut off services in your home? No   OTHER   Name(s) of People in Home son, David Reza     Patient was living with her son, David. Spoke to him over the phone. He states she used a power chair and transferred from bed to chair by herself. Has walker and shower chair and wheelchair. He stays home with her during the day and is primary caregiver.

## 2024-08-08 NOTE — PROGRESS NOTES
Inpatient Nutrition Assessment    Admit Date: 8/7/2024   Total duration of encounter: 1 day   Patient Age: 73 y.o.    Nutrition Recommendation/Prescription     Tube feeding recommendations:   Peptamen AF, goal rate of 60 mL/hr  1440 kcal/day (98% estimated needs)  90 g protein/day (100% estimated needs)  972 mL free water/day (66% estimated needs)  Calculations based on estimated run time of 20 hours/day      Communication of Recommendations: reviewed with nurse    Nutrition Assessment     Malnutrition Assessment/Nutrition-Focused Physical Exam    Malnutrition Context: other (see comments) (unable to obtain) (08/08/24 1114)     Energy Intake (Malnutrition): other (see comments) (unable to obtain) (08/08/24 1114)  Weight Loss (Malnutrition): other (see comments) (unable to obtain) (08/08/24 1114)  Subcutaneous Fat (Malnutrition): other (see comments) (unable to obtain) (08/08/24 1114)           Muscle Mass (Malnutrition): other (see comments) (unable to obtain) (08/08/24 1114)                          Fluid Accumulation (Malnutrition): other (see comments) (none per flowsheets) (08/08/24 1114)        A minimum of two characteristics is recommended for diagnosis of either severe or non-severe malnutrition.    Chart Review    Reason Seen: continuous nutrition monitoring, malnutrition screening tool (MST), physician consult for tube feeding recommendations, and follow-up    Malnutrition Screening Tool Results   Have you recently lost weight without trying?: Unsure  Have you been eating poorly because of a decreased appetite?: No   MST Score: 2   Diagnosis:  Suspected CVA versus seizures  Lactic acidosis  Hypokalemia    Relevant Medical History:   CVA, HTN, hemiparesis affected left side    Scheduled Medications:  dexAMETHasone, 4 mg, Q12H  levETIRAcetam (Keppra) IV (PEDS and ADULTS), 500 mg, BID  [START ON 8/9/2024] mupirocin, , BID    Continuous Infusions:  lactated ringers, Last Rate: 100 mL/hr at 08/08/24  "0451  NORepinephrine bitartrate-D5W, Last Rate: 0.62 mcg/kg/min (08/08/24 1000)  propofoL, Last Rate: Stopped (08/08/24 1005)  vasopressin, Last Rate: 0.04 Units/min (08/08/24 1000)    PRN Medications:  dextrose 10%, 12.5 g, PRN  dextrose 10%, 25 g, PRN  glucagon (human recombinant), 1 mg, PRN  insulin aspart U-100, 0-5 Units, Q6H PRN  sodium chloride 0.9%, 10 mL, PRN    Calorie Containing IV Medications: no significant kcals from medications at this time    Recent Labs   Lab 08/07/24  2337 08/08/24  0406    140   K 3.3* 4.3   CALCIUM 7.8* 7.9*   PHOS 2.8  --    MG 1.70  --    CO2 11* 16*   BUN 9.7* 9.2*   CREATININE 0.82 0.75   EGFRNORACEVR >60 >60   GLUCOSE 213* 215*   BILITOT 0.4 0.5   ALKPHOS 77 69   ALT 19 17   AST 44* 46*   ALBUMIN 2.9* 2.6*   WBC 19.19* 21.60*   HGB 11.9* 12.0   HCT 35.9* 35.8*     Nutrition Orders:  Diet NPO      Appetite/Oral Intake: NPO/not applicable  Factors Affecting Nutritional Intake: on mechanical ventilation  Social Needs Impacting Access to Food: unable to assess at this time; will attempt on follow-up  Food/Muslim/Cultural Preferences: unable to obtain  Food Allergies: unable to obtain     Wound(s): no pressure injuries    Comments    8/8/24: Intubated. Currently off sedation. On vasopressor support. MAP > 65. OG in place. No nutrition support ordered at this time. Plan for angiogram later today noted. No family at bedside to gather information on nutrition/weight history. Per chart review/past records, ~10 lb weight gain over the last 9-10 months.    8/9/24: Consulted for TF recommendations. Remains intubated without continuous sedation. Weaning vasopressor support. OG in place, trickle feeds ordered - discussed with nursing staff - will adjust goal rate to meet estimated needs.    Anthropometrics    Height: 5' 2" (157.5 cm),    Last Weight: 65 kg (143 lb 4.8 oz) (08/07/24 2200), Weight Method: Bed Scale     BMI Classification: overweight (BMI 25-29.9)     Ideal Body " Weight (IBW), Female: 110 lb                                   Usual Weight Provided By: EMR weight history    Wt Readings from Last 5 Encounters:   08/07/24 65 kg (143 lb 4.8 oz)   10/23/23 60.7 kg (133 lb 12.8 oz)   04/10/23 59.4 kg (131 lb)   10/10/22 60.8 kg (134 lb)   08/02/22 60.8 kg (134 lb)     Weight Change(s) Since Admission:   Wt Readings from Last 1 Encounters:   08/07/24 2200 65 kg (143 lb 4.8 oz)   Admit Weight: 65 kg (143 lb 4.8 oz) (08/07/24 2200), Weight Method: Bed Scale    Estimated Needs    Weight Used For Calorie Calculations: 65 kg (143 lb 4.8 oz)  Energy Calorie Requirements (kcal): 1468 kcal/day  Energy Need Method: [facility] (modified)  Weight Used For Protein Calculations: 65 kg (143 lb 4.8 oz)  Protein Requirements: 78-98 g (1.2-1.5 g/kg)  Fluid Requirements (mL): 1468 mL (1 mL/kcal)  CHO Requirement: 165 g (45% of estimated needs)   Calculated 8/8/24: Tmax 36.7C, Ve 10.2    Enteral Nutrition     Formula: Peptamen AF  Rate/Volume: 20 mL/hr  Water Flushes: 100 mL q4h  Additives/Modulars: none at this time  Route: orogastric tube  Method: continuous  Total Nutrition Provided by Tube Feeding, Additives, and Flushes:  Calories Provided  480 kcal/d, 33% needs   Protein Provided  30 g/d, 38% needs   Fluid Provided  924 ml/d, 63% needs   Continuous feeding calculations based on estimated 20 hr/d run time unless otherwise stated.    Parenteral Nutrition     Patient not receiving parenteral nutrition support at this time.    Evaluation of Received Nutrient Intake    Calories: not meeting estimated needs  Protein: not meeting estimated needs    Patient Education     Not applicable.    Nutrition Diagnosis     PES: Inadequate oral intake related to inability to consume sufficient nutrients as evidenced by intubated/NPO. (active)     Nutrition Interventions     Intervention(s): modified rate of enteral nutrition and collaboration with other providers    Goal: Meet greater than 80% of nutritional needs  by follow-up. (goal progressing)  Goal: Tolerate enteral feeding at goal rate by follow-up. (goal progressing)    Nutrition Goals & Monitoring     Dietitian will monitor: energy intake, enteral nutrition intake, weight, electrolyte/renal panel, glucose/endocrine profile, and gastrointestinal profile  Discharge planning: too early to determine; pending clinical course  Nutrition Risk/Follow-Up: moderate (follow-up in 3-5 days)   Please consult if re-assessment needed sooner.

## 2024-08-08 NOTE — H&P
[facility] - [facility] ICU  Pulmonary Critical Care Note    Patient Name: Shannan Reza  MRN: [id number]  Admission Date: 8/7/2024  Hospital Length of Stay: 1 days  Code Status: Full Code  Attending Provider: Cody Morrissey Jr., MD,*  Primary Care Provider: Kiana Marie MD     Subjective:     HPI:   This is a 73-year-old female with past medical history of hypertension and to strokes in past presenting from outside facility for suspected CVA and seizure-like activity.  Family member present at bedside and states that patient was in normal state of health until yesterday evening.  States she started to feel weak and lethargic and eventually went unresponsive.  Had presentation like this previously when she had stroke.  CT head at outside facility did not show any acute infarct.  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 She was intubated for airway protection and loaded with Keppra.  She was transferred to [facility] for further care.  Repeat imaging was ordered.  Patient continues to be intubated and sedated on propofol. Requiring increased amounts of Levophed and started on vasopressin and stress dose steroids.  Admitted to ICU for close monitoring.    Hospital Course/Significant events:      24 Hour Interval History:  pend    Past Medical History:   Diagnosis Date    Carotid-cavernous fistula     Cerebrovascular accident (CVA) due to thrombosis of right middle cerebral artery 6/26/2018    Essential hypertension 6/27/2018    Hemiparesis affecting left side as late effect of cerebrovascular accident     Hx of tobacco use, presenting hazards to health        Past Surgical History:   Procedure Laterality Date    AUGMENTATION OF BREAST      BRAIN SURGERY         Social History     Socioeconomic History    Marital status: Unknown   Tobacco Use    Smoking status: Former     Current packs/day: 0.00     Average packs/day: 0.5 packs/day for 25.0  years (12.5 ttl pk-yrs)     Types: Cigarettes     Start date: 1993     Quit date: 2018     Years since quittin.1    Smokeless tobacco: Former   Substance and Sexual Activity    Alcohol use: Not Currently    Drug use: No    Sexual activity: Never   Social History Narrative    ** Merged History Encounter **                Current Outpatient Medications   Medication Instructions    amLODIPine (NORVASC) 5 mg, Oral, Daily    aspirin (ECOTRIN) 81 mg, Oral, Daily    folic acid (FOLVITE) 1 mg, Oral, Daily    hydrOXYzine HCL (ATARAX) 25 MG tablet TAKE 1 TABLET BY MOUTH THREE TIMES DAILY AS NEEDED FOR ITCHING OR ANXIETY    multivitamin Tab 1 tablet, Oral, Daily    rosuvastatin (CRESTOR) 40 mg, Oral, Nightly       Current Inpatient Medications   aspirin  325 mg Oral Once    dexAMETHasone  4 mg Intravenous Q12H    levETIRAcetam (Keppra) IV (PEDS and ADULTS)  500 mg Intravenous BID    [START ON 2024] mupirocin   Nasal BID       Current Intravenous Infusions   NORepinephrine bitartrate-D5W  0-3 mcg/kg/min Intravenous Continuous 18.9 mL/hr at 24 0001 0.62 mcg/kg/min at 24 0001    vasopressin  0.04 Units/min Intravenous Continuous 12 mL/hr at 24 0035 0.04 Units/min at 24 0035         Review of Systems   Unable to perform ROS: Intubated          Objective:     No intake or output data in the 24 hours ending 24 0056      Vital Signs (Most Recent):  Temp: 97.7 °F (36.5 °C) (24 0000)  Pulse: (!) 111 (24 0045)  Resp: (!) 35 (24 0045)  BP: (!) 84/72 (24 004)  SpO2: 99 % (24)  Body mass index is 26.21 kg/m².  Weight: 65 kg (143 lb 4.8 oz) Vital Signs (24h Range):  Temp:  [97.7 °F (36.5 °C)-98 °F (36.7 °C)] 97.7 °F (36.5 °C)  Pulse:  [108-115] 111  Resp:  [28-35] 35  SpO2:  [98 %-100 %] 99 %  BP: ()/(59-79) 84/72  Arterial Line BP: (104-127)/(45-56) 127/56     Physical Exam  Constitutional:       Appearance: She is ill-appearing.      Comments:  Intubated and sedated.   HENT:      Head: Normocephalic.   Eyes:      Comments: R eye with sluggish pupil response and present on left   Cardiovascular:      Rate and Rhythm: Regular rhythm. Tachycardia present.   Pulmonary:      Breath sounds: Normal breath sounds.      Comments: Intubated and mechanically ventilated  Abdominal:      Palpations: Abdomen is soft.   Neurological:      Comments: Difficult to assess due to sedation           Lines/Drains/Airways       Central Venous Catheter Line  Duration             Percutaneous Central Line - Triple Lumen  08/08/24 0000 Internal Jugular Right <1 day              Drain  Duration                  NG/OG Tube 08/06/24 0000 Center mouth 2 days         Urethral Catheter 08/07/24 2300 <1 day              Arterial Line  Duration             Arterial Line 08/07/24 2330 Right Radial <1 day              Peripheral Intravenous Line  Duration                  Peripheral IV - Single Lumen 08/07/24 1500 18 G Right Antecubital <1 day         Peripheral IV - Single Lumen 08/07/24 1500 20 G Posterior;Right Hand <1 day                    Significant Labs:    Lab Results   Component Value Date    WBC 19.19 (H) 08/07/2024    HGB 11.9 (L) 08/07/2024    HCT 35.9 (L) 08/07/2024    MCV 88.0 08/07/2024     08/07/2024           BMP  Lab Results   Component Value Date     10/23/2023    K 3.8 10/23/2023    CO2 23 10/23/2023    BUN 5.4 (L) 10/23/2023    CREATININE 0.77 10/23/2023    CALCIUM 9.5 10/23/2023    ESTGFRAFRICA >60.0 07/07/2022    EGFRNONAA >60.0 07/07/2022         ABG  Recent Labs   Lab 08/08/24  0052   PH 7.270*   PO2 64.0*   PCO2 28.0*   HCO3 12.9*   POCBASEDEF -12.60       Mechanical Ventilation Support:  Vent Mode: A/C (08/07/24 2147)  Ventilator Initiated: Yes (08/07/24 2147)  Set Rate: 14 BPM (08/07/24 2147)  Vt Set: 450 mL (08/07/24 2147)  PEEP/CPAP: 8 cmH20 (08/07/24 2147)  Peak Airway Pressure: 23 cmH20 (08/07/24 2147)  Total Ve: 17.3 L/m (08/07/24  4691)      Significant Imaging:  I have reviewed the pertinent imaging within the past 24 hours.        Assessment/Plan:     Assessment  Suspected CVA versus seizures  Lactic acidosis   Hypokalemia   Hx HTN, CVA with residual left sided weakness       Plan  Admit to ICU for close monitoring   Intubated and mechanically ventilated. Wean per [facility] protocol.   Neuro intervention following and recommend   Continue keppra 500 bid   Echo with bubble in am  Eeg in am  Start aspirin 325 mg   DSA scheduled in am  Q1 neuro checks   Sedated on propofol. Wean as tolerated   On levophed, vasopressin and stress dose steroids for increased hypotension. Wean as tolerated.  Bolus LR and maintenance fluids after.  Trend lactate   Replete electrolytes as needed    DVT Prophylaxis: SCD  GI Prophylaxis: na     32 minutes of critical care was time spent personally by me on the following activities: development of treatment plan with patient or surrogate and bedside caregivers, discussions with consultants, evaluation of patient's response to treatment, examination of patient, ordering and performing treatments and interventions, ordering and review of laboratory studies, ordering and review of radiographic studies, pulse oximetry, re-evaluation of patient's condition.  This critical care time did not overlap with that of any other provider or involve time for any procedures.     Carol Rehman MD  Pulmonary Critical Care Medicine  [facility] - [facility] ICU  DOS: 08/08/2024

## 2024-08-08 NOTE — SUBJECTIVE & OBJECTIVE
Subjective:     Interval History: remains sedated, intubated, on vent. No change in limited exam.      Current Facility-Administered Medications   Medication Dose Route Frequency Provider Last Rate Last Admin    dexAMETHasone injection 4 mg  4 mg Intravenous Q12H Gary Santa DO   4 mg at 08/08/24 0827    dextrose 10% bolus 125 mL 125 mL  12.5 g Intravenous PRN Carol Rehman MD        dextrose 10% bolus 250 mL 250 mL  25 g Intravenous PRN Carol Rehman MD        glucagon (human recombinant) injection 1 mg  1 mg Intramuscular PRN Carol Rehman MD        insulin aspart U-100 injection 0-5 Units  0-5 Units Subcutaneous Q6H PRN Carol Rehman MD   2 Units at 08/08/24 1403    lactated ringers infusion   Intravenous Continuous Carol Rehman  mL/hr at 08/08/24 0451 Restarted at 08/08/24 0451    levETIRAcetam (Keppra) 500 mg in D5W 100 mL IVPB (MB+)  500 mg Intravenous BID Vickey Singh MD   Stopped at 08/08/24 0854    [START ON 8/9/2024] mupirocin 2 % ointment   Nasal BID Cody Morrissey Jr., MD, FCCP        NORepinephrine 32 mg in D5W 250 mL infusion  0-3 mcg/kg/min Intravenous Continuous Gary Santa DO 14 mL/hr at 08/08/24 1355 0.46 mcg/kg/min at 08/08/24 1355    propofol (DIPRIVAN) 10 mg/mL infusion  0-50 mcg/kg/min Intravenous Continuous Chance Flores MD   Paused at 08/08/24 1005    sodium chloride 0.9% flush 10 mL  10 mL Intravenous PRN Carol Rehman MD        vasopressin (PITRESSIN) 0.2 Units/mL in D5W 100 mL infusion  0.04 Units/min Intravenous Continuous Gary Santa DO 12 mL/hr at 08/08/24 1200 0.04 Units/min at 08/08/24 1200       Review of Systems   Unable to perform ROS: Acuity of condition     Objective:     Vital Signs (Most Recent):  Temp: 97.9 °F (36.6 °C) (08/08/24 1200)  Pulse: 103 (08/08/24 1445)  Resp: 20 (08/08/24 1445)  BP: 102/68 (08/08/24 1347)  SpO2: 99 % (08/08/24 1445) Vital Signs (24h Range):  Temp:  [97.7 °F (36.5 °C)-98 °F (36.7 °C)] 97.9 °F (36.6 °C)  Pulse:   [] 103  Resp:  [14-35] 20  SpO2:  [97 %-100 %] 99 %  BP: ()/(47-91) 102/68  Arterial Line BP: ()/(45-92) 112/74     Weight: 65 kg (143 lb 4.8 oz)  Body mass index is 26.21 kg/m².     Physical Exam  Vitals and nursing note reviewed.   Constitutional:       Interventions: She is sedated and intubated.      Comments: On ventilator   HENT:      Head: Normocephalic.      Nose: Nose normal.      Mouth/Throat:      Mouth: Mucous membranes are moist.   Pulmonary:      Effort: She is intubated.          NEUROLOGICAL EXAMINATION:     MENTAL STATUS        Localizes to pain. Does not open eyes spontaneous. Pupils reactive to light.        Significant Labs:   Recent Lab Results  (Last 5 results in the past 24 hours)        08/08/24  1401   08/08/24  1007   08/08/24  0844   08/08/24  0826   08/08/24  0542        Allens Test         N/A       Ao peak rola   1.33             Ao VTI   19.80             AV valve area   1.26             JULIANA by Velocity Ratio   1.38             AV mean gradient   4             AV index (prosthetic)   0.44             AV peak gradient   7             AV Velocity Ratio   0.49             Calcium Level Ionized         1.10       Left Ventricle Relative Wall Thickness   0.45             Drawn by         sd rrt       E/A ratio   1.35             E/E' ratio   8.67             E wave deceleration time   161.00             FIO2, Blood gas         60       FS   22             IVSd   0.89             LA area A2C   16.80             Lactic Acid Level       4.5  Comment: Critical Result called and verified by verbal readback to: Danyelle Conte on 08/08/2024 at 08:56. Reported by [id number].         LA size   3.10             LVOT area   2.8             LV LATERAL E/E' RATIO   7.22             LV SEPTAL E/E' RATIO   10.83             LV EDV BP   62.30             Left Ventricular End Diastolic Volume by Teichholz Method   62.30             Left Ventricular End Systolic Volume by Teichholz Method    34.40             LV ESV A2C   41.20             LVIDd   3.81             LVIDs   2.98             LV mass   97.59             Left Ventricular Outflow Tract Mean Gradient   1.00             Left Ventricular Outflow Tract Mean Velocity   0.43             LVOT diameter   1.90             LVOT peak dean   0.65             LVOT stroke volume   24.94             LVOT peak VTI   8.80             LV ESV BP   34.40             Mean e'   0.08             Mech Vt         450       MODE         AC       MV valve area by continuity eq   2.29             MV mean gradient   1             MV peak gradient   2             MV Peak A Dean   0.48             MV Peak E Dean   0.65             MV VTI   10.9             Oxygen Device, Blood gas         Ventilator       PEEP         8.0       Base Excess, Blood gas         -8.00       POC HCO3         16.9       POC PCO2         32.0       POC PH         7.330       POC PO2         82.0       POCT Glucose 216     269           Potassium, Blood Gas         3.8       PV peak gradient   3             PV PEAK VELOCITY   0.80             Posterior Wall   0.86             Mech RR         14       RV/LV Ratio   0.73             RVDD   2.79             Sample site         Arterial Line       Sample Type         Arterial Blood       sO2, Blood gas         95.0       Sodium, Blood Gas         139       TAPSE   1.76             TOC2, Blood gas         17.9       TDI SEPTAL   0.06             TDI LATERAL   0.09             Triscuspid Valve Regurgitation Peak Gradient   23             TR Max Dean   2.39                                    Significant Imaging: I have reviewed all pertinent imaging results/findings within the past 24 hours.    Echo Saline Bubble? Yes    Left Ventricle: Moderate global hypokinesis present. There is severely   reduced systolic function with a visually estimated ejection fraction of   20 - 25%. Large anterior wall hypokinesis.    Right Ventricle: Normal right ventricular  cavity size. Systolic   function is normal.    Left Atrium: Agitated saline study of the atrial septum is negative,   suggesting absence of intracardiac shunt at the atrial level.    Tricuspid Valve: There is mild regurgitation.    Pericardium: There is no pericardial effusion.    Definity was to assess wall motion abnormalities  CT Brain Perfusion inc Post Processing  Narrative: EXAMINATION:  CT BRAIN PERFUSION INC POST PROCESSING    CLINICAL HISTORY:  AMS, seizure;    TECHNIQUE:  Multiphase postcontrast imaging of the brain was obtained for CT perfusion with post processing.    COMPARISON:  CT head dated 08/07/2024    FINDINGS:  There is increased T-max and MTT in the right anterior cerebral artery territory, with associated decrease in cerebral blood flow and a mismatch volume of 21 mL.  Impression: Perfusion defect in the right EMANUEL territory with mismatch volume of 21 mL.    Electronically signed by: Aleta Peck  Date:    08/08/2024  Time:    08:56  X-Ray Chest 1 View  Narrative: EXAMINATION:  XR CHEST 1 VIEW    CPT 64981    CLINICAL HISTORY:  line placements;    COMPARISON:  May 29, 2021    FINDINGS:  Examination reveals mediastinal silhouette to be within normal limits cardiac silhouette is not enlarged there is increase in interstitial and pulmonary vascular markings which might be related to an pulmonary vascular congestion and cardiac decompensation.    There is minimal blunting of the left costophrenic angle representing a left-sided pleural effusion.  There is increased left retrocardiac density and silhouetting of the left hemidiaphragm although these might be related to pleural fluid it may also represent an infiltrate/atelectasis.    There is an endotracheal tube with tip above the sravanthi nasogastric tube is seen with the tip below the diaphragm.    Central line is seen tip slightly coil over itself at the junction of the jugular vein and brachycephalic vein/superior vena cava  Impression:  Increase in interstitial and pulmonary vascular markings indicating some degree of pulmonary vascular congestion and cardiac decompensation.    Support catheters as above    Electronically signed by: Cameron Woodard  Date:    08/08/2024  Time:    07:48  CT Head Without Contrast  Narrative: EXAMINATION:  CT HEAD WITHOUT CONTRAST    CLINICAL HISTORY:  Mental status change, unknown cause;Stroke, follow up;    TECHNIQUE:  Axial scans were obtained from skull base to the vertex.    Coronal and sagittal reconstructions obtained from the axial data.    Automatic exposure control was utilized to limit radiation dose.    Contrast: None    Radiation Dose:    Total DLP: 1094 mGy*cm    COMPARISON:  Outside CT head dated 08/06/2024    FINDINGS:  Streak artifact partially limits evaluation.  There is encephalomalacia in the right parietal lobe.  There are changes from prior aneurysm coiling on the right.  Patchy hypodensities in the subcortical and periventricular white matter likely represent chronic microvascular ischemic changes.  There is no definite acute intracranial hemorrhage.  There is no mass effect or midline shift.  The basal cisterns are patent.  There is diffuse parenchymal volume loss.  The skull base is intact.  There is fluid layering in the sphenoid sinuses.  Impression: Evaluation limited by streak artifact.  No definite acute abnormality identified.    No significant change from the [facility] interpretation.    Electronically signed by: Aleta Peck  Date:    08/08/2024  Time:    07:29

## 2024-08-08 NOTE — PROCEDURES
Shannan Reza is a 73 y.o. female patient.    Temp: 97.7 °F (36.5 °C) (08/08/24 0000)  Pulse: 97 (08/08/24 0115)  Resp: (!) 21 (08/08/24 0115)  BP: (!) 95/57 (08/08/24 0115)  SpO2: 100 % (08/08/24 0115)  Weight: 65 kg (143 lb 4.8 oz) (08/07/24 2200)       Arterial Line    Date/Time: 8/8/2024 1:33 AM  Location procedure was performed: [facility]    Performed by: Gary Santa DO  Authorized by: Gary Santa DO  Indications: multiple ABGs and hemodynamic monitoring  Location: right radial  Needle gauge: 20  Complications: No  Specimens: No  Implants: No  Post-procedure: dressing applied  Post-procedure CMS: normal          8/8/2024

## 2024-08-08 NOTE — NURSING
Nurses Note -- 4 Eyes      8/8/2024   2:54 AM      Skin assessed during: Admit      [x] No Altered Skin Integrity Present    [x]Prevention Measures Documented      [] Yes- Altered Skin Integrity Present or Discovered   [] LDA Added if Not in Epic (Describe Wound)   [] New Altered Skin Integrity was Present on Admit and Documented in LDA   [] Wound Image Taken    Wound Care Consulted? No    Attending Nurse:  Deyanira Rodriguez RN/Staff Member:   Tammie Gresham RN

## 2024-08-08 NOTE — NURSING
Nurses Note -- 4 Eyes      8/8/2024   10:30 AM      Skin assessed during: Daily Assessment      [x] No Altered Skin Integrity Present    [x]Prevention Measures Documented      [] Yes- Altered Skin Integrity Present or Discovered   [] LDA Added if Not in Epic (Describe Wound)   [] New Altered Skin Integrity was Present on Admit and Documented in LDA   [] Wound Image Taken    Wound Care Consulted? No    Attending Nurse:  Zane Malik RN     Second RN/Staff Member:  Maida Arriaza RN

## 2024-08-08 NOTE — CONSULTS
Interventional Neurology Consult Note    Consult Requested by:  Dr Morrissey  Reason for Consult:  AMS  SUBJECTIVE:     History of Present Illness:  Patient is a 73 y.o. female presents to OSH with concern for seizure after she was noted to have left sided weakness by her . She was intubated for airway protection and given keppra.  reported that she has had similar presentation in the past when she had a stroke. CTH last night did not show any acute infarct. 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 She was transferred to [facility] for further care.     On arrival, she remains intubated on propofol, levophed. Limited neuro exam due to sedation. CTH, CTP were obtained on arrival to [facility].     Review of patient's allergies indicates:   Allergen Reactions    Percodan [oxycodone hcl-oxycodone-asa] Itching       Past Medical History:   Diagnosis Date    Carotid-cavernous fistula     Cerebrovascular accident (CVA) due to thrombosis of right middle cerebral artery 2018    Essential hypertension 2018    Hemiparesis affecting left side as late effect of cerebrovascular accident     Hx of tobacco use, presenting hazards to health      Past Surgical History:   Procedure Laterality Date    AUGMENTATION OF BREAST      BRAIN SURGERY       Family History   Problem Relation Name Age of Onset    Deep vein thrombosis Father      Melanoma Neg Hx       Social History     Tobacco Use    Smoking status: Former     Current packs/day: 0.00     Average packs/day: 0.5 packs/day for 25.0 years (12.5 ttl pk-yrs)     Types: Cigarettes     Start date: 1993     Quit date: 2018     Years since quittin.1    Smokeless tobacco: Former   Substance Use Topics    Alcohol use: Not Currently    Drug use: No       Review of Systems:  *ROS*    OBJECTIVE:     Vital Signs:       Physical Exam:  Limited neuro exam due to  sedation  Minimal withdrawal on propofol. Appears weak on left    Laboratory:  I have reviewed all pertinent lab results within the past 24 hours.    Diagnostic Results:  CTH, no large infarct. Metal artifact noted. CTA concerning for right ICA occlusion with dilated veins in the cavernous region concerning for an AV fistula. Also noted right EMANUEL occlusion distally and a small ACOM aneurysm. Right VA V4 appears hypoplastic. CTP with perfusion mismatch in right EMANUEL territory.      ASSESSMENT/PLAN:     73 F transferred from OSH due to concern for multiple findings on CTA that was done this AM. Unclear last known normal. No family at bedside.     CTA concerning for a right CC fistula, ACOM aneurysm, right EMANUEL occlusion distally, hypoplastic right V4. Perfusion missmatch on right EMANUEL territory. Right EMANUEL not amenable for thrombectomy due to right ICA occlusion.     Unclear etiology with wide differentials at this time due to unknown PMH. Right EMANUEL stroke vs right CC fistula vs metabolic/infectious encephalopathy vs seizures    Plan:   - admit in ICU  - continue keppra 500 mg bid  - echo with bubble study  - EEG in AM  - start aspirin 325 mg  - will need DSA in AM    Stroke team will follow in AM    I spent a total of 45 minutes on the day of the visit.This includes face to face time and non-face to face time preparing to see the patient (eg, review of tests), obtaining and/or reviewing separately obtained history, documenting clinical information in the electronic or other health record, independently interpreting results and communicating results to the patient/family/caregiver, or care coordinator.    Vickey Singh MD  Vascular and Interventional Neurology

## 2024-08-08 NOTE — CONSULTS
"Inpatient consult to Cardiology  Consult performed by: Rainer Hammonds ANP  Consult ordered by: Carol Rehman MD  Reason for consult: New Onset Heart Failure          [facility]     Cardiology  Consult Note    Patient Name: Shannan Reza  MRN: [id number]  Admission Date: 8/7/2024  Hospital Length of Stay: 1 days  Code Status: Full Code   Attending Provider: Chance Flores MD   Consulting Provider: SIS Goins  Primary Care Physician: Kiana Marie MD  Principal Problem:<principal problem not specified>    Patient information was obtained from past medical records, ER records, and primary team.     Subjective:     Chief Complaint/Reason for Consult: New Onset Heart Failure    HPI: Ms. Montelongo is a 74 y/o female with a history of Hypertension, CVA, who is not known to [place]. She presented to the ER on 8.8.2024 as a transfer from [facility] facility for suspected CVA and seizure-like activity. Family member states "the patient begin to feel weak and lethargic and eventually went unresponsive." The patient had previous stroke with similar symptoms. CT head at outside facility did not show any acute infarct.  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 Patient was given Keppra and intubated for airway protection. She was then transferred to [facility] for neurology services. During evaluation ECHO was obtained and revealed an EF of 20 - 25%. Large anterior wall hypokinesis. Significant labs WBC 21.60, Chloride 114, CO2 16, Glucose 215, Calcium 7.9, Albumin 2.6, AST 46, Lactic 4.5. UDS shows Fentanyl. Blood Cultures pending. CT head unremarkable. CT brain shows Perfusion defect in the right EMANUEL territory with mismatch volume of 21 mL. CXR shows increase in interstitial and pulmonary vascular markings indicating some degree of pulmonary vascular congestion and cardiac decompensation. [facility] was consulted for new onset Heart Failure.   "   PMH: Hypertension, CVA, Carotid Fistula, Hemiparesis (Left)  PSH: breast Augmentation, Brain Surgery  Family History: Father - DVT  Social History: Former smoker (Quit in 2018), Denies illicit drug or alcohol use.    Previous Cardiac Diagnostics:   Echo 8.8.24:  Left Ventricle: Moderate global hypokinesis present. There is severely reduced systolic function with a visually estimated ejection fraction of 20 - 25%. Large anterior wall hypokinesis.Right Ventricle: Normal right ventricular cavity size. Systolic function is normal. Left Atrium: Agitated saline study of the atrial septum is negative, suggesting absence of intracardiac shunt at the atrial level. Tricuspid Valve: There is mild regurgitation. Pericardium: There is no pericardial effusion. Definity was to assess wall motion abnormalities    Echo 7.7.22:  The left ventricle is normal in size with hyperdynamic systolic function. The estimated ejection fraction is 70%. Normal left ventricular diastolic function.    Review of patient's allergies indicates:   Allergen Reactions    Percodan [oxycodone hcl-oxycodone-asa] Itching     No current facility-administered medications on file prior to encounter.     Current Outpatient Medications on File Prior to Encounter   Medication Sig    amLODIPine (NORVASC) 5 MG tablet Take 1 tablet (5 mg total) by mouth once daily.    aspirin (ECOTRIN) 81 MG EC tablet Take 81 mg by mouth once daily.    folic acid (FOLVITE) 1 MG tablet Take 1 tablet (1 mg total) by mouth once daily.    hydrOXYzine HCL (ATARAX) 25 MG tablet TAKE 1 TABLET BY MOUTH THREE TIMES DAILY AS NEEDED FOR ITCHING OR ANXIETY    multivitamin Tab Take 1 tablet by mouth once daily.    rosuvastatin (CRESTOR) 40 MG Tab Take 1 tablet (40 mg total) by mouth every evening.    [DISCONTINUED] clopidogreL (PLAVIX) 75 mg tablet Take 1 tablet (75 mg total) by mouth once daily.     Review of Systems   Unable to perform ROS: Intubated     Objective:     Vital Signs (Most  Recent):  Temp: 97.9 °F (36.6 °C) (08/08/24 1200)  Pulse: 96 (08/08/24 1530)  Resp: (!) 22 (08/08/24 1530)  BP: 116/67 (08/08/24 1447)  SpO2: 99 % (08/08/24 1530) Vital Signs (24h Range):  Temp:  [97.7 °F (36.5 °C)-98 °F (36.7 °C)] 97.9 °F (36.6 °C)  Pulse:  [] 96  Resp:  [14-35] 22  SpO2:  [97 %-100 %] 99 %  BP: ()/(47-91) 116/67  Arterial Line BP: ()/(45-92) 120/61   Weight: 65 kg (143 lb 4.8 oz)  Body mass index is 26.21 kg/m².  SpO2: 99 %       Intake/Output Summary (Last 24 hours) at 8/8/2024 1610  Last data filed at 8/8/2024 1400  Gross per 24 hour   Intake 2269 ml   Output 345 ml   Net 1924 ml     Lines/Drains/Airways       Central Venous Catheter Line  Duration             Percutaneous Central Line - Triple Lumen  08/08/24 0000 Internal Jugular Right <1 day              Drain  Duration                  NG/OG Tube 08/06/24 0000 Center mouth 2 days         Urethral Catheter 08/07/24 2300 <1 day              Airway  Duration                  Airway - Non-Surgical 08/07/24 2147 Endotracheal Tube <1 day              Arterial Line  Duration             Arterial Line 08/07/24 2330 Right Radial <1 day              Peripheral Intravenous Line  Duration                  Peripheral IV - Single Lumen 08/07/24 1500 18 G Right Antecubital 1 day         Peripheral IV - Single Lumen 08/07/24 1500 20 G Posterior;Right Hand 1 day                  Significant Labs:   Chemistries:   Recent Labs   Lab 08/07/24  2337 08/08/24  0406    140   K 3.3* 4.3   * 114*   CO2 11* 16*   BUN 9.7* 9.2*   CREATININE 0.82 0.75   CALCIUM 7.8* 7.9*   BILITOT 0.4 0.5   ALKPHOS 77 69   ALT 19 17   AST 44* 46*   GLUCOSE 213* 215*   MG 1.70  --    PHOS 2.8  --         CBC/Anemia Labs: Coags:    Recent Labs   Lab 08/07/24  2337 08/08/24  0406   WBC 19.19* 21.60*   HGB 11.9* 12.0   HCT 35.9* 35.8*    309   MCV 88.0 87.1   RDW 14.3 14.3    Recent Labs   Lab 08/07/24  2337   INR 1.3        Telemetry:      Physical  Exam  Vitals and nursing note reviewed.   Constitutional:       Appearance: She is ill-appearing.      Comments: Vented/Sedated   HENT:      Head:      Comments: EEG in progress     Mouth/Throat:      Mouth: Mucous membranes are dry.   Cardiovascular:      Rate and Rhythm: Normal rate and regular rhythm.      Pulses: Normal pulses.      Heart sounds: Murmur heard.   Pulmonary:      Effort: Pulmonary effort is normal.      Comments: Ventilator Associated Breath Sounds  Vent Mode: A/C  Oxygen Concentration (%):  [30-75] 30  Resp Rate Total:  [15 br/min-37 br/min] 18 br/min  Vt Set:  [450 mL] 450 mL  PEEP/CPAP:  [8 cmH20] 8 cmH20  Mean Airway Pressure:  [10 ppO74-77 cmH20] 11 cmH20  Abdominal:      Palpations: Abdomen is soft.   Skin:     General: Skin is warm and dry.      Coloration: Skin is pale.   Neurological:      Comments: Vented/Sedated       Home Medications:   No current facility-administered medications on file prior to encounter.     Current Outpatient Medications on File Prior to Encounter   Medication Sig Dispense Refill    amLODIPine (NORVASC) 5 MG tablet Take 1 tablet (5 mg total) by mouth once daily. 90 tablet 3    aspirin (ECOTRIN) 81 MG EC tablet Take 81 mg by mouth once daily.      folic acid (FOLVITE) 1 MG tablet Take 1 tablet (1 mg total) by mouth once daily. 90 tablet 3    hydrOXYzine HCL (ATARAX) 25 MG tablet TAKE 1 TABLET BY MOUTH THREE TIMES DAILY AS NEEDED FOR ITCHING OR ANXIETY 270 tablet 0    multivitamin Tab Take 1 tablet by mouth once daily. 30 tablet 0    rosuvastatin (CRESTOR) 40 MG Tab Take 1 tablet (40 mg total) by mouth every evening. 90 tablet 3    [DISCONTINUED] clopidogreL (PLAVIX) 75 mg tablet Take 1 tablet (75 mg total) by mouth once daily. 30 tablet 1     Current Schedule Inpatient Medications:   dexAMETHasone  4 mg Intravenous Q12H    levETIRAcetam (Keppra) IV (PEDS and ADULTS)  500 mg Intravenous BID    [START ON 8/9/2024] mupirocin   Nasal BID     Continuous Infusions:    lactated ringers   Intravenous Continuous 100 mL/hr at 08/08/24 0451 Restarted at 08/08/24 0451    NORepinephrine bitartrate-D5W  0-3 mcg/kg/min Intravenous Continuous 14 mL/hr at 08/08/24 1355 0.46 mcg/kg/min at 08/08/24 1355    propofoL  0-50 mcg/kg/min Intravenous Continuous   Paused at 08/08/24 1005    vasopressin  0.04 Units/min Intravenous Continuous 12 mL/hr at 08/08/24 1200 0.04 Units/min at 08/08/24 1200     Assessment:   Newly Diagnosed Acute Systolic Heart Failure/EF 20-25%     - EF 20-25% on ECHO (8.8.24)  CMO - Unspecified  Acute Ischemic R EMANUEL CVA   Seizures?    - EEG in Progress   Leukocytosis  Lactic Acidosis - Persistent  Hypokalemia - Resolved  Transaminitis   HTN  History of CVA  Hemiparesis (Left)  Carotid Fistula  No Known History of GI Bleed    Plan:   Consider Palliative Care Consultation pending Neurological Recommendations  Please Reconsult Cardiology for Ischemic Evaluation if PT makes a Purposeful Neurological Recovery  We will be available as needed    Thank you for your consult.     Rainer Hammonds, ANP-C  Cardiology  [facility]  08/08/2024

## 2024-08-08 NOTE — PROGRESS NOTES
[facility] - [facility] ICU  Neurology  Progress Note    Patient Name: Shannan Reza  MRN: [id number]  Admission Date: 8/7/2024  Hospital Length of Stay: 1 days  Code Status: Full Code   Attending Provider: Chance Flores MD  Primary Care Physician: Kiana Marie MD   Principal Problem:<principal problem not specified>    HPI:   No notes on file    Overview/Hospital Course:  No notes on file        Subjective:     Interval History: remains sedated, intubated, on vent. No change in limited exam.      Current Facility-Administered Medications   Medication Dose Route Frequency Provider Last Rate Last Admin    dexAMETHasone injection 4 mg  4 mg Intravenous Q12H Gary Santa DO   4 mg at 08/08/24 0827    dextrose 10% bolus 125 mL 125 mL  12.5 g Intravenous PRN Carol Rehman MD        dextrose 10% bolus 250 mL 250 mL  25 g Intravenous PRN Carol Rehman MD        glucagon (human recombinant) injection 1 mg  1 mg Intramuscular PRN Carol Rehman MD        insulin aspart U-100 injection 0-5 Units  0-5 Units Subcutaneous Q6H PRN Carol Rehman MD   2 Units at 08/08/24 1403    lactated ringers infusion   Intravenous Continuous Carol Rehman  mL/hr at 08/08/24 0451 Restarted at 08/08/24 0451    levETIRAcetam (Keppra) 500 mg in D5W 100 mL IVPB (MB+)  500 mg Intravenous BID Vickey Singh MD   Stopped at 08/08/24 0854    [START ON 8/9/2024] mupirocin 2 % ointment   Nasal BID Cody Morrissey Jr., MD, FCCP        NORepinephrine 32 mg in D5W 250 mL infusion  0-3 mcg/kg/min Intravenous Continuous Gary Santa DO 14 mL/hr at 08/08/24 1355 0.46 mcg/kg/min at 08/08/24 1355    propofol (DIPRIVAN) 10 mg/mL infusion  0-50 mcg/kg/min Intravenous Continuous Chance Flores MD   Paused at 08/08/24 1005    sodium chloride 0.9% flush 10 mL  10 mL Intravenous PRN Carol Rehman MD        vasopressin (PITRESSIN) 0.2 Units/mL in D5W 100 mL infusion  0.04 Units/min Intravenous Continuous Gary Santa, DO 12  mL/hr at 08/08/24 1200 0.04 Units/min at 08/08/24 1200       Review of Systems   Unable to perform ROS: Acuity of condition     Objective:     Vital Signs (Most Recent):  Temp: 97.9 °F (36.6 °C) (08/08/24 1200)  Pulse: 103 (08/08/24 1445)  Resp: 20 (08/08/24 1445)  BP: 102/68 (08/08/24 1347)  SpO2: 99 % (08/08/24 1445) Vital Signs (24h Range):  Temp:  [97.7 °F (36.5 °C)-98 °F (36.7 °C)] 97.9 °F (36.6 °C)  Pulse:  [] 103  Resp:  [14-35] 20  SpO2:  [97 %-100 %] 99 %  BP: ()/(47-91) 102/68  Arterial Line BP: ()/(45-92) 112/74     Weight: 65 kg (143 lb 4.8 oz)  Body mass index is 26.21 kg/m².     Physical Exam  Vitals and nursing note reviewed.   Constitutional:       Interventions: She is sedated and intubated.      Comments: On ventilator   HENT:      Head: Normocephalic.      Nose: Nose normal.      Mouth/Throat:      Mouth: Mucous membranes are moist.   Pulmonary:      Effort: She is intubated.          NEUROLOGICAL EXAMINATION:     MENTAL STATUS        Localizes to pain. Does not open eyes spontaneous. Pupils reactive to light.        Significant Labs:   Recent Lab Results  (Last 5 results in the past 24 hours)        08/08/24  1401   08/08/24  1007   08/08/24  0844   08/08/24  0826   08/08/24  0542        Allens Test         N/A       Ao peak rola   1.33             Ao VTI   19.80             AV valve area   1.26             JULIANA by Velocity Ratio   1.38             AV mean gradient   4             AV index (prosthetic)   0.44             AV peak gradient   7             AV Velocity Ratio   0.49             Calcium Level Ionized         1.10       Left Ventricle Relative Wall Thickness   0.45             Drawn by         sd rrt       E/A ratio   1.35             E/E' ratio   8.67             E wave deceleration time   161.00             FIO2, Blood gas         60       FS   22             IVSd   0.89             LA area A2C   16.80             Lactic Acid Level       4.5  Comment: Critical Result  called and verified by verbal readback to: Danyelle Conte on 08/08/2024 at 08:56. Reported by [id number].         LA size   3.10             LVOT area   2.8             LV LATERAL E/E' RATIO   7.22             LV SEPTAL E/E' RATIO   10.83             LV EDV BP   62.30             Left Ventricular End Diastolic Volume by Teichholz Method   62.30             Left Ventricular End Systolic Volume by Teichholz Method   34.40             LV ESV A2C   41.20             LVIDd   3.81             LVIDs   2.98             LV mass   97.59             Left Ventricular Outflow Tract Mean Gradient   1.00             Left Ventricular Outflow Tract Mean Velocity   0.43             LVOT diameter   1.90             LVOT peak rola   0.65             LVOT stroke volume   24.94             LVOT peak VTI   8.80             LV ESV BP   34.40             Mean e'   0.08             [facility] Vt         450       MODE         AC       MV valve area by continuity eq   2.29             MV mean gradient   1             MV peak gradient   2             MV Peak A Rola   0.48             MV Peak E Rola   0.65             MV VTI   10.9             Oxygen Device, Blood gas         Ventilator       PEEP         8.0       Base Excess, Blood gas         -8.00       POC HCO3         16.9       POC PCO2         32.0       POC PH         7.330       POC PO2         82.0       POCT Glucose 216     269           Potassium, Blood Gas         3.8       PV peak gradient   3             PV PEAK VELOCITY   0.80             Posterior Wall   0.86             Mech RR         14       RV/LV Ratio   0.73             RVDD   2.79             Sample site         Arterial Line       Sample Type         Arterial Blood       sO2, Blood gas         95.0       Sodium, Blood Gas         139       TAPSE   1.76             TOC2, Blood gas         17.9       TDI SEPTAL   0.06             TDI LATERAL   0.09             Triscuspid Valve Regurgitation Peak Gradient   23             TR Max Rola    2.39                                    Significant Imaging: I have reviewed all pertinent imaging results/findings within the past 24 hours.    Echo Saline Bubble? Yes    Left Ventricle: Moderate global hypokinesis present. There is severely   reduced systolic function with a visually estimated ejection fraction of   20 - 25%. Large anterior wall hypokinesis.    Right Ventricle: Normal right ventricular cavity size. Systolic   function is normal.    Left Atrium: Agitated saline study of the atrial septum is negative,   suggesting absence of intracardiac shunt at the atrial level.    Tricuspid Valve: There is mild regurgitation.    Pericardium: There is no pericardial effusion.    Definity was to assess wall motion abnormalities  CT Brain Perfusion inc Post Processing  Narrative: EXAMINATION:  CT BRAIN PERFUSION INC POST PROCESSING    CLINICAL HISTORY:  AMS, seizure;    TECHNIQUE:  Multiphase postcontrast imaging of the brain was obtained for CT perfusion with post processing.    COMPARISON:  CT head dated 08/07/2024    FINDINGS:  There is increased T-max and MTT in the right anterior cerebral artery territory, with associated decrease in cerebral blood flow and a mismatch volume of 21 mL.  Impression: Perfusion defect in the right EMANUEL territory with mismatch volume of 21 mL.    Electronically signed by: Aleta Peck  Date:    08/08/2024  Time:    08:56  CT Head Without Contrast  Narrative: EXAMINATION:  CT HEAD WITHOUT CONTRAST    CLINICAL HISTORY:  Mental status change, unknown cause;Stroke, follow up;    TECHNIQUE:  Axial scans were obtained from skull base to the vertex.    Coronal and sagittal reconstructions obtained from the axial data.    Automatic exposure control was utilized to limit radiation dose.    Contrast: None    Radiation Dose:    Total DLP: 1094 mGy*cm    COMPARISON:  Outside CT head dated 08/06/2024    FINDINGS:  Streak artifact partially limits evaluation.  There is encephalomalacia in the  right parietal lobe.  There are changes from prior aneurysm coiling on the right.  Patchy hypodensities in the subcortical and periventricular white matter likely represent chronic microvascular ischemic changes.  There is no definite acute intracranial hemorrhage.  There is no mass effect or midline shift.  The basal cisterns are patent.  There is diffuse parenchymal volume loss.  The skull base is intact.  There is fluid layering in the sphenoid sinuses.  Impression: Evaluation limited by streak artifact.  No definite acute abnormality identified.    No significant change from the [facility] interpretation.    Electronically signed by: Aleta Peck  Date:    08/08/2024  Time:    07:29      Assessment and Plan:     Acute ischemic right EMANUEL stroke  Right EMANUEL stroke with Encephalopathy  8/8/2024 CT perfusion Perfusion defect in the right EMANUEL territory with mismatch volume of 21 mL.   8/8/2024 CTH no definite acute intracranial hemorrhage. No definite acute abnormality identified.  8/8/2024 EEG Impression: This is an abnormal EEG due to moderate generalized slowing. Generalized slowing can be seen with generalized cerebral dysfunction as seen in metabolic, toxic, infectious, or multifocal structural abnormalities.   8/8/2024 Echo EF 20-25%. Negative saline bubble study.     Antithrombotics for secondary stroke prevention: Antiplatelets: Aspirin: 81 mg daily    Statins for secondary stroke prevention and hyperlipidemia, if present:   Statins: None: Reason: elevated liver enzymes    Aggressive risk factor modification:  history tobacco use, prior stroke     Rehab efforts: The patient has been evaluated by a stroke team provider and the therapy needs have been fully considered based off the presenting complaints and exam findings. The following therapy evaluations are needed: PT evaluate and treat, OT evaluate and treat, SLP evaluate and treat    Diagnostics ordered/pending: Carotid ultrasound to assess vasculature,  HgbA1C to assess blood glucose levels, Lipid Profile to assess cholesterol levels, TSH to assess thyroid function    VTE prophylaxis: Mechanical prophylaxis: Place SCDs    BP parameters: Infarct: Less than 180     - No repeat imaging at this time, needs stabilization from sepsis standpoint.  - IR diagnostic carotid angiogram  - NPO after MN        Further recommendations to follow from MD      VTE Risk Mitigation (From admission, onward)           Ordered     Reason for No Pharmacological VTE Prophylaxis  Once        Question:  Reasons:  Answer:  Risk of Bleeding    08/08/24 1611     IP VTE HIGH RISK PATIENT  Once         08/08/24 1611     Place sequential compression device  Until discontinued         08/08/24 1611     Place sequential compression device  Until discontinued         08/08/24 0052                      Shantell Vail NP  Neurology  [facility] - [facility] ICU

## 2024-08-08 NOTE — PROCEDURES
Shannan Reza is a 73 y.o. female patient.    Temp: 97.7 °F (36.5 °C) (08/08/24 0000)  Pulse: 97 (08/08/24 0115)  Resp: (!) 21 (08/08/24 0115)  BP: (!) 95/57 (08/08/24 0115)  SpO2: 100 % (08/08/24 0115)  Weight: 65 kg (143 lb 4.8 oz) (08/07/24 2200)       Central Line    Date/Time: 8/8/2024 1:35 AM    Performed by: Gary Santa DO  Authorized by: Gary Santa DO    Location procedure was performed:  [facility]  Consent Done ?:  Yes  Indications:  Med administration and vascular access  Anesthesia:  Local infiltration  Local anesthetic:  Lidocaine 1% without epinephrine  Preparation:  Skin prepped with ChloraPrep  Location:  Right internal jugular  Catheter size:  7.5 Fr  Ultrasound guidance: Yes    Vessel Caliber:  Large  Needle advanced into vessel with real time ultrasound guidance.    Guidewire confirmed in vessel.    Steril sheath on probe.    Sterile gel used.  Manometry: Yes    Number of attempts:  1  Securement:  Line sutured, chlorhexidine patch and sterile dressing applied  Complications: No    Specimens: No    Implants: No    XRay:  Placement verified by x-ray  Adverse Events:  NoneTermination Site: superior vena cava      8/8/2024

## 2024-08-08 NOTE — LOPA/MORA/SWTA/AOC/AEB
[facility] ([facility])  Notification of Referral  [facility] Contact # [phone]        Thank you for the referral of this patient to determine suitability for organ, tissue, and eye donation.  A chart review has been conducted (date):2024 at (time) 8:30 AM.    ? Potential candidate for organ donation - [facility] following patient. Any changes in patients condition, discussion of withdrawing the vent or brain death exams, or family mention of donation immediately call [phone]. Refer all organ referrals within 1 hour of meeting the clinical triger of a patient with a neurological, anoxic, or life threatening injury and ONE of the following:    * GCS </= 8    * Loss of 2 or more brain stem reflexes    * Hypothermic Protocol Initiated    * Withdrawal of support discussion regardless of GCS    * Family mention of Donation    ? Potential for candidate for tissue and eye donation- call [facility] at [phone] within 2 hours of death for screening as a potential tissue and/or eye donor.      ? Potential candidate for eye donation - call [facility] at [phone] within 2 hours of death for screening as a potential eye donor.    ? NOT a candidate for organ/tissue/eye donation- call [facility] at [phone] within 2 hours of death to report the time of death.    ? Potential candidate for donation/ Referral Closed- Any changes in patients condition, GCS of 5 or less, discussion of withdrawing the vent, brain death exams, or family mention of donation immediately call [phone].      Screened by: SONAL Sandoval    [facility] Referral Number:  [id number]     Suitable for:  [x]Organ  [x] Tissue  [x] Eye  []Not suitable for Donation    Rule out Reason: N/A    Patient Name: Shannan Reza                   73 y.o. female  Patient MRN: [id number]  : 1950  DOD:  TOD:  Cause of Death: N/A    [x]Vented Patient  [facility] representative to approach family if appropriate  []DNR status obtained Referral of critical care  patients when family initiates Do Not Resuscitate  []Cardiac Death   Clinical Support Center to approach family via telephone if appropriate  [x]Donor Registry  Patient is listed in the Donor Registry    Completed by: Dulce Lomax

## 2024-08-08 NOTE — PROCEDURES
"Shannan Reza is a 73 y.o. female patient.    Temp: 97.8 °F (36.6 °C) (08/08/24 0800)  Pulse: 92 (08/08/24 1130)  Resp: (!) 23 (08/08/24 1130)  BP: (!) 108/91 (08/08/24 1130)  SpO2: 98 % (08/08/24 1130)  Weight: 65 kg (143 lb 4.8 oz) (08/07/24 2200)  Height: 5' 2" (157.5 cm) (08/08/24 1103)       EEG    Date/Time: 8/8/2024 11:47 AM    Performed by: Romi Vivar MD  Authorized by: Carol Rehman MD          8/8/2024    Reason for exam: seizure    Duration 1 hour 49 minutes    Technical description:  A standard digital EEG was performed at [facility].  The 10 20 international system of electrode placement is used.  Standard montages were recorded.  Activation procedures were performed.    Description:  No posterior dominant rhythm was identified.  The record was dominated by admixed polymorphic theta and delta activity stage 2 sleep was not identified.  There were no electrographic seizures or epileptiform discharges.    Impression:  This is an abnormal EEG due to moderate generalized slowing.  Generalized slowing can be seen with generalized cerebral dysfunction as seen in metabolic, toxic, infectious, or multifocal structural abnormalities.   "

## 2024-08-08 NOTE — ASSESSMENT & PLAN NOTE
Right EMANUEL stroke with Encephalopathy  8/8/2024 CT perfusion Perfusion defect in the right EMANUEL territory with mismatch volume of 21 mL.   8/8/2024 CTH no definite acute intracranial hemorrhage. No definite acute abnormality identified.  8/8/2024 EEG Impression: This is an abnormal EEG due to moderate generalized slowing. Generalized slowing can be seen with generalized cerebral dysfunction as seen in metabolic, toxic, infectious, or multifocal structural abnormalities.   8/8/2024 Echo EF 20-25%. Negative saline bubble study.     Antithrombotics for secondary stroke prevention: Antiplatelets: Aspirin: 81 mg daily    Statins for secondary stroke prevention and hyperlipidemia, if present:   Statins: None: Reason: elevated liver enzymes    Aggressive risk factor modification:  history tobacco use, prior stroke     Rehab efforts: The patient has been evaluated by a stroke team provider and the therapy needs have been fully considered based off the presenting complaints and exam findings. The following therapy evaluations are needed: PT evaluate and treat, OT evaluate and treat, SLP evaluate and treat    Diagnostics ordered/pending: Carotid ultrasound to assess vasculature, HgbA1C to assess blood glucose levels, Lipid Profile to assess cholesterol levels, TSH to assess thyroid function    VTE prophylaxis: Mechanical prophylaxis: Place SCDs    BP parameters: Infarct: Less than 180     - No repeat imaging at this time, needs stabilization from sepsis standpoint.  - IR diagnostic carotid angiogram  - NPO after MN

## 2024-08-09 PROBLEM — R56.9 SEIZURE: Status: ACTIVE | Noted: 2024-08-09

## 2024-08-09 LAB
ALBUMIN SERPL-MCNC: 2.4 G/DL (ref 3.4–4.8)
ALBUMIN/GLOB SERPL: 1 RATIO (ref 1.1–2)
ALLENS TEST BLOOD GAS (OHS): ABNORMAL
ALP SERPL-CCNC: 69 UNIT/L (ref 40–150)
ALT SERPL-CCNC: 17 UNIT/L (ref 0–55)
ANION GAP SERPL CALC-SCNC: 9 MEQ/L
AST SERPL-CCNC: 55 UNIT/L (ref 5–34)
BASE EXCESS BLD CALC-SCNC: -0.5 MMOL/L
BASOPHILS # BLD AUTO: 0.02 X10(3)/MCL
BASOPHILS NFR BLD AUTO: 0.1 %
BILIRUB SERPL-MCNC: 0.4 MG/DL
BLOOD GAS SAMPLE TYPE (OHS): ABNORMAL
BUN SERPL-MCNC: 12.9 MG/DL (ref 9.8–20.1)
CA-I BLD-SCNC: 1.08 MMOL/L (ref 1.12–1.23)
CALCIUM SERPL-MCNC: 8.1 MG/DL (ref 8.4–10.2)
CHLORIDE SERPL-SCNC: 110 MMOL/L (ref 98–107)
CO2 BLDA-SCNC: 22 MMOL/L
CO2 SERPL-SCNC: 18 MMOL/L (ref 23–31)
CREAT SERPL-MCNC: 0.71 MG/DL (ref 0.55–1.02)
CREAT/UREA NIT SERPL: 18
DRAWN BY BLOOD GAS (OHS): ABNORMAL
EOSINOPHIL # BLD AUTO: 0 X10(3)/MCL (ref 0–0.9)
EOSINOPHIL NFR BLD AUTO: 0 %
ERYTHROCYTE [DISTWIDTH] IN BLOOD BY AUTOMATED COUNT: 14.6 % (ref 11.5–17)
GFR SERPLBLD CREATININE-BSD FMLA CKD-EPI: >60 ML/MIN/1.73/M2
GLOBULIN SER-MCNC: 2.4 GM/DL (ref 2.4–3.5)
GLUCOSE SERPL-MCNC: 180 MG/DL (ref 82–115)
HCO3 BLDA-SCNC: 21.2 MMOL/L (ref 22–26)
HCT VFR BLD AUTO: 30.9 % (ref 37–47)
HGB BLD-MCNC: 10.6 G/DL (ref 12–16)
IMM GRANULOCYTES # BLD AUTO: 0.21 X10(3)/MCL (ref 0–0.04)
IMM GRANULOCYTES NFR BLD AUTO: 1.2 %
INHALED O2 CONCENTRATION: 30 %
LYMPHOCYTES # BLD AUTO: 1.02 X10(3)/MCL (ref 0.6–4.6)
LYMPHOCYTES NFR BLD AUTO: 5.7 %
MAGNESIUM SERPL-MCNC: 1.7 MG/DL (ref 1.6–2.6)
MCH RBC QN AUTO: 29.1 PG (ref 27–31)
MCHC RBC AUTO-ENTMCNC: 34.3 G/DL (ref 33–36)
MCV RBC AUTO: 84.9 FL (ref 80–94)
MECH RR (OHS): 14 B/MIN
MODE (OHS): AC
MONOCYTES # BLD AUTO: 0.71 X10(3)/MCL (ref 0.1–1.3)
MONOCYTES NFR BLD AUTO: 4 %
NEUTROPHILS # BLD AUTO: 15.98 X10(3)/MCL (ref 2.1–9.2)
NEUTROPHILS NFR BLD AUTO: 89 %
NRBC BLD AUTO-RTO: 0 %
OHS QRS DURATION: 74 MS
OHS QTC CALCULATION: 490 MS
OXYGEN DEVICE BLOOD GAS (OHS): ABNORMAL
PCO2 BLDA: 26 MMHG (ref 35–45)
PEEP RESPIRATORY: 8 CMH2O
PH BLDA: 7.52 [PH] (ref 7.35–7.45)
PHOSPHATE SERPL-MCNC: 2.4 MG/DL (ref 2.3–4.7)
PLATELET # BLD AUTO: 250 X10(3)/MCL (ref 130–400)
PLATELETS.RETICULATED NFR BLD AUTO: 5 % (ref 0.9–11.2)
PMV BLD AUTO: 11.5 FL (ref 7.4–10.4)
PO2 BLDA: 60 MMHG (ref 80–100)
POTASSIUM BLOOD GAS (OHS): 3.6 MMOL/L (ref 3.5–5)
POTASSIUM SERPL-SCNC: 4.5 MMOL/L (ref 3.5–5.1)
PROT SERPL-MCNC: 4.8 GM/DL (ref 5.8–7.6)
RBC # BLD AUTO: 3.64 X10(6)/MCL (ref 4.2–5.4)
SAMPLE SITE BLOOD GAS (OHS): ABNORMAL
SAO2 % BLDA: 93 %
SODIUM BLOOD GAS (OHS): 137 MMOL/L (ref 137–145)
SODIUM SERPL-SCNC: 137 MMOL/L (ref 136–145)
SPONT+MECH VT ON VENT: 450 ML
WBC # BLD AUTO: 17.94 X10(3)/MCL (ref 4.5–11.5)

## 2024-08-09 PROCEDURE — 63600175 PHARM REV CODE 636 W HCPCS: Performed by: PSYCHIATRY & NEUROLOGY

## 2024-08-09 PROCEDURE — 99900026 HC AIRWAY MAINTENANCE (STAT)

## 2024-08-09 PROCEDURE — 63600175 PHARM REV CODE 636 W HCPCS: Performed by: INTERNAL MEDICINE

## 2024-08-09 PROCEDURE — 99900035 HC TECH TIME PER 15 MIN (STAT)

## 2024-08-09 PROCEDURE — 63600175 PHARM REV CODE 636 W HCPCS: Mod: JG

## 2024-08-09 PROCEDURE — 20000000 HC ICU ROOM

## 2024-08-09 PROCEDURE — P9047 ALBUMIN (HUMAN), 25%, 50ML: HCPCS | Mod: JG

## 2024-08-09 PROCEDURE — 36600 WITHDRAWAL OF ARTERIAL BLOOD: CPT

## 2024-08-09 PROCEDURE — 25000003 PHARM REV CODE 250: Performed by: INTERNAL MEDICINE

## 2024-08-09 PROCEDURE — 25000003 PHARM REV CODE 250: Performed by: PSYCHIATRY & NEUROLOGY

## 2024-08-09 PROCEDURE — 94760 N-INVAS EAR/PLS OXIMETRY 1: CPT | Mod: XB

## 2024-08-09 PROCEDURE — 83735 ASSAY OF MAGNESIUM: CPT

## 2024-08-09 PROCEDURE — 80053 COMPREHEN METABOLIC PANEL: CPT

## 2024-08-09 PROCEDURE — 99233 SBSQ HOSP IP/OBS HIGH 50: CPT | Mod: ,,, | Performed by: PSYCHIATRY & NEUROLOGY

## 2024-08-09 PROCEDURE — 36415 COLL VENOUS BLD VENIPUNCTURE: CPT

## 2024-08-09 PROCEDURE — 82803 BLOOD GASES ANY COMBINATION: CPT

## 2024-08-09 PROCEDURE — 25000003 PHARM REV CODE 250: Performed by: STUDENT IN AN ORGANIZED HEALTH CARE EDUCATION/TRAINING PROGRAM

## 2024-08-09 PROCEDURE — 84100 ASSAY OF PHOSPHORUS: CPT

## 2024-08-09 PROCEDURE — 25000003 PHARM REV CODE 250

## 2024-08-09 PROCEDURE — 27200966 HC CLOSED SUCTION SYSTEM

## 2024-08-09 PROCEDURE — 27100171 HC OXYGEN HIGH FLOW UP TO 24 HOURS

## 2024-08-09 PROCEDURE — 25000003 PHARM REV CODE 250: Performed by: NURSE PRACTITIONER

## 2024-08-09 PROCEDURE — 99900031 HC PATIENT EDUCATION (STAT)

## 2024-08-09 PROCEDURE — 85025 COMPLETE CBC W/AUTO DIFF WBC: CPT

## 2024-08-09 PROCEDURE — 94003 VENT MGMT INPAT SUBQ DAY: CPT

## 2024-08-09 RX ORDER — FENTANYL CITRATE-0.9 % NACL/PF 10 MCG/ML
0-250 PLASTIC BAG, INJECTION (ML) INTRAVENOUS CONTINUOUS
Status: DISCONTINUED | OUTPATIENT
Start: 2024-08-09 | End: 2024-08-23

## 2024-08-09 RX ORDER — FUROSEMIDE 10 MG/ML
40 INJECTION INTRAMUSCULAR; INTRAVENOUS ONCE
Status: COMPLETED | OUTPATIENT
Start: 2024-08-09 | End: 2024-08-09

## 2024-08-09 RX ORDER — ALBUMIN HUMAN 250 G/1000ML
12.5 SOLUTION INTRAVENOUS ONCE
Status: COMPLETED | OUTPATIENT
Start: 2024-08-09 | End: 2024-08-09

## 2024-08-09 RX ADMIN — LEVETIRACETAM 500 MG: 100 INJECTION, SOLUTION INTRAVENOUS at 08:08

## 2024-08-09 RX ADMIN — MUPIROCIN: 20 OINTMENT TOPICAL at 08:08

## 2024-08-09 RX ADMIN — ASPIRIN 81 MG CHEWABLE TABLET 81 MG: 81 TABLET CHEWABLE at 08:08

## 2024-08-09 RX ADMIN — PROPOFOL 10 MCG/KG/MIN: 10 INJECTION, EMULSION INTRAVENOUS at 01:08

## 2024-08-09 RX ADMIN — ALBUMIN (HUMAN) 12.5 G: 12.5 SOLUTION INTRAVENOUS at 02:08

## 2024-08-09 RX ADMIN — VASOPRESSIN 0.04 UNITS/MIN: 20 INJECTION INTRAVENOUS at 01:08

## 2024-08-09 RX ADMIN — FUROSEMIDE 40 MG: 10 INJECTION, SOLUTION INTRAMUSCULAR; INTRAVENOUS at 02:08

## 2024-08-09 RX ADMIN — Medication 25 MCG/HR: at 04:08

## 2024-08-09 NOTE — PT/OT/SLP PROGRESS
Physical Therapy      Patient Name:  Shannan Reza   MRN:  [id number]    PT eval orders received, chart reviewed, discussed with RN, patient not medically appropriate for PT evaluation today, will follow up as schedule permits.

## 2024-08-09 NOTE — PROGRESS NOTES
[facility] - 7 North ICU  Pulmonary Critical Care Note    Patient Name: Shannan Reza  MRN: [id number]  Admission Date: 8/7/2024  Hospital Length of Stay: 2 days  Code Status: Full Code  Attending Provider: Chance Flores MD  Primary Care Provider: Kiana Marie MD     Subjective:     HPI:   This is a 73-year-old female with past medical history of hypertension and to strokes in past presenting from outside facility for suspected CVA and seizure-like activity.  Family member present at bedside and states that patient was in normal state of health until yesterday evening.  States she started to feel weak and lethargic and eventually went unresponsive.  Had presentation like this previously when she had stroke.  CT head at outside facility did not show any acute infarct.  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 She was intubated for airway protection and loaded with Keppra.  She was transferred to [facility] for further care.  Repeat imaging was ordered.  Patient continues to be intubated and sedated on propofol. Requiring increased amounts of Levophed and started on vasopressin and stress dose steroids.  Admitted to ICU for close monitoring.    24 Hour Interval History:  Pressors weaned. Levo 0.06. Right pupil fixed. Withdraws in upper extremities   Interventional neurology is considering cerebral angiogram.   EEG shows moderate generalized slowing    Past Medical History:   Diagnosis Date    Carotid-cavernous fistula     Cerebrovascular accident (CVA) due to thrombosis of right middle cerebral artery 6/26/2018    Essential hypertension 6/27/2018    Hemiparesis affecting left side as late effect of cerebrovascular accident     Hx of tobacco use, presenting hazards to health        Past Surgical History:   Procedure Laterality Date    AUGMENTATION OF BREAST      BRAIN SURGERY         Social History     Socioeconomic History    Marital  status: Unknown   Tobacco Use    Smoking status: Former     Current packs/day: 0.00     Average packs/day: 0.5 packs/day for 25.0 years (12.5 ttl pk-yrs)     Types: Cigarettes     Start date: 1993     Quit date: 2018     Years since quittin.1    Smokeless tobacco: Former   Substance and Sexual Activity    Alcohol use: Not Currently    Drug use: No    Sexual activity: Never   Social History Narrative    ** Merged History Encounter **          Social Determinants of Health     Financial Resource Strain: Low Risk  (2024)    Overall Financial Resource Strain (CARDIA)     Difficulty of Paying Living Expenses: Not very hard   Food Insecurity: No Food Insecurity (2024)    Hunger Vital Sign     Worried About Running Out of Food in the Last Year: Never true     Ran Out of Food in the Last Year: Never true   Transportation Needs: No Transportation Needs (2024)    TRANSPORTATION NEEDS     Transportation : No   Housing Stability: Low Risk  (2024)    Housing Stability Vital Sign     Unable to Pay for Housing in the Last Year: No     Homeless in the Last Year: No           Current Outpatient Medications   Medication Instructions    amLODIPine (NORVASC) 5 mg, Oral, Daily    aspirin (ECOTRIN) 81 mg, Oral, Daily    folic acid (FOLVITE) 1 mg, Oral, Daily    hydrOXYzine HCL (ATARAX) 25 MG tablet TAKE 1 TABLET BY MOUTH THREE TIMES DAILY AS NEEDED FOR ITCHING OR ANXIETY    multivitamin Tab 1 tablet, Oral, Daily    rosuvastatin (CRESTOR) 40 mg, Oral, Nightly       Current Inpatient Medications   aspirin  81 mg Per NG tube Daily    levETIRAcetam (Keppra) IV (PEDS and ADULTS)  500 mg Intravenous BID    mupirocin   Nasal BID       Current Intravenous Infusions   0.9% NaCl   Intravenous Continuous   Held at 24 1700    lactated ringers   Intravenous Continuous 100 mL/hr at 24 0451 Restarted at 24 0451    NORepinephrine bitartrate-D5W  0-3 mcg/kg/min Intravenous Continuous 1.8 mL/hr at 24  0607 0.06 mcg/kg/min at 08/09/24 0607    propofoL  0-50 mcg/kg/min Intravenous Continuous   Stopped at 08/09/24 0300    vasopressin  0.04 Units/min Intravenous Continuous   Stopped at 08/09/24 0416         Review of Systems   Unable to perform ROS: Intubated          Objective:       Intake/Output Summary (Last 24 hours) at 8/9/2024 0707  Last data filed at 8/9/2024 0607  Gross per 24 hour   Intake 839.17 ml   Output 1030 ml   Net -190.83 ml         Vital Signs (Most Recent):  Temp: 99.3 °F (37.4 °C) (08/09/24 0400)  Pulse: 94 (08/09/24 0634)  Resp: 16 (08/09/24 0634)  BP: 102/64 (08/09/24 0600)  SpO2: 97 % (08/09/24 0634)  Body mass index is 26.21 kg/m².  Weight: 65 kg (143 lb 4.8 oz) Vital Signs (24h Range):  Temp:  [97.8 °F (36.6 °C)-99.3 °F (37.4 °C)] 99.3 °F (37.4 °C)  Pulse:  [] 94  Resp:  [15-30] 16  SpO2:  [95 %-100 %] 97 %  BP: ()/(43-91) 102/64  Arterial Line BP: ()/(47-97) 82/64     Physical Exam  Constitutional:       Appearance: She is ill-appearing.      Comments: Intubated and sedated.   HENT:      Head: Normocephalic.   Eyes:      Comments: Pupils are equal but the right pupil is fixed   Cardiovascular:      Rate and Rhythm: Normal rate and regular rhythm.   Pulmonary:      Breath sounds: Normal breath sounds.      Comments: Intubated and mechanically ventilated  Abdominal:      Palpations: Abdomen is soft.   Neurological:      Comments: Withdraws to pain and opens eyes to sternal rub           Lines/Drains/Airways       Central Venous Catheter Line  Duration             Percutaneous Central Line - Triple Lumen  08/08/24 0000 Internal Jugular Right 1 day              Drain  Duration                  NG/OG Tube 08/06/24 0000 Center mouth 3 days         Urethral Catheter 08/07/24 2300 1 day              Airway  Duration                  Airway - Non-Surgical 08/07/24 2147 Endotracheal Tube 1 day              Arterial Line  Duration             Arterial Line 08/07/24 2330 Right Radial  1 day              Peripheral Intravenous Line  Duration                  Peripheral IV - Single Lumen 08/07/24 1500 18 G Right Antecubital 1 day         Peripheral IV - Single Lumen 08/07/24 1500 20 G Posterior;Right Hand 1 day                    Significant Labs:    Lab Results   Component Value Date    WBC 17.94 (H) 08/09/2024    HGB 10.6 (L) 08/09/2024    HCT 30.9 (L) 08/09/2024    MCV 84.9 08/09/2024     08/09/2024           BMP  Lab Results   Component Value Date     08/09/2024    K 4.5 08/09/2024    CO2 18 (L) 08/09/2024    BUN 12.9 08/09/2024    CREATININE 0.71 08/09/2024    CALCIUM 8.1 (L) 08/09/2024    AGAP 9.0 08/09/2024    ESTGFRAFRICA >60.0 07/07/2022    EGFRNONAA >60.0 07/07/2022         ABG  Recent Labs   Lab 08/09/24 0542   PH 7.520*   PO2 60.0*   PCO2 26.0*   HCO3 21.2*   POCBASEDEF -0.50       Mechanical Ventilation Support:  Vent Mode: A/C (08/09/24 0634)  Ventilator Initiated: Yes (08/07/24 2147)  Set Rate: 14 BPM (08/09/24 0634)  Vt Set: 450 mL (08/09/24 0634)  PEEP/CPAP: 8 cmH20 (08/09/24 0634)  Oxygen Concentration (%): 30 (08/09/24 0400)  Peak Airway Pressure: 26 cmH20 (08/09/24 0634)  Total Ve: 7.8 L/m (08/09/24 0634)  F/VT Ratio<105 (RSBI): (!) 38.37 (08/09/24 0634)      Significant Imaging:  I have reviewed the pertinent imaging within the past 24 hours.  Chest x-ray with worsening right-sided infiltrate and coiling of the right IJ central line      Assessment/Plan:     Assessment  Suspected CVA versus seizures  Lactic acidosis   Hypokalemia   Hx HTN, CVA with residual left sided weakness       Plan  Appreciate neurology recommendations  Continue Keppra 500 mg b.i.d.  Follow-up interventional Neurology recommendations regarding DSA, MRI brain  Blood pressure goals of systolic 100-180  Continue mechanical ventilation  Wean vasopressors  Follow-up cultures  Appreciate cardiology recommendations regarding new onset systolic dysfunction  Start tube feeds if not going for  DSA  Will work on getting IV access and minimize use of the central line given the coiling seen on chest x-ray    DVT Prophylaxis: SCD  GI Prophylaxis: na     35 minutes of critical care was time spent personally by me on the following activities: development of treatment plan with patient or surrogate and bedside caregivers, discussions with consultants, evaluation of patient's response to treatment, examination of patient, ordering and performing treatments and interventions, ordering and review of laboratory studies, ordering and review of radiographic studies, pulse oximetry, re-evaluation of patient's condition.  This critical care time did not overlap with that of any other provider or involve time for any procedures.     Vladimir Acevedo MD  Pulmonary Critical Care Medicine  [facility] - [facility] ICU  DOS: 08/09/2024

## 2024-08-09 NOTE — ASSESSMENT & PLAN NOTE
Stroke   - presented with unresponsive and worsening left sided weakness  - Stroke RF: tobacco abuse, history of stroke  - Intervention:   - Etiology: TBD    Stroke workup:  -CTh: Evaluation limited by streak artifact. No definite acute abnormality identified.   -ECHO: EF: 20-25%, LA normal, BS negative  -CUS: negative  -LDL: 33  -A1c: 5.7  -TSH: 0.610  -CT perfusion: Perfusion defect in the right EMANUEL territory with mismatch volume of 21 mL.   -EEG Impression: abnormal EEG due to moderate generalized slowing. Generalized slowing can be seen with generalized cerebral dysfunction as seen in metabolic, toxic, infectious, or multifocal structural abnormalities.       Plan:  -continue stroke workup  - Aspirin 81mg daily  - Atorvastatin 40mg daily  -PT/OT/ST to evaluate   --180  -Keppra 500 mg BID  -will repeat CT head in am

## 2024-08-09 NOTE — CONSULTS
Inpatient consult to Palliative Care  Consult performed by: Chance Flores MD  Consult ordered by: Chance Flores MD  Reason for consult: Goals of Care      Patient Name: Shannan Reza   MRN: [id number]   Admission Date: 8/7/2024   Hospital Length of Stay: 2   Attending Provider: Chance Flores MD   Consulting Provider: Palliative Care Team   Reason for Consult: Goals of Care  Primary Care Physician: Kiana Marie MD     Principal Problem: <principal problem not specified>     Patient information was obtained from relative(s), ER records, and primary team.      Final diagnoses:  [I63.9] CVA (cerebral vascular accident)       73-year-old female with past medical history of hypertension and to strokes in past presenting from outside facility for suspected CVA and seizure-like activity. Patient is currently intubated, off of sedation, requiring pressors. Right pupil fixed. Withdraws in upper extremities   Interventional neurology is considering cerebral angiogram. EEG shows moderate generalized slowing. Cardiology consulted for new onset of heart failure      Assessment/Plan:     I reviewed the patient and family's understanding of the seriousness of the illness and its expected prognosis. We discussed the patient's goals of care and treatment preferences. We discussed the patient's chosen code status as listed. I answered all questions and we formulated a plan including recommendations for symptom management and how to best achieve goals of care.       Advance Care Planning     Date: 08/09/2024  Introduced self and palliative care role within a hospital setting to patient's son via telephone.    [facility]  I engaged the family in a voluntary conversation about advance care planning and we specifically addressed what the goals of care would be moving forward, in light of the patient's change in clinical status, specifically patient's current condition, goals of care and medical wishes.     Patient's son  "reports that patient is  and that he is the patient's only child. States that they had recently completed documents making him the power of , denies living will. States prior to admission patient would require some assistance with activities of daily living related to past CVA resulting in left sided weakness, however was fairly independent, would use a motorized wheel chair but was able to transfer self with moderate assistance. States two nights ago he assisted patient with application of some eye drops, states patient reporting burning after the drops were applied and then "calming relief".  States after that patient became weak and went unresponsive.     We did specifically address the patient's likely prognosis, which is poor, to which the patient's son verbalized "I know and understand the seriousness of my mother's current health and know I will have some decisions to make regarding her care." Emotional support provided and informed that the palliative care team role is to provide support and guidance to patient's and patient's family in exploring the patient's values and preferences for future care.     We explored the patient's values and preferences for future care. Patient's son states that they had discussed the importance of discussing the patient's medical wishes given her medical history however, they had not had the chance to discuss in detail what the patient's medical wishes would be in the event that the patient would not be able to make decisions for herself.  The family endorses that what is most important right now is to focus on curative/life-prolongation (regardless of treatment burdens) and goals of care to continue to be discussed throughout patient's hospital stay with plans to follow up with patient's son after the weekend.ECHO was obtained and revealed an EF of 20 - 25%. Large anterior wall hypokinesis. Significant labs WBC 21.60, Chloride 114, CO2 16, Glucose 215, Calcium " "7.9, Albumin 2.6, AST 46, Lactic 4.5. UDS shows Fentanyl.Palliative consulted for goals of care.     Accordingly, we have decided that the best plan to meet the patient's goals includes continuing with treatment    Palliative Care RN visit was spent on advance care planning, goals of care discussion, emotional support, formulating and communicating prognosis and exploring burden/benefit of various approaches of treatment. This discussion occurred on a fully voluntary basis with the verbal consent of the patient and/or family.      Recommendations:     Spoke to Palliative Team Physician/NP - reviewed patient's current status and plan of care in detail. The following recommendations/orders were given:     Continue discussion of goals of care and medical wishes     Palliative Team will continue to provided emotional support, education, and make adjustments to plan of care to meet patient's needs throughout hospital stay.     Objective:   /83   Pulse (!) 118   Temp 99.3 °F (37.4 °C) (Oral)   Resp (!) 29   Ht 5' 2" (1.575 m)   Wt 65 kg (143 lb 4.8 oz)   LMP  (LMP Unknown)   SpO2 (!) 94%   BMI 26.21 kg/m²      PAINAD: NA    Physical Exam  Vitals and nursing note reviewed.   Constitutional:       Interventions: She is intubated.   HENT:      Head: Normocephalic.   Cardiovascular:      Rate and Rhythm: Tachycardia present.   Pulmonary:      Effort: She is intubated.        Review of Symptoms      Symptom Assessment (ESAS 0-10 Scale)  Pain:  0  Dyspnea:  0  Anxiety:  0  Nausea:  0  Depression:  0  Anorexia:  0  Fatigue:  0  Insomnia:  0  Restlessness:  0  Agitation:  0         Performance Status:  10    Living Arrangements:  Lives with family    Psychosocial/Cultural:   See Palliative Psychosocial Note: Yes  73 year old female,  with one son.   **Primary  to Follow**  Palliative Care  Consult: No    Spiritual:  F - Radha and Belief:  Y  I - Importance:  Y  C - Community:  Y  A - " Address in Care:  Y      Advance Care Planning   Advance Care Planning           FAMILY CONTACTS: David Reza (son) [phone]    Caregiver burden formerly assessed: Yes    EMELI Reid, BSN-RN, PN  Palliative Medicine  [facility] - Palliative Team

## 2024-08-09 NOTE — NURSING
Nurses Note -- 4 Eyes      8/9/2024   3:11 AM      Skin assessed during: Daily Assessment      [x] No Altered Skin Integrity Present    [x]Prevention Measures Documented      [] Yes- Altered Skin Integrity Present or Discovered   [] LDA Added if Not in Epic (Describe Wound)   [] New Altered Skin Integrity was Present on Admit and Documented in LDA   [] Wound Image Taken    Wound Care Consulted? No    Attending Nurse:  Deyanira Rodriguez RN/Staff Member:   Estephania Arenas RN

## 2024-08-09 NOTE — PT/OT/SLP PROGRESS
[facility] Speech Language Pathology Department    Patient Name:  Shannan Reza   MRN:  [id number]  Routine CVA orders received, chart reviewed, and nursing consulted.  Pt intubated for mechanical ventilation.  Skilled SLP services not warranted at this time.  Please reconsult as needed.

## 2024-08-09 NOTE — PROGRESS NOTES
[facility] - [facility]  Neurology  Progress Note    Patient Name: Shannan Reza  MRN: [id number]  Admission Date: 8/7/2024  Hospital Length of Stay: 2 days  Code Status: Full Code   Attending Provider: Chance Flores MD  Primary Care Physician: Kiana Marie MD   Principal Problem:<principal problem not specified>      Subjective:     Interval History:   Remains intubated, not on sedation, overall exam unchanged.     Current Neurological Medications:     Current Facility-Administered Medications   Medication Dose Route Frequency Provider Last Rate Last Admin    0.9%  NaCl infusion   Intravenous Continuous Shantell Vail, NP   Held at 08/08/24 1700    aspirin chewable tablet 81 mg  81 mg Per NG tube Daily Shantell Vail NP   81 mg at 08/09/24 0847    bisacodyL suppository 10 mg  10 mg Rectal Daily PRN Shantell Vail NP        dextrose 10% bolus 125 mL 125 mL  12.5 g Intravenous PRN Carol Rehman MD        dextrose 10% bolus 250 mL 250 mL  25 g Intravenous PRN Carol Rehman MD        glucagon (human recombinant) injection 1 mg  1 mg Intramuscular PRN Carol Rehman MD        insulin aspart U-100 injection 0-5 Units  0-5 Units Subcutaneous Q6H PRN Carol Rehman MD   2 Units at 08/08/24 1403    levETIRAcetam (Keppra) 500 mg in D5W 100 mL IVPB (MB+)  500 mg Intravenous BID Vickey Singh MD   Stopped at 08/09/24 0919    mupirocin 2 % ointment   Nasal BID Cody Morrissey Jr., MD, FCCP   Given at 08/09/24 0848    NORepinephrine 32 mg in D5W 250 mL infusion  0-3 mcg/kg/min Intravenous Continuous Gary Santa DO 1.8 mL/hr at 08/09/24 0607 0.06 mcg/kg/min at 08/09/24 0607    sodium chloride 0.9% flush 10 mL  10 mL Intravenous PRN Carol Rehman MD           Review of Systems   Unable to perform ROS: Acuity of condition     Objective:     Vital Signs (Most Recent):  Temp: 99.3 °F (37.4 °C) (08/09/24 0400)  Pulse: (!) 120 (08/09/24 1115)  Resp: (!) 32 (08/09/24 1115)  BP: 107/71  (08/09/24 1115)  SpO2: (!) 92 % (08/09/24 1115) Vital Signs (24h Range):  Temp:  [98.2 °F (36.8 °C)-99.3 °F (37.4 °C)] 99.3 °F (37.4 °C)  Pulse:  [] 120  Resp:  [15-32] 32  SpO2:  [91 %-100 %] 92 %  BP: ()/(43-83) 107/71  Arterial Line BP: ()/() 114/88     Weight: 65 kg (143 lb 4.8 oz)  Body mass index is 26.21 kg/m².     Physical Exam  Vitals and nursing note reviewed.   Constitutional:       General: She is not in acute distress.     Interventions: She is intubated.   HENT:      Head: Normocephalic.   Eyes:      Pupils: Pupils are equal, round, and reactive to light.   Pulmonary:      Effort: She is intubated.   Musculoskeletal:      Right lower leg: No edema.      Left lower leg: No edema.   Skin:     General: Skin is warm and dry.      Capillary Refill: Capillary refill takes less than 2 seconds.   Neurological:      Comments:     Limited PE  Does not participate in exam  Appears to withdraw from painful stim BUE, BLE  Contracted LUE (chronic LUE, LLE weakness from previous stroke)  ? Appears to open her eyes but does not track  +cough, gag  Does not blink to threat  PERR     NEUROLOGICAL EXAMINATION:     CRANIAL NERVES     CN III, IV, VI   Pupils are equal, round, and reactive to light.      Significant Labs:   Recent Lab Results  (Last 5 results in the past 24 hours)        08/09/24  0542   08/09/24  0241   08/08/24  2206   08/08/24  2037   08/08/24  1911        Immature Platelet Fraction   5.0             Albumin/Globulin Ratio   1.0             Albumin   2.4             ALP   69             Allens Test N/A               ALT   17             Anion Gap   9.0             AST   55             Baso #   0.02             Basophil %   0.1             BILIRUBIN TOTAL   0.4             BUN   12.9             BUN/CREAT RATIO   18             Calcium   8.1             Calcium Level Ionized 1.08               Chloride   110             CO2   18             Creatinine   0.71             Drawn by sd  rrt               eGFR   >60             Eos #   0.00             Eos %   0.0             FIO2, Blood gas 30               Globulin, Total   2.4             Glucose   180             Hematocrit   30.9             Hemoglobin   10.6             Immature Grans (Abs)   0.21             Immature Granulocytes   1.2             Left CCA dist de la paz     13           Left CCA dist sys     52           Left CCA prox de la paz     14           Left CCA prox sys     59           Left ECA de la paz     4           Left ECA sys     29           Left ICA dist de la paz     12           Left ICA dist sys     31           Left ICA mid de la paz     14           Left ICA mid sys     38           Left ICA prox de la paz     14           Left ICA prox sys     40           Left vertebral de la paz     13           Left vertebral sys     34           Lymph #   1.02             LYMPH %   5.7             Magnesium    1.70             MCH   29.1             MCHC   34.3             MCV   84.9             Mech Vt 450               MODE AC               Mono #   0.71             Mono %   4.0             MPV   11.5             Neut #   15.98             Neut %   89.0             nRBC   0.0             Left ICA/CCA ratio     0.77           Left Highest CCA     59           Left Highest ICA     40.00           LT Highest EDV     14.00           QRS Duration         74       OHS QTC Calculation         490       Oxygen Device, Blood gas Ventilator               PEEP 8.0               Phosphorus Level   2.4             Platelet Count   250             Base Excess, Blood gas -0.50               POC HCO3 21.2               POC PCO2 26.0               POC PH 7.520               POC PO2 60.0               POCT Glucose       172         Potassium   4.5             Potassium, Blood Gas 3.6               PROTEIN TOTAL   4.8             RBC   3.64             RDW   14.6             Mech RR 14               Sample site Arterial Line               Sample Type Arterial Blood                sO2, Blood gas 93.0               Sodium   137             Sodium, Blood Gas 137               TOC2, Blood gas 22.0               WBC   17.94                                    Significant Imaging: I have reviewed all pertinent imaging results/findings within the past 24 hours.  Assessment and Plan:     Acute ischemic right EMANUEL stroke  - presented with unresponsive and worsening left sided weakness  - Stroke RF: tobacco abuse, history of stroke  - Intervention:   - Etiology: TBD    Stroke workup:  -CTh: Evaluation limited by streak artifact. No definite acute abnormality identified.   -ECHO: EF: 20-25%, LA normal, BS negative  -CUS: negative  -LDL: 33  -A1c: 5.7  -TSH: 0.610  -CT perfusion: Perfusion defect in the right EMANUEL territory with mismatch volume of 21 mL.   -EEG Impression: abnormal EEG due to moderate generalized slowing. Generalized slowing can be seen with generalized cerebral dysfunction as seen in metabolic, toxic, infectious, or multifocal structural abnormalities.       Plan:  -continue stroke workup  - Aspirin 81mg daily  - Atorvastatin 40mg daily  -PT/OT/ST to evaluate   --180  -Keppra 500 mg BID  -will repeat CT head in am     Further recommendations to follow from MD     VTE Risk Mitigation (From admission, onward)           Ordered     Reason for No Pharmacological VTE Prophylaxis  Once        Question:  Reasons:  Answer:  Risk of Bleeding    08/08/24 1611     IP VTE HIGH RISK PATIENT  Once         08/08/24 1611     Place sequential compression device  Until discontinued         08/08/24 1611     Place sequential compression device  Until discontinued         08/08/24 0052                    Pat Baird NP  Neurology  [facility]

## 2024-08-09 NOTE — SUBJECTIVE & OBJECTIVE
Subjective:     Interval History:   Remains intubated, not on sedation, overall exam unchanged.     Current Neurological Medications:     Current Facility-Administered Medications   Medication Dose Route Frequency Provider Last Rate Last Admin    0.9%  NaCl infusion   Intravenous Continuous Shantell Vail NP   Held at 08/08/24 1700    aspirin chewable tablet 81 mg  81 mg Per NG tube Daily Shantell Vail NP   81 mg at 08/09/24 0847    bisacodyL suppository 10 mg  10 mg Rectal Daily PRN Shantell Vail NP        dextrose 10% bolus 125 mL 125 mL  12.5 g Intravenous PRN Carol Rehman MD        dextrose 10% bolus 250 mL 250 mL  25 g Intravenous PRN Carol Rehman MD        glucagon (human recombinant) injection 1 mg  1 mg Intramuscular PRN Carol Rehman MD        insulin aspart U-100 injection 0-5 Units  0-5 Units Subcutaneous Q6H PRN Carol Rehman MD   2 Units at 08/08/24 1403    levETIRAcetam (Keppra) 500 mg in D5W 100 mL IVPB (MB+)  500 mg Intravenous BID Vickey Singh MD   Stopped at 08/09/24 0919    mupirocin 2 % ointment   Nasal BID Cody Morrissey Jr., MD, FCCP   Given at 08/09/24 0848    NORepinephrine 32 mg in D5W 250 mL infusion  0-3 mcg/kg/min Intravenous Continuous Gary Santa, DO 1.8 mL/hr at 08/09/24 0607 0.06 mcg/kg/min at 08/09/24 0607    sodium chloride 0.9% flush 10 mL  10 mL Intravenous PRN Carol Rehman MD           Review of Systems   Unable to perform ROS: Acuity of condition     Objective:     Vital Signs (Most Recent):  Temp: 99.3 °F (37.4 °C) (08/09/24 0400)  Pulse: (!) 120 (08/09/24 1115)  Resp: (!) 32 (08/09/24 1115)  BP: 107/71 (08/09/24 1115)  SpO2: (!) 92 % (08/09/24 1115) Vital Signs (24h Range):  Temp:  [98.2 °F (36.8 °C)-99.3 °F (37.4 °C)] 99.3 °F (37.4 °C)  Pulse:  [] 120  Resp:  [15-32] 32  SpO2:  [91 %-100 %] 92 %  BP: ()/(43-83) 107/71  Arterial Line BP: ()/() 114/88     Weight: 65 kg (143 lb 4.8 oz)  Body mass index is 26.21  kg/m².     Physical Exam  Vitals and nursing note reviewed.   Constitutional:       General: She is not in acute distress.     Interventions: She is intubated.   HENT:      Head: Normocephalic.   Eyes:      Pupils: Pupils are equal, round, and reactive to light.   Pulmonary:      Effort: She is intubated.   Musculoskeletal:      Right lower leg: No edema.      Left lower leg: No edema.   Skin:     General: Skin is warm and dry.      Capillary Refill: Capillary refill takes less than 2 seconds.   Neurological:      Comments:     Limited PE  Does not participate in exam  Appears to withdraw from painful stim BUE, BLE  Contracted LUE (chronic LUE, LLE weakness from previous stroke)  ? Appears to open her eyes but does not track  +cough, gag  Does not blink to threat  PERR            NEUROLOGICAL EXAMINATION:     CRANIAL NERVES     CN III, IV, VI   Pupils are equal, round, and reactive to light.      Significant Labs:   Recent Lab Results  (Last 5 results in the past 24 hours)        08/09/24  0542   08/09/24  0241   08/08/24  2206   08/08/24  2037   08/08/24  1911        Immature Platelet Fraction   5.0             Albumin/Globulin Ratio   1.0             Albumin   2.4             ALP   69             Allens Test N/A               ALT   17             Anion Gap   9.0             AST   55             Baso #   0.02             Basophil %   0.1             BILIRUBIN TOTAL   0.4             BUN   12.9             BUN/CREAT RATIO   18             Calcium   8.1             Calcium Level Ionized 1.08               Chloride   110             CO2   18             Creatinine   0.71             Drawn by sd rrt               eGFR   >60             Eos #   0.00             Eos %   0.0             FIO2, Blood gas 30               Globulin, Total   2.4             Glucose   180             Hematocrit   30.9             Hemoglobin   10.6             Immature Grans (Abs)   0.21             Immature Granulocytes   1.2             Left  CCA dist de la paz     13           Left CCA dist sys     52           Left CCA prox de [place] paz     14           Left CCA prox sys     59           Left ECA de la paz     4           Left ECA sys     29           Left ICA dist de la paz     12           Left ICA dist sys     31           Left ICA mid de la paz     14           Left ICA mid sys     38           Left ICA prox de la paz     14           Left ICA prox sys     40           Left vertebral de la paz     13           Left vertebral sys     34           Lymph #   1.02             LYMPH %   5.7             Magnesium    1.70             MCH   29.1             MCHC   34.3             MCV   84.9             Mech Vt 450               MODE AC               Mono #   0.71             Mono %   4.0             MPV   11.5             Neut #   15.98             Neut %   89.0             nRBC   0.0             Left ICA/CCA ratio     0.77           Left Highest CCA     59           Left Highest ICA     40.00           LT Highest EDV     14.00           QRS Duration         74       OHS QTC Calculation         490       Oxygen Device, Blood gas Ventilator               PEEP 8.0               Phosphorus Level   2.4             Platelet Count   250             Base Excess, Blood gas -0.50               POC HCO3 21.2               POC PCO2 26.0               POC PH 7.520               POC PO2 60.0               POCT Glucose       172         Potassium   4.5             Potassium, Blood Gas 3.6               PROTEIN TOTAL   4.8             RBC   3.64             RDW   14.6             Mech RR 14               Sample site Arterial Line               Sample Type Arterial Blood               sO2, Blood gas 93.0               Sodium   137             Sodium, Blood Gas 137               TOC2, Blood gas 22.0               WBC   17.94                                    Significant Imaging: I have reviewed all pertinent imaging results/findings within the past 24 hours.

## 2024-08-09 NOTE — PLAN OF CARE
Problem: Adult Inpatient Plan of Care  Goal: Plan of Care Review  Outcome: Progressing  Goal: Patient-Specific Goal (Individualized)  Outcome: Progressing  Goal: Absence of Hospital-Acquired Illness or Injury  Outcome: Progressing  Goal: Optimal Comfort and Wellbeing  Outcome: Progressing  Goal: Readiness for Transition of Care  Outcome: Progressing     Problem: Infection  Goal: Absence of Infection Signs and Symptoms  Outcome: Progressing     Problem: Skin Injury Risk Increased  Goal: Skin Health and Integrity  Outcome: Progressing     Problem: Stroke, Ischemic (Includes Transient Ischemic Attack)  Goal: Optimal Coping  Outcome: Progressing  Goal: Effective Bowel Elimination  Outcome: Progressing  Goal: Optimal Cerebral Tissue Perfusion  Outcome: Progressing  Goal: Optimal Cognitive Function  Outcome: Progressing  Goal: Improved Communication Skills  Outcome: Progressing  Goal: Optimal Functional Ability  Outcome: Progressing  Goal: Optimal Nutrition Intake  Outcome: Progressing  Goal: Effective Oxygenation and Ventilation  Outcome: Progressing  Goal: Improved Sensorimotor Function  Outcome: Progressing  Goal: Safe and Effective Swallow  Outcome: Progressing  Goal: Effective Urinary Elimination  Outcome: Progressing

## 2024-08-10 LAB
ALBUMIN SERPL-MCNC: 2.9 G/DL (ref 3.4–4.8)
ALBUMIN/GLOB SERPL: 1.2 RATIO (ref 1.1–2)
ALLENS TEST BLOOD GAS (OHS): YES
ALP SERPL-CCNC: 75 UNIT/L (ref 40–150)
ALT SERPL-CCNC: 25 UNIT/L (ref 0–55)
ANION GAP SERPL CALC-SCNC: 13 MEQ/L
AST SERPL-CCNC: 80 UNIT/L (ref 5–34)
BASE EXCESS BLD CALC-SCNC: -1.2 MMOL/L
BASOPHILS # BLD AUTO: 0.03 X10(3)/MCL
BASOPHILS NFR BLD AUTO: 0.2 %
BILIRUB SERPL-MCNC: 0.5 MG/DL
BLOOD GAS SAMPLE TYPE (OHS): ABNORMAL
BUN SERPL-MCNC: 23.4 MG/DL (ref 9.8–20.1)
CA-I BLD-SCNC: 1.17 MMOL/L (ref 1.12–1.23)
CALCIUM SERPL-MCNC: 8.6 MG/DL (ref 8.4–10.2)
CHLORIDE SERPL-SCNC: 109 MMOL/L (ref 98–107)
CO2 BLDA-SCNC: 20.8 MMOL/L
CO2 SERPL-SCNC: 16 MMOL/L (ref 23–31)
CREAT SERPL-MCNC: 0.79 MG/DL (ref 0.55–1.02)
CREAT/UREA NIT SERPL: 30
DRAWN BY BLOOD GAS (OHS): ABNORMAL
EOSINOPHIL # BLD AUTO: 0 X10(3)/MCL (ref 0–0.9)
EOSINOPHIL NFR BLD AUTO: 0 %
ERYTHROCYTE [DISTWIDTH] IN BLOOD BY AUTOMATED COUNT: 14.6 % (ref 11.5–17)
FLOW (OHS): 50 LPM
GFR SERPLBLD CREATININE-BSD FMLA CKD-EPI: >60 ML/MIN/1.73/M2
GLOBULIN SER-MCNC: 2.5 GM/DL (ref 2.4–3.5)
GLUCOSE SERPL-MCNC: 125 MG/DL (ref 82–115)
HCO3 BLDA-SCNC: 20.1 MMOL/L (ref 22–26)
HCT VFR BLD AUTO: 31.7 % (ref 37–47)
HGB BLD-MCNC: 10.9 G/DL (ref 12–16)
IMM GRANULOCYTES # BLD AUTO: 0.25 X10(3)/MCL (ref 0–0.04)
IMM GRANULOCYTES NFR BLD AUTO: 1.4 %
INHALED O2 CONCENTRATION: 45 %
LYMPHOCYTES # BLD AUTO: 1.41 X10(3)/MCL (ref 0.6–4.6)
LYMPHOCYTES NFR BLD AUTO: 7.8 %
MCH RBC QN AUTO: 29.6 PG (ref 27–31)
MCHC RBC AUTO-ENTMCNC: 34.4 G/DL (ref 33–36)
MCV RBC AUTO: 86.1 FL (ref 80–94)
MECH RR (OHS): 14 B/MIN
MODE (OHS): AC
MONOCYTES # BLD AUTO: 0.89 X10(3)/MCL (ref 0.1–1.3)
MONOCYTES NFR BLD AUTO: 4.9 %
NEUTROPHILS # BLD AUTO: 15.5 X10(3)/MCL (ref 2.1–9.2)
NEUTROPHILS NFR BLD AUTO: 85.7 %
NRBC BLD AUTO-RTO: 0.2 %
OXYGEN DEVICE BLOOD GAS (OHS): ABNORMAL
PCO2 BLDA: 24 MMHG (ref 35–45)
PEEP RESPIRATORY: 6 CMH2O
PH BLDA: 7.53 [PH] (ref 7.35–7.45)
PLATELET # BLD AUTO: 163 X10(3)/MCL (ref 130–400)
PLATELETS.RETICULATED NFR BLD AUTO: 6.9 % (ref 0.9–11.2)
PMV BLD AUTO: 11.2 FL (ref 7.4–10.4)
PO2 BLDA: 51 MMHG (ref 80–100)
POCT GLUCOSE: 137 MG/DL (ref 70–110)
POTASSIUM BLOOD GAS (OHS): 3.9 MMOL/L (ref 3.5–5)
POTASSIUM SERPL-SCNC: 4.4 MMOL/L (ref 3.5–5.1)
PROT SERPL-MCNC: 5.4 GM/DL (ref 5.8–7.6)
RBC # BLD AUTO: 3.68 X10(6)/MCL (ref 4.2–5.4)
SAMPLE SITE BLOOD GAS (OHS): ABNORMAL
SAO2 % BLDA: 90 %
SODIUM BLOOD GAS (OHS): 135 MMOL/L (ref 137–145)
SODIUM SERPL-SCNC: 138 MMOL/L (ref 136–145)
SPONT+MECH VT ON VENT: 450 ML
WBC # BLD AUTO: 18.08 X10(3)/MCL (ref 4.5–11.5)

## 2024-08-10 PROCEDURE — 27200966 HC CLOSED SUCTION SYSTEM

## 2024-08-10 PROCEDURE — 99900026 HC AIRWAY MAINTENANCE (STAT)

## 2024-08-10 PROCEDURE — 25000003 PHARM REV CODE 250

## 2024-08-10 PROCEDURE — 36600 WITHDRAWAL OF ARTERIAL BLOOD: CPT

## 2024-08-10 PROCEDURE — 85025 COMPLETE CBC W/AUTO DIFF WBC: CPT

## 2024-08-10 PROCEDURE — 94760 N-INVAS EAR/PLS OXIMETRY 1: CPT | Mod: XB

## 2024-08-10 PROCEDURE — 25000003 PHARM REV CODE 250: Performed by: INTERNAL MEDICINE

## 2024-08-10 PROCEDURE — 99233 SBSQ HOSP IP/OBS HIGH 50: CPT | Mod: FS,,, | Performed by: PSYCHIATRY & NEUROLOGY

## 2024-08-10 PROCEDURE — 25000003 PHARM REV CODE 250: Performed by: PSYCHIATRY & NEUROLOGY

## 2024-08-10 PROCEDURE — 20000000 HC ICU ROOM

## 2024-08-10 PROCEDURE — 99900031 HC PATIENT EDUCATION (STAT)

## 2024-08-10 PROCEDURE — 94003 VENT MGMT INPAT SUBQ DAY: CPT

## 2024-08-10 PROCEDURE — 27100171 HC OXYGEN HIGH FLOW UP TO 24 HOURS

## 2024-08-10 PROCEDURE — 63600175 PHARM REV CODE 636 W HCPCS: Performed by: PSYCHIATRY & NEUROLOGY

## 2024-08-10 PROCEDURE — 99900035 HC TECH TIME PER 15 MIN (STAT)

## 2024-08-10 PROCEDURE — 36415 COLL VENOUS BLD VENIPUNCTURE: CPT

## 2024-08-10 PROCEDURE — 80053 COMPREHEN METABOLIC PANEL: CPT

## 2024-08-10 PROCEDURE — 82803 BLOOD GASES ANY COMBINATION: CPT

## 2024-08-10 PROCEDURE — 97162 PT EVAL MOD COMPLEX 30 MIN: CPT

## 2024-08-10 PROCEDURE — 25000003 PHARM REV CODE 250: Performed by: NURSE PRACTITIONER

## 2024-08-10 RX ORDER — ATORVASTATIN CALCIUM 40 MG/1
40 TABLET, FILM COATED ORAL DAILY
Status: DISCONTINUED | OUTPATIENT
Start: 2024-08-10 | End: 2024-08-21

## 2024-08-10 RX ADMIN — MUPIROCIN: 20 OINTMENT TOPICAL at 08:08

## 2024-08-10 RX ADMIN — ATORVASTATIN CALCIUM 40 MG: 40 TABLET, FILM COATED ORAL at 08:08

## 2024-08-10 RX ADMIN — ASPIRIN 81 MG CHEWABLE TABLET 81 MG: 81 TABLET CHEWABLE at 08:08

## 2024-08-10 RX ADMIN — LEVETIRACETAM 500 MG: 100 INJECTION, SOLUTION INTRAVENOUS at 08:08

## 2024-08-10 NOTE — PROGRESS NOTES
[facility] - [facility]  Neurology  Progress Note    Patient Name: Shannan Reza  MRN: [id number]  Admission Date: 8/7/2024  Hospital Length of Stay: 3 days  Code Status: Full Code   Attending Provider: Chance Flores MD  Primary Care Physician: Kiana Marie MD   Principal Problem:<principal problem not specified>      Subjective:     Interval History:   Neurologically unchanged, CT head significant for stroke.     Current Neurological Medications:     Current Facility-Administered Medications   Medication Dose Route Frequency Provider Last Rate Last Admin    0.9%  NaCl infusion   Intravenous Continuous Shantell Vail NP   Held at 08/08/24 1700    aspirin chewable tablet 81 mg  81 mg Per NG tube Daily Shantell Vail NP   81 mg at 08/10/24 0813    atorvastatin tablet 40 mg  40 mg Oral Daily Khalida Orozco MD   40 mg at 08/10/24 0813    bisacodyL suppository 10 mg  10 mg Rectal Daily PRN Shantell Vail NP        dextrose 10% bolus 125 mL 125 mL  12.5 g Intravenous PRN Carol Rehman MD        dextrose 10% bolus 250 mL 250 mL  25 g Intravenous PRN Carol Rehman MD        fentaNYL 2500 mcg in 0.9% sodium chloride 250 mL infusion premix (titrating)  0-250 mcg/hr Intravenous Continuous Vladimir Acevedo MD 2.5 mL/hr at 08/09/24 1650 25 mcg/hr at 08/09/24 1650    glucagon (human recombinant) injection 1 mg  1 mg Intramuscular PRN Carol Rehman MD        insulin aspart U-100 injection 0-5 Units  0-5 Units Subcutaneous Q6H PRN Carol Rehman MD   2 Units at 08/08/24 1403    levETIRAcetam (Keppra) 500 mg in D5W 100 mL IVPB (MB+)  500 mg Intravenous BID Vickey Singh MD   Stopped at 08/10/24 0855    mupirocin 2 % ointment   Nasal BID Cody Morrissey Jr., MD, FCCP   Given at 08/10/24 0813    NORepinephrine 32 mg in D5W 250 mL infusion  0-3 mcg/kg/min Intravenous Continuous Gary Santa DO   Stopped at 08/09/24 0721    sodium chloride 0.9% flush 10 mL  10 mL Intravenous PRN  Carol Rehman MD           Review of Systems   Unable to perform ROS: Acuity of condition     Objective:     Vital Signs (Most Recent):  Temp: 97.9 °F (36.6 °C) (08/10/24 0800)  Pulse: 101 (08/10/24 1100)  Resp: (!) 26 (08/10/24 1100)  BP: (!) 94/58 (08/10/24 1100)  SpO2: 97 % (08/10/24 1100) Vital Signs (24h Range):  Temp:  [97.5 °F (36.4 °C)-98.3 °F (36.8 °C)] 97.9 °F (36.6 °C)  Pulse:  [] 101  Resp:  [18-38] 26  SpO2:  [90 %-98 %] 97 %  BP: ()/(35-92) 94/58  Arterial Line BP: ()/(56-88) 111/86     Weight: 65 kg (143 lb 4.8 oz)  Body mass index is 26.21 kg/m².     Physical Exam        Vitals and nursing note reviewed.   Constitutional:       General: She is not in acute distress.     Interventions: She is intubated.   HENT:      Head: Normocephalic.   Eyes:      Pupils: Pupils are equal, round, and reactive to light.   Pulmonary:      Effort: She is intubated.   Musculoskeletal:      Right lower leg: No edema.      Left lower leg: No edema.   Skin:     General: Skin is warm and dry.      Capillary Refill: Capillary refill takes less than 2 seconds.   Neurological:      Comments:      Limited PE  Does not participate in exam  Appears to withdraw from painful stim BUE, BLE  Contracted LUE (chronic LUE, LLE weakness from previous stroke)  ? Appears to open her eyes but does not track  +cough, gag  PERR     NEUROLOGICAL EXAMINATION:      CRANIAL NERVES      CN III, IV, VI   Pupils are equal, round, and reactive to light.    Significant Labs:   Recent Lab Results         08/10/24  1210   08/10/24  0750   08/10/24  0308        Immature Platelet Fraction     6.9       Albumin/Globulin Ratio     1.2       Albumin     2.9       ALP     75       Allens Test   Yes         ALT     25       Anion Gap     13.0       AST     80       Baso #     0.03       Basophil %     0.2       BILIRUBIN TOTAL     0.5       BUN     23.4       BUN/CREAT RATIO     30       Calcium     8.6       Calcium Level Ionized   1.17          Chloride     109       CO2     16       Creatinine     0.79       Drawn by   MA RRT         eGFR     >60       Eos #     0.00       Eos %     0.0       FIO2, Blood gas   45         Flow   50         Globulin, Total     2.5       Glucose     125       Hematocrit     31.7       Hemoglobin     10.9       Immature Grans (Abs)     0.25       Immature Granulocytes     1.4       Lymph #     1.41       LYMPH %     7.8       MCH     29.6       MCHC     34.4       MCV     86.1       Mech Vt   450         MODE   AC         Mono #     0.89       Mono %     4.9       MPV     11.2       Neut #     15.50       Neut %     85.7       nRBC     0.2       Oxygen Device, Blood gas   Ventilator         PEEP   6.0         Platelet Count     163       Base Excess, Blood gas   -1.20         POC HCO3   20.1         POC PCO2   24.0         POC PH   7.530         POC PO2   51.0         POCT Glucose 137           Potassium     4.4       Potassium, Blood Gas   3.9         PROTEIN TOTAL     5.4       RBC     3.68       RDW     14.6       Mech RR   14         Sample site   Right Radial Artery         Sample Type   Arterial Blood         sO2, Blood gas   90.0         Sodium     138       Sodium, Blood Gas   135         TOC2, Blood gas   20.8         WBC     18.08               Significant Imaging: I have reviewed all pertinent imaging results/findings within the past 24 hours.  Assessment and Plan:     Acute ischemic right EMANUEL stroke  Stroke   - presented with unresponsive and worsening left sided weakness  - Stroke RF: tobacco abuse, history of stroke  - Intervention: none  - Etiology: embolic?    Stroke workup:  -CTh: Evaluation limited by streak artifact. No definite acute abnormality identified.   -ECHO: EF: 20-25%, LA normal, BS negative  -CUS: negative  -LDL: 33  -A1c: 5.7  -TSH: 0.610  -CT perfusion: Perfusion defect in the right EMANUEL territory with mismatch volume of 21 mL.   -EEG Impression: abnormal EEG due to moderate generalized  slowing. Generalized slowing can be seen with generalized cerebral dysfunction as seen in metabolic, toxic, infectious, or multifocal structural abnormalities.   -repeat CT head 8/10:  Areas of acute infarcts seen in the right and left frontal region     Plan:  - continue stroke workup  - Aspirin 325 mgdaily  - Atorvastatin 40mg daily  - -180  - Keppra 500 mg BID  - will need 30 day monitor upon discharge   - stroke appears to be embolic     Further recommendations to follow from MD       VTE Risk Mitigation (From admission, onward)           Ordered     Reason for No Pharmacological VTE Prophylaxis  Once        Question:  Reasons:  Answer:  Risk of Bleeding    08/08/24 1611     IP VTE HIGH RISK PATIENT  Once         08/08/24 1611     Place sequential compression device  Until discontinued         08/08/24 1611     Place sequential compression device  Until discontinued         08/08/24 0052                    Pat Baird NP  Neurology  [facility]

## 2024-08-10 NOTE — SUBJECTIVE & OBJECTIVE
Subjective:     Interval History:   Neurologically unchanged, CT head significant for right EMANUEL stroke.     Current Neurological Medications:     Current Facility-Administered Medications   Medication Dose Route Frequency Provider Last Rate Last Admin    0.9%  NaCl infusion   Intravenous Continuous Shantell Vail NP   Held at 08/08/24 1700    aspirin chewable tablet 81 mg  81 mg Per NG tube Daily Shantell Vail NP   81 mg at 08/10/24 0813    atorvastatin tablet 40 mg  40 mg Oral Daily Khalida Orozco MD   40 mg at 08/10/24 0813    bisacodyL suppository 10 mg  10 mg Rectal Daily PRN Shantell Vail NP        dextrose 10% bolus 125 mL 125 mL  12.5 g Intravenous PRN Carol Rehman MD        dextrose 10% bolus 250 mL 250 mL  25 g Intravenous PRN Carol Rehman MD        fentaNYL 2500 mcg in 0.9% sodium chloride 250 mL infusion premix (titrating)  0-250 mcg/hr Intravenous Continuous Vladimir Acevedo MD 2.5 mL/hr at 08/09/24 1650 25 mcg/hr at 08/09/24 1650    glucagon (human recombinant) injection 1 mg  1 mg Intramuscular PRN Carol Rehman MD        insulin aspart U-100 injection 0-5 Units  0-5 Units Subcutaneous Q6H PRN Carol Rehman MD   2 Units at 08/08/24 1403    levETIRAcetam (Keppra) 500 mg in D5W 100 mL IVPB (MB+)  500 mg Intravenous BID Vickey Singh MD   Stopped at 08/10/24 0855    mupirocin 2 % ointment   Nasal BID Cody Morrissey Jr., MD, FCCP   Given at 08/10/24 0813    NORepinephrine 32 mg in D5W 250 mL infusion  0-3 mcg/kg/min Intravenous Continuous Gary Santa DO   Stopped at 08/09/24 0721    sodium chloride 0.9% flush 10 mL  10 mL Intravenous PRN Carol Rehman MD           Review of Systems   Unable to perform ROS: Acuity of condition     Objective:     Vital Signs (Most Recent):  Temp: 97.9 °F (36.6 °C) (08/10/24 0800)  Pulse: 101 (08/10/24 1100)  Resp: (!) 26 (08/10/24 1100)  BP: (!) 94/58 (08/10/24 1100)  SpO2: 97 % (08/10/24 1100) Vital Signs (24h Range):  Temp:  [97.5 °F  (36.4 °C)-98.3 °F (36.8 °C)] 97.9 °F (36.6 °C)  Pulse:  [] 101  Resp:  [18-38] 26  SpO2:  [90 %-98 %] 97 %  BP: ()/(35-92) 94/58  Arterial Line BP: ()/(56-88) 111/86     Weight: 65 kg (143 lb 4.8 oz)  Body mass index is 26.21 kg/m².     Physical Exam        Vitals and nursing note reviewed.   Constitutional:       General: She is not in acute distress.     Interventions: She is intubated.   HENT:      Head: Normocephalic.   Eyes:      Pupils: Pupils are equal, round, and reactive to light.   Pulmonary:      Effort: She is intubated.   Musculoskeletal:      Right lower leg: No edema.      Left lower leg: No edema.   Skin:     General: Skin is warm and dry.      Capillary Refill: Capillary refill takes less than 2 seconds.   Neurological:      Comments:      Limited PE  Does not participate in exam  Appears to withdraw from painful stim BUE, BLE  Contracted LUE (chronic LUE, LLE weakness from previous stroke)  ? Appears to open her eyes but does not track  +cough, gag  PERR     NEUROLOGICAL EXAMINATION:      CRANIAL NERVES      CN III, IV, VI   Pupils are equal, round, and reactive to light.    Significant Labs:   Recent Lab Results         08/10/24  1210   08/10/24  0750   08/10/24  0308        Immature Platelet Fraction     6.9       Albumin/Globulin Ratio     1.2       Albumin     2.9       ALP     75       Allens Test   Yes         ALT     25       Anion Gap     13.0       AST     80       Baso #     0.03       Basophil %     0.2       BILIRUBIN TOTAL     0.5       BUN     23.4       BUN/CREAT RATIO     30       Calcium     8.6       Calcium Level Ionized   1.17         Chloride     109       CO2     16       Creatinine     0.79       Drawn by   MA RRT         eGFR     >60       Eos #     0.00       Eos %     0.0       FIO2, Blood gas   45         Flow   50         Globulin, Total     2.5       Glucose     125       Hematocrit     31.7       Hemoglobin     10.9       Immature Grans (Abs)      0.25       Immature Granulocytes     1.4       Lymph #     1.41       LYMPH %     7.8       MCH     29.6       MCHC     34.4       MCV     86.1       Mech Vt   450         MODE   AC         Mono #     0.89       Mono %     4.9       MPV     11.2       Neut #     15.50       Neut %     85.7       nRBC     0.2       Oxygen Device, Blood gas   Ventilator         PEEP   6.0         Platelet Count     163       Base Excess, Blood gas   -1.20         POC HCO3   20.1         POC PCO2   24.0         POC PH   7.530         POC PO2   51.0         POCT Glucose 137           Potassium     4.4       Potassium, Blood Gas   3.9         PROTEIN TOTAL     5.4       RBC     3.68       RDW     14.6       Mech RR   14         Sample site   Right Radial Artery         Sample Type   Arterial Blood         sO2, Blood gas   90.0         Sodium     138       Sodium, Blood Gas   135         TOC2, Blood gas   20.8         WBC     18.08               Significant Imaging: I have reviewed all pertinent imaging results/findings within the past 24 hours.

## 2024-08-10 NOTE — PLAN OF CARE
Problem: Physical Therapy  Goal: Physical Therapy Goal  Description: Goals to be met by: 9/10/2024     Patient will increase functional independence with mobility by performin. Supine to sit with MInimal Assistance  2. Sit to supine with MInimal Assistance  3. Sitting at edge of bed x10 minutes with Minimal Assistance  4. Pt to follow 50% of commands.       Outcome: Progressing

## 2024-08-10 NOTE — PT/OT/SLP EVAL
Physical Therapy Evaluation    Patient Name:  Shannan Reza   MRN:  [id number]    Recommendations:     Discharge therapy intensity: Moderate Intensity Therapy   Discharge Equipment Recommendations:  to be determined by next level of care  Barriers to discharge: Impaired mobility and Ongoing medical needs    Assessment:     Shannan Reza is a 73 y.o. female admitted with a medical diagnosis of suspected CVA vs Seizures, lactic acidosis, hypokalemia, and leukocytosis. Pt with Hx of HTN and CVA with residual left sided weakness. She presents with the following impairments/functional limitations: weakness, decreased upper extremity function, decreased ROM, impaired balance, impaired functional mobility, abnormal tone, impaired self care skills.    Pt intubated and on ventilator upon entry. Pt very lethargic. Pt unable to follow commands. Pt able to respond to noxious stimulus on L big toe; not on R. Pt with spontaneous movements of LLE. Increased tone noted to CECILLE and LUE. Pt required total assistance for long sitting. Did not mobilize further today. Per chart- patient used power chair and was able to transfer to chair independently. Recommending moderate intensity therapy at this time.     Rehab Prognosis: Good; patient would benefit from acute skilled PT services to address these deficits and reach maximum level of function.    Recent Surgery: * No surgery found *      Plan:     During this hospitalization, patient would benefit from acute PT services 5 x/week to address the identified rehab impairments via therapeutic activities, therapeutic exercises, gait training, neuromuscular re-education and progress toward the following goals:    Plan of Care Expires:  09/10/24    Subjective     Chief Complaint: none stated   Patient/Family Comments/goals: Unsure  Pain/Comfort:   Unable to verbalize     Patients cultural, spiritual, Restorationist conflicts given the current situation: no    Living Environment:  Per chart - Pt  was living with her son. Pt used a power chair and transferred from bed to chair by herself.   Prior to admission pt was able to transfer from bed to chair by herself..  Equipment used at home: power chair, wheelchair, walker  .  DME owned (not currently used): none.  Unsure of who pt will have assistance from upon discharge.    Objective:     Communicated with RN prior to session.  Patient found HOB elevated with blood pressure cuff, NG tube, ventilator, peripheral IV, pressure relief boots, SCD, telemetry, pulse ox (continuous), armstrong catheter  upon PT entry to room.    General Precautions: Standard,  (-180)  Orthopedic Precautions:N/A   Braces: N/A  Respiratory Status: Ventilator Volume A/C; 6 PEEP, 50 peek flow  Blood Pressure: 113/70      Exams:  RLE : increased tone at hip and knee  LLE: increased tone at hip and knee   LUE: increased tone  RUE: no tone noted   Pt responded to noxious stimulus on L great toe; did not respond on R  Spontaneous movements noted of LLE      Functional Mobility:  Bed mobility: Long sitting - total assistance - 2 mins       AM-PAC 6 CLICK MOBILITY  Total Score:        Treatment & Education:      Patient provided with verbal education education regarding PT role/goals/POC and safety awareness.  Additional teaching is warranted.     Patient left HOB elevated with all lines intact, call button in reach, wedge under L side, and pressure relief boots.    GOALS:   Multidisciplinary Problems       Physical Therapy Goals          Problem: Physical Therapy    Goal Priority Disciplines Outcome Goal Variances Interventions   Physical Therapy Goal     PT, PT/OT Progressing     Description: Goals to be met by: 9/10/2024     Patient will increase functional independence with mobility by performin. Supine to sit with MInimal Assistance  2. Sit to supine with MInimal Assistance  3. Sitting at edge of bed x10 minutes with Minimal Assistance  4. Pt to follow 50% of commands.                             History:     Past Medical History:   Diagnosis Date    Carotid-cavernous fistula     Cerebrovascular accident (CVA) due to thrombosis of right middle cerebral artery 6/26/2018    Essential hypertension 6/27/2018    Hemiparesis affecting left side as late effect of cerebrovascular accident     Hx of tobacco use, presenting hazards to health        Past Surgical History:   Procedure Laterality Date    AUGMENTATION OF BREAST      BRAIN SURGERY         Time Tracking:     PT Received On: 08/10/24  PT Start Time: 0843     PT Stop Time: 0901  PT Total Time (min): 18 min     Billable Minutes: Evaluation Mod      08/10/2024

## 2024-08-10 NOTE — PLAN OF CARE
Problem: Adult Inpatient Plan of Care  Goal: Plan of Care Review  Outcome: Progressing  Goal: Patient-Specific Goal (Individualized)  Outcome: Progressing  Goal: Absence of Hospital-Acquired Illness or Injury  Outcome: Progressing  Goal: Optimal Comfort and Wellbeing  Outcome: Progressing  Goal: Readiness for Transition of Care  Outcome: Progressing     Problem: Infection  Goal: Absence of Infection Signs and Symptoms  Outcome: Progressing     Problem: Skin Injury Risk Increased  Goal: Skin Health and Integrity  Outcome: Progressing     Problem: Stroke, Ischemic (Includes Transient Ischemic Attack)  Goal: Optimal Coping  Outcome: Progressing  Goal: Effective Bowel Elimination  Outcome: Progressing  Goal: Optimal Cerebral Tissue Perfusion  Outcome: Progressing  Goal: Optimal Cognitive Function  Outcome: Progressing  Goal: Improved Communication Skills  Outcome: Progressing  Goal: Optimal Functional Ability  Outcome: Progressing  Goal: Optimal Nutrition Intake  Outcome: Progressing  Goal: Effective Oxygenation and Ventilation  Outcome: Progressing  Goal: Improved Sensorimotor Function  Outcome: Progressing  Goal: Safe and Effective Swallow  Outcome: Progressing  Goal: Effective Urinary Elimination  Outcome: Progressing     Problem: Coping Ineffective  Goal: Effective Coping  Outcome: Progressing

## 2024-08-10 NOTE — NURSING
Nurses Note -- 4 Eyes      8/10/2024   2:58 AM      Skin assessed during: Daily Assessment      [x] No Altered Skin Integrity Present    [x]Prevention Measures Documented      [] Yes- Altered Skin Integrity Present or Discovered   [] LDA Added if Not in Epic (Describe Wound)   [] New Altered Skin Integrity was Present on Admit and Documented in LDA   [] Wound Image Taken    Wound Care Consulted? No    Attending Nurse:  Deyanira Rodriguez RN/Staff Member:   RENÉE Perez

## 2024-08-10 NOTE — ASSESSMENT & PLAN NOTE
Stroke   - presented with unresponsive and worsening left sided weakness  - Stroke RF: tobacco abuse, history of stroke  - Intervention: none  - Etiology: embolic?    Stroke workup:  -CTh: Evaluation limited by streak artifact. No definite acute abnormality identified.   -ECHO: EF: 20-25%, LA normal, BS negative  -CUS: negative  -LDL: 33  -A1c: 5.7  -TSH: 0.610  -CT perfusion: Perfusion defect in the right EMANUEL territory with mismatch volume of 21 mL.   -EEG Impression: abnormal EEG due to moderate generalized slowing. Generalized slowing can be seen with generalized cerebral dysfunction as seen in metabolic, toxic, infectious, or multifocal structural abnormalities.   -repeat CT head 8/10:  Areas of acute infarcts seen in the right and left frontal region     Plan:  - continue stroke workup  - Aspirin 325 mgdaily  - Atorvastatin 40mg daily  - -180  - Keppra 500 mg BID  - will need 30 day monitor upon discharge   - stroke appears to be embolic

## 2024-08-10 NOTE — PROGRESS NOTES
[facility] - [facility]  Pulmonary Critical Care Note    Patient Name: Shannan Reza  MRN: [id number]  Admission Date: 8/7/2024  Hospital Length of Stay: 3 days  Code Status: Full Code  Attending Provider: Chance Flores MD  Primary Care Provider: Kiana Marie MD     Subjective:     HPI:   This is a 73-year-old female with past medical history of hypertension and to strokes in past presenting from outside facility for suspected CVA and seizure-like activity.  Family member present at bedside and states that patient was in normal state of health until yesterday evening.  States she started to feel weak and lethargic and eventually went unresponsive.  Had presentation like this previously when she had stroke.  CT head at outside facility did not show any acute infarct.  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 She was intubated for airway protection and loaded with Keppra.  She was transferred to [facility] for further care.  Repeat imaging was ordered.  Patient continues to be intubated and sedated on propofol. Requiring increased amounts of Levophed and started on vasopressin and stress dose steroids.  Admitted to ICU for close monitoring.    24 Hour Interval History:  NAEON. Remains intubated and mechanically ventilated. Pressors off. Opens eyes with stimulation. Right pupil fixed. Withdraws in upper extremities. Pending repeat CT head this AM.     Past Medical History:   Diagnosis Date    Carotid-cavernous fistula     Cerebrovascular accident (CVA) due to thrombosis of right middle cerebral artery 6/26/2018    Essential hypertension 6/27/2018    Hemiparesis affecting left side as late effect of cerebrovascular accident     Hx of tobacco use, presenting hazards to health        Past Surgical History:   Procedure Laterality Date    AUGMENTATION OF BREAST      BRAIN SURGERY         Social History     Socioeconomic History    Marital  status: Unknown   Tobacco Use    Smoking status: Former     Current packs/day: 0.00     Average packs/day: 0.5 packs/day for 25.0 years (12.5 ttl pk-yrs)     Types: Cigarettes     Start date: 1993     Quit date: 2018     Years since quittin.1    Smokeless tobacco: Former   Substance and Sexual Activity    Alcohol use: Not Currently    Drug use: No    Sexual activity: Never   Social History Narrative    ** Merged History Encounter **          Social Determinants of Health     Financial Resource Strain: Patient Declined (2024)    Overall Financial Resource Strain (CARDIA)     Difficulty of Paying Living Expenses: Patient declined   Food Insecurity: Unknown (2024)    Hunger Vital Sign     Worried About Running Out of Food in the Last Year: Patient declined     Ran Out of Food in the Last Year: Never true   Transportation Needs: Patient Declined (2024)    TRANSPORTATION NEEDS     Transportation : Patient declined   Housing Stability: Patient Declined (2024)    Housing Stability Vital Sign     Unable to Pay for Housing in the Last Year: Patient declined     Homeless in the Last Year: Patient declined           Current Outpatient Medications   Medication Instructions    amLODIPine (NORVASC) 5 mg, Oral, Daily    aspirin (ECOTRIN) 81 mg, Oral, Daily    folic acid (FOLVITE) 1 mg, Oral, Daily    hydrOXYzine HCL (ATARAX) 25 MG tablet TAKE 1 TABLET BY MOUTH THREE TIMES DAILY AS NEEDED FOR ITCHING OR ANXIETY    multivitamin Tab 1 tablet, Oral, Daily    rosuvastatin (CRESTOR) 40 mg, Oral, Nightly       Current Inpatient Medications   aspirin  81 mg Per NG tube Daily    levETIRAcetam (Keppra) IV (PEDS and ADULTS)  500 mg Intravenous BID    mupirocin   Nasal BID       Current Intravenous Infusions   0.9% NaCl   Intravenous Continuous   Held at 24 1700    fentanyl  0-250 mcg/hr Intravenous Continuous 2.5 mL/hr at 24 1650 25 mcg/hr at 24 1650    NORepinephrine bitartrate-D5W  0-3  mcg/kg/min Intravenous Continuous   Stopped at 08/09/24 0721         Review of Systems   Unable to perform ROS: Intubated          Objective:       Intake/Output Summary (Last 24 hours) at 8/10/2024 0529  Last data filed at 8/10/2024 0224  Gross per 24 hour   Intake 281.22 ml   Output 600 ml   Net -318.78 ml         Vital Signs (Most Recent):  Temp: 98 °F (36.7 °C) (08/10/24 0400)  Pulse: 108 (08/10/24 0500)  Resp: (!) 29 (08/10/24 0500)  BP: 107/73 (08/10/24 0500)  SpO2: 95 % (08/10/24 0500)  Body mass index is 26.21 kg/m².  Weight: 65 kg (143 lb 4.8 oz) Vital Signs (24h Range):  Temp:  [97.5 °F (36.4 °C)-98.3 °F (36.8 °C)] 98 °F (36.7 °C)  Pulse:  [] 108  Resp:  [15-38] 29  SpO2:  [90 %-98 %] 95 %  BP: ()/(35-92) 107/73  Arterial Line BP: ()/() 111/86     Physical Exam  Constitutional:       Appearance: She is ill-appearing.      Comments: Intubated and sedated.   HENT:      Head: Normocephalic.   Eyes:      Comments: Pupils are equal but the right pupil is fixed   Cardiovascular:      Rate and Rhythm: Normal rate and regular rhythm.   Pulmonary:      Comments: Intubated and mechanically ventilated. Mechanical breath sounds.  Abdominal:      Palpations: Abdomen is soft.   Neurological:      Comments: Opens eyes with stimulation. Withdraws to pain upper extremities. +corneal, cough, gag reflex.            Lines/Drains/Airways       Drain  Duration                  NG/OG Tube 08/06/24 0000 Center mouth 4 days         Urethral Catheter 08/07/24 2300 2 days              Airway  Duration                  Airway - Non-Surgical 08/07/24 2147 Endotracheal Tube 2 days              Peripheral Intravenous Line  Duration                  Peripheral IV - Single Lumen 08/09/24 1511 20 G 1 in Anterior;Left;Lateral Wrist <1 day         Peripheral IV - Single Lumen 08/09/24 1511 20 G 1 in No Left;Anterior Antecubital <1 day                    Significant Labs:    Lab Results   Component Value Date    WBC  18.08 (H) 08/10/2024    HGB 10.9 (L) 08/10/2024    HCT 31.7 (L) 08/10/2024    MCV 86.1 08/10/2024     08/10/2024           BMP  Lab Results   Component Value Date     08/10/2024    K 4.4 08/10/2024    CO2 16 (L) 08/10/2024    BUN 23.4 (H) 08/10/2024    CREATININE 0.79 08/10/2024    CALCIUM 8.6 08/10/2024    AGAP 13.0 08/10/2024    ESTGFRAFRICA >60.0 07/07/2022    EGFRNONAA >60.0 07/07/2022         ABG  Recent Labs   Lab 08/09/24  0542   PH 7.520*   PO2 60.0*   PCO2 26.0*   HCO3 21.2*   POCBASEDEF -0.50       Mechanical Ventilation Support:  Vent Mode: A/C (08/10/24 0245)  Ventilator Initiated: Yes (08/07/24 2147)  Set Rate: 14 BPM (08/10/24 0245)  Vt Set: 450 mL (08/10/24 0245)  PEEP/CPAP: 6 cmH20 (08/10/24 0245)  Oxygen Concentration (%): 45 (08/10/24 0400)  Peak Airway Pressure: 28 cmH20 (08/10/24 0245)  Total Ve: 9.3 L/m (08/10/24 0245)  F/VT Ratio<105 (RSBI): (!) 52.53 (08/10/24 0245)      Significant Imaging:  I have reviewed the pertinent imaging within the past 24 hours.  Chest x-ray with worsening right-sided infiltrate and coiling of the right IJ central line      Assessment/Plan:     Assessment  Suspected CVA versus seizures  Lactic acidosis   Hypokalemia - resolved  Leukocytosis - stable  Hx HTN, CVA with residual left sided weakness       Plan  Appreciate neurology recommendations.  Keppra 500 mg b.i.d., ASA, Lipitor 40mg  Pending repeat CT head this AM.  Neurology recommendations: hold off DSA for now; can consider DSA in delayed manner if she makes good recovery.  Blood pressure goals of systolic 100-180  Continue mechanical ventilation  Blood cultures x 2: NG at 24h  Appreciate cardiology recommendations regarding new onset systolic dysfunction.  Continue tube feeds if not going for DSA      DVT Prophylaxis: SCD  GI Prophylaxis: na     35 minutes of critical care was time spent personally by me on the following activities: development of treatment plan with patient or surrogate and  bedside caregivers, discussions with consultants, evaluation of patient's response to treatment, examination of patient, ordering and performing treatments and interventions, ordering and review of laboratory studies, ordering and review of radiographic studies, pulse oximetry, re-evaluation of patient's condition.  This critical care time did not overlap with that of any other provider or involve time for any procedures.     Khalida Orozco MD  Pulmonary Critical Care Medicine  [facility] - [facility]  DOS: 08/10/2024

## 2024-08-11 LAB
ALBUMIN SERPL-MCNC: 2.7 G/DL (ref 3.4–4.8)
ALBUMIN/GLOB SERPL: 1.1 RATIO (ref 1.1–2)
ALLENS TEST BLOOD GAS (OHS): YES
ALP SERPL-CCNC: 82 UNIT/L (ref 40–150)
ALT SERPL-CCNC: 32 UNIT/L (ref 0–55)
ANION GAP SERPL CALC-SCNC: 11 MEQ/L
AST SERPL-CCNC: 65 UNIT/L (ref 5–34)
BASE EXCESS BLD CALC-SCNC: -0.1 MMOL/L
BASOPHILS # BLD AUTO: 0.01 X10(3)/MCL
BASOPHILS NFR BLD AUTO: 0.1 %
BILIRUB SERPL-MCNC: 0.6 MG/DL
BLOOD GAS SAMPLE TYPE (OHS): ABNORMAL
BUN SERPL-MCNC: 19.7 MG/DL (ref 9.8–20.1)
CA-I BLD-SCNC: 1.19 MMOL/L (ref 1.12–1.23)
CALCIUM SERPL-MCNC: 8.6 MG/DL (ref 8.4–10.2)
CHLORIDE SERPL-SCNC: 115 MMOL/L (ref 98–107)
CO2 BLDA-SCNC: 21.3 MMOL/L
CO2 SERPL-SCNC: 19 MMOL/L (ref 23–31)
CREAT SERPL-MCNC: 0.75 MG/DL (ref 0.55–1.02)
CREAT/UREA NIT SERPL: 26
DRAWN BY BLOOD GAS (OHS): ABNORMAL
EOSINOPHIL # BLD AUTO: 0.01 X10(3)/MCL (ref 0–0.9)
EOSINOPHIL NFR BLD AUTO: 0.1 %
ERYTHROCYTE [DISTWIDTH] IN BLOOD BY AUTOMATED COUNT: 14.7 % (ref 11.5–17)
GFR SERPLBLD CREATININE-BSD FMLA CKD-EPI: >60 ML/MIN/1.73/M2
GLOBULIN SER-MCNC: 2.5 GM/DL (ref 2.4–3.5)
GLUCOSE SERPL-MCNC: 124 MG/DL (ref 82–115)
HCO3 BLDA-SCNC: 20.6 MMOL/L (ref 22–26)
HCT VFR BLD AUTO: 31.3 % (ref 37–47)
HGB BLD-MCNC: 10.7 G/DL (ref 12–16)
IMM GRANULOCYTES # BLD AUTO: 0.16 X10(3)/MCL (ref 0–0.04)
IMM GRANULOCYTES NFR BLD AUTO: 1.1 %
INHALED O2 CONCENTRATION: 45 %
LYMPHOCYTES # BLD AUTO: 1.6 X10(3)/MCL (ref 0.6–4.6)
LYMPHOCYTES NFR BLD AUTO: 11 %
MCH RBC QN AUTO: 29.3 PG (ref 27–31)
MCHC RBC AUTO-ENTMCNC: 34.2 G/DL (ref 33–36)
MCV RBC AUTO: 85.8 FL (ref 80–94)
MECH RR (OHS): 14 B/MIN
MODE (OHS): AC
MONOCYTES # BLD AUTO: 0.77 X10(3)/MCL (ref 0.1–1.3)
MONOCYTES NFR BLD AUTO: 5.3 %
NEUTROPHILS # BLD AUTO: 11.99 X10(3)/MCL (ref 2.1–9.2)
NEUTROPHILS NFR BLD AUTO: 82.4 %
NRBC BLD AUTO-RTO: 1.4 %
PCO2 BLDA: 23 MMHG (ref 35–45)
PEEP RESPIRATORY: 6 CMH2O
PH BLDA: 7.56 [PH] (ref 7.35–7.45)
PLATELET # BLD AUTO: 188 X10(3)/MCL (ref 130–400)
PLATELETS.RETICULATED NFR BLD AUTO: 3.9 % (ref 0.9–11.2)
PMV BLD AUTO: 11.6 FL (ref 7.4–10.4)
PO2 BLDA: 57 MMHG (ref 80–100)
POTASSIUM BLOOD GAS (OHS): 3.3 MMOL/L (ref 3.5–5)
POTASSIUM SERPL-SCNC: 3.7 MMOL/L (ref 3.5–5.1)
PROT SERPL-MCNC: 5.2 GM/DL (ref 5.8–7.6)
RBC # BLD AUTO: 3.65 X10(6)/MCL (ref 4.2–5.4)
SAMPLE SITE BLOOD GAS (OHS): ABNORMAL
SAO2 % BLDA: 93 %
SODIUM BLOOD GAS (OHS): 141 MMOL/L (ref 137–145)
SODIUM SERPL-SCNC: 145 MMOL/L (ref 136–145)
SPONT+MECH VT ON VENT: 450 ML
WBC # BLD AUTO: 14.54 X10(3)/MCL (ref 4.5–11.5)

## 2024-08-11 PROCEDURE — 63600175 PHARM REV CODE 636 W HCPCS: Performed by: PSYCHIATRY & NEUROLOGY

## 2024-08-11 PROCEDURE — 94760 N-INVAS EAR/PLS OXIMETRY 1: CPT | Mod: XB

## 2024-08-11 PROCEDURE — 36600 WITHDRAWAL OF ARTERIAL BLOOD: CPT

## 2024-08-11 PROCEDURE — 36415 COLL VENOUS BLD VENIPUNCTURE: CPT

## 2024-08-11 PROCEDURE — 25000003 PHARM REV CODE 250

## 2024-08-11 PROCEDURE — 85025 COMPLETE CBC W/AUTO DIFF WBC: CPT

## 2024-08-11 PROCEDURE — 20000000 HC ICU ROOM

## 2024-08-11 PROCEDURE — 27100171 HC OXYGEN HIGH FLOW UP TO 24 HOURS

## 2024-08-11 PROCEDURE — 27200966 HC CLOSED SUCTION SYSTEM

## 2024-08-11 PROCEDURE — 25000003 PHARM REV CODE 250: Performed by: PSYCHIATRY & NEUROLOGY

## 2024-08-11 PROCEDURE — 99232 SBSQ HOSP IP/OBS MODERATE 35: CPT | Mod: FS,,, | Performed by: PSYCHIATRY & NEUROLOGY

## 2024-08-11 PROCEDURE — 25000003 PHARM REV CODE 250: Performed by: NURSE PRACTITIONER

## 2024-08-11 PROCEDURE — 94003 VENT MGMT INPAT SUBQ DAY: CPT

## 2024-08-11 PROCEDURE — 99900031 HC PATIENT EDUCATION (STAT)

## 2024-08-11 PROCEDURE — 80053 COMPREHEN METABOLIC PANEL: CPT

## 2024-08-11 PROCEDURE — 87077 CULTURE AEROBIC IDENTIFY: CPT | Performed by: STUDENT IN AN ORGANIZED HEALTH CARE EDUCATION/TRAINING PROGRAM

## 2024-08-11 PROCEDURE — 99900026 HC AIRWAY MAINTENANCE (STAT)

## 2024-08-11 PROCEDURE — 63600175 PHARM REV CODE 636 W HCPCS

## 2024-08-11 PROCEDURE — 99900035 HC TECH TIME PER 15 MIN (STAT)

## 2024-08-11 PROCEDURE — 82803 BLOOD GASES ANY COMBINATION: CPT

## 2024-08-11 RX ORDER — NOREPINEPHRINE BITARTRATE/D5W 8 MG/250ML
0-3 PLASTIC BAG, INJECTION (ML) INTRAVENOUS CONTINUOUS
Status: DISCONTINUED | OUTPATIENT
Start: 2024-08-11 | End: 2024-08-13

## 2024-08-11 RX ORDER — FENTANYL CITRATE 50 UG/ML
25 INJECTION, SOLUTION INTRAMUSCULAR; INTRAVENOUS
Status: COMPLETED | OUTPATIENT
Start: 2024-08-11 | End: 2024-08-12

## 2024-08-11 RX ORDER — DEXMEDETOMIDINE HYDROCHLORIDE 4 UG/ML
0-1.4 INJECTION, SOLUTION INTRAVENOUS CONTINUOUS
Status: DISCONTINUED | OUTPATIENT
Start: 2024-08-11 | End: 2024-08-13

## 2024-08-11 RX ADMIN — LEVETIRACETAM 500 MG: 100 INJECTION, SOLUTION INTRAVENOUS at 08:08

## 2024-08-11 RX ADMIN — ASPIRIN 81 MG CHEWABLE TABLET 81 MG: 81 TABLET CHEWABLE at 08:08

## 2024-08-11 RX ADMIN — MUPIROCIN: 20 OINTMENT TOPICAL at 08:08

## 2024-08-11 RX ADMIN — NOREPINEPHRINE BITARTRATE 0.02 MCG/KG/MIN: 8 INJECTION, SOLUTION INTRAVENOUS at 05:08

## 2024-08-11 RX ADMIN — FENTANYL CITRATE 25 MCG: 50 INJECTION, SOLUTION INTRAMUSCULAR; INTRAVENOUS at 09:08

## 2024-08-11 RX ADMIN — ATORVASTATIN CALCIUM 40 MG: 40 TABLET, FILM COATED ORAL at 08:08

## 2024-08-11 RX ADMIN — DEXMEDETOMIDINE HYDROCHLORIDE 0.6 MCG/KG/HR: 400 INJECTION INTRAVENOUS at 07:08

## 2024-08-11 NOTE — ASSESSMENT & PLAN NOTE
Stroke   - presented with unresponsive and worsening left sided weakness  - Stroke RF: tobacco abuse, history of stroke  - Intervention: none  - Etiology: embolic?    Stroke workup:  -CTh: Evaluation limited by streak artifact. No definite acute abnormality identified.   -ECHO: EF: 20-25%, LA normal, BS negative  -CUS: negative  -LDL: 33  -A1c: 5.7  -TSH: 0.610  -CT perfusion: Perfusion defect in the right EMANUEL territory with mismatch volume of 21 mL.   -EEG Impression: abnormal EEG due to moderate generalized slowing. Generalized slowing can be seen with generalized cerebral dysfunction as seen in metabolic, toxic, infectious, or multifocal structural abnormalities.   -repeat CT head 8/10:  Areas of acute infarcts seen in the right and left frontal region     Plan:  - continue stroke workup  - Aspirin 325 mgdaily  - Atorvastatin 40mg daily  - -180  - Keppra 500 mg BID  - will need 30 day monitor upon discharge   - stroke appears to be embolic   - Palliative care following

## 2024-08-11 NOTE — PROGRESS NOTES
[facility]  Neurology  Progress Note    Patient Name: Shannan Reza  MRN: [id number]  Admission Date: 8/7/2024  Hospital Length of Stay: 4 days  Code Status: Full Code   Attending Provider: Chance Flores MD  Primary Care Physician: Kiana Marie MD   Principal Problem:<principal problem not specified>      Subjective:     Interval History:   Nursing reports she is tachypneic this am off of sedation, overall exam unchanged.     Current Neurological Medications:     Current Facility-Administered Medications   Medication Dose Route Frequency Provider Last Rate Last Admin    0.9%  NaCl infusion   Intravenous Continuous Shantell Vail NP   Held at 08/08/24 1700    aspirin chewable tablet 81 mg  81 mg Per NG tube Daily Shantell Vail NP   81 mg at 08/11/24 0832    atorvastatin tablet 40 mg  40 mg Oral Daily Khalida Orozco MD   40 mg at 08/11/24 0832    bisacodyL suppository 10 mg  10 mg Rectal Daily PRN Shantell Vail NP        dexmedetomidine (PRECEDEX) 400mcg/100mL 0.9% NaCL infusion  0-1.4 mcg/kg/hr Intravenous Continuous Gary Santa DO        dextrose 10% bolus 125 mL 125 mL  12.5 g Intravenous PRN Carol Rehman MD        dextrose 10% bolus 250 mL 250 mL  25 g Intravenous PRN Carol Rehman MD        fentaNYL 2500 mcg in 0.9% sodium chloride 250 mL infusion premix (titrating)  0-250 mcg/hr Intravenous Continuous Vladimir Acevedo MD 2.5 mL/hr at 08/09/24 1650 25 mcg/hr at 08/09/24 1650    glucagon (human recombinant) injection 1 mg  1 mg Intramuscular PRN Carol Rehman MD        insulin aspart U-100 injection 0-5 Units  0-5 Units Subcutaneous Q6H PRN Carol Rehman MD   2 Units at 08/08/24 1403    levETIRAcetam (Keppra) 500 mg in D5W 100 mL IVPB (MB+)  500 mg Intravenous BID Vickey Singh MD   Stopped at 08/11/24 0903    mupirocin 2 % ointment   Nasal BID Cody Morrissey Jr., MD, FCCP   Given at 08/11/24 0832    NORepinephrine 32 mg in D5W 250 mL infusion   0-3 mcg/kg/min Intravenous Continuous Gary Santa DO   Stopped at 08/09/24 0721    sodium chloride 0.9% flush 10 mL  10 mL Intravenous Carol Ariza MD           Review of Systems   Unable to perform ROS: Acuity of condition     Objective:     Vital Signs (Most Recent):  Temp: 99.5 °F (37.5 °C) (08/11/24 0400)  Pulse: (!) 113 (08/11/24 0600)  Resp: (!) 30 (08/11/24 0600)  BP: 138/76 (08/11/24 0600)  SpO2: 100 % (08/11/24 0905) Vital Signs (24h Range):  Temp:  [99.5 °F (37.5 °C)-99.9 °F (37.7 °C)] 99.5 °F (37.5 °C)  Pulse:  [103-115] 113  Resp:  [23-31] 30  SpO2:  [92 %-100 %] 100 %  BP: ()/(60-86) 138/76     Weight: 65 kg (143 lb 4.8 oz)  Body mass index is 26.21 kg/m².     Physical Exam      Vitals and nursing note reviewed.   Constitutional:       General: She is not in acute distress.     Interventions: She is intubated.   HENT:      Head: Normocephalic.   Eyes:      Pupils: Pupils are equal, round, and reactive to light.   Pulmonary:      Effort: She is intubated. No sedation  Musculoskeletal:      Right lower leg: No edema.      Left lower leg: No edema.   Skin:     General: Skin is warm and dry.      Capillary Refill: Capillary refill takes less than 2 seconds.   Neurological:      Comments:      Limited PE  Does not participate in exam  Withdraws painful stim LUE, LLE, RUE?  ? No response RLE  Contracted LUE (chronic LUE, LLE weakness from previous stroke)  ? Appears to open her eyes but does not track  +cough, gag  PERR     NEUROLOGICAL EXAMINATION:      CRANIAL NERVES      CN III, IV, VI   Pupils are equal, round, and reactive to light.    Significant Labs:   Recent Lab Results         08/11/24  0836   08/11/24  0525   08/11/24  0329        Immature Platelet Fraction     3.9       Albumin/Globulin Ratio     1.1       Albumin     2.7       ALP     82       Allens Test   Yes         ALT     32       Anion Gap     11.0       AST     65       Baso #     0.01       Basophil %     0.1        BILIRUBIN TOTAL     0.6       BUN     19.7       BUN/CREAT RATIO     26       Calcium     8.6       Calcium Level Ionized   1.19         Chloride     115       CO2     19       Creatinine     0.75       Drawn by   tdl rrt         eGFR     >60       Eos #     0.01       Eos %     0.1       FIO2, Blood gas   45         Globulin, Total     2.5       Glucose     124       GRAM STAIN Quality 2+  [P]            Rare Yeast  [P]            No bacteria seen  [P]           Hematocrit     31.3       Hemoglobin     10.7       Immature Grans (Abs)     0.16       Immature Granulocytes     1.1       Lymph #     1.60       LYMPH %     11.0       MCH     29.3       MCHC     34.2       MCV     85.8       Mech Vt   450         MODE   AC         Mono #     0.77       Mono %     5.3       MPV     11.6       Neut #     11.99       Neut %     82.4       nRBC     1.4       PEEP   6.0         Platelet Count     188       Base Excess, Blood gas   -0.10         POC HCO3   20.6         POC PCO2   23.0         POC PH   7.560         POC PO2   57.0         Potassium     3.7       Potassium, Blood Gas   3.3         PROTEIN TOTAL     5.2       RBC     3.65       RDW     14.7       Mech RR   14         Sample site   Right Radial Artery         Sample Type   Arterial Blood         sO2, Blood gas   93.0         Sodium     145       Sodium, Blood Gas   141         TOC2, Blood gas   21.3         WBC     14.54                [P] - Preliminary Result               Significant Imaging: I have reviewed all pertinent imaging results/findings within the past 24 hours.    Assessment and Plan:     Acute ischemic right EMANUEL stroke  Stroke   - presented with unresponsive and worsening left sided weakness  - Stroke RF: tobacco abuse, history of stroke  - Intervention: none  - Etiology: embolic?    Stroke workup:  -CTh: Evaluation limited by streak artifact. No definite acute abnormality identified.   -ECHO: EF: 20-25%, LA normal, BS negative  -CUS: negative  -LDL:  33  -A1c: 5.7  -TSH: 0.610  -CT perfusion: Perfusion defect in the right EMANUEL territory with mismatch volume of 21 mL.   -EEG Impression: abnormal EEG due to moderate generalized slowing. Generalized slowing can be seen with generalized cerebral dysfunction as seen in metabolic, toxic, infectious, or multifocal structural abnormalities.   -repeat CT head 8/10:  Areas of acute infarcts seen in the right and left frontal region     Plan:  - continue stroke workup  - Aspirin 325 mgdaily  - Atorvastatin 40mg daily  - -180  - Keppra 500 mg BID  - will need 30 day monitor upon discharge   - stroke appears to be embolic   - Palliative care following      Further recommendations to follow from MD     VTE Risk Mitigation (From admission, onward)           Ordered     Reason for No Pharmacological VTE Prophylaxis  Once        Question:  Reasons:  Answer:  Risk of Bleeding    08/08/24 1611     IP VTE HIGH RISK PATIENT  Once         08/08/24 1611     Place sequential compression device  Until discontinued         08/08/24 1611     Place sequential compression device  Until discontinued         08/08/24 0052                    Pat Baird NP  Neurology  [facility]

## 2024-08-11 NOTE — SUBJECTIVE & OBJECTIVE
Subjective:     Interval History:   Nursing reports she is tachypneic this am off of sedation, overall exam unchanged.     Current Neurological Medications:     Current Facility-Administered Medications   Medication Dose Route Frequency Provider Last Rate Last Admin    0.9%  NaCl infusion   Intravenous Continuous Shantell Vail NP   Held at 08/08/24 1700    aspirin chewable tablet 81 mg  81 mg Per NG tube Daily Shantell Vail NP   81 mg at 08/11/24 0832    atorvastatin tablet 40 mg  40 mg Oral Daily Khalida Orozco MD   40 mg at 08/11/24 0832    bisacodyL suppository 10 mg  10 mg Rectal Daily PRN Shantell Vail NP        dexmedetomidine (PRECEDEX) 400mcg/100mL 0.9% NaCL infusion  0-1.4 mcg/kg/hr Intravenous Continuous Gary Santa DO        dextrose 10% bolus 125 mL 125 mL  12.5 g Intravenous PRN Carol Rehman MD        dextrose 10% bolus 250 mL 250 mL  25 g Intravenous PRN Carol Rehman MD        fentaNYL 2500 mcg in 0.9% sodium chloride 250 mL infusion premix (titrating)  0-250 mcg/hr Intravenous Continuous Vladimir Acevedo MD 2.5 mL/hr at 08/09/24 1650 25 mcg/hr at 08/09/24 1650    glucagon (human recombinant) injection 1 mg  1 mg Intramuscular PRN Carol Rehman MD        insulin aspart U-100 injection 0-5 Units  0-5 Units Subcutaneous Q6H PRN Carol Rehman MD   2 Units at 08/08/24 1403    levETIRAcetam (Keppra) 500 mg in D5W 100 mL IVPB (MB+)  500 mg Intravenous BID Vickey Singh MD   Stopped at 08/11/24 0903    mupirocin 2 % ointment   Nasal BID Cody Morrissey Jr., MD, FCCP   Given at 08/11/24 0832    NORepinephrine 32 mg in D5W 250 mL infusion  0-3 mcg/kg/min Intravenous Continuous Gayr Santa DO   Stopped at 08/09/24 0721    sodium chloride 0.9% flush 10 mL  10 mL Intravenous PRN Carol Rehman MD           Review of Systems   Unable to perform ROS: Acuity of condition     Objective:     Vital Signs (Most Recent):  Temp: 99.5 °F (37.5 °C) (08/11/24 0400)  Pulse: (!) 113  (08/11/24 0600)  Resp: (!) 30 (08/11/24 0600)  BP: 138/76 (08/11/24 0600)  SpO2: 100 % (08/11/24 0905) Vital Signs (24h Range):  Temp:  [99.5 °F (37.5 °C)-99.9 °F (37.7 °C)] 99.5 °F (37.5 °C)  Pulse:  [103-115] 113  Resp:  [23-31] 30  SpO2:  [92 %-100 %] 100 %  BP: ()/(60-86) 138/76     Weight: 65 kg (143 lb 4.8 oz)  Body mass index is 26.21 kg/m².     Physical Exam      Vitals and nursing note reviewed.   Constitutional:       General: She is not in acute distress.     Interventions: She is intubated.   HENT:      Head: Normocephalic.   Eyes:      Pupils: Pupils are equal, round, and reactive to light.   Pulmonary:      Effort: She is intubated. No sedation  Musculoskeletal:      Right lower leg: No edema.      Left lower leg: No edema.   Skin:     General: Skin is warm and dry.      Capillary Refill: Capillary refill takes less than 2 seconds.   Neurological:      Comments:      Limited PE  Does not participate in exam  Withdraws painful stim LUE, LLE, RUE?  ? No response RLE  Contracted LUE (chronic LUE, LLE weakness from previous stroke)  ? Appears to open her eyes but does not track  +cough, gag  PERR     NEUROLOGICAL EXAMINATION:      CRANIAL NERVES      CN III, IV, VI   Pupils are equal, round, and reactive to light.    Significant Labs:   Recent Lab Results         08/11/24  0836   08/11/24  0525   08/11/24  0329        Immature Platelet Fraction     3.9       Albumin/Globulin Ratio     1.1       Albumin     2.7       ALP     82       Allens Test   Yes         ALT     32       Anion Gap     11.0       AST     65       Baso #     0.01       Basophil %     0.1       BILIRUBIN TOTAL     0.6       BUN     19.7       BUN/CREAT RATIO     26       Calcium     8.6       Calcium Level Ionized   1.19         Chloride     115       CO2     19       Creatinine     0.75       Drawn by   tdl rrt         eGFR     >60       Eos #     0.01       Eos %     0.1       FIO2, Blood gas   45         Globulin, Total     2.5        Glucose     124       GRAM STAIN Quality 2+  [P]            Rare Yeast  [P]            No bacteria seen  [P]           Hematocrit     31.3       Hemoglobin     10.7       Immature Grans (Abs)     0.16       Immature Granulocytes     1.1       Lymph #     1.60       LYMPH %     11.0       MCH     29.3       MCHC     34.2       MCV     85.8       Mech Vt   450         MODE   AC         Mono #     0.77       Mono %     5.3       MPV     11.6       Neut #     11.99       Neut %     82.4       nRBC     1.4       PEEP   6.0         Platelet Count     188       Base Excess, Blood gas   -0.10         POC HCO3   20.6         POC PCO2   23.0         POC PH   7.560         POC PO2   57.0         Potassium     3.7       Potassium, Blood Gas   3.3         PROTEIN TOTAL     5.2       RBC     3.65       RDW     14.7       Mech RR   14         Sample site   Right Radial Artery         Sample Type   Arterial Blood         sO2, Blood gas   93.0         Sodium     145       Sodium, Blood Gas   141         TOC2, Blood gas   21.3         WBC     14.54                [P] - Preliminary Result               Significant Imaging: I have reviewed all pertinent imaging results/findings within the past 24 hours.

## 2024-08-11 NOTE — PROGRESS NOTES
[facility]  Pulmonary Critical Care Note    Patient Name: Shannan Reza  MRN: [id number]  Admission Date: 8/7/2024  Hospital Length of Stay: 4 days  Code Status: Full Code  Attending Provider: Chance Flores MD  Primary Care Provider: Kiana Marie MD     Subjective:     HPI:   This is a 73-year-old female with past medical history of hypertension and to strokes in past presenting from outside facility for suspected CVA and seizure-like activity.  Family member present at bedside and states that patient was in normal state of health until yesterday evening.  States she started to feel weak and lethargic and eventually went unresponsive.  Had presentation like this previously when she had stroke.  CT head at outside facility did not show any acute infarct.  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 She was intubated for airway protection and loaded with Keppra.  She was transferred to [facility] for further care.  Repeat imaging was ordered.  Patient continues to be intubated and sedated on propofol. Requiring increased amounts of Levophed and started on vasopressin and stress dose steroids.  Admitted to ICU for close monitoring.    24 Hour Interval History:  No acute events overnight.  Seen by Physical therapy yesterday.  Thick secretions noted in ET tube    Past Medical History:   Diagnosis Date    Carotid-cavernous fistula     Cerebrovascular accident (CVA) due to thrombosis of right middle cerebral artery 6/26/2018    Essential hypertension 6/27/2018    Hemiparesis affecting left side as late effect of cerebrovascular accident     Hx of tobacco use, presenting hazards to health        Past Surgical History:   Procedure Laterality Date    AUGMENTATION OF BREAST      BRAIN SURGERY         Social History     Socioeconomic History    Marital status: Unknown   Tobacco Use    Smoking status: Former     Current packs/day:  0.00     Average packs/day: 0.5 packs/day for 25.0 years (12.5 ttl pk-yrs)     Types: Cigarettes     Start date: 1993     Quit date: 2018     Years since quittin.1    Smokeless tobacco: Former   Substance and Sexual Activity    Alcohol use: Not Currently    Drug use: No    Sexual activity: Never   Social History Narrative    ** Merged History Encounter **          Social Determinants of Health     Financial Resource Strain: Patient Declined (2024)    Overall Financial Resource Strain (CARDIA)     Difficulty of Paying Living Expenses: Patient declined   Food Insecurity: Unknown (2024)    Hunger Vital Sign     Worried About Running Out of Food in the Last Year: Patient declined     Ran Out of Food in the Last Year: Never true   Transportation Needs: Patient Declined (2024)    TRANSPORTATION NEEDS     Transportation : Patient declined   Housing Stability: Patient Declined (2024)    Housing Stability Vital Sign     Unable to Pay for Housing in the Last Year: Patient declined     Homeless in the Last Year: Patient declined           Current Outpatient Medications   Medication Instructions    amLODIPine (NORVASC) 5 mg, Oral, Daily    aspirin (ECOTRIN) 81 mg, Oral, Daily    folic acid (FOLVITE) 1 mg, Oral, Daily    hydrOXYzine HCL (ATARAX) 25 MG tablet TAKE 1 TABLET BY MOUTH THREE TIMES DAILY AS NEEDED FOR ITCHING OR ANXIETY    multivitamin Tab 1 tablet, Oral, Daily    rosuvastatin (CRESTOR) 40 mg, Oral, Nightly       Current Inpatient Medications   aspirin  81 mg Per NG tube Daily    atorvastatin  40 mg Oral Daily    levETIRAcetam (Keppra) IV (PEDS and ADULTS)  500 mg Intravenous BID    mupirocin   Nasal BID       Current Intravenous Infusions   0.9% NaCl   Intravenous Continuous   Held at 24 1700    fentanyl  0-250 mcg/hr Intravenous Continuous 2.5 mL/hr at 24 1650 25 mcg/hr at 24 1650    NORepinephrine bitartrate-D5W  0-3 mcg/kg/min Intravenous Continuous   Stopped at  08/09/24 0721         Review of Systems   Unable to perform ROS: Intubated          Objective:       Intake/Output Summary (Last 24 hours) at 8/11/2024 0618  Last data filed at 8/11/2024 0545  Gross per 24 hour   Intake 375.62 ml   Output 1300 ml   Net -924.38 ml         Vital Signs (Most Recent):  Temp: 99.5 °F (37.5 °C) (08/11/24 0400)  Pulse: (!) 113 (08/11/24 0600)  Resp: (!) 30 (08/11/24 0600)  BP: 138/76 (08/11/24 0600)  SpO2: (!) 92 % (08/11/24 0600)  Body mass index is 26.21 kg/m².  Weight: 65 kg (143 lb 4.8 oz) Vital Signs (24h Range):  Temp:  [97.9 °F (36.6 °C)-99.9 °F (37.7 °C)] 99.5 °F (37.5 °C)  Pulse:  [] 113  Resp:  [22-34] 30  SpO2:  [92 %-99 %] 92 %  BP: ()/(48-86) 138/76     Physical Exam  Constitutional:       Appearance: She is ill-appearing.   HENT:      Head: Normocephalic.   Eyes:      Comments: Pupils are equal but the right pupil is fixed   Cardiovascular:      Rate and Rhythm: Normal rate and regular rhythm.   Pulmonary:      Breath sounds: Normal breath sounds.      Comments: Intubated and mechanically ventilated. Tachypneic  Abdominal:      Palpations: Abdomen is soft.   Neurological:      Comments: Withdraws to pain and opens eyes to sternal rub           Lines/Drains/Airways       Drain  Duration                  NG/OG Tube 08/06/24 0000 Center mouth 5 days    Female External Urinary Catheter w/ Suction 08/11/24 0434 <1 day              Airway  Duration                  Airway - Non-Surgical 08/07/24 2147 Endotracheal Tube 3 days              Peripheral Intravenous Line  Duration                  Peripheral IV - Single Lumen 08/09/24 1511 20 G 1 in Anterior;Left;Lateral Wrist 1 day         Peripheral IV - Single Lumen 08/09/24 1511 20 G 1 in No Left;Anterior Antecubital 1 day                    Significant Labs:    Lab Results   Component Value Date    WBC 14.54 (H) 08/11/2024    HGB 10.7 (L) 08/11/2024    HCT 31.3 (L) 08/11/2024    MCV 85.8 08/11/2024     08/11/2024            BMP  Lab Results   Component Value Date     08/11/2024    K 3.7 08/11/2024    CO2 19 (L) 08/11/2024    BUN 19.7 08/11/2024    CREATININE 0.75 08/11/2024    CALCIUM 8.6 08/11/2024    AGAP 11.0 08/11/2024    ESTGFRAFRICA >60.0 07/07/2022    EGFRNONAA >60.0 07/07/2022         ABG  Recent Labs   Lab 08/11/24 0525   PH 7.560*   PO2 57.0*   PCO2 23.0*   HCO3 20.6*   POCBASEDEF -0.10       Mechanical Ventilation Support:  Vent Mode: A/C (08/11/24 0526)  Ventilator Initiated: Yes (08/07/24 2147)  Set Rate: 14 BPM (08/11/24 0526)  Vt Set: 450 mL (08/11/24 0526)  PEEP/CPAP: 6 cmH20 (08/11/24 0526)  Oxygen Concentration (%): 45 (08/10/24 2000)  Peak Airway Pressure: 29 cmH20 (08/11/24 0526)  Total Ve: 11 L/m (08/11/24 0526)  F/VT Ratio<105 (RSBI): (!) 54.77 (08/11/24 0526)      Significant Imaging:  I have reviewed the pertinent imaging within the past 24 hours.  Chest x-ray with worsening infiltrates bilaterally, right-greater-than-left    Assessment/Plan:     Assessment  Right EMANUEL stroke  Sepsis  Lactic acidosis  - resolved  Hypokalemia - resolved  Hx HTN, CVA with residual left sided weakness       Plan  Continue Keppra 500 mg b.i.d.  Continue aspirin, statin  Follow-up interventional Neurology recommendations regarding DSA  Minimize sedation  Blood pressure goals of systolic 100-180  Continue mechanical ventilation.  No plans for SBT at this time  Follow-up blood cultures  Continue to monitor closely for signs of pneumonia given chest x-ray findings but hold off on antibiotics at this point. Check sputum culture  Given her alkalemia, will plan to restart fentanyl drip after neurologic exam  Appreciate cardiology recommendations regarding new onset systolic dysfunction  Appreciate palliative care recommendations     32 minutes of critical care was time spent personally by me on the following activities: development of treatment plan with patient or surrogate and bedside caregivers, discussions with  consultants, evaluation of patient's response to treatment, examination of patient, ordering and performing treatments and interventions, ordering and review of laboratory studies, ordering and review of radiographic studies, pulse oximetry, re-evaluation of patient's condition.  This critical care time did not overlap with that of any other provider or involve time for any procedures.     This note was generated via Dictaphone and may contain some voice recognition errors.     Vladimir Acevedo MD  Pulmonary Critical Care Medicine  [facility]  DOS: 08/11/2024

## 2024-08-12 LAB
ALBUMIN SERPL-MCNC: 2.3 G/DL (ref 3.4–4.8)
ALBUMIN/GLOB SERPL: 0.7 RATIO (ref 1.1–2)
ALLENS TEST BLOOD GAS (OHS): ABNORMAL
ALP SERPL-CCNC: 89 UNIT/L (ref 40–150)
ALT SERPL-CCNC: 37 UNIT/L (ref 0–55)
ANION GAP SERPL CALC-SCNC: 11 MEQ/L
AST SERPL-CCNC: 57 UNIT/L (ref 5–34)
BASE EXCESS BLD CALC-SCNC: -2.2 MMOL/L
BASOPHILS # BLD AUTO: 0.04 X10(3)/MCL
BASOPHILS NFR BLD AUTO: 0.2 %
BILIRUB SERPL-MCNC: 0.5 MG/DL
BLOOD GAS SAMPLE TYPE (OHS): ABNORMAL
BUN SERPL-MCNC: 20.1 MG/DL (ref 9.8–20.1)
CA-I BLD-SCNC: 1.21 MMOL/L (ref 1.12–1.23)
CALCIUM SERPL-MCNC: 8.5 MG/DL (ref 8.4–10.2)
CHLORIDE SERPL-SCNC: 115 MMOL/L (ref 98–107)
CO2 BLDA-SCNC: 21.8 MMOL/L
CO2 SERPL-SCNC: 17 MMOL/L (ref 23–31)
CREAT SERPL-MCNC: 0.66 MG/DL (ref 0.55–1.02)
CREAT/UREA NIT SERPL: 30
DRAWN BY BLOOD GAS (OHS): ABNORMAL
EOSINOPHIL # BLD AUTO: 0.05 X10(3)/MCL (ref 0–0.9)
EOSINOPHIL NFR BLD AUTO: 0.3 %
ERYTHROCYTE [DISTWIDTH] IN BLOOD BY AUTOMATED COUNT: 15.1 % (ref 11.5–17)
GFR SERPLBLD CREATININE-BSD FMLA CKD-EPI: >60 ML/MIN/1.73/M2
GLOBULIN SER-MCNC: 3.3 GM/DL (ref 2.4–3.5)
GLUCOSE SERPL-MCNC: 207 MG/DL (ref 82–115)
HCO3 BLDA-SCNC: 20.9 MMOL/L (ref 22–26)
HCT VFR BLD AUTO: 34.1 % (ref 37–47)
HGB BLD-MCNC: 11.4 G/DL (ref 12–16)
IMM GRANULOCYTES # BLD AUTO: 0.29 X10(3)/MCL (ref 0–0.04)
IMM GRANULOCYTES NFR BLD AUTO: 1.7 %
INHALED O2 CONCENTRATION: 55 %
LYMPHOCYTES # BLD AUTO: 1.83 X10(3)/MCL (ref 0.6–4.6)
LYMPHOCYTES NFR BLD AUTO: 10.7 %
MCH RBC QN AUTO: 29.2 PG (ref 27–31)
MCHC RBC AUTO-ENTMCNC: 33.4 G/DL (ref 33–36)
MCV RBC AUTO: 87.2 FL (ref 80–94)
MECH RR (OHS): 14 B/MIN
MODE (OHS): AC
MONOCYTES # BLD AUTO: 1.11 X10(3)/MCL (ref 0.1–1.3)
MONOCYTES NFR BLD AUTO: 6.5 %
MRSA PCR SCRN (OHS): NOT DETECTED
NEUTROPHILS # BLD AUTO: 13.85 X10(3)/MCL (ref 2.1–9.2)
NEUTROPHILS NFR BLD AUTO: 80.6 %
NRBC BLD AUTO-RTO: 1.5 %
OXYGEN DEVICE BLOOD GAS (OHS): ABNORMAL
PCO2 BLDA: 30 MMHG (ref 35–45)
PEEP RESPIRATORY: 5 CMH2O
PH BLDA: 7.45 [PH] (ref 7.35–7.45)
PLATELET # BLD AUTO: 153 X10(3)/MCL (ref 130–400)
PLATELETS.RETICULATED NFR BLD AUTO: 5.5 % (ref 0.9–11.2)
PMV BLD AUTO: 11.1 FL (ref 7.4–10.4)
PO2 BLDA: 63 MMHG (ref 80–100)
POCT GLUCOSE: 133 MG/DL (ref 70–110)
POCT GLUCOSE: 134 MG/DL (ref 70–110)
POCT GLUCOSE: 183 MG/DL (ref 70–110)
POCT GLUCOSE: 211 MG/DL (ref 70–110)
POTASSIUM BLOOD GAS (OHS): 3.5 MMOL/L (ref 3.5–5)
POTASSIUM SERPL-SCNC: 3.8 MMOL/L (ref 3.5–5.1)
PROT SERPL-MCNC: 5.6 GM/DL (ref 5.8–7.6)
RBC # BLD AUTO: 3.91 X10(6)/MCL (ref 4.2–5.4)
SAMPLE SITE BLOOD GAS (OHS): ABNORMAL
SAO2 % BLDA: 93 %
SODIUM BLOOD GAS (OHS): 144 MMOL/L (ref 137–145)
SODIUM SERPL-SCNC: 143 MMOL/L (ref 136–145)
SPONT+MECH VT ON VENT: 450 ML
WBC # BLD AUTO: 17.17 X10(3)/MCL (ref 4.5–11.5)

## 2024-08-12 PROCEDURE — 20000000 HC ICU ROOM

## 2024-08-12 PROCEDURE — 25000003 PHARM REV CODE 250: Performed by: NURSE PRACTITIONER

## 2024-08-12 PROCEDURE — 36600 WITHDRAWAL OF ARTERIAL BLOOD: CPT

## 2024-08-12 PROCEDURE — 99900031 HC PATIENT EDUCATION (STAT)

## 2024-08-12 PROCEDURE — 85025 COMPLETE CBC W/AUTO DIFF WBC: CPT

## 2024-08-12 PROCEDURE — 51702 INSERT TEMP BLADDER CATH: CPT

## 2024-08-12 PROCEDURE — 63600175 PHARM REV CODE 636 W HCPCS: Performed by: STUDENT IN AN ORGANIZED HEALTH CARE EDUCATION/TRAINING PROGRAM

## 2024-08-12 PROCEDURE — 99900035 HC TECH TIME PER 15 MIN (STAT)

## 2024-08-12 PROCEDURE — 25000003 PHARM REV CODE 250: Performed by: STUDENT IN AN ORGANIZED HEALTH CARE EDUCATION/TRAINING PROGRAM

## 2024-08-12 PROCEDURE — 80053 COMPREHEN METABOLIC PANEL: CPT

## 2024-08-12 PROCEDURE — 99900026 HC AIRWAY MAINTENANCE (STAT)

## 2024-08-12 PROCEDURE — 87641 MR-STAPH DNA AMP PROBE: CPT | Performed by: STUDENT IN AN ORGANIZED HEALTH CARE EDUCATION/TRAINING PROGRAM

## 2024-08-12 PROCEDURE — 25000003 PHARM REV CODE 250: Performed by: PSYCHIATRY & NEUROLOGY

## 2024-08-12 PROCEDURE — 94003 VENT MGMT INPAT SUBQ DAY: CPT

## 2024-08-12 PROCEDURE — 25000003 PHARM REV CODE 250

## 2024-08-12 PROCEDURE — 36415 COLL VENOUS BLD VENIPUNCTURE: CPT

## 2024-08-12 PROCEDURE — 94761 N-INVAS EAR/PLS OXIMETRY MLT: CPT | Mod: XB

## 2024-08-12 PROCEDURE — 51798 US URINE CAPACITY MEASURE: CPT

## 2024-08-12 PROCEDURE — 63600175 PHARM REV CODE 636 W HCPCS

## 2024-08-12 PROCEDURE — 82803 BLOOD GASES ANY COMBINATION: CPT

## 2024-08-12 PROCEDURE — 25000003 PHARM REV CODE 250: Performed by: INTERNAL MEDICINE

## 2024-08-12 PROCEDURE — 63600175 PHARM REV CODE 636 W HCPCS: Performed by: PSYCHIATRY & NEUROLOGY

## 2024-08-12 PROCEDURE — 51701 INSERT BLADDER CATHETER: CPT

## 2024-08-12 PROCEDURE — 27100171 HC OXYGEN HIGH FLOW UP TO 24 HOURS

## 2024-08-12 PROCEDURE — 99221 1ST HOSP IP/OBS SF/LOW 40: CPT | Mod: ,,, | Performed by: NURSE PRACTITIONER

## 2024-08-12 RX ADMIN — FENTANYL CITRATE 25 MCG: 50 INJECTION, SOLUTION INTRAMUSCULAR; INTRAVENOUS at 03:08

## 2024-08-12 RX ADMIN — LEVETIRACETAM 500 MG: 100 INJECTION, SOLUTION INTRAVENOUS at 07:08

## 2024-08-12 RX ADMIN — PIPERACILLIN AND TAZOBACTAM 4.5 G: 4; .5 INJECTION, POWDER, LYOPHILIZED, FOR SOLUTION INTRAVENOUS; PARENTERAL at 05:08

## 2024-08-12 RX ADMIN — ASPIRIN 81 MG CHEWABLE TABLET 81 MG: 81 TABLET CHEWABLE at 08:08

## 2024-08-12 RX ADMIN — MUPIROCIN: 20 OINTMENT TOPICAL at 07:08

## 2024-08-12 RX ADMIN — LEVETIRACETAM 500 MG: 100 INJECTION, SOLUTION INTRAVENOUS at 08:08

## 2024-08-12 RX ADMIN — VANCOMYCIN HYDROCHLORIDE 1500 MG: 500 INJECTION, POWDER, LYOPHILIZED, FOR SOLUTION INTRAVENOUS at 11:08

## 2024-08-12 RX ADMIN — MUPIROCIN: 20 OINTMENT TOPICAL at 08:08

## 2024-08-12 RX ADMIN — DEXMEDETOMIDINE HYDROCHLORIDE 0.6 MCG/KG/HR: 400 INJECTION INTRAVENOUS at 09:08

## 2024-08-12 RX ADMIN — INSULIN ASPART 2 UNITS: 100 INJECTION, SOLUTION INTRAVENOUS; SUBCUTANEOUS at 12:08

## 2024-08-12 RX ADMIN — ATORVASTATIN CALCIUM 40 MG: 40 TABLET, FILM COATED ORAL at 08:08

## 2024-08-12 RX ADMIN — PIPERACILLIN AND TAZOBACTAM 4.5 G: 4; .5 INJECTION, POWDER, LYOPHILIZED, FOR SOLUTION INTRAVENOUS; PARENTERAL at 09:08

## 2024-08-12 NOTE — NURSING
Nurses Note -- 4 Eyes      8/11/2024   9:15 PM      Skin assessed during: Daily Assessment      [x] No Altered Skin Integrity Present    [x]Prevention Measures Documented      [] Yes- Altered Skin Integrity Present or Discovered   [] LDA Added if Not in Epic (Describe Wound)   [] New Altered Skin Integrity was Present on Admit and Documented in LDA   [] Wound Image Taken    Wound Care Consulted? No    Attending Nurse:  Randall Rodriguez RN/Staff Member:  Magdi

## 2024-08-12 NOTE — CONSULTS
Consults    Patient Name: Shannan Reza   MRN: [id number]   Admission Date: 8/7/2024   Hospital Length of Stay: 5   Attending Provider: Chance Flores MD   Consulting Provider: Nohelia YARBROUGH  Reason for Consult: Goals of Care  Primary Care Physician: Kiana Marie MD     Principal Problem: <principal problem not specified>     Patient information was obtained from relative(s) and ER records.      Final diagnoses:  [I63.9] CVA (cerebral vascular accident)     Assessment/Plan:     I reviewed the patient and family's understanding of the seriousness of the illness and its expected prognosis. We discussed the patient's goals of care and treatment preferences.  I clarified current code status. I identified the surrogate decision maker or health care POA.  I answered all questions and we formulated a plan including recommendations for symptom management and how to best achieve goals of care.  Advance Care Planning     Date: 08/12/2024    [facility]  I engaged the family in a voluntary conversation about advance care planning and we specifically addressed what the goals of care would be moving forward, in light of the patient's change in clinical status, specifically current condition.  We did specifically address the patient's likely prognosis, which is poor.  We explored the patient's values and preferences for future care.  The family endorses that what is most important right now is to focus on curative/life-prolongation (regardless of treatment burdens)    Accordingly, we have decided that the best plan to meet the patient's goals includes continuing with treatment       Met with patient's son to discuss goals of care and code status further.  He states that his father had a stroke in 2005 and after 3 weeks, left the hospital and made a significant recovery after a year's time.  He states that he would like to give his mother every opportunity to make same recovery although he understands that her prognosis is  serious and that she may not likely make such an improvement.  Discuss that patient can remain with ETT for a limited time and that consideration for trach and PEG may be a discussion if son wants to continue further care.  Discussed option for this and option for comfort measures to which son states that he is continuing to consider.  Discuss code status to which son states that he is not ready to make a decision for DNR at this time.  Offered support and informed the palliative Medicine will continue to follow.          History of Present Illness:     Patient is a 73-year-old female with PMH of HTN and CVA who presented from outside facility suspected CVA and seizure-like activity.  Family reported that patient was in normal state of health until evening before presentation when she reported feeling weak and lethargic and eventually became unresponsive.  CT of the head out at outside facility negative for acute infarct.  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  She was intubated for airway protection and loaded with Keppra and transferred to this facility for higher level of care.        Active Ambulatory Problems     Diagnosis Date Noted    Cerebrovascular accident (CVA) due to thrombosis of right middle cerebral artery 06/26/2018    Carotid-cavernous fistula     Hemiparesis affecting left side as late effect of cerebrovascular accident 06/27/2018    Essential hypertension 06/27/2018    Anterior communicating artery aneurysm 06/28/2018    Macrocytic anemia 04/26/2019    Hx of tobacco use, presenting hazards to health 12/23/2020    Closed fracture of left proximal humerus 06/02/2021    Chronic left shoulder pain 06/02/2021     Resolved Ambulatory Problems     Diagnosis Date Noted    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     No Additional Past Medical History        Past Surgical History:   Procedure Laterality Date    AUGMENTATION OF BREAST      BRAIN  SURGERY          Review of patient's allergies indicates:   Allergen Reactions    Percodan [oxycodone hcl-oxycodone-asa] Itching          Current Facility-Administered Medications:     0.9%  NaCl infusion, , Intravenous, Continuous, Shantell Vail NP, Held at 08/08/24 1700    aspirin chewable tablet 81 mg, 81 mg, Per NG tube, Daily, Shantell Vail NP, 81 mg at 08/12/24 0830    atorvastatin tablet 40 mg, 40 mg, Oral, Daily, Khalida Orozco MD, 40 mg at 08/12/24 0830    bisacodyL suppository 10 mg, 10 mg, Rectal, Daily PRN, Shantell Vail NP    dexmedetomidine (PRECEDEX) 400mcg/100mL 0.9% NaCL infusion, 0-1.4 mcg/kg/hr, Intravenous, Continuous, Gary Santa DO, Last Rate: 9.75 mL/hr at 08/12/24 0344, 0.6 mcg/kg/hr at 08/12/24 0344    dextrose 10% bolus 125 mL 125 mL, 12.5 g, Intravenous, PRN, Carol Rehman MD    dextrose 10% bolus 250 mL 250 mL, 25 g, Intravenous, PRN, Carol Rehman MD    fentaNYL 2500 mcg in 0.9% sodium chloride 250 mL infusion premix (titrating), 0-250 mcg/hr, Intravenous, Continuous, Vladimir Acevedo MD, Last Rate: 2.5 mL/hr at 08/09/24 1650, 25 mcg/hr at 08/09/24 1650    fentaNYL injection 25 mcg, 25 mcg, Intravenous, Q2H PRN, Gary Santa DO, 25 mcg at 08/12/24 0335    glucagon (human recombinant) injection 1 mg, 1 mg, Intramuscular, PRN, Carol Rehman MD    insulin aspart U-100 injection 0-5 Units, 0-5 Units, Subcutaneous, Q6H PRN, Carol Rehman MD, 2 Units at 08/08/24 1403    levETIRAcetam (Keppra) 500 mg in D5W 100 mL IVPB (MB+), 500 mg, Intravenous, BID, Vickey Singh MD, Last Rate: 200 mL/hr at 08/12/24 0822, 500 mg at 08/12/24 0822    mupirocin 2 % ointment, , Nasal, BID, Cody Morrissey Jr., [facility], [facility], Given at 08/12/24 0829    NORepinephrine 32 mg in D5W 250 mL infusion, 0-3 mcg/kg/min, Intravenous, Continuous, Gary Santa DO, Stopped at 08/09/24 0721    NORepinephrine 8 mg in dextrose 5% 250 mL infusion, 0-3 mcg/kg/min, Intravenous, Continuous, Ilda,  "Caitlin BREWSTER, FNP, Last Rate: 4.9 mL/hr at 08/12/24 0344, 0.04 mcg/kg/min at 08/12/24 0344    piperacillin-tazobactam (ZOSYN) 4.5 g in D5W 100 mL IVPB (MB+), 4.5 g, Intravenous, Q8H, Vladimir Acevedo MD    sodium chloride 0.9% flush 10 mL, 10 mL, Intravenous, PRN, Carol Rehman MD       Current Facility-Administered Medications:     bisacodyL, 10 mg, Rectal, Daily PRN    dextrose 10%, 12.5 g, Intravenous, PRN    dextrose 10%, 25 g, Intravenous, PRN    fentaNYL, 25 mcg, Intravenous, Q2H PRN    glucagon (human recombinant), 1 mg, Intramuscular, PRN    insulin aspart U-100, 0-5 Units, Subcutaneous, Q6H PRN    sodium chloride 0.9%, 10 mL, Intravenous, PRN     Family History   Problem Relation Name Age of Onset    Deep vein thrombosis Father      Melanoma Neg Hx          Review of Systems   Unable to perform ROS: Intubated            Objective:   /79   Pulse 84   Temp 97.5 °F (36.4 °C) (Axillary)   Resp (!) 36   Ht 5' 2" (1.575 m)   Wt 65 kg (143 lb 4.8 oz)   LMP  (LMP Unknown)   SpO2 95%   BMI 26.21 kg/m²      Physical Exam  Constitutional:       Appearance: She is ill-appearing.   HENT:      Head: Normocephalic.   Eyes:      Pupils: Pupils are equal, round, and reactive to light.   Cardiovascular:      Rate and Rhythm: Normal rate.   Abdominal:      Palpations: Abdomen is soft.   Neurological:      Comments: obtunded             Review of Symptoms      Symptom Assessment (ESAS 0-10 Scale)  Pain:  0  Dyspnea:  0  Anxiety:  0  Nausea:  0  Depression:  0  Anorexia:  0  Fatigue:  0  Insomnia:  0  Restlessness:  0  Agitation:  0         Bowel Management Plan (BMP):  Yes      Psychosocial/Cultural:   See Palliative Psychosocial Note: Yes  **Primary  to Follow**  Palliative Care  Consult: No    Spiritual:  F - Radha and Belief:  Mormonism      Advance Care Planning   Advance Care Planning        PAINAD: NA    Caregiver burden formerly assessed: Yes        > 50% of 45 min of encounter was " spent in chart review, face to face discussion of goals of care, symptom assessment, coordination of care and emotional support.         Nohelia PENAP, [facility]  Palliative Medicine  [facility]

## 2024-08-12 NOTE — PROGRESS NOTES
Pharmacokinetic Initial Assessment: IV Vancomycin    Assessment/Plan:    Initiate intravenous vancomycin with loading dose of 1500 mg once followed by a maintenance dose of vancomycin 750mg IV every 12 hours  Desired empiric serum trough concentration is 15 to 20 mcg/mL  Draw vancomycin trough level 60 min prior to 5th total dose on 08/14 at approximately 1000  Pharmacy will continue to follow and monitor vancomycin.      Please contact pharmacy at extension [phone] with any questions regarding this assessment.     Thank you for the consult,   Saji Bond       Patient brief summary:  Shannan Reza is a 73 y.o. female initiated on antimicrobial therapy with IV Vancomycin for treatment of suspected lower respiratory infection    Drug Allergies:   Review of patient's allergies indicates:   Allergen Reactions    Percodan [oxycodone hcl-oxycodone-asa] Itching       Actual Body Weight:   Wt Readings from Last 1 Encounters:   08/07/24 65 kg (143 lb 4.8 oz)       Renal Function:   Estimated Creatinine Clearance: 66.2 mL/min (based on SCr of 0.66 mg/dL).,     CBC (last 72 hours):  Recent Labs   Lab Result Units 08/10/24  0308 08/11/24  0329 08/12/24  0348   WBC x10(3)/mcL 18.08* 14.54* 17.17*   Hgb g/dL 10.9* 10.7* 11.4*   Hct % 31.7* 31.3* 34.1*   Platelet x10(3)/mcL 163 188 153   Mono % % 4.9 5.3 6.5   Eos % % 0.0 0.1 0.3   Basophil % % 0.2 0.1 0.2       Metabolic Panel (last 72 hours):  Recent Labs   Lab Result Units 08/10/24  0308 08/10/24  0750 08/11/24  0329 08/11/24  0525 08/12/24  0348 08/12/24  0922   Sodium mmol/L 138  --  145  --  143  --    Sodium, Blood Gas mmol/L  --  135*  --  141  --  144   Potassium mmol/L 4.4  --  3.7  --  3.8  --    Potassium, Blood Gas mmol/L  --  3.9  --  3.3*  --  3.5   Chloride mmol/L 109*  --  115*  --  115*  --    CO2 mmol/L 16*  --  19*  --  17*  --    Glucose mg/dL 125*  --  124*  --  207*  --    Blood Urea Nitrogen mg/dL 23.4*  --  19.7  --  20.1  --    Creatinine mg/dL 0.79  --   "0.75  --  0.66  --    Albumin g/dL 2.9*  --  2.7*  --  2.3*  --    Bilirubin Total mg/dL 0.5  --  0.6  --  0.5  --    ALP unit/L 75  --  82  --  89  --    AST unit/L 80*  --  65*  --  57*  --    ALT unit/L 25  --  32  --  37  --        Drug levels (last 3 results):  No results for input(s): "VANCOMYCINRA", "VANCORANDOM", "VANCOMYCINPE", "VANCOPEAK", "VANCOMYCINTR", "VANCOTROUGH" in the last 72 hours.    Microbiologic Results:  Microbiology Results (last 7 days)       Procedure Component Value Units Date/Time    Blood Culture [[id number]]  (Normal) Collected: 08/08/24 0733    Order Status: Completed Specimen: Blood from Hand, Right Updated: 08/12/24 0901     Blood Culture No Growth At 96 Hours    Blood Culture [[id number]]  (Normal) Collected: 08/08/24 0733    Order Status: Completed Specimen: Blood from Arm, Right Updated: 08/12/24 0901     Blood Culture No Growth At 96 Hours    Respiratory Culture [[id number]] Collected: 08/11/24 0836    Order Status: Completed Specimen: Sputum from Endotracheal Aspirate Updated: 08/12/24 0655     Respiratory Culture Rare normal respiratory franki     GRAM STAIN Quality 2+      Rare Yeast      No bacteria seen            "

## 2024-08-12 NOTE — PT/OT/SLP PROGRESS
Occupational Therapy      Patient Name:  Shannan Reza   MRN:  [id number]    OT evaluation orders received. Chart reviewed and spoke to RN. Patient not appropriate for ADLs and mobility at this time and they have consulted palliative. OT will sign off; please re-consult as appropriate.     8/12/2024

## 2024-08-12 NOTE — PROGRESS NOTES
[facility] - [facility] ICU  Pulmonary Critical Care Note    Patient Name: Shannan Reza  MRN: [id number]  Admission Date: 8/7/2024  Hospital Length of Stay: 5 days  Code Status: Full Code  Attending Provider: Chance Flores MD  Primary Care Provider: Kiana Marie MD     Subjective:     HPI:   This is a 73-year-old female with past medical history of hypertension and to strokes in past presenting from outside facility for suspected CVA and seizure-like activity.  Family member present at bedside and states that patient was in normal state of health until yesterday evening.  States she started to feel weak and lethargic and eventually went unresponsive.  Had presentation like this previously when she had stroke.  CT head at outside facility did not show any acute infarct.  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 She was intubated for airway protection and loaded with Keppra.  She was transferred to [facility] for further care.  Repeat imaging was ordered.  Patient continues to be intubated and sedated on propofol. Requiring increased amounts of Levophed and started on vasopressin and stress dose steroids.  Admitted to ICU for close monitoring.    24 Hour Interval History:  NAEON. Afebrile. Remains intubated/mechanically ventilated. Off Precedex when examined this AM. Requiring 0.04 levophed. WBC uptrended (17 from 14.5).    Past Medical History:   Diagnosis Date    Carotid-cavernous fistula     Cerebrovascular accident (CVA) due to thrombosis of right middle cerebral artery 6/26/2018    Essential hypertension 6/27/2018    Hemiparesis affecting left side as late effect of cerebrovascular accident     Hx of tobacco use, presenting hazards to health        Past Surgical History:   Procedure Laterality Date    AUGMENTATION OF BREAST      BRAIN SURGERY         Social History     Socioeconomic History    Marital status: Unknown   Tobacco Use     Smoking status: Former     Current packs/day: 0.00     Average packs/day: 0.5 packs/day for 25.0 years (12.5 ttl pk-yrs)     Types: Cigarettes     Start date: 1993     Quit date: 2018     Years since quittin.1    Smokeless tobacco: Former   Substance and Sexual Activity    Alcohol use: Not Currently    Drug use: No    Sexual activity: Never   Social History Narrative    ** Merged History Encounter **          Social Determinants of Health     Financial Resource Strain: Patient Declined (2024)    Overall Financial Resource Strain (CARDIA)     Difficulty of Paying Living Expenses: Patient declined   Food Insecurity: Unknown (2024)    Hunger Vital Sign     Worried About Running Out of Food in the Last Year: Patient declined     Ran Out of Food in the Last Year: Never true   Transportation Needs: Patient Declined (2024)    TRANSPORTATION NEEDS     Transportation : Patient declined   Housing Stability: Patient Declined (2024)    Housing Stability Vital Sign     Unable to Pay for Housing in the Last Year: Patient declined     Homeless in the Last Year: Patient declined           Current Outpatient Medications   Medication Instructions    amLODIPine (NORVASC) 5 mg, Oral, Daily    aspirin (ECOTRIN) 81 mg, Oral, Daily    folic acid (FOLVITE) 1 mg, Oral, Daily    hydrOXYzine HCL (ATARAX) 25 MG tablet TAKE 1 TABLET BY MOUTH THREE TIMES DAILY AS NEEDED FOR ITCHING OR ANXIETY    multivitamin Tab 1 tablet, Oral, Daily    rosuvastatin (CRESTOR) 40 mg, Oral, Nightly       Current Inpatient Medications   aspirin  81 mg Per NG tube Daily    atorvastatin  40 mg Oral Daily    levETIRAcetam (Keppra) IV (PEDS and ADULTS)  500 mg Intravenous BID    mupirocin   Nasal BID       Current Intravenous Infusions   0.9% NaCl   Intravenous Continuous   Held at 24 1700    dexmedeTOMIDine (Precedex) infusion (titrating)  0-1.4 mcg/kg/hr Intravenous Continuous 9.75 mL/hr at 24 0344 0.6 mcg/kg/hr at  08/12/24 0344    fentanyl  0-250 mcg/hr Intravenous Continuous 2.5 mL/hr at 08/09/24 1650 25 mcg/hr at 08/09/24 1650    NORepinephrine bitartrate-D5W  0-3 mcg/kg/min Intravenous Continuous   Stopped at 08/09/24 0721    NORepinephrine bitartrate-D5W  0-3 mcg/kg/min Intravenous Continuous 4.9 mL/hr at 08/12/24 0344 0.04 mcg/kg/min at 08/12/24 0344         Review of Systems   Unable to perform ROS: Intubated          Objective:       Intake/Output Summary (Last 24 hours) at 8/12/2024 0510  Last data filed at 8/12/2024 0344  Gross per 24 hour   Intake 1303.12 ml   Output 650 ml   Net 653.12 ml         Vital Signs (Most Recent):  Temp: 98.4 °F (36.9 °C) (08/12/24 0000)  Pulse: 63 (08/12/24 0423)  Resp: (!) 25 (08/12/24 0423)  BP: 99/63 (08/12/24 0335)  SpO2: 96 % (08/12/24 0423)  Body mass index is 26.21 kg/m².  Weight: 65 kg (143 lb 4.8 oz) Vital Signs (24h Range):  Temp:  [98.4 °F (36.9 °C)-99 °F (37.2 °C)] 98.4 °F (36.9 °C)  Pulse:  [] 63  Resp:  [17-40] 25  SpO2:  [91 %-100 %] 96 %  BP: ()/(50-97) 99/63     Physical Exam  Vitals reviewed.   Constitutional:       Appearance: She is ill-appearing.   HENT:      Head: Normocephalic.   Eyes:      Comments: L pupil 2mm, R pupil 3mm. L slightly reactive. R pupil fixed.   Cardiovascular:      Rate and Rhythm: Normal rate and regular rhythm.      Heart sounds: No murmur heard.  Pulmonary:      Breath sounds: Normal breath sounds.      Comments: Intubated and mechanically ventilated.   Abdominal:      Palpations: Abdomen is soft.   Neurological:      Comments: Withdraws to pain LUE and LLE. No withdrawal on right side. Weak corneal reflexes.           Lines/Drains/Airways       Drain  Duration                  NG/OG Tube 08/06/24 0000 Center mouth 6 days    Female External Urinary Catheter w/ Suction 08/11/24 0434 1 day              Airway  Duration                  Airway - Non-Surgical 08/07/24 2147 Endotracheal Tube 4 days              Peripheral Intravenous  Line  Duration                  Peripheral IV - Single Lumen 08/09/24 1511 20 G 1 in Anterior;Left;Lateral Wrist 2 days         Peripheral IV - Single Lumen 08/09/24 1511 20 G 1 in No Left;Anterior Antecubital 2 days                    Significant Labs:    Lab Results   Component Value Date    WBC 17.17 (H) 08/12/2024    HGB 11.4 (L) 08/12/2024    HCT 34.1 (L) 08/12/2024    MCV 87.2 08/12/2024     08/12/2024           BMP  Lab Results   Component Value Date     08/12/2024    K 3.8 08/12/2024    CO2 17 (L) 08/12/2024    BUN 20.1 08/12/2024    CREATININE 0.66 08/12/2024    CALCIUM 8.5 08/12/2024    AGAP 11.0 08/12/2024    ESTGFRAFRICA >60.0 07/07/2022    EGFRNONAA >60.0 07/07/2022         ABG  Recent Labs   Lab 08/11/24 0525   PH 7.560*   PO2 57.0*   PCO2 23.0*   HCO3 20.6*   POCBASEDEF -0.10       Mechanical Ventilation Support:  Vent Mode: A/C (08/12/24 0423)  Ventilator Initiated: Yes (08/07/24 2147)  Set Rate: 14 BPM (08/12/24 0423)  Vt Set: 450 mL (08/12/24 0423)  PEEP/CPAP: 5 cmH20 (08/12/24 0423)  Oxygen Concentration (%): 55 (08/11/24 1907)  Peak Airway Pressure: 25 cmH20 (08/12/24 0423)  Total Ve: 12.8 L/m (08/12/24 0423)  F/VT Ratio<105 (RSBI): (!) 50 (08/12/24 0423)      Significant Imaging:  I have reviewed the pertinent imaging within the past 24 hours.  Chest x-ray with worsening infiltrates bilaterally, right-greater-than-left    Assessment/Plan:     Assessment  Right EMANUEL stroke  Sepsis  Lactic acidosis  - resolved  Hypokalemia - resolved  Hx HTN, CVA with residual left sided weakness       Plan  Continue Keppra 500 mg b.i.d.  Continue aspirin, statin  Follow-up interventional Neurology recommendations regarding DSA  Minimize sedation  Blood pressure goals of systolic 100-180  Continue mechanical ventilation. No plans for SBT at this time.  Blood cultures x 2: NG at 72h  Continue to monitor closely for signs of pneumonia given chest x-ray findings but hold off on antibiotics at this point.  Follow up sputum culture  Appreciate cardiology recommendations regarding new onset systolic dysfunction  Will need to further discuss goals of care with family. Appreciate palliative care recommendations.     32 minutes of critical care was time spent personally by me on the following activities: development of treatment plan with patient or surrogate and bedside caregivers, discussions with consultants, evaluation of patient's response to treatment, examination of patient, ordering and performing treatments and interventions, ordering and review of laboratory studies, ordering and review of radiographic studies, pulse oximetry, re-evaluation of patient's condition.  This critical care time did not overlap with that of any other provider or involve time for any procedures.       Khalida Orozco MD  Pulmonary Critical Care Medicine  [facility] - [facility] ICU  DOS: 08/12/2024

## 2024-08-12 NOTE — NURSING
Nurses Note -- 4 Eyes      8/12/2024   4:27 PM      Skin assessed during: Daily Assessment      [x] No Altered Skin Integrity Present    [x]Prevention Measures Documented      [] Yes- Altered Skin Integrity Present or Discovered   [] LDA Added if Not in Epic (Describe Wound)   [] New Altered Skin Integrity was Present on Admit and Documented in LDA   [] Wound Image Taken    Wound Care Consulted? No    Attending Nurse:  Cely Rodriguez RN/Staff Member:  TRACEY Crespo

## 2024-08-12 NOTE — PT/OT/SLP PROGRESS
Physical Therapy      Patient Name:  Shannan Reza   MRN:  [id number]    Tx held, palliative care consulted. Will follow up.

## 2024-08-13 LAB
ALBUMIN SERPL-MCNC: 2.3 G/DL (ref 3.4–4.8)
ALBUMIN/GLOB SERPL: 0.8 RATIO (ref 1.1–2)
ALLENS TEST BLOOD GAS (OHS): ABNORMAL
ALLENS TEST BLOOD GAS (OHS): YES
ALP SERPL-CCNC: 113 UNIT/L (ref 40–150)
ALT SERPL-CCNC: 41 UNIT/L (ref 0–55)
ANION GAP SERPL CALC-SCNC: 9 MEQ/L
AST SERPL-CCNC: 49 UNIT/L (ref 5–34)
BACTERIA BLD CULT: NORMAL
BACTERIA BLD CULT: NORMAL
BASE EXCESS BLD CALC-SCNC: 1.5 MMOL/L
BASE EXCESS BLD CALC-SCNC: 1.9 MMOL/L
BASOPHILS # BLD AUTO: 0.05 X10(3)/MCL
BASOPHILS NFR BLD AUTO: 0.3 %
BILIRUB SERPL-MCNC: 0.6 MG/DL
BLOOD GAS SAMPLE TYPE (OHS): ABNORMAL
BLOOD GAS SAMPLE TYPE (OHS): ABNORMAL
BUN SERPL-MCNC: 16.9 MG/DL (ref 9.8–20.1)
CA-I BLD-SCNC: 1.24 MMOL/L (ref 1.12–1.23)
CA-I BLD-SCNC: 1.26 MMOL/L (ref 1.12–1.23)
CALCIUM SERPL-MCNC: 8.6 MG/DL (ref 8.4–10.2)
CHLORIDE SERPL-SCNC: 118 MMOL/L (ref 98–107)
CO2 BLDA-SCNC: 25.6 MMOL/L
CO2 BLDA-SCNC: 27.9 MMOL/L
CO2 SERPL-SCNC: 20 MMOL/L (ref 23–31)
CREAT SERPL-MCNC: 0.59 MG/DL (ref 0.55–1.02)
CREAT/UREA NIT SERPL: 29
DRAWN BY BLOOD GAS (OHS): ABNORMAL
DRAWN BY BLOOD GAS (OHS): ABNORMAL
EOSINOPHIL # BLD AUTO: 0.54 X10(3)/MCL (ref 0–0.9)
EOSINOPHIL NFR BLD AUTO: 2.9 %
ERYTHROCYTE [DISTWIDTH] IN BLOOD BY AUTOMATED COUNT: 15.4 % (ref 11.5–17)
GFR SERPLBLD CREATININE-BSD FMLA CKD-EPI: >60 ML/MIN/1.73/M2
GLOBULIN SER-MCNC: 3 GM/DL (ref 2.4–3.5)
GLUCOSE SERPL-MCNC: 117 MG/DL (ref 82–115)
HCO3 BLDA-SCNC: 24.6 MMOL/L (ref 22–26)
HCO3 BLDA-SCNC: 26.6 MMOL/L (ref 22–26)
HCT VFR BLD AUTO: 34.2 % (ref 37–47)
HGB BLD-MCNC: 11.2 G/DL (ref 12–16)
IMM GRANULOCYTES # BLD AUTO: 0.37 X10(3)/MCL (ref 0–0.04)
IMM GRANULOCYTES NFR BLD AUTO: 2 %
INHALED O2 CONCENTRATION: 55 %
INHALED O2 CONCENTRATION: 60 %
LYMPHOCYTES # BLD AUTO: 1.38 X10(3)/MCL (ref 0.6–4.6)
LYMPHOCYTES NFR BLD AUTO: 7.3 %
MCH RBC QN AUTO: 29 PG (ref 27–31)
MCHC RBC AUTO-ENTMCNC: 32.7 G/DL (ref 33–36)
MCV RBC AUTO: 88.6 FL (ref 80–94)
MECH RR (OHS): 14 B/MIN
MECH RR (OHS): 14 B/MIN
MODE (OHS): AC
MODE (OHS): AC
MONOCYTES # BLD AUTO: 1.38 X10(3)/MCL (ref 0.1–1.3)
MONOCYTES NFR BLD AUTO: 7.3 %
NEUTROPHILS # BLD AUTO: 15.22 X10(3)/MCL (ref 2.1–9.2)
NEUTROPHILS NFR BLD AUTO: 80.2 %
NRBC BLD AUTO-RTO: 0.8 %
OXYGEN DEVICE BLOOD GAS (OHS): ABNORMAL
OXYGEN DEVICE BLOOD GAS (OHS): ABNORMAL
PCO2 BLDA: 33 MMHG (ref 35–45)
PCO2 BLDA: 41 MMHG (ref 35–45)
PEEP RESPIRATORY: 5 CMH2O
PEEP RESPIRATORY: 5 CMH2O
PH BLDA: 7.42 [PH] (ref 7.35–7.45)
PH BLDA: 7.48 [PH] (ref 7.35–7.45)
PLATELET # BLD AUTO: 199 X10(3)/MCL (ref 130–400)
PLATELETS.RETICULATED NFR BLD AUTO: 6.7 % (ref 0.9–11.2)
PMV BLD AUTO: 11.7 FL (ref 7.4–10.4)
PO2 BLDA: 57 MMHG (ref 80–100)
PO2 BLDA: 63 MMHG (ref 80–100)
POCT GLUCOSE: 141 MG/DL (ref 70–110)
POCT GLUCOSE: 155 MG/DL (ref 70–110)
POCT GLUCOSE: 172 MG/DL (ref 70–110)
POTASSIUM BLOOD GAS (OHS): 3.3 MMOL/L (ref 3.5–5)
POTASSIUM BLOOD GAS (OHS): 3.7 MMOL/L (ref 3.5–5)
POTASSIUM SERPL-SCNC: 3.8 MMOL/L (ref 3.5–5.1)
PROT SERPL-MCNC: 5.3 GM/DL (ref 5.8–7.6)
RBC # BLD AUTO: 3.86 X10(6)/MCL (ref 4.2–5.4)
SAMPLE SITE BLOOD GAS (OHS): ABNORMAL
SAMPLE SITE BLOOD GAS (OHS): ABNORMAL
SAO2 % BLDA: 90 %
SAO2 % BLDA: 93 %
SODIUM BLOOD GAS (OHS): 149 MMOL/L (ref 137–145)
SODIUM BLOOD GAS (OHS): 150 MMOL/L (ref 137–145)
SODIUM SERPL-SCNC: 147 MMOL/L (ref 136–145)
SPONT+MECH VT ON VENT: 450 ML
SPONT+MECH VT ON VENT: 450 ML
WBC # BLD AUTO: 18.94 X10(3)/MCL (ref 4.5–11.5)

## 2024-08-13 PROCEDURE — 99900035 HC TECH TIME PER 15 MIN (STAT)

## 2024-08-13 PROCEDURE — 25000003 PHARM REV CODE 250: Performed by: STUDENT IN AN ORGANIZED HEALTH CARE EDUCATION/TRAINING PROGRAM

## 2024-08-13 PROCEDURE — 99900031 HC PATIENT EDUCATION (STAT)

## 2024-08-13 PROCEDURE — 94760 N-INVAS EAR/PLS OXIMETRY 1: CPT | Mod: XB

## 2024-08-13 PROCEDURE — 63600175 PHARM REV CODE 636 W HCPCS: Performed by: STUDENT IN AN ORGANIZED HEALTH CARE EDUCATION/TRAINING PROGRAM

## 2024-08-13 PROCEDURE — 82803 BLOOD GASES ANY COMBINATION: CPT

## 2024-08-13 PROCEDURE — 99900026 HC AIRWAY MAINTENANCE (STAT)

## 2024-08-13 PROCEDURE — 27200966 HC CLOSED SUCTION SYSTEM

## 2024-08-13 PROCEDURE — 27100171 HC OXYGEN HIGH FLOW UP TO 24 HOURS

## 2024-08-13 PROCEDURE — 85025 COMPLETE CBC W/AUTO DIFF WBC: CPT

## 2024-08-13 PROCEDURE — 63600175 PHARM REV CODE 636 W HCPCS: Performed by: PSYCHIATRY & NEUROLOGY

## 2024-08-13 PROCEDURE — 36600 WITHDRAWAL OF ARTERIAL BLOOD: CPT

## 2024-08-13 PROCEDURE — 63600175 PHARM REV CODE 636 W HCPCS

## 2024-08-13 PROCEDURE — 25000003 PHARM REV CODE 250: Performed by: PSYCHIATRY & NEUROLOGY

## 2024-08-13 PROCEDURE — 94761 N-INVAS EAR/PLS OXIMETRY MLT: CPT | Mod: XB

## 2024-08-13 PROCEDURE — 25000003 PHARM REV CODE 250

## 2024-08-13 PROCEDURE — 94003 VENT MGMT INPAT SUBQ DAY: CPT

## 2024-08-13 PROCEDURE — 80053 COMPREHEN METABOLIC PANEL: CPT

## 2024-08-13 PROCEDURE — 20000000 HC ICU ROOM

## 2024-08-13 PROCEDURE — 25000003 PHARM REV CODE 250: Performed by: NURSE PRACTITIONER

## 2024-08-13 PROCEDURE — 36415 COLL VENOUS BLD VENIPUNCTURE: CPT

## 2024-08-13 RX ORDER — NOREPINEPHRINE BITARTRATE/D5W 8 MG/250ML
0-3 PLASTIC BAG, INJECTION (ML) INTRAVENOUS CONTINUOUS
Status: DISCONTINUED | OUTPATIENT
Start: 2024-08-13 | End: 2024-08-23

## 2024-08-13 RX ORDER — FENTANYL CITRATE 50 UG/ML
25 INJECTION, SOLUTION INTRAMUSCULAR; INTRAVENOUS
Status: DISCONTINUED | OUTPATIENT
Start: 2024-08-13 | End: 2024-08-23

## 2024-08-13 RX ORDER — PROPOFOL 10 MG/ML
0-50 INJECTION, EMULSION INTRAVENOUS CONTINUOUS
Status: DISCONTINUED | OUTPATIENT
Start: 2024-08-13 | End: 2024-08-23

## 2024-08-13 RX ADMIN — PROPOFOL 10 MCG/KG/MIN: 10 INJECTION, EMULSION INTRAVENOUS at 08:08

## 2024-08-13 RX ADMIN — MUPIROCIN: 20 OINTMENT TOPICAL at 09:08

## 2024-08-13 RX ADMIN — LEVETIRACETAM 500 MG: 100 INJECTION, SOLUTION INTRAVENOUS at 08:08

## 2024-08-13 RX ADMIN — DEXMEDETOMIDINE HYDROCHLORIDE 0.2 MCG/KG/HR: 400 INJECTION INTRAVENOUS at 03:08

## 2024-08-13 RX ADMIN — FENTANYL CITRATE 25 MCG: 50 INJECTION, SOLUTION INTRAMUSCULAR; INTRAVENOUS at 01:08

## 2024-08-13 RX ADMIN — PIPERACILLIN AND TAZOBACTAM 4.5 G: 4; .5 INJECTION, POWDER, LYOPHILIZED, FOR SOLUTION INTRAVENOUS; PARENTERAL at 09:08

## 2024-08-13 RX ADMIN — Medication 50 MCG/HR: at 01:08

## 2024-08-13 RX ADMIN — PIPERACILLIN AND TAZOBACTAM 4.5 G: 4; .5 INJECTION, POWDER, LYOPHILIZED, FOR SOLUTION INTRAVENOUS; PARENTERAL at 01:08

## 2024-08-13 RX ADMIN — PROPOFOL 35 MCG/KG/MIN: 10 INJECTION, EMULSION INTRAVENOUS at 11:08

## 2024-08-13 RX ADMIN — PIPERACILLIN AND TAZOBACTAM 4.5 G: 4; .5 INJECTION, POWDER, LYOPHILIZED, FOR SOLUTION INTRAVENOUS; PARENTERAL at 05:08

## 2024-08-13 RX ADMIN — NOREPINEPHRINE BITARTRATE 0.04 MCG/KG/MIN: 8 INJECTION, SOLUTION INTRAVENOUS at 12:08

## 2024-08-13 RX ADMIN — MUPIROCIN: 20 OINTMENT TOPICAL at 08:08

## 2024-08-13 RX ADMIN — ASPIRIN 81 MG CHEWABLE TABLET 81 MG: 81 TABLET CHEWABLE at 09:08

## 2024-08-13 RX ADMIN — LEVETIRACETAM 500 MG: 100 INJECTION, SOLUTION INTRAVENOUS at 09:08

## 2024-08-13 RX ADMIN — FENTANYL CITRATE 25 MCG: 50 INJECTION, SOLUTION INTRAMUSCULAR; INTRAVENOUS at 11:08

## 2024-08-13 RX ADMIN — PROPOFOL 40 MCG/KG/MIN: 10 INJECTION, EMULSION INTRAVENOUS at 04:08

## 2024-08-13 RX ADMIN — ATORVASTATIN CALCIUM 40 MG: 40 TABLET, FILM COATED ORAL at 09:08

## 2024-08-13 NOTE — PLAN OF CARE
Problem: Adult Inpatient Plan of Care  Goal: Plan of Care Review  Outcome: Progressing  Goal: Patient-Specific Goal (Individualized)  Outcome: Progressing  Goal: Absence of Hospital-Acquired Illness or Injury  Outcome: Progressing  Goal: Optimal Comfort and Wellbeing  Outcome: Progressing  Goal: Readiness for Transition of Care  Outcome: Progressing     Problem: Infection  Goal: Absence of Infection Signs and Symptoms  Outcome: Progressing     Problem: Coping Ineffective  Goal: Effective Coping  Outcome: Progressing

## 2024-08-13 NOTE — PT/OT/SLP PROGRESS
Physical Therapy      Patient Name:  Shannan Reza   MRN:  [id number]    PT to sign off due to pt not being appropriate for PT services and family seeking comfort care at this time. Please re-consult if status changes.     8/13/2024

## 2024-08-13 NOTE — PROGRESS NOTES
[facility] - [facility]  Pulmonary Critical Care Note    Patient Name: Shannan Reza  MRN: [id number]  Admission Date: 8/7/2024  Hospital Length of Stay: 6 days  Code Status: Full Code  Attending Provider: Chance Flores MD  Primary Care Provider: Kiana Marie MD     Subjective:     HPI:   This is a 73-year-old female with past medical history of hypertension and to strokes in past presenting from outside facility for suspected CVA and seizure-like activity.  Family member present at bedside and states that patient was in normal state of health until yesterday evening.  States she started to feel weak and lethargic and eventually went unresponsive.  Had presentation like this previously when she had stroke.  CT head at outside facility did not show any acute infarct.  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 She was intubated for airway protection and loaded with Keppra.  She was transferred to [facility] for further care.  Repeat imaging was ordered.  Patient continues to be intubated and sedated on propofol. Requiring increased amounts of Levophed and started on vasopressin and stress dose steroids.  Admitted to ICU for close monitoring.    24 Hour Interval History:  No acute events overnight.  Tachypneic despite Precedex.  White blood cell count continues to trend up.  Met with palliative Care yesterday and is considering transition to comfort measures.    Past Medical History:   Diagnosis Date    Carotid-cavernous fistula     Cerebrovascular accident (CVA) due to thrombosis of right middle cerebral artery 6/26/2018    Essential hypertension 6/27/2018    Hemiparesis affecting left side as late effect of cerebrovascular accident     Hx of tobacco use, presenting hazards to health        Past Surgical History:   Procedure Laterality Date    AUGMENTATION OF BREAST      BRAIN SURGERY         Social History     Socioeconomic History     Marital status: Unknown   Tobacco Use    Smoking status: Former     Current packs/day: 0.00     Average packs/day: 0.5 packs/day for 25.0 years (12.5 ttl pk-yrs)     Types: Cigarettes     Start date: 1993     Quit date: 2018     Years since quittin.1    Smokeless tobacco: Former   Substance and Sexual Activity    Alcohol use: Not Currently    Drug use: No    Sexual activity: Never   Social History Narrative    ** Merged History Encounter **          Social Determinants of Health     Financial Resource Strain: Patient Declined (2024)    Overall Financial Resource Strain (CARDIA)     Difficulty of Paying Living Expenses: Patient declined   Food Insecurity: Unknown (2024)    Hunger Vital Sign     Worried About Running Out of Food in the Last Year: Patient declined     Ran Out of Food in the Last Year: Never true   Transportation Needs: Patient Declined (2024)    TRANSPORTATION NEEDS     Transportation : Patient declined   Housing Stability: Patient Declined (2024)    Housing Stability Vital Sign     Unable to Pay for Housing in the Last Year: Patient declined     Homeless in the Last Year: Patient declined           Current Outpatient Medications   Medication Instructions    amLODIPine (NORVASC) 5 mg, Oral, Daily    aspirin (ECOTRIN) 81 mg, Oral, Daily    folic acid (FOLVITE) 1 mg, Oral, Daily    hydrOXYzine HCL (ATARAX) 25 MG tablet TAKE 1 TABLET BY MOUTH THREE TIMES DAILY AS NEEDED FOR ITCHING OR ANXIETY    multivitamin Tab 1 tablet, Oral, Daily    rosuvastatin (CRESTOR) 40 mg, Oral, Nightly       Current Inpatient Medications   aspirin  81 mg Per NG tube Daily    atorvastatin  40 mg Oral Daily    levETIRAcetam (Keppra) IV (PEDS and ADULTS)  500 mg Intravenous BID    mupirocin   Nasal BID    piperacillin-tazobactam (Zosyn) IV (PEDS and ADULTS) (extended infusion is not appropriate)  4.5 g Intravenous Q8H       Current Intravenous Infusions   0.9% NaCl   Intravenous Continuous   Held  at 08/08/24 1700    fentanyl  0-250 mcg/hr Intravenous Continuous 2.5 mL/hr at 08/09/24 1650 25 mcg/hr at 08/09/24 1650    NORepinephrine bitartrate-D5W  0-3 mcg/kg/min Intravenous Continuous   Stopped at 08/09/24 0721    propofoL  0-50 mcg/kg/min Intravenous Continuous             Review of Systems   Unable to perform ROS: Intubated          Objective:       Intake/Output Summary (Last 24 hours) at 8/13/2024 0747  Last data filed at 8/13/2024 0621  Gross per 24 hour   Intake 2595.2 ml   Output 2450 ml   Net 145.2 ml         Vital Signs (Most Recent):  Temp: 98.1 °F (36.7 °C) (08/13/24 0400)  Pulse: 85 (08/13/24 0704)  Resp: (!) 38 (08/13/24 0704)  BP: 139/64 (08/13/24 0700)  SpO2: 97 % (08/13/24 0704)  Body mass index is 26.21 kg/m².  Weight: 65 kg (143 lb 4.8 oz) Vital Signs (24h Range):  Temp:  [97 °F (36.1 °C)-98.1 °F (36.7 °C)] 98.1 °F (36.7 °C)  Pulse:  [52-95] 85  Resp:  [23-40] 38  SpO2:  [91 %-100 %] 97 %  BP: ()/(48-91) 139/64     Physical Exam  Vitals reviewed.   Constitutional:       Appearance: She is ill-appearing.   HENT:      Head: Normocephalic.   Eyes:      Comments: L pupil 2mm, R pupil 3mm. L slightly reactive. R pupil fixed.   Cardiovascular:      Rate and Rhythm: Normal rate and regular rhythm.      Heart sounds: No murmur heard.  Pulmonary:      Breath sounds: Normal breath sounds.      Comments: Intubated and mechanically ventilated.   Abdominal:      Palpations: Abdomen is soft.   Neurological:      Comments: Withdraws to pain LUE and LLE. No withdrawal on right side.            Lines/Drains/Airways       Drain  Duration                  NG/OG Tube 08/06/24 0000 Center mouth 7 days         Urethral Catheter 08/12/24 1620 Silicone 16 Fr. <1 day              Airway  Duration                  Airway - Non-Surgical 08/07/24 2147 Endotracheal Tube 5 days              Peripheral Intravenous Line  Duration                  Peripheral IV - Single Lumen 08/09/24 1511 20 G 1 in  Anterior;Left;Lateral Wrist 3 days         Peripheral IV - Single Lumen 08/09/24 1511 20 G 1 in No Left;Anterior Antecubital 3 days         Peripheral IV - Single Lumen 08/13/24 0300 18 G Anterior;Left Upper Arm <1 day                    Significant Labs:    Lab Results   Component Value Date    WBC 18.94 (H) 08/13/2024    HGB 11.2 (L) 08/13/2024    HCT 34.2 (L) 08/13/2024    MCV 88.6 08/13/2024     08/13/2024           BMP  Lab Results   Component Value Date     (H) 08/13/2024    K 3.8 08/13/2024    CO2 20 (L) 08/13/2024    BUN 16.9 08/13/2024    CREATININE 0.59 08/13/2024    CALCIUM 8.6 08/13/2024    AGAP 9.0 08/13/2024    ESTGFRAFRICA >60.0 07/07/2022    EGFRNONAA >60.0 07/07/2022         ABG  Recent Labs   Lab 08/13/24  0505   PH 7.480*   PO2 63.0*   PCO2 33.0*   HCO3 24.6   POCBASEDEF 1.50       Mechanical Ventilation Support:  Vent Mode: A/C (08/13/24 0645)  Ventilator Initiated: Yes (08/07/24 2147)  Set Rate: 14 BPM (08/13/24 0645)  Vt Set: 450 mL (08/13/24 0645)  PEEP/CPAP: 5 cmH20 (08/13/24 0645)  Oxygen Concentration (%): 60 (08/13/24 0600)  Peak Airway Pressure: 22 cmH20 (08/13/24 0645)  Total Ve: 15.8 L/m (08/13/24 0645)  F/VT Ratio<105 (RSBI): (!) 82 (08/13/24 0645)      Significant Imaging:  I have reviewed the pertinent imaging within the past 24 hours.  Chest x-ray with worsening infiltrates bilaterally, right-greater-than-left    Assessment/Plan:     Assessment  Right EMANUEL stroke  Sepsis  Lactic acidosis  - resolved  Hypokalemia - resolved  Hx HTN, CVA with residual left sided weakness       Plan  Continue Keppra 500 mg b.i.d.  Continue aspirin, statin  Follow-up interventional Neurology recommendations regarding DSA  Minimize sedation  Blood pressure goals of systolic 100-180  Continue mechanical ventilation. No plans for SBT at this time.  Continue vancomycin and Zosyn.  Follow up sputum culture  Appreciate cardiology recommendations regarding new onset systolic dysfunction  Will need  to further discuss goals of care with family. Appreciate palliative care recommendations.     34 minutes of critical care was time spent personally by me on the following activities: development of treatment plan with patient or surrogate and bedside caregivers, discussions with consultants, evaluation of patient's response to treatment, examination of patient, ordering and performing treatments and interventions, ordering and review of laboratory studies, ordering and review of radiographic studies, pulse oximetry, re-evaluation of patient's condition.  This critical care time did not overlap with that of any other provider or involve time for any procedures.       Vladimir Acevedo MD  Pulmonary Critical Care Medicine  [facility] - [facility] ICU  DOS: 08/13/2024

## 2024-08-13 NOTE — NURSING
Nurses Note -- 4 Eyes      8/13/2024   5:05 PM      Skin assessed during: Daily Assessment      [x] No Altered Skin Integrity Present    [x]Prevention Measures Documented      [] Yes- Altered Skin Integrity Present or Discovered   [] LDA Added if Not in Epic (Describe Wound)   [] New Altered Skin Integrity was Present on Admit and Documented in LDA   [] Wound Image Taken    Wound Care Consulted? No    Attending Nurse: TRACEY Mari.    Second RN/Staff Member:  TRACEY Vargas.

## 2024-08-13 NOTE — PROGRESS NOTES
Inpatient Nutrition Assessment    Admit Date: 8/7/2024   Total duration of encounter: 6 days   Patient Age: 73 y.o.    Nutrition Recommendation/Prescription     Tube feeding recommendations:   Peptamen AF, goal rate of 60 mL/hr  1440 kcal/day (98% estimated needs)  90 g protein/day (100% estimated needs)  972 mL free water/day (66% estimated needs)  Calculations based on estimated run time of 20 hours/day      Communication of Recommendations: reviewed with nurse    Nutrition Assessment     Malnutrition Assessment/Nutrition-Focused Physical Exam    Malnutrition Context: other (see comments) (unable to obtain) (08/08/24 1114)     Energy Intake (Malnutrition): other (see comments) (unable to obtain) (08/08/24 1114)  Weight Loss (Malnutrition): other (see comments) (unable to obtain) (08/08/24 1114)  Subcutaneous Fat (Malnutrition): other (see comments) (unable to obtain) (08/08/24 1114)           Muscle Mass (Malnutrition): other (see comments) (unable to obtain) (08/08/24 1114)                          Fluid Accumulation (Malnutrition): other (see comments) (none per flowsheets) (08/08/24 1114)        A minimum of two characteristics is recommended for diagnosis of either severe or non-severe malnutrition.    Chart Review    Reason Seen: continuous nutrition monitoring, malnutrition screening tool (MST), physician consult for tube feeding recommendations, and follow-up    Malnutrition Screening Tool Results   Have you recently lost weight without trying?: Unsure  Have you been eating poorly because of a decreased appetite?: No   MST Score: 2   Diagnosis:  Suspected CVA versus seizures  Lactic acidosis  Hypokalemia    Relevant Medical History:   CVA, HTN, hemiparesis affected left side    Scheduled Medications:  aspirin, 81 mg, Daily  atorvastatin, 40 mg, Daily  levETIRAcetam (Keppra) IV (PEDS and ADULTS), 500 mg, BID  mupirocin, , BID  piperacillin-tazobactam (Zosyn) IV (PEDS and ADULTS) (extended infusion is not  appropriate), 4.5 g, Q8H    Continuous Infusions:  0.9% NaCl, Last Rate: Stopped (08/08/24 1700)  fentanyl, Last Rate: 25 mcg/hr (08/09/24 1650)  NORepinephrine bitartrate-D5W  propofoL, Last Rate: 10 mcg/kg/min (08/13/24 0850)    PRN Medications:  bisacodyL, 10 mg, Daily PRN  dextrose 10%, 12.5 g, PRN  dextrose 10%, 25 g, PRN  fentaNYL, 25 mcg, Q2H PRN  glucagon (human recombinant), 1 mg, PRN  insulin aspart U-100, 0-5 Units, Q6H PRN  sodium chloride 0.9%, 10 mL, PRN    Calorie Containing IV Medications: no significant kcals from medications at this time    Recent Labs   Lab 08/07/24  2337 08/08/24  0406 08/08/24  1625 08/09/24  0241 08/10/24  0308 08/11/24  0329 08/12/24  0348 08/13/24  0447    140  --  137 138 145 143 147*   K 3.3* 4.3  --  4.5 4.4 3.7 3.8 3.8   CALCIUM 7.8* 7.9*  --  8.1* 8.6 8.6 8.5 8.6   PHOS 2.8  --   --  2.4  --   --   --   --    MG 1.70  --   --  1.70  --   --   --   --    CO2 11* 16*  --  18* 16* 19* 17* 20*   BUN 9.7* 9.2*  --  12.9 23.4* 19.7 20.1 16.9   CREATININE 0.82 0.75  --  0.71 0.79 0.75 0.66 0.59   EGFRNORACEVR >60 >60  --  >60 >60 >60 >60 >60   GLUCOSE 213* 215*  --  180* 125* 124* 207* 117*   BILITOT 0.4 0.5  --  0.4 0.5 0.6 0.5 0.6   ALKPHOS 77 69  --  69 75 82 89 113   ALT 19 17  --  17 25 32 37 41   AST 44* 46*  --  55* 80* 65* 57* 49*   ALBUMIN 2.9* 2.6*  --  2.4* 2.9* 2.7* 2.3* 2.3*   TRIG  --   --  95  --   --   --   --   --    HGBA1C  --   --  5.7  --   --   --   --   --    WBC 19.19* 21.60*  --  17.94* 18.08* 14.54* 17.17* 18.94*   HGB 11.9* 12.0  --  10.6* 10.9* 10.7* 11.4* 11.2*   HCT 35.9* 35.8*  --  30.9* 31.7* 31.3* 34.1* 34.2*     Nutrition Orders:  Diet NPO  Tube Feedings/Formulas 60; 1,200; Peptamen AF; OG; Banatrol TF; 100; Every 4 hours    Appetite/Oral Intake: NPO/not applicable  Factors Affecting Nutritional Intake: on mechanical ventilation  Social Needs Impacting Access to Food: unable to assess at this time; will attempt on  "follow-up  Food/Yarsani/Cultural Preferences: unable to obtain  Food Allergies: unable to obtain  Last Bowel Movement: 08/13/24  Wound(s): no pressure injuries    Comments    8/8/24: Intubated. Currently off sedation. On vasopressor support. MAP > 65. OG in place. No nutrition support ordered at this time. Plan for angiogram later today noted. No family at bedside to gather information on nutrition/weight history. Per chart review/past records, ~10 lb weight gain over the last 9-10 months.    8/9/24: Consulted for TF recommendations. Remains intubated without continuous sedation. Weaning vasopressor support. OG in place, trickle feeds ordered - discussed with nursing staff - will adjust goal rate to meet estimated needs.    8/13/24: TF continues, tolerated per RN.     Anthropometrics    Height: 5' 2" (157.5 cm),    Last Weight: 65 kg (143 lb 4.8 oz) (08/07/24 2200), Weight Method: Bed Scale     BMI Classification: overweight (BMI 25-29.9)     Ideal Body Weight (IBW), Female: 110 lb                                   Usual Weight Provided By: EMR weight history    Wt Readings from Last 5 Encounters:   08/07/24 65 kg (143 lb 4.8 oz)   10/23/23 60.7 kg (133 lb 12.8 oz)   04/10/23 59.4 kg (131 lb)   10/10/22 60.8 kg (134 lb)   08/02/22 60.8 kg (134 lb)     Weight Change(s) Since Admission:   Wt Readings from Last 1 Encounters:   08/07/24 2200 65 kg (143 lb 4.8 oz)   Admit Weight: 65 kg (143 lb 4.8 oz) (08/07/24 2200), Weight Method: Bed Scale    Estimated Needs    Weight Used For Calorie Calculations: 65 kg (143 lb 4.8 oz)  Energy Calorie Requirements (kcal): 1468 kcal/day  [facility]  Weight Used For Protein Calculations: 65 kg (143 lb 4.8 oz)  Protein Requirements: 78-98 g (1.2-1.5 g/kg)  Fluid Requirements (mL): 1468 mL (1 mL/kcal)  CHO Requirement: 165 g (45% of estimated needs)   Calculated 8/8/24: Tmax 36.7C, Ve 10.2    Enteral Nutrition     Formula: Peptamen AF  Rate/Volume: 60 " mL/hr  Water Flushes: 100 mL q4h  Additives/Modulars: none at this time  Route: orogastric tube  Method: continuous  Total Nutrition Provided by Tube Feeding, Additives, and Flushes:  Calories Provided  1440 kcal/d, 98% needs   Protein Provided  90 g/d, 100% needs   Fluid Provided  972 ml/d, 63% needs   Continuous feeding calculations based on estimated 20 hr/d run time unless otherwise stated.    Parenteral Nutrition     Patient not receiving parenteral nutrition support at this time.    Evaluation of Received Nutrient Intake    Calories: meeting estimated needs  Protein: meeting estimated needs    Patient Education     Not applicable.    Nutrition Diagnosis     PES: Inadequate oral intake related to inability to consume sufficient nutrients as evidenced by intubated/NPO. (active)     Nutrition Interventions     Intervention(s): collaboration with other providers    Goal: Meet greater than 80% of nutritional needs by follow-up. (goal progressing)  Goal: Tolerate enteral feeding at goal rate by follow-up. (goal progressing)    Nutrition Goals & Monitoring     Dietitian will monitor: energy intake, enteral nutrition intake, weight, electrolyte/renal panel, glucose/endocrine profile, and gastrointestinal profile  Discharge planning: too early to determine; pending clinical course  Nutrition Risk/Follow-Up: high (follow-up in 1-4 days)   Please consult if re-assessment needed sooner.

## 2024-08-14 LAB
ALBUMIN SERPL-MCNC: 1.9 G/DL (ref 3.4–4.8)
ALBUMIN/GLOB SERPL: 0.5 RATIO (ref 1.1–2)
ALLENS TEST BLOOD GAS (OHS): YES
ALP SERPL-CCNC: 119 UNIT/L (ref 40–150)
ALT SERPL-CCNC: 30 UNIT/L (ref 0–55)
ANION GAP SERPL CALC-SCNC: 10 MEQ/L
ANION GAP SERPL CALC-SCNC: 10 MEQ/L
ANION GAP SERPL CALC-SCNC: 11 MEQ/L
AST SERPL-CCNC: 35 UNIT/L (ref 5–34)
BASE EXCESS BLD CALC-SCNC: 1.4 MMOL/L
BASOPHILS # BLD AUTO: 0.04 X10(3)/MCL
BASOPHILS NFR BLD AUTO: 0.2 %
BILIRUB SERPL-MCNC: 0.5 MG/DL
BLOOD GAS SAMPLE TYPE (OHS): ABNORMAL
BUN SERPL-MCNC: 17.8 MG/DL (ref 9.8–20.1)
BUN SERPL-MCNC: 18.4 MG/DL (ref 9.8–20.1)
BUN SERPL-MCNC: 18.8 MG/DL (ref 9.8–20.1)
CA-I BLD-SCNC: 1.24 MMOL/L (ref 1.12–1.23)
CALCIUM SERPL-MCNC: 8.5 MG/DL (ref 8.4–10.2)
CALCIUM SERPL-MCNC: 8.6 MG/DL (ref 8.4–10.2)
CALCIUM SERPL-MCNC: 8.7 MG/DL (ref 8.4–10.2)
CHLORIDE SERPL-SCNC: 118 MMOL/L (ref 98–107)
CHLORIDE SERPL-SCNC: 122 MMOL/L (ref 98–107)
CHLORIDE SERPL-SCNC: 124 MMOL/L (ref 98–107)
CO2 BLDA-SCNC: 28.6 MMOL/L
CO2 SERPL-SCNC: 19 MMOL/L (ref 23–31)
CO2 SERPL-SCNC: 21 MMOL/L (ref 23–31)
CO2 SERPL-SCNC: 21 MMOL/L (ref 23–31)
CREAT SERPL-MCNC: 0.68 MG/DL (ref 0.55–1.02)
CREAT SERPL-MCNC: 0.7 MG/DL (ref 0.55–1.02)
CREAT SERPL-MCNC: 0.7 MG/DL (ref 0.55–1.02)
CREAT/UREA NIT SERPL: 25
CREAT/UREA NIT SERPL: 27
CREAT/UREA NIT SERPL: 27
DRAWN BY BLOOD GAS (OHS): ABNORMAL
EOSINOPHIL # BLD AUTO: 0.89 X10(3)/MCL (ref 0–0.9)
EOSINOPHIL NFR BLD AUTO: 4.8 %
ERYTHROCYTE [DISTWIDTH] IN BLOOD BY AUTOMATED COUNT: 16.2 % (ref 11.5–17)
GFR SERPLBLD CREATININE-BSD FMLA CKD-EPI: >60 ML/MIN/1.73/M2
GLOBULIN SER-MCNC: 3.5 GM/DL (ref 2.4–3.5)
GLUCOSE SERPL-MCNC: 125 MG/DL (ref 82–115)
GLUCOSE SERPL-MCNC: 149 MG/DL (ref 82–115)
GLUCOSE SERPL-MCNC: 150 MG/DL (ref 82–115)
HCO3 BLDA-SCNC: 27.2 MMOL/L (ref 22–26)
HCT VFR BLD AUTO: 36.8 % (ref 37–47)
HGB BLD-MCNC: 11.9 G/DL (ref 12–16)
IMM GRANULOCYTES # BLD AUTO: 0.36 X10(3)/MCL (ref 0–0.04)
IMM GRANULOCYTES NFR BLD AUTO: 1.9 %
INHALED O2 CONCENTRATION: 65 %
LYMPHOCYTES # BLD AUTO: 1.77 X10(3)/MCL (ref 0.6–4.6)
LYMPHOCYTES NFR BLD AUTO: 9.5 %
MCH RBC QN AUTO: 29.8 PG (ref 27–31)
MCHC RBC AUTO-ENTMCNC: 32.3 G/DL (ref 33–36)
MCV RBC AUTO: 92 FL (ref 80–94)
MECH RR (OHS): 14 B/MIN
MODE (OHS): AC
MONOCYTES # BLD AUTO: 1.31 X10(3)/MCL (ref 0.1–1.3)
MONOCYTES NFR BLD AUTO: 7 %
NEUTROPHILS # BLD AUTO: 14.32 X10(3)/MCL (ref 2.1–9.2)
NEUTROPHILS NFR BLD AUTO: 76.6 %
NRBC BLD AUTO-RTO: 0.5 %
OXYGEN DEVICE BLOOD GAS (OHS): ABNORMAL
PCO2 BLDA: 47 MMHG (ref 35–45)
PEEP RESPIRATORY: 8 CMH2O
PH BLDA: 7.37 [PH] (ref 7.35–7.45)
PLATELET # BLD AUTO: 198 X10(3)/MCL (ref 130–400)
PLATELETS.RETICULATED NFR BLD AUTO: 4.4 % (ref 0.9–11.2)
PMV BLD AUTO: 11.2 FL (ref 7.4–10.4)
PO2 BLDA: 61 MMHG (ref 80–100)
POCT GLUCOSE: 143 MG/DL (ref 70–110)
POCT GLUCOSE: 143 MG/DL (ref 70–110)
POCT GLUCOSE: 171 MG/DL (ref 70–110)
POTASSIUM BLOOD GAS (OHS): 3.9 MMOL/L (ref 3.5–5)
POTASSIUM SERPL-SCNC: 4.1 MMOL/L (ref 3.5–5.1)
POTASSIUM SERPL-SCNC: 4.1 MMOL/L (ref 3.5–5.1)
POTASSIUM SERPL-SCNC: 4.2 MMOL/L (ref 3.5–5.1)
PROT SERPL-MCNC: 5.4 GM/DL (ref 5.8–7.6)
RBC # BLD AUTO: 4 X10(6)/MCL (ref 4.2–5.4)
SAMPLE SITE BLOOD GAS (OHS): ABNORMAL
SAO2 % BLDA: 90 %
SODIUM BLOOD GAS (OHS): 148 MMOL/L (ref 137–145)
SODIUM SERPL-SCNC: 150 MMOL/L (ref 136–145)
SODIUM SERPL-SCNC: 153 MMOL/L (ref 136–145)
SODIUM SERPL-SCNC: 153 MMOL/L (ref 136–145)
SPONT+MECH VT ON VENT: 450 ML
WBC # BLD AUTO: 18.69 X10(3)/MCL (ref 4.5–11.5)

## 2024-08-14 PROCEDURE — 25000003 PHARM REV CODE 250: Performed by: PSYCHIATRY & NEUROLOGY

## 2024-08-14 PROCEDURE — 85025 COMPLETE CBC W/AUTO DIFF WBC: CPT

## 2024-08-14 PROCEDURE — 99900035 HC TECH TIME PER 15 MIN (STAT)

## 2024-08-14 PROCEDURE — 63600175 PHARM REV CODE 636 W HCPCS: Performed by: STUDENT IN AN ORGANIZED HEALTH CARE EDUCATION/TRAINING PROGRAM

## 2024-08-14 PROCEDURE — 63600175 PHARM REV CODE 636 W HCPCS: Performed by: PSYCHIATRY & NEUROLOGY

## 2024-08-14 PROCEDURE — 94760 N-INVAS EAR/PLS OXIMETRY 1: CPT

## 2024-08-14 PROCEDURE — 25000003 PHARM REV CODE 250: Performed by: STUDENT IN AN ORGANIZED HEALTH CARE EDUCATION/TRAINING PROGRAM

## 2024-08-14 PROCEDURE — 20000000 HC ICU ROOM

## 2024-08-14 PROCEDURE — 25000003 PHARM REV CODE 250

## 2024-08-14 PROCEDURE — 63600175 PHARM REV CODE 636 W HCPCS

## 2024-08-14 PROCEDURE — 99900026 HC AIRWAY MAINTENANCE (STAT)

## 2024-08-14 PROCEDURE — 82803 BLOOD GASES ANY COMBINATION: CPT

## 2024-08-14 PROCEDURE — 94761 N-INVAS EAR/PLS OXIMETRY MLT: CPT | Mod: XB

## 2024-08-14 PROCEDURE — 27100171 HC OXYGEN HIGH FLOW UP TO 24 HOURS

## 2024-08-14 PROCEDURE — 80053 COMPREHEN METABOLIC PANEL: CPT

## 2024-08-14 PROCEDURE — 27200966 HC CLOSED SUCTION SYSTEM

## 2024-08-14 PROCEDURE — 36415 COLL VENOUS BLD VENIPUNCTURE: CPT

## 2024-08-14 PROCEDURE — 94003 VENT MGMT INPAT SUBQ DAY: CPT

## 2024-08-14 PROCEDURE — 36600 WITHDRAWAL OF ARTERIAL BLOOD: CPT

## 2024-08-14 PROCEDURE — 36415 COLL VENOUS BLD VENIPUNCTURE: CPT | Performed by: STUDENT IN AN ORGANIZED HEALTH CARE EDUCATION/TRAINING PROGRAM

## 2024-08-14 PROCEDURE — 25000003 PHARM REV CODE 250: Performed by: NURSE PRACTITIONER

## 2024-08-14 PROCEDURE — 99233 SBSQ HOSP IP/OBS HIGH 50: CPT | Mod: ,,, | Performed by: NURSE PRACTITIONER

## 2024-08-14 PROCEDURE — 99900031 HC PATIENT EDUCATION (STAT)

## 2024-08-14 RX ORDER — PROPRANOLOL HYDROCHLORIDE 10 MG/1
20 TABLET ORAL 2 TIMES DAILY
Status: DISCONTINUED | OUTPATIENT
Start: 2024-08-14 | End: 2024-08-21

## 2024-08-14 RX ORDER — FUROSEMIDE 10 MG/ML
40 INJECTION INTRAMUSCULAR; INTRAVENOUS ONCE
Status: COMPLETED | OUTPATIENT
Start: 2024-08-14 | End: 2024-08-14

## 2024-08-14 RX ORDER — DEXTROSE MONOHYDRATE 50 MG/ML
INJECTION, SOLUTION INTRAVENOUS CONTINUOUS
Status: ACTIVE | OUTPATIENT
Start: 2024-08-14 | End: 2024-08-14

## 2024-08-14 RX ADMIN — PIPERACILLIN AND TAZOBACTAM 4.5 G: 4; .5 INJECTION, POWDER, LYOPHILIZED, FOR SOLUTION INTRAVENOUS; PARENTERAL at 06:08

## 2024-08-14 RX ADMIN — PIPERACILLIN AND TAZOBACTAM 4.5 G: 4; .5 INJECTION, POWDER, LYOPHILIZED, FOR SOLUTION INTRAVENOUS; PARENTERAL at 09:08

## 2024-08-14 RX ADMIN — ATORVASTATIN CALCIUM 40 MG: 40 TABLET, FILM COATED ORAL at 09:08

## 2024-08-14 RX ADMIN — LEVETIRACETAM 500 MG: 100 INJECTION, SOLUTION INTRAVENOUS at 08:08

## 2024-08-14 RX ADMIN — FUROSEMIDE 40 MG: 10 INJECTION, SOLUTION INTRAMUSCULAR; INTRAVENOUS at 09:08

## 2024-08-14 RX ADMIN — PIPERACILLIN AND TAZOBACTAM 4.5 G: 4; .5 INJECTION, POWDER, LYOPHILIZED, FOR SOLUTION INTRAVENOUS; PARENTERAL at 01:08

## 2024-08-14 RX ADMIN — ASPIRIN 81 MG CHEWABLE TABLET 81 MG: 81 TABLET CHEWABLE at 09:08

## 2024-08-14 RX ADMIN — PROPOFOL 35 MCG/KG/MIN: 10 INJECTION, EMULSION INTRAVENOUS at 05:08

## 2024-08-14 RX ADMIN — PROPOFOL 10 MCG/KG/MIN: 10 INJECTION, EMULSION INTRAVENOUS at 09:08

## 2024-08-14 RX ADMIN — PROPRANOLOL HYDROCHLORIDE 20 MG: 10 TABLET ORAL at 10:08

## 2024-08-14 RX ADMIN — LEVETIRACETAM 500 MG: 100 INJECTION, SOLUTION INTRAVENOUS at 09:08

## 2024-08-14 RX ADMIN — DEXTROSE MONOHYDRATE: 50 INJECTION, SOLUTION INTRAVENOUS at 10:08

## 2024-08-14 RX ADMIN — PROPRANOLOL HYDROCHLORIDE 20 MG: 10 TABLET ORAL at 08:08

## 2024-08-14 RX ADMIN — Medication 100 MCG/HR: at 08:08

## 2024-08-14 NOTE — PROGRESS NOTES
[facility] - [facility] ICU  Pulmonary Critical Care Note    Patient Name: Shannan Reza  MRN: [id number]  Admission Date: 8/7/2024  Hospital Length of Stay: 7 days  Code Status: Full Code  Attending Provider: Vladimir Acevedo MD  Primary Care Provider: Kiana Marie MD     Subjective:     HPI:   This is a 73-year-old female with past medical history of hypertension and to strokes in past presenting from outside facility for suspected CVA and seizure-like activity.  Family member present at bedside and states that patient was in normal state of health until yesterday evening.  States she started to feel weak and lethargic and eventually went unresponsive.  Had presentation like this previously when she had stroke.  CT head at outside facility did not show any acute infarct.  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 She was intubated for airway protection and loaded with Keppra.  She was transferred to [facility] for further care.  Repeat imaging was ordered.  Patient continues to be intubated and sedated on propofol. Requiring increased amounts of Levophed and started on vasopressin and stress dose steroids.  Admitted to ICU for close monitoring.    24 Hour Interval History:  NAEO documented, remains tachypneic but improved over last 24 hours and soft Bps with SBP in 100s. Intubated and remains on ventilation with propofol 35 mcg/kg/min. Remains off of fentanyl and vasopressors. Per palliative care notation, son still considering comfort measures only but is not ready for DNR status yet. Neurology signed off. Persistent leukocytosis and hypernatremia noted at 153 this AM. On Zosyn, Vancomycin discontinued on 8/12. Sputum culture consistent with gram negative rods, pending speciation.     Past Medical History:   Diagnosis Date    Carotid-cavernous fistula     Cerebrovascular accident (CVA) due to thrombosis of right middle cerebral artery  2018    Essential hypertension 2018    Hemiparesis affecting left side as late effect of cerebrovascular accident     Hx of tobacco use, presenting hazards to health        Past Surgical History:   Procedure Laterality Date    AUGMENTATION OF BREAST      BRAIN SURGERY         Social History     Socioeconomic History    Marital status: Unknown   Tobacco Use    Smoking status: Former     Current packs/day: 0.00     Average packs/day: 0.5 packs/day for 25.0 years (12.5 ttl pk-yrs)     Types: Cigarettes     Start date: 1993     Quit date: 2018     Years since quittin.1    Smokeless tobacco: Former   Substance and Sexual Activity    Alcohol use: Not Currently    Drug use: No    Sexual activity: Never   Social History Narrative    ** Merged History Encounter **          Social Determinants of Health     Financial Resource Strain: Patient Declined (2024)    Overall Financial Resource Strain (CARDIA)     Difficulty of Paying Living Expenses: Patient declined   Food Insecurity: Unknown (2024)    Hunger Vital Sign     Worried About Running Out of Food in the Last Year: Patient declined     Ran Out of Food in the Last Year: Never true   Transportation Needs: Patient Declined (2024)    TRANSPORTATION NEEDS     Transportation : Patient declined   Housing Stability: Patient Declined (2024)    Housing Stability Vital Sign     Unable to Pay for Housing in the Last Year: Patient declined     Homeless in the Last Year: Patient declined           Current Outpatient Medications   Medication Instructions    amLODIPine (NORVASC) 5 mg, Oral, Daily    aspirin (ECOTRIN) 81 mg, Oral, Daily    folic acid (FOLVITE) 1 mg, Oral, Daily    hydrOXYzine HCL (ATARAX) 25 MG tablet TAKE 1 TABLET BY MOUTH THREE TIMES DAILY AS NEEDED FOR ITCHING OR ANXIETY    multivitamin Tab 1 tablet, Oral, Daily    rosuvastatin (CRESTOR) 40 mg, Oral, Nightly       Current Inpatient Medications   aspirin  81 mg Per NG tube Daily     atorvastatin  40 mg Oral Daily    levETIRAcetam (Keppra) IV (PEDS and ADULTS)  500 mg Intravenous BID    piperacillin-tazobactam (Zosyn) IV (PEDS and ADULTS) (extended infusion is not appropriate)  4.5 g Intravenous Q8H       Current Intravenous Infusions   0.9% NaCl   Intravenous Continuous   Held at 08/08/24 1700    fentanyl  0-250 mcg/hr Intravenous Continuous 7.5 mL/hr at 08/13/24 1830 75 mcg/hr at 08/13/24 1830    NORepinephrine bitartrate-D5W  0-3 mcg/kg/min Intravenous Continuous 4.9 mL/hr at 08/13/24 1830 0.04 mcg/kg/min at 08/13/24 1830    propofoL  0-50 mcg/kg/min Intravenous Continuous 13.7 mL/hr at 08/13/24 2320 35 mcg/kg/min at 08/13/24 2320         Review of Systems   Unable to perform ROS: Intubated          Objective:       Intake/Output Summary (Last 24 hours) at 8/14/2024 0131  Last data filed at 8/13/2024 2000  Gross per 24 hour   Intake 2325.04 ml   Output 2230 ml   Net 95.04 ml         Vital Signs (Most Recent):  Temp: 99.7 °F (37.6 °C) (08/14/24 0000)  Pulse: 86 (08/14/24 0000)  Resp: 20 (08/14/24 0000)  BP: (!) 101/59 (08/14/24 0000)  SpO2: 95 % (08/14/24 0024)  Body mass index is 26.21 kg/m².  Weight: 65 kg (143 lb 4.8 oz) Vital Signs (24h Range):  Temp:  [97.6 °F (36.4 °C)-99.7 °F (37.6 °C)] 99.7 °F (37.6 °C)  Pulse:  [] 86  Resp:  [19-40] 20  SpO2:  [91 %-99 %] 95 %  BP: ()/(44-94) 101/59     Physical Exam  Vitals reviewed.   Constitutional:       Appearance: She is ill-appearing.   HENT:      Head: Normocephalic.   Eyes:      Comments: L pupil 2mm, R pupil 3mm. L slightly reactive. R pupil fixed.   Cardiovascular:      Rate and Rhythm: Normal rate and regular rhythm.      Heart sounds: No murmur heard.  Pulmonary:      Breath sounds: Normal breath sounds.      Comments: Intubated and mechanically ventilated.   No dyssynchrony to mechanical ventilation.   Abdominal:      Palpations: Abdomen is soft.   Neurological:      Comments: Withdraws to pain LUE and LLE. No withdrawal  on right side.            Lines/Drains/Airways       Drain  Duration                  NG/OG Tube 08/06/24 0000 Center mouth 8 days         Urethral Catheter 08/12/24 1620 Silicone 16 Fr. 1 day              Airway  Duration                  Airway - Non-Surgical 08/07/24 2147 Endotracheal Tube 6 days              Peripheral Intravenous Line  Duration                  Peripheral IV - Single Lumen 08/09/24 1511 20 G 1 in Anterior;Left;Lateral Wrist 4 days         Peripheral IV - Single Lumen 08/09/24 1511 20 G 1 in No Left;Anterior Antecubital 4 days         Peripheral IV - Single Lumen 08/13/24 0300 18 G Anterior;Left Upper Arm <1 day                    Significant Labs:    Lab Results   Component Value Date    WBC 18.94 (H) 08/13/2024    HGB 11.2 (L) 08/13/2024    HCT 34.2 (L) 08/13/2024    MCV 88.6 08/13/2024     08/13/2024           BMP  Lab Results   Component Value Date     (H) 08/13/2024    K 3.8 08/13/2024    CO2 20 (L) 08/13/2024    BUN 16.9 08/13/2024    CREATININE 0.59 08/13/2024    CALCIUM 8.6 08/13/2024    AGAP 9.0 08/13/2024    ESTGFRAFRICA >60.0 07/07/2022    EGFRNONAA >60.0 07/07/2022         ABG  Recent Labs   Lab 08/13/24  1443   PH 7.420   PO2 57.0*   PCO2 41.0   HCO3 26.6*   POCBASEDEF 1.90       Mechanical Ventilation Support:  Vent Mode: A/C (08/14/24 0024)  Ventilator Initiated: Yes (08/07/24 2147)  Set Rate: 14 BPM (08/14/24 0024)  Vt Set: 450 mL (08/14/24 0024)  PEEP/CPAP: 8 cmH20 (08/14/24 0024)  Oxygen Concentration (%): 75 (08/14/24 0024)  Peak Airway Pressure: 24 cmH20 (08/14/24 0024)  Total Ve: 9.4 L/m (08/14/24 0024)  F/VT Ratio<105 (RSBI): (!) 53.92 (08/13/24 2153)      Significant Imaging:  I have reviewed the pertinent imaging within the past 24 hours.    Microbiology Results (last 7 days)       Procedure Component Value Units Date/Time    Blood Culture [[id number]]  (Normal) Collected: 08/08/24 0733    Order Status: Completed Specimen: Blood from Hand, Right Updated:  08/13/24 0900     Blood Culture No Growth at 5 days    Blood Culture [[id number]]  (Normal) Collected: 08/08/24 0733    Order Status: Completed Specimen: Blood from Arm, Right Updated: 08/13/24 0900     Blood Culture No Growth at 5 days    Respiratory Culture [[id number]]  (Abnormal) Collected: 08/11/24 0836    Order Status: Completed Specimen: Sputum from Endotracheal Aspirate Updated: 08/13/24 0807     Respiratory Culture Rare Gram-negative Rods      Rare Yeast     Comment: with no normal respiratory franki        GRAM STAIN Quality 2+      Rare Yeast      No bacteria seen    Respiratory Culture [[id number]]     Order Status: Canceled Specimen: Sputum from Trachael Aspirate              Assessment/Plan:     Assessment  Right EMANUEL stroke  Sepsis  Hypernatremia   Leukocytosis, persistent   Newly Diagnosed Acute Systolic HF w EF 20-25%   Lactic acidosis  - resolved  Hypokalemia - resolved  Hx HTN, CVA with residual left sided weakness       Plan  -Continue ICU level of care   -Wean sedation as tolerated and mechanical ventilation per ARDS net protocol   -Remains off pressors   -Neurology and cardiology signed off   -Continue Keppra 500 mg b.i.d.  -Continue aspirin, statin  -Maintain MAP >65, SBP goal 100-180  -Continue Zosyn, sputum culture consistent with gram negative rods pending speciation. Vancomycin discontinued 8/12  -Palliative care following, Continue to discuss goals of care with family, considering comfort measures only.     34 minutes of critical care was time spent personally by me on the following activities: development of treatment plan with patient or surrogate and bedside caregivers, discussions with consultants, evaluation of patient's response to treatment, examination of patient, ordering and performing treatments and interventions, ordering and review of laboratory studies, ordering and review of radiographic studies, pulse oximetry, re-evaluation of patient's condition.  This critical care time  did not overlap with that of any other provider or involve time for any procedures.       Raymond Vasquez MD  Pulmonary Critical Care Medicine  [facility] - [facility] ICU  DOS: 08/14/2024

## 2024-08-14 NOTE — CONSULTS
"Consults    Patient Name: Shannan Reza   MRN: [id number]   Admission Date: 8/7/2024   Hospital Length of Stay: 8   Attending Provider: Vladimir Acevedo MD   Consulting Provider: Nohelia YARBROUGH  Reason for Consult: Goals of Care  Primary Care Physician:  Kiana Marie MD     Principal Problem: <principal problem not specified>       Final diagnoses:  [I63.9] CVA (cerebral vascular accident)      Assessment/Plan:     I reviewed the patient and family's understanding of the seriousness of the illness and its expected prognosis. We discussed the patient's goals of care and treatment preferences.        Advance Care Planning     Date: 08/15/2024    [facility]  I engaged the family in a voluntary conversation about advance care planning and we specifically addressed what the goals of care would be moving forward, in light of the patient's change in clinical status, specifically current condition.  We did specifically address the patient's likely prognosis, which is poor.  We explored the patient's values and preferences for future care.  The family endorses that what is most important right now is to focus on curative/life-prolongation (regardless of treatment burdens)    Accordingly, we have decided that the best plan to meet the patient's goals includes continuing with treatment        Extensive discussion with son concerning patient's current condition and increasing oxygen requirements.  Son expressed that he "blames" himself for not insisting that patient be sent to [facility] initially. Discussed with son that  patient has history of  strokes and is high-risk for further strokes.  Discussed that guilt is a normal part of the grieving process.   Son reports difficulty in new decisions because the patient was his "best friend" and wants to be "absolutely sure" that  he is making a decision.  He states that he does not want to base his decisions upon conversations with one physician.      Extensive discussion " with son about the course of patient's hospitalization and her now declining respiratory status.  Discussed the palliative Medicine will facilitate any conversation  that son feels he needs wishes to seek more time to see if patient will make and recovery, he may have to consider tracheostomy placement.  Son expressed any note was the patient would not want her life prolonged with poor quality.  Offered extensive support I encouraged to call palliative for any questions or concerns.          Interval History:     Patient remains intubated on 2 pressors with increasing oxygen requirements now on 100% FIO2.  Palliative Medicine continuing to follow.       Active Ambulatory Problems     Diagnosis Date Noted    Cerebrovascular accident (CVA) due to thrombosis of right middle cerebral artery 06/26/2018    Carotid-cavernous fistula     Hemiparesis affecting left side as late effect of cerebrovascular accident 06/27/2018    Essential hypertension 06/27/2018    Anterior communicating artery aneurysm 06/28/2018    Macrocytic anemia 04/26/2019    Hx of tobacco use, presenting hazards to health 12/23/2020    Closed fracture of left proximal humerus 06/02/2021    Chronic left shoulder pain 06/02/2021     Resolved Ambulatory Problems     Diagnosis Date Noted    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     No Additional Past Medical History        Past Surgical History:   Procedure Laterality Date    AUGMENTATION OF BREAST      BRAIN SURGERY          Review of patient's allergies indicates:   Allergen Reactions    Percodan [oxycodone hcl-oxycodone-asa] Itching          Current Facility-Administered Medications:     aspirin chewable tablet 81 mg, 81 mg, Per NG tube, Daily, Shantell Vail, NP, 81 mg at 08/14/24 0901    atorvastatin tablet 40 mg, 40 mg, Oral, Daily, Khalida Orozco MD, 40 mg at 08/14/24 0901    bisacodyL suppository 10 mg, 10 mg, Rectal, Daily PRN, Shantell Vail, NP    dextrose 10% bolus 125 mL 125 mL, 12.5 g,  Intravenous, PRN, Carol Rehman MD    dextrose 10% bolus 250 mL 250 mL, 25 g, Intravenous, PRN, Carol Rehman MD    fentaNYL 2500 mcg in 0.9% sodium chloride 250 mL infusion premix (titrating), 0-250 mcg/hr, Intravenous, Continuous, Vladimir Acevedo MD, Last Rate: 7.5 mL/hr at 08/15/24 0619, 75 mcg/hr at 08/15/24 0619    fentaNYL injection 25 mcg, 25 mcg, Intravenous, Q2H PRN, Tyson Garcia, DO, 25 mcg at 08/13/24 1108    furosemide injection 80 mg, 80 mg, Intravenous, Q6H, Vladimir Acevedo MD    glucagon (human recombinant) injection 1 mg, 1 mg, Intramuscular, PRN, Carol Rehman MD    insulin aspart U-100 injection 0-5 Units, 0-5 Units, Subcutaneous, Q6H PRN, Carol Rehman MD, 2 Units at 08/12/24 1206    levETIRAcetam (Keppra) 500 mg in D5W 100 mL IVPB (MB+), 500 mg, Intravenous, BID, Vickey Singh MD, Stopped at 08/14/24 2114    NORepinephrine 8 mg in dextrose 5% 250 mL infusion, 0-3 mcg/kg/min, Intravenous, Continuous, Vladimir Acevedo MD, Stopped at 08/14/24 0331    piperacillin-tazobactam (ZOSYN) 4.5 g in D5W 100 mL IVPB (MB+), 4.5 g, Intravenous, Q8H, Vladimir Acevedo MD, Stopped at 08/15/24 0621    propofol (DIPRIVAN) 10 mg/mL infusion, 0-50 mcg/kg/min, Intravenous, Continuous, Vladimir Acevedo MD, Last Rate: 3.9 mL/hr at 08/15/24 0619, 10 mcg/kg/min at 08/15/24 0619    propranoloL tablet 20 mg, 20 mg, Oral, BID, Vladimir Acevedo MD, 20 mg at 08/14/24 2011    sodium chloride 0.9% flush 10 mL, 10 mL, Intravenous, PRN, Carol Rehman MD       Current Facility-Administered Medications:     bisacodyL, 10 mg, Rectal, Daily PRN    dextrose 10%, 12.5 g, Intravenous, PRN    dextrose 10%, 25 g, Intravenous, PRN    fentaNYL, 25 mcg, Intravenous, Q2H PRN    glucagon (human recombinant), 1 mg, Intramuscular, PRN    insulin aspart U-100, 0-5 Units, Subcutaneous, Q6H PRN    sodium chloride 0.9%, 10 mL, Intravenous, PRN     Family History   Problem Relation Name Age of Onset    Deep vein thrombosis Father      Melanoma Neg  "Hx            Review of Systems   Unable to perform ROS: Intubated            Objective:   /61   Pulse 75   Temp 99.1 °F (37.3 °C) (Oral)   Resp (!) 21   Ht 5' 2" (1.575 m)   Wt 65 kg (143 lb 4.8 oz)   LMP  (LMP Unknown)   SpO2 97%   BMI 26.21 kg/m²      Physical Exam   Constitutional: She appears ill.   HENT:   Head: Normocephalic.   Cardiovascular: Normal rate. Pulmonary:      Breath sounds: Rhonchi present.     Neurological:   sedated   Skin: There is pallor.            Review of Symptoms      Symptom Assessment (ESAS 0-10 Scale)  Pain:  0  Dyspnea:  0  Anxiety:  0  Nausea:  0  Depression:  0  Anorexia:  0  Fatigue:  0  Insomnia:  0  Restlessness:  0  Agitation:  0         Bowel Management Plan (BMP):  Yes      Psychosocial/Cultural:   See Palliative Psychosocial Note: Yes  **Primary  to Follow**  Palliative Care  Consult: No      Advance Care Planning   Advance Directives:   Goals of Care: What is most important right now is to focus on curative/life-prolongation (regardless of treatment burdens). Accordingly, we have decided that the best plan to meet the patient's goals includes continuing with treatment.          PAINAD: NA    Caregiver burden formerly assessed: Yes      No results displayed because visit has over 200 results.               > 50% of 35 min of encounter was spent in chart review, face to face discussion of goals of care, symptom assessment, coordination of care and emotional support.    Nohelia Chi FNP, [facility]  Palliative Medicine  [facility]    "

## 2024-08-14 NOTE — PROGRESS NOTES
Inpatient Nutrition Assessment    Admit Date: 8/7/2024   Total duration of encounter: 7 days   Patient Age: 73 y.o.    Nutrition Recommendation/Prescription     Tube feeding recommendations:     Peptamen AF, goal rate of 60 mL/hr    1440 kcal/day (98% estimated needs)  90 g protein/day (100% estimated needs)  972 mL free water/day (66% estimated needs)  Calculations based on estimated run time of 20 hours/day    If no IV fluids running, can give 100ml q 2hr water flushes. Total water provided: 1972ml (134% est needs.) Rec to improve Na and Cl. If labs become low, may need to decrease water.       Communication of Recommendations: reviewed with nurse    Nutrition Assessment     Malnutrition Assessment/Nutrition-Focused Physical Exam    Malnutrition Context: other (see comments) (unable to obtain) (08/08/24 1114)     Energy Intake (Malnutrition): other (see comments) (unable to obtain) (08/08/24 1114)  Weight Loss (Malnutrition): other (see comments) (unable to obtain) (08/08/24 1114)  Subcutaneous Fat (Malnutrition): other (see comments) (unable to obtain) (08/08/24 1114)           Muscle Mass (Malnutrition): other (see comments) (unable to obtain) (08/08/24 1114)                          Fluid Accumulation (Malnutrition): other (see comments) (none per flowsheets) (08/08/24 1114)        A minimum of two characteristics is recommended for diagnosis of either severe or non-severe malnutrition.    Chart Review    Reason Seen: continuous nutrition monitoring, malnutrition screening tool (MST), physician consult for tube feeding recommendations, and follow-up    Malnutrition Screening Tool Results   Have you recently lost weight without trying?: Unsure  Have you been eating poorly because of a decreased appetite?: No   MST Score: 2   Diagnosis:  Suspected CVA versus seizures  Lactic acidosis  Hypokalemia    Relevant Medical History:   CVA, HTN, hemiparesis affected left side    Scheduled Medications:  aspirin, 81 mg,  Daily  atorvastatin, 40 mg, Daily  levETIRAcetam (Keppra) IV (PEDS and ADULTS), 500 mg, BID  piperacillin-tazobactam (Zosyn) IV (PEDS and ADULTS) (extended infusion is not appropriate), 4.5 g, Q8H    Continuous Infusions:  D5W  fentanyl, Last Rate: 75 mcg/hr (08/14/24 1019)  NORepinephrine bitartrate-D5W, Last Rate: Stopped (08/14/24 0331)  propofoL, Last Rate: 25 mcg/kg/min (08/14/24 1019)    PRN Medications:  bisacodyL, 10 mg, Daily PRN  dextrose 10%, 12.5 g, PRN  dextrose 10%, 25 g, PRN  fentaNYL, 25 mcg, Q2H PRN  glucagon (human recombinant), 1 mg, PRN  insulin aspart U-100, 0-5 Units, Q6H PRN  sodium chloride 0.9%, 10 mL, PRN    Calorie Containing IV Medications: Diprivan @ 10 ml/hr (provides 260 kcal/d)    Recent Labs   Lab 08/07/24  2337 08/08/24  0406 08/08/24  1625 08/09/24  0241 08/10/24  0308 08/11/24  0329 08/12/24  0348 08/13/24  0447 08/14/24  0304    140  --  137 138 145 143 147* 153*   K 3.3* 4.3  --  4.5 4.4 3.7 3.8 3.8 4.1   CALCIUM 7.8* 7.9*  --  8.1* 8.6 8.6 8.5 8.6 8.6   PHOS 2.8  --   --  2.4  --   --   --   --   --    MG 1.70  --   --  1.70  --   --   --   --   --    CO2 11* 16*  --  18* 16* 19* 17* 20* 19*   BUN 9.7* 9.2*  --  12.9 23.4* 19.7 20.1 16.9 18.4   CREATININE 0.82 0.75  --  0.71 0.79 0.75 0.66 0.59 0.68   EGFRNORACEVR >60 >60  --  >60 >60 >60 >60 >60 >60   GLUCOSE 213* 215*  --  180* 125* 124* 207* 117* 125*   BILITOT 0.4 0.5  --  0.4 0.5 0.6 0.5 0.6 0.5   ALKPHOS 77 69  --  69 75 82 89 113 119   ALT 19 17  --  17 25 32 37 41 30   AST 44* 46*  --  55* 80* 65* 57* 49* 35*   ALBUMIN 2.9* 2.6*  --  2.4* 2.9* 2.7* 2.3* 2.3* 1.9*   TRIG  --   --  95  --   --   --   --   --   --    HGBA1C  --   --  5.7  --   --   --   --   --   --    WBC 19.19* 21.60*  --  17.94* 18.08* 14.54* 17.17* 18.94* 18.69*   HGB 11.9* 12.0  --  10.6* 10.9* 10.7* 11.4* 11.2* 11.9*   HCT 35.9* 35.8*  --  30.9* 31.7* 31.3* 34.1* 34.2* 36.8*     Nutrition Orders:  Diet NPO  Tube Feedings/Formulas 60; 1,200;  "Peptamen AF; OG; Banatrol TF; 100; Every 2 hours    Appetite/Oral Intake: NPO/not applicable  Factors Affecting Nutritional Intake: on mechanical ventilation  Social Needs Impacting Access to Food: unable to assess at this time; will attempt on follow-up  Food/Catholic/Cultural Preferences: unable to obtain  Food Allergies: unable to obtain  Last Bowel Movement: 08/13/24  Wound(s): no pressure injuries    Comments    8/8/24: Intubated. Currently off sedation. On vasopressor support. MAP > 65. OG in place. No nutrition support ordered at this time. Plan for angiogram later today noted. No family at bedside to gather information on nutrition/weight history. Per chart review/past records, ~10 lb weight gain over the last 9-10 months.    8/9/24: Consulted for TF recommendations. Remains intubated without continuous sedation. Weaning vasopressor support. OG in place, trickle feeds ordered - discussed with nursing staff - will adjust goal rate to meet estimated needs.    8/13/24: TF continues, tolerated per RN.     8/14/24: TF continues. Noted increase in Na. Will increase FWF.     Anthropometrics    Height: 5' 2" (157.5 cm),    Last Weight: 65 kg (143 lb 4.8 oz) (08/07/24 2200), Weight Method: Bed Scale     BMI Classification: overweight (BMI 25-29.9)     Ideal Body Weight (IBW), Female: 110 lb                                   Usual Weight Provided By: EMR weight history    Wt Readings from Last 5 Encounters:   08/07/24 65 kg (143 lb 4.8 oz)   10/23/23 60.7 kg (133 lb 12.8 oz)   04/10/23 59.4 kg (131 lb)   10/10/22 60.8 kg (134 lb)   08/02/22 60.8 kg (134 lb)     Weight Change(s) Since Admission:   Wt Readings from Last 1 Encounters:   08/07/24 2200 65 kg (143 lb 4.8 oz)   Admit Weight: 65 kg (143 lb 4.8 oz) (08/07/24 2200), Weight Method: Bed Scale    Estimated Needs    Weight Used For Calorie Calculations: 65 kg (143 lb 4.8 oz)  Energy Calorie Requirements (kcal): 1468 kcal/day  Energy Need Method: [facility] " (modified)  Weight Used For Protein Calculations: 65 kg (143 lb 4.8 oz)  Protein Requirements: 78-98 g (1.2-1.5 g/kg)  Fluid Requirements (mL): 1468 mL (1 mL/kcal)  CHO Requirement: 165 g (45% of estimated needs)   Calculated 8/8/24: Tmax 36.7C, Ve 10.2    Enteral Nutrition     Formula: Peptamen AF  Rate/Volume: 60 mL/hr  Water Flushes: 100 mL q4h  Additives/Modulars: none at this time  Route: orogastric tube  Method: continuous  Total Nutrition Provided by Tube Feeding, Additives, and Flushes:  Calories Provided  1440 kcal/d, 98% needs   Protein Provided  90 g/d, 100% needs   Fluid Provided  972 ml/d, 63% needs   Continuous feeding calculations based on estimated 20 hr/d run time unless otherwise stated.    Parenteral Nutrition     Patient not receiving parenteral nutrition support at this time.    Evaluation of Received Nutrient Intake    Calories: meeting estimated needs  Protein: meeting estimated needs    Patient Education     Not applicable.    Nutrition Diagnosis     PES: Inadequate oral intake related to inability to consume sufficient nutrients as evidenced by intubated/NPO. (active)     Nutrition Interventions     Intervention(s): collaboration with other providers    Goal: Meet greater than 80% of nutritional needs by follow-up. (goal progressing)  Goal: Tolerate enteral feeding at goal rate by follow-up. (goal progressing)    Nutrition Goals & Monitoring     Dietitian will monitor: energy intake, enteral nutrition intake, weight, electrolyte/renal panel, glucose/endocrine profile, and gastrointestinal profile  Discharge planning: too early to determine; pending clinical course  Nutrition Risk/Follow-Up: high (follow-up in 1-4 days)   Please consult if re-assessment needed sooner.

## 2024-08-14 NOTE — NURSING
Nurses Note -- 4 Eyes      8/14/2024   5:28 AM      Skin assessed during: Daily Assessment      [x] No Altered Skin Integrity Present    [x]Prevention Measures Documented      [] Yes- Altered Skin Integrity Present or Discovered   [] LDA Added if Not in Epic (Describe Wound)   [] New Altered Skin Integrity was Present on Admit and Documented in LDA   [] Wound Image Taken    Wound Care Consulted? No    Attending Nurse:  TRACEY Ortiz     Second RN/Staff Member:  DAISY Heart

## 2024-08-14 NOTE — NURSING
Nurses Note -- 4 Eyes      8/14/2024   1:46 PM      Skin assessed during: Daily Assessment      [] No Altered Skin Integrity Present    []Prevention Measures Documented      [x] Yes- Altered Skin Integrity Present or Discovered   [x] LDA Added if Not in Epic (Describe Wound)   [x] New Altered Skin Integrity was Present on Admit and Documented in LDA   [] Wound Image Taken    Wound Care Consulted? Yes    Attending Nurse: TRACEY Mari    Second RN/Staff Member: TRACEY Vargas.

## 2024-08-14 NOTE — PLAN OF CARE
Problem: Adult Inpatient Plan of Care  Goal: Plan of Care Review  Outcome: Progressing  Goal: Patient-Specific Goal (Individualized)  Outcome: Progressing  Goal: Optimal Comfort and Wellbeing  Outcome: Progressing     Problem: Stroke, Ischemic (Includes Transient Ischemic Attack)  Goal: Optimal Coping  Outcome: Progressing  Goal: Effective Bowel Elimination  Outcome: Progressing  Goal: Optimal Cerebral Tissue Perfusion  Outcome: Progressing  Goal: Optimal Nutrition Intake  Outcome: Progressing     Problem: Coping Ineffective  Goal: Effective Coping  Outcome: Progressing     Problem: Adult Inpatient Plan of Care  Goal: Absence of Hospital-Acquired Illness or Injury  Outcome: Not Progressing  Goal: Readiness for Transition of Care  Outcome: Not Progressing     Problem: Stroke, Ischemic (Includes Transient Ischemic Attack)  Goal: Optimal Cognitive Function  Outcome: Not Progressing  Goal: Improved Communication Skills  Outcome: Not Progressing  Goal: Optimal Functional Ability  Outcome: Not Progressing  Goal: Effective Oxygenation and Ventilation  Outcome: Not Progressing  Goal: Improved Sensorimotor Function  Outcome: Not Progressing  Goal: Safe and Effective Swallow  Outcome: Not Progressing  Goal: Effective Urinary Elimination  Outcome: Not Progressing

## 2024-08-15 LAB
ALBUMIN SERPL-MCNC: 1.9 G/DL (ref 3.4–4.8)
ALBUMIN/GLOB SERPL: 0.6 RATIO (ref 1.1–2)
ALLENS TEST BLOOD GAS (OHS): YES
ALP SERPL-CCNC: 117 UNIT/L (ref 40–150)
ALT SERPL-CCNC: 27 UNIT/L (ref 0–55)
ANION GAP SERPL CALC-SCNC: 11 MEQ/L
AST SERPL-CCNC: 38 UNIT/L (ref 5–34)
BACTERIA SPT CULT: ABNORMAL
BACTERIA SPT CULT: ABNORMAL
BASE EXCESS BLD CALC-SCNC: -0.4 MMOL/L
BASOPHILS # BLD AUTO: 0.04 X10(3)/MCL
BASOPHILS NFR BLD AUTO: 0.2 %
BILIRUB SERPL-MCNC: 0.5 MG/DL
BLOOD GAS SAMPLE TYPE (OHS): ABNORMAL
BUN SERPL-MCNC: 24.8 MG/DL (ref 9.8–20.1)
CA-I BLD-SCNC: 1.05 MMOL/L (ref 1.12–1.23)
CALCIUM SERPL-MCNC: 7.8 MG/DL (ref 8.4–10.2)
CHLORIDE SERPL-SCNC: 114 MMOL/L (ref 98–107)
CO2 BLDA-SCNC: 30.1 MMOL/L
CO2 SERPL-SCNC: 19 MMOL/L (ref 23–31)
CREAT SERPL-MCNC: 0.8 MG/DL (ref 0.55–1.02)
CREAT/UREA NIT SERPL: 31
DRAWN BY BLOOD GAS (OHS): ABNORMAL
EOSINOPHIL # BLD AUTO: 0.75 X10(3)/MCL (ref 0–0.9)
EOSINOPHIL NFR BLD AUTO: 4.2 %
ERYTHROCYTE [DISTWIDTH] IN BLOOD BY AUTOMATED COUNT: 16.3 % (ref 11.5–17)
GFR SERPLBLD CREATININE-BSD FMLA CKD-EPI: >60 ML/MIN/1.73/M2
GLOBULIN SER-MCNC: 3 GM/DL (ref 2.4–3.5)
GLUCOSE SERPL-MCNC: 129 MG/DL (ref 82–115)
GRAM STN SPEC: ABNORMAL
HCO3 BLDA-SCNC: 28.1 MMOL/L (ref 22–26)
HCT VFR BLD AUTO: 28.3 % (ref 37–47)
HGB BLD-MCNC: 8.7 G/DL (ref 12–16)
IMM GRANULOCYTES # BLD AUTO: 0.44 X10(3)/MCL (ref 0–0.04)
IMM GRANULOCYTES NFR BLD AUTO: 2.5 %
INHALED O2 CONCENTRATION: 90 %
LYMPHOCYTES # BLD AUTO: 1.22 X10(3)/MCL (ref 0.6–4.6)
LYMPHOCYTES NFR BLD AUTO: 6.8 %
MAGNESIUM SERPL-MCNC: 2.4 MG/DL (ref 1.6–2.6)
MCH RBC QN AUTO: 29 PG (ref 27–31)
MCHC RBC AUTO-ENTMCNC: 30.7 G/DL (ref 33–36)
MCV RBC AUTO: 94.3 FL (ref 80–94)
MECH RR (OHS): 14 B/MIN
MODE (OHS): AC
MONOCYTES # BLD AUTO: 0.76 X10(3)/MCL (ref 0.1–1.3)
MONOCYTES NFR BLD AUTO: 4.3 %
NEUTROPHILS # BLD AUTO: 14.62 X10(3)/MCL (ref 2.1–9.2)
NEUTROPHILS NFR BLD AUTO: 82 %
NRBC BLD AUTO-RTO: 0.2 %
OXYGEN DEVICE BLOOD GAS (OHS): ABNORMAL
PCO2 BLDA: 64 MMHG (ref 35–45)
PEEP RESPIRATORY: 8 CMH2O
PH BLDA: 7.25 [PH] (ref 7.35–7.45)
PHOSPHATE SERPL-MCNC: 4.8 MG/DL (ref 2.3–4.7)
PLATELET # BLD AUTO: 199 X10(3)/MCL (ref 130–400)
PLATELETS.RETICULATED NFR BLD AUTO: 6.1 % (ref 0.9–11.2)
PMV BLD AUTO: 11.3 FL (ref 7.4–10.4)
PO2 BLDA: 72 MMHG (ref 80–100)
POCT GLUCOSE: 141 MG/DL (ref 70–110)
POCT GLUCOSE: 147 MG/DL (ref 70–110)
POCT GLUCOSE: 161 MG/DL (ref 70–110)
POCT GLUCOSE: 174 MG/DL (ref 70–110)
POTASSIUM BLOOD GAS (OHS): 4.1 MMOL/L (ref 3.5–5)
POTASSIUM SERPL-SCNC: 4.8 MMOL/L (ref 3.5–5.1)
PROT SERPL-MCNC: 4.9 GM/DL (ref 5.8–7.6)
RBC # BLD AUTO: 3 X10(6)/MCL (ref 4.2–5.4)
SAMPLE SITE BLOOD GAS (OHS): ABNORMAL
SAO2 % BLDA: 91 %
SODIUM BLOOD GAS (OHS): 142 MMOL/L (ref 137–145)
SODIUM SERPL-SCNC: 144 MMOL/L (ref 136–145)
SPONT+MECH VT ON VENT: 450 ML
WBC # BLD AUTO: 17.83 X10(3)/MCL (ref 4.5–11.5)

## 2024-08-15 PROCEDURE — 25000003 PHARM REV CODE 250: Performed by: NURSE PRACTITIONER

## 2024-08-15 PROCEDURE — 63600175 PHARM REV CODE 636 W HCPCS: Performed by: PSYCHIATRY & NEUROLOGY

## 2024-08-15 PROCEDURE — 94003 VENT MGMT INPAT SUBQ DAY: CPT

## 2024-08-15 PROCEDURE — 25000003 PHARM REV CODE 250

## 2024-08-15 PROCEDURE — 80053 COMPREHEN METABOLIC PANEL: CPT

## 2024-08-15 PROCEDURE — 84100 ASSAY OF PHOSPHORUS: CPT

## 2024-08-15 PROCEDURE — 99900026 HC AIRWAY MAINTENANCE (STAT)

## 2024-08-15 PROCEDURE — 94760 N-INVAS EAR/PLS OXIMETRY 1: CPT | Mod: XB

## 2024-08-15 PROCEDURE — 99900035 HC TECH TIME PER 15 MIN (STAT)

## 2024-08-15 PROCEDURE — 25000003 PHARM REV CODE 250: Performed by: STUDENT IN AN ORGANIZED HEALTH CARE EDUCATION/TRAINING PROGRAM

## 2024-08-15 PROCEDURE — 85025 COMPLETE CBC W/AUTO DIFF WBC: CPT

## 2024-08-15 PROCEDURE — 36415 COLL VENOUS BLD VENIPUNCTURE: CPT

## 2024-08-15 PROCEDURE — 83735 ASSAY OF MAGNESIUM: CPT

## 2024-08-15 PROCEDURE — 20000000 HC ICU ROOM

## 2024-08-15 PROCEDURE — 82803 BLOOD GASES ANY COMBINATION: CPT

## 2024-08-15 PROCEDURE — 99900031 HC PATIENT EDUCATION (STAT)

## 2024-08-15 PROCEDURE — 27200966 HC CLOSED SUCTION SYSTEM

## 2024-08-15 PROCEDURE — 25000003 PHARM REV CODE 250: Performed by: PSYCHIATRY & NEUROLOGY

## 2024-08-15 PROCEDURE — 63600175 PHARM REV CODE 636 W HCPCS: Performed by: STUDENT IN AN ORGANIZED HEALTH CARE EDUCATION/TRAINING PROGRAM

## 2024-08-15 PROCEDURE — 36600 WITHDRAWAL OF ARTERIAL BLOOD: CPT

## 2024-08-15 PROCEDURE — 27100171 HC OXYGEN HIGH FLOW UP TO 24 HOURS

## 2024-08-15 RX ORDER — FUROSEMIDE 10 MG/ML
80 INJECTION INTRAMUSCULAR; INTRAVENOUS EVERY 6 HOURS
Status: COMPLETED | OUTPATIENT
Start: 2024-08-15 | End: 2024-08-15

## 2024-08-15 RX ADMIN — PIPERACILLIN AND TAZOBACTAM 4.5 G: 4; .5 INJECTION, POWDER, LYOPHILIZED, FOR SOLUTION INTRAVENOUS; PARENTERAL at 06:08

## 2024-08-15 RX ADMIN — PIPERACILLIN AND TAZOBACTAM 4.5 G: 4; .5 INJECTION, POWDER, LYOPHILIZED, FOR SOLUTION INTRAVENOUS; PARENTERAL at 02:08

## 2024-08-15 RX ADMIN — FUROSEMIDE 80 MG: 10 INJECTION, SOLUTION INTRAMUSCULAR; INTRAVENOUS at 08:08

## 2024-08-15 RX ADMIN — PROPOFOL 10 MCG/KG/MIN: 10 INJECTION, EMULSION INTRAVENOUS at 04:08

## 2024-08-15 RX ADMIN — PROPRANOLOL HYDROCHLORIDE 20 MG: 10 TABLET ORAL at 09:08

## 2024-08-15 RX ADMIN — PROPRANOLOL HYDROCHLORIDE 20 MG: 10 TABLET ORAL at 08:08

## 2024-08-15 RX ADMIN — LEVETIRACETAM 500 MG: 100 INJECTION, SOLUTION INTRAVENOUS at 08:08

## 2024-08-15 RX ADMIN — LEVETIRACETAM 500 MG: 100 INJECTION, SOLUTION INTRAVENOUS at 09:08

## 2024-08-15 RX ADMIN — FUROSEMIDE 80 MG: 10 INJECTION, SOLUTION INTRAMUSCULAR; INTRAVENOUS at 05:08

## 2024-08-15 RX ADMIN — ATORVASTATIN CALCIUM 40 MG: 40 TABLET, FILM COATED ORAL at 08:08

## 2024-08-15 RX ADMIN — ASPIRIN 81 MG CHEWABLE TABLET 81 MG: 81 TABLET CHEWABLE at 08:08

## 2024-08-15 RX ADMIN — PIPERACILLIN AND TAZOBACTAM 4.5 G: 4; .5 INJECTION, POWDER, LYOPHILIZED, FOR SOLUTION INTRAVENOUS; PARENTERAL at 10:08

## 2024-08-15 NOTE — PLAN OF CARE
Problem: Adult Inpatient Plan of Care  Goal: Plan of Care Review  Outcome: Not Progressing  Goal: Patient-Specific Goal (Individualized)  Outcome: Not Progressing  Goal: Absence of Hospital-Acquired Illness or Injury  Outcome: Progressing  Goal: Optimal Comfort and Wellbeing  Outcome: Not Progressing  Goal: Readiness for Transition of Care  Outcome: Not Progressing     Problem: Infection  Goal: Absence of Infection Signs and Symptoms  Outcome: Not Progressing     Problem: Skin Injury Risk Increased  Goal: Skin Health and Integrity  Outcome: Not Progressing     Problem: Stroke, Ischemic (Includes Transient Ischemic Attack)  Goal: Optimal Coping  Outcome: Not Progressing  Goal: Effective Bowel Elimination  Outcome: Not Progressing  Goal: Optimal Cerebral Tissue Perfusion  Outcome: Not Progressing  Goal: Optimal Cognitive Function  Outcome: Not Progressing  Goal: Improved Communication Skills  Outcome: Not Progressing  Goal: Optimal Functional Ability  Outcome: Not Progressing  Goal: Optimal Nutrition Intake  Outcome: Progressing  Goal: Effective Oxygenation and Ventilation  Outcome: Not Progressing  Goal: Improved Sensorimotor Function  Outcome: Not Progressing  Goal: Safe and Effective Swallow  Outcome: Not Progressing  Goal: Effective Urinary Elimination  Outcome: Not Progressing     Problem: Coping Ineffective  Goal: Effective Coping  Outcome: Not Progressing

## 2024-08-15 NOTE — PROGRESS NOTES
[facility] [address]  Wound Care    Patient Name:  Shannan Reza   MRN:  [id number]  Date: 8/15/2024  Diagnosis: <principal problem not specified>    History:     Past Medical History:   Diagnosis Date    Carotid-cavernous fistula     Cerebrovascular accident (CVA) due to thrombosis of right middle cerebral artery 2018    Essential hypertension 2018    Hemiparesis affecting left side as late effect of cerebrovascular accident     Hx of tobacco use, presenting hazards to health        Social History     Socioeconomic History    Marital status: Unknown   Tobacco Use    Smoking status: Former     Current packs/day: 0.00     Average packs/day: 0.5 packs/day for 25.0 years (12.5 ttl pk-yrs)     Types: Cigarettes     Start date: 1993     Quit date: 2018     Years since quittin.1    Smokeless tobacco: Former   Substance and Sexual Activity    Alcohol use: Not Currently    Drug use: No    Sexual activity: Never   Social History Narrative    ** Merged History Encounter **          Social Determinants of Health     Financial Resource Strain: Patient Declined (2024)    Overall Financial Resource Strain (CARDIA)     Difficulty of Paying Living Expenses: Patient declined   Food Insecurity: Unknown (2024)    Hunger Vital Sign     Worried About Running Out of Food in the Last Year: Patient declined     Ran Out of Food in the Last Year: Never true   Transportation Needs: Patient Declined (2024)    TRANSPORTATION NEEDS     Transportation : Patient declined   Housing Stability: Patient Declined (2024)    Housing Stability Vital Sign     Unable to Pay for Housing in the Last Year: Patient declined     Homeless in the Last Year: Patient declined       Precautions:     Allergies as of 2024 - Reviewed 2024   Allergen Reaction Noted    Percodan [oxycodone [id number]] Itching 10/18/2015       WO Assessment Details/Treatment        08/15/24 1044   WO Assessment    Visit Date 08/15/24   Visit Time 1044   Consult Type New   WOCN Speciality Wound   Intervention chart review;assessed;applied;orders   Teaching on-going        Wound 08/14/24 0745 Blister(s) Right proximal;posterior Arm #1   Date First Assessed/Time First Assessed: 08/14/24 0745   Present on Original Admission: No  Primary Wound Type: Blister(s)  Side: Right  Orientation: proximal;posterior  Location: Arm  Wound Number: #1  Is this injury device related?: No   Wound Image    Dressing Appearance Open to air   Drainage Amount Small   Drainage Characteristics/Odor Serosanguineous   Appearance Red;Maroon;Purple;Moist   Tissue loss description Full thickness   Black (%), Wound Tissue Color 0 %   Red (%), Wound Tissue Color 100 %   Yellow (%), Wound Tissue Color 0 %   Periwound Area Intact;Pink;Moist   Wound Edges Defined   Wound Length (cm) 3.2 cm   Wound Width (cm) 2.4 cm   Wound Depth (cm) 0.1 cm   Wound Volume (cm^3) 0.768 cm^3   Wound Surface Area (cm^2) 7.68 cm^2   Care Cleansed with:;Soap and water   Dressing Reinforced;Foam     WOCN consulted for right arm. Discussed plan of care with nurse Sadie prior to visiting the patient. Patient is currently sedated and intubated. Treatment recommendations to be put in place. Upon assessment, patient is swollen with weeping from right arm. Right arm: Cleanse with soap and water, dry well. Apply absorptive pads underneath arms. May wrap with abds and kerlix but there would be frequent changing due to the amount of drainage. Patient on ICU MIKAELA mattress with preventative measures in place. Nursing to continue with treatment recommendations and other preventative measures. No further questions from staff at this time. Will follow up.  08/15/2024

## 2024-08-15 NOTE — NURSING
Nurses Note -- 4 Eyes      8/15/2024   3:05 AM      Skin assessed during: Daily Assessment      [] No Altered Skin Integrity Present    [x]Prevention Measures Documented      [x] Yes- Altered Skin Integrity Present or Discovered   [] LDA Added if Not in Epic (Describe Wound)   [] New Altered Skin Integrity was Present on Admit and Documented in LDA   [] Wound Image Taken    Wound Care Consulted? Yes    Attending Nurse:  TRACEY Ortiz     Second RN/Staff Member:  TRACEY Fairbanks

## 2024-08-15 NOTE — PROGRESS NOTES
Inpatient Nutrition Assessment    Admit Date: 8/7/2024   Total duration of encounter: 8 days   Patient Age: 73 y.o.    Nutrition Recommendation/Prescription     Tube feeding recommendations:     Peptamen AF, goal rate of 60 mL/hr    1440 kcal/day (98% estimated needs, 104% with meds)  90 g protein/day (100% estimated needs)  972 mL free water/day (66% estimated needs)  Calculations based on estimated run time of 20 hours/day    If no IV fluids running, can give 100ml q 2hr water flushes. Total water provided: 1972ml (134% est needs.) If labs become low, may need to decrease water.     Communication of Recommendations: reviewed with nurse    Nutrition Assessment     Malnutrition Assessment/Nutrition-Focused Physical Exam    Malnutrition Context: other (see comments) (unable to obtain) (08/08/24 1114)     Energy Intake (Malnutrition): other (see comments) (unable to obtain) (08/08/24 1114)  Weight Loss (Malnutrition): other (see comments) (unable to obtain) (08/08/24 1114)  Subcutaneous Fat (Malnutrition): other (see comments) (unable to obtain) (08/08/24 1114)           Muscle Mass (Malnutrition): other (see comments) (unable to obtain) (08/08/24 1114)                          Fluid Accumulation (Malnutrition): other (see comments) (none per flowsheets) (08/08/24 1114)        A minimum of two characteristics is recommended for diagnosis of either severe or non-severe malnutrition.    Chart Review    Reason Seen: continuous nutrition monitoring, malnutrition screening tool (MST), physician consult for tube feeding recommendations, and follow-up    Malnutrition Screening Tool Results   Have you recently lost weight without trying?: Unsure  Have you been eating poorly because of a decreased appetite?: No   MST Score: 2   Diagnosis:  Suspected CVA versus seizures  Lactic acidosis  Hypokalemia    Relevant Medical History:   CVA, HTN, hemiparesis affected left side    Scheduled Medications:  aspirin, 81 mg,  Daily  atorvastatin, 40 mg, Daily  furosemide (LASIX) injection, 80 mg, Q6H  levETIRAcetam (Keppra) IV (PEDS and ADULTS), 500 mg, BID  piperacillin-tazobactam (Zosyn) IV (PEDS and ADULTS) (extended infusion is not appropriate), 4.5 g, Q8H  propranoloL, 20 mg, BID    Continuous Infusions:  fentanyl, Last Rate: 75 mcg/hr (08/15/24 0619)  NORepinephrine bitartrate-D5W, Last Rate: Stopped (08/14/24 0331)  propofoL, Last Rate: 10 mcg/kg/min (08/15/24 0619)    PRN Medications:  bisacodyL, 10 mg, Daily PRN  dextrose 10%, 12.5 g, PRN  dextrose 10%, 25 g, PRN  fentaNYL, 25 mcg, Q2H PRN  glucagon (human recombinant), 1 mg, PRN  insulin aspart U-100, 0-5 Units, Q6H PRN  sodium chloride 0.9%, 10 mL, PRN    Calorie Containing IV Medications: Diprivan @ 3 ml/hr (provides 80 kcal/d)    Recent Labs   Lab 08/08/24  1625 08/09/24  0241 08/09/24  0241 08/10/24  0308 08/11/24  0329 08/12/24  0348 08/13/24  0447 08/14/24  0304 08/14/24  1120 08/14/24  1808 08/15/24  0338 08/15/24  0656   NA  --  137   < > 138 145 143 147* 153* 153* 150* 144  --    K  --  4.5   < > 4.4 3.7 3.8 3.8 4.1 4.1 4.2 4.8  --    CALCIUM  --  8.1*   < > 8.6 8.6 8.5 8.6 8.6 8.7 8.5 7.8*  --    PHOS  --  2.4  --   --   --   --   --   --   --   --   --  4.8*   MG  --  1.70  --   --   --   --   --   --   --   --  2.40  --    CO2  --  18*   < > 16* 19* 17* 20* 19* 21* 21* 19*  --    BUN  --  12.9   < > 23.4* 19.7 20.1 16.9 18.4 18.8 17.8 24.8*  --    CREATININE  --  0.71   < > 0.79 0.75 0.66 0.59 0.68 0.70 0.70 0.80  --    EGFRNORACEVR  --  >60   < > >60 >60 >60 >60 >60 >60 >60 >60  --    GLUCOSE  --  180*   < > 125* 124* 207* 117* 125* 150* 149* 129*  --    BILITOT  --  0.4  --  0.5 0.6 0.5 0.6 0.5  --   --  0.5  --    ALKPHOS  --  69  --  75 82 89 113 119  --   --  117  --    ALT  --  17  --  25 32 37 41 30  --   --  27  --    AST  --  55*  --  80* 65* 57* 49* 35*  --   --  38*  --    ALBUMIN  --  2.4*  --  2.9* 2.7* 2.3* 2.3* 1.9*  --   --  1.9*  --    TRIG 95  --    "--   --   --   --   --   --   --   --   --   --    HGBA1C 5.7  --   --   --   --   --   --   --   --   --   --   --    WBC  --  17.94*  --  18.08* 14.54* 17.17* 18.94* 18.69*  --   --   --   --    HGB  --  10.6*  --  10.9* 10.7* 11.4* 11.2* 11.9*  --   --   --   --    HCT  --  30.9*  --  31.7* 31.3* 34.1* 34.2* 36.8*  --   --   --   --     < > = values in this interval not displayed.     Nutrition Orders:  Diet NPO  Tube Feedings/Formulas 60; 1,200; Peptamen AF; OG; Banatrol TF; 100; Every 2 hours    Appetite/Oral Intake: NPO/not applicable  Factors Affecting Nutritional Intake: on mechanical ventilation  Social Needs Impacting Access to Food: unable to assess at this time; will attempt on follow-up  Food/Zoroastrianism/Cultural Preferences: unable to obtain  Food Allergies: unable to obtain  Last Bowel Movement: 08/13/24  Wound(s): no pressure injuries    Comments    8/8/24: Intubated. Currently off sedation. On vasopressor support. MAP > 65. OG in place. No nutrition support ordered at this time. Plan for angiogram later today noted. No family at bedside to gather information on nutrition/weight history. Per chart review/past records, ~10 lb weight gain over the last 9-10 months.    8/9/24: Consulted for TF recommendations. Remains intubated without continuous sedation. Weaning vasopressor support. OG in place, trickle feeds ordered - discussed with nursing staff - will adjust goal rate to meet estimated needs.    8/13/24: TF continues, tolerated per RN.     8/14/24: TF continues. Noted increase in Na. Will increase FWF.     8/15/24: TF continues, tolerated per RN. Na and Cl WNL. Will monitor for need for decreasing FWF if Na or Cl becomes low.     Anthropometrics    Height: 5' 2" (157.5 cm),    Last Weight: 65 kg (143 lb 4.8 oz) (08/07/24 2200), Weight Method: Bed Scale     BMI Classification: overweight (BMI 25-29.9)     Ideal Body Weight (IBW), Female: 110 lb                                   Usual Weight Provided " By: EMR weight history    Wt Readings from Last 5 Encounters:   08/07/24 65 kg (143 lb 4.8 oz)   10/23/23 60.7 kg (133 lb 12.8 oz)   04/10/23 59.4 kg (131 lb)   10/10/22 60.8 kg (134 lb)   08/02/22 60.8 kg (134 lb)     Weight Change(s) Since Admission:   Wt Readings from Last 1 Encounters:   08/07/24 2200 65 kg (143 lb 4.8 oz)   Admit Weight: 65 kg (143 lb 4.8 oz) (08/07/24 2200), Weight Method: Bed Scale    Estimated Needs    Weight Used For Calorie Calculations: 65 kg (143 lb 4.8 oz)  Energy Calorie Requirements (kcal): 1468 kcal/day  Energy Need Method: [facility] (modified)  Weight Used For Protein Calculations: 65 kg (143 lb 4.8 oz)  Protein Requirements: 78-98 g (1.2-1.5 g/kg)  Fluid Requirements (mL): 1468 mL (1 mL/kcal)  CHO Requirement: 165 g (45% of estimated needs)   Calculated 8/8/24: Tmax 36.7C, Ve 10.2    Enteral Nutrition     Formula: Peptamen AF  Rate/Volume: 60 mL/hr  Water Flushes: 100 mL q2h  Additives/Modulars: none at this time  Route: orogastric tube  Method: continuous  Total Nutrition Provided by Tube Feeding, Additives, and Flushes:  Calories Provided  1440 kcal/d, 98% needs   Protein Provided  90 g/d, 100% needs   Fluid Provided  972 ml/d, 63% needs   Continuous feeding calculations based on estimated 20 hr/d run time unless otherwise stated.    Parenteral Nutrition     Patient not receiving parenteral nutrition support at this time.    Evaluation of Received Nutrient Intake    Calories: meeting estimated needs  Protein: meeting estimated needs    Patient Education     Not applicable.    Nutrition Diagnosis     PES: Inadequate oral intake related to inability to consume sufficient nutrients as evidenced by intubated since admit. (active)     Nutrition Interventions     Intervention(s): collaboration with other providers    Goal: Meet greater than 80% of nutritional needs by follow-up. (goal progressing)  Goal: Tolerate enteral feeding at goal rate by follow-up. (goal  progressing)    Nutrition Goals & Monitoring     Dietitian will monitor: energy intake, enteral nutrition intake, weight, electrolyte/renal panel, glucose/endocrine profile, and gastrointestinal profile  Discharge planning: too early to determine; pending clinical course  Nutrition Risk/Follow-Up: high (follow-up in 1-4 days)   Please consult if re-assessment needed sooner.

## 2024-08-15 NOTE — NURSING
Nurses Note -- 4 Eyes      8/15/2024   08:00 AM      Skin assessed during: Daily Assessment      [] No Altered Skin Integrity Present    [x]Prevention Measures Documented      [x] Yes- Altered Skin Integrity Present or Discovered   [x] LDA Added if Not in Epic (Describe Wound)   [] New Altered Skin Integrity was Present on Admit and Documented in LDA   [x] Wound Image Taken    Wound Care Consulted? Yes    Attending Nurse:  TRACEY Noel    Second RN/Staff Member: RENÉE Roche

## 2024-08-15 NOTE — PROGRESS NOTES
[facility] - [facility]  Pulmonary Critical Care Note    Patient Name: Shannan Reza  MRN: [id number]  Admission Date: 8/7/2024  Hospital Length of Stay: 8 days  Code Status: Full Code  Attending Provider: Vladimir Acevedo MD  Primary Care Provider: Kiana Marie MD     Subjective:     HPI:   This is a 73-year-old female with past medical history of hypertension and to strokes in past presenting from outside facility for suspected CVA and seizure-like activity.  Family member present at bedside and states that patient was in normal state of health until yesterday evening.  States she started to feel weak and lethargic and eventually went unresponsive.  Had presentation like this previously when she had stroke.  CT head at outside facility did not show any acute infarct.  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 She was intubated for airway protection and loaded with Keppra.  She was transferred to [facility] for further care.  Repeat imaging was ordered.  Patient continues to be intubated and sedated on propofol. Requiring increased amounts of Levophed and started on vasopressin and stress dose steroids.  Admitted to ICU for close monitoring.    24 Hour Interval History:  No events overnight.  ABG 7.25/64/72.  Chest x-ray with continuing worsening of bilateral opacities.  Significantly net positive yesterday.    Past Medical History:   Diagnosis Date    Carotid-cavernous fistula     Cerebrovascular accident (CVA) due to thrombosis of right middle cerebral artery 6/26/2018    Essential hypertension 6/27/2018    Hemiparesis affecting left side as late effect of cerebrovascular accident     Hx of tobacco use, presenting hazards to health        Past Surgical History:   Procedure Laterality Date    AUGMENTATION OF BREAST      BRAIN SURGERY         Social History     Socioeconomic History    Marital status: Unknown   Tobacco Use    Smoking  status: Former     Current packs/day: 0.00     Average packs/day: 0.5 packs/day for 25.0 years (12.5 ttl pk-yrs)     Types: Cigarettes     Start date: 1993     Quit date: 2018     Years since quittin.1    Smokeless tobacco: Former   Substance and Sexual Activity    Alcohol use: Not Currently    Drug use: No    Sexual activity: Never   Social History Narrative    ** Merged History Encounter **          Social Determinants of Health     Financial Resource Strain: Patient Declined (2024)    Overall Financial Resource Strain (CARDIA)     Difficulty of Paying Living Expenses: Patient declined   Food Insecurity: Unknown (2024)    Hunger Vital Sign     Worried About Running Out of Food in the Last Year: Patient declined     Ran Out of Food in the Last Year: Never true   Transportation Needs: Patient Declined (2024)    TRANSPORTATION NEEDS     Transportation : Patient declined   Housing Stability: Patient Declined (2024)    Housing Stability Vital Sign     Unable to Pay for Housing in the Last Year: Patient declined     Homeless in the Last Year: Patient declined           Current Outpatient Medications   Medication Instructions    amLODIPine (NORVASC) 5 mg, Oral, Daily    aspirin (ECOTRIN) 81 mg, Oral, Daily    folic acid (FOLVITE) 1 mg, Oral, Daily    hydrOXYzine HCL (ATARAX) 25 MG tablet TAKE 1 TABLET BY MOUTH THREE TIMES DAILY AS NEEDED FOR ITCHING OR ANXIETY    multivitamin Tab 1 tablet, Oral, Daily    rosuvastatin (CRESTOR) 40 mg, Oral, Nightly       Current Inpatient Medications   aspirin  81 mg Per NG tube Daily    atorvastatin  40 mg Oral Daily    levETIRAcetam (Keppra) IV (PEDS and ADULTS)  500 mg Intravenous BID    piperacillin-tazobactam (Zosyn) IV (PEDS and ADULTS) (extended infusion is not appropriate)  4.5 g Intravenous Q8H    propranoloL  20 mg Oral BID       Current Intravenous Infusions   fentanyl  0-250 mcg/hr Intravenous Continuous 7.5 mL/hr at 08/15/24 0619 75 mcg/hr at  08/15/24 0619    NORepinephrine bitartrate-D5W  0-3 mcg/kg/min Intravenous Continuous   Stopped at 08/14/24 0331    propofoL  0-50 mcg/kg/min Intravenous Continuous 3.9 mL/hr at 08/15/24 0619 10 mcg/kg/min at 08/15/24 0619         Review of Systems   Unable to perform ROS: Intubated          Objective:       Intake/Output Summary (Last 24 hours) at 8/15/2024 0700  Last data filed at 8/15/2024 0619  Gross per 24 hour   Intake 5182.19 ml   Output 1630 ml   Net 3552.19 ml         Vital Signs (Most Recent):  Temp: 99.1 °F (37.3 °C) (08/15/24 0400)  Pulse: 73 (08/15/24 0615)  Resp: 16 (08/15/24 0615)  BP: (!) 98/48 (08/15/24 0615)  SpO2: 95 % (08/15/24 0615)  Body mass index is 26.21 kg/m².  Weight: 65 kg (143 lb 4.8 oz) Vital Signs (24h Range):  Temp:  [98.4 °F (36.9 °C)-99.9 °F (37.7 °C)] 99.1 °F (37.3 °C)  Pulse:  [] 73  Resp:  [14-33] 16  SpO2:  [89 %-97 %] 95 %  BP: ()/(44-78) 98/48     Physical Exam  Vitals reviewed.   Constitutional:       Appearance: She is ill-appearing.   HENT:      Head: Normocephalic.   Eyes:      Comments: L pupil 2mm, R pupil 3mm. L slightly reactive. R pupil fixed.   Cardiovascular:      Rate and Rhythm: Normal rate and regular rhythm.      Heart sounds: No murmur heard.  Pulmonary:      Breath sounds: Normal breath sounds.      Comments: Intubated and mechanically ventilated.   No dyssynchrony to mechanical ventilation.   Abdominal:      Palpations: Abdomen is soft.   Neurological:      Comments: Withdraws to pain LUE and LLE. No withdrawal on right side.            Lines/Drains/Airways       Drain  Duration                  NG/OG Tube 08/06/24 0000 Center mouth 9 days         Urethral Catheter 08/12/24 1620 Silicone 16 Fr. 2 days              Airway  Duration                  Airway - Non-Surgical 08/07/24 2147 Endotracheal Tube 7 days              Peripheral Intravenous Line  Duration                  Peripheral IV - Single Lumen 08/09/24 1511 20 G 1 in Anterior;Left;Lateral  Wrist 5 days         Peripheral IV - Single Lumen 08/09/24 1511 20 G 1 in No Left;Anterior Antecubital 5 days         Peripheral IV - Single Lumen 08/13/24 0300 18 G Anterior;Left Upper Arm 2 days                    Significant Labs:    Lab Results   Component Value Date    WBC 18.69 (H) 08/14/2024    HGB 11.9 (L) 08/14/2024    HCT 36.8 (L) 08/14/2024    MCV 92.0 08/14/2024     08/14/2024           BMP  Lab Results   Component Value Date     08/15/2024    K 4.8 08/15/2024    CO2 19 (L) 08/15/2024    BUN 24.8 (H) 08/15/2024    CREATININE 0.80 08/15/2024    CALCIUM 7.8 (L) 08/15/2024    AGAP 11.0 08/15/2024    ESTGFRAFRICA >60.0 07/07/2022    EGFRNONAA >60.0 07/07/2022         ABG  Recent Labs   Lab 08/15/24  0618   PH 7.250*   PO2 72.0*   PCO2 64.0*   HCO3 28.1*   POCBASEDEF -0.40       Mechanical Ventilation Support:  Vent Mode: VOLUME A/C (08/15/24 0532)  Ventilator Initiated: Yes (08/07/24 2147)  Set Rate: 14 BPM (08/15/24 0532)  Vt Set: 450 mL (08/15/24 0532)  PEEP/CPAP: 8 cmH20 (08/15/24 0532)  Oxygen Concentration (%): 90 (08/15/24 0532)  Peak Airway Pressure: 33 cmH20 (08/15/24 0532)  Total Ve: 5.6 L/m (08/15/24 0532)  F/VT Ratio<105 (RSBI): (!) 36.84 (08/15/24 0532)      Significant Imaging:  I have reviewed the pertinent imaging within the past 24 hours.    Microbiology Results (last 7 days)       Procedure Component Value Units Date/Time    Blood Culture [[id number]]  (Normal) Collected: 08/08/24 0733    Order Status: Completed Specimen: Blood from Hand, Right Updated: 08/13/24 0900     Blood Culture No Growth at 5 days    Blood Culture [[id number]]  (Normal) Collected: 08/08/24 0733    Order Status: Completed Specimen: Blood from Arm, Right Updated: 08/13/24 0900     Blood Culture No Growth at 5 days    Respiratory Culture [[id number]]  (Abnormal) Collected: 08/11/24 0836    Order Status: Completed Specimen: Sputum from Endotracheal Aspirate Updated: 08/13/24 0807     Respiratory Culture Rare  Gram-negative Rods      Rare Yeast     Comment: with no normal respiratory franki        GRAM STAIN Quality 2+      Rare Yeast      No bacteria seen    Respiratory Culture [[id number]]     Order Status: Canceled Specimen: Sputum from Trachael Aspirate              Assessment/Plan:     Assessment  Right EMANUEL stroke  Hospital-acquired pneumonia  ARDS  Hypernatremia   Leukocytosis, persistent   Newly Diagnosed Acute Systolic HF w EF 20-25%   Lactic acidosis  - resolved  Hypokalemia - resolved  Hx HTN, CVA with residual left sided weakness       Plan  -continue mechanical ventilation with low tidal volumes.  No plan for SBT at this time  -increase respiratory rate to 20 and repeat ABG  -start diuresis with goal net -1 L daily  -Continue Keppra 500 mg b.i.d.  -Continue aspirin, statin  -Maintain MAP >65  -Continue Zosyn considering worsening oxygenation and chest x-ray findings  -Appreciate palliative care recommendations     34 minutes of critical care was time spent personally by me on the following activities: development of treatment plan with patient or surrogate and bedside caregivers, discussions with consultants, evaluation of patient's response to treatment, examination of patient, ordering and performing treatments and interventions, ordering and review of laboratory studies, ordering and review of radiographic studies, pulse oximetry, re-evaluation of patient's condition.  This critical care time did not overlap with that of any other provider or involve time for any procedures.       Vladimir Acevedo MD  Pulmonary Critical Care Medicine  [facility] - [facility] ICU  DOS: 08/15/2024

## 2024-08-16 LAB
ALBUMIN SERPL-MCNC: 1.6 G/DL (ref 3.4–4.8)
ALBUMIN/GLOB SERPL: 0.5 RATIO (ref 1.1–2)
ALLENS TEST BLOOD GAS (OHS): YES
ALP SERPL-CCNC: 120 UNIT/L (ref 40–150)
ALT SERPL-CCNC: 22 UNIT/L (ref 0–55)
ANION GAP SERPL CALC-SCNC: 11 MEQ/L
AST SERPL-CCNC: 33 UNIT/L (ref 5–34)
BASE EXCESS BLD CALC-SCNC: 8.9 MMOL/L
BASOPHILS # BLD AUTO: 0.04 X10(3)/MCL
BASOPHILS NFR BLD AUTO: 0.2 %
BILIRUB SERPL-MCNC: 0.4 MG/DL
BLOOD GAS SAMPLE TYPE (OHS): ABNORMAL
BUN SERPL-MCNC: 34.6 MG/DL (ref 9.8–20.1)
CA-I BLD-SCNC: 1.05 MMOL/L (ref 1.12–1.23)
CALCIUM SERPL-MCNC: 7.4 MG/DL (ref 8.4–10.2)
CHLORIDE SERPL-SCNC: 109 MMOL/L (ref 98–107)
CO2 BLDA-SCNC: 36.2 MMOL/L
CO2 SERPL-SCNC: 26 MMOL/L (ref 23–31)
CREAT SERPL-MCNC: 0.9 MG/DL (ref 0.55–1.02)
CREAT/UREA NIT SERPL: 38
DRAWN BY BLOOD GAS (OHS): ABNORMAL
EOSINOPHIL # BLD AUTO: 0.69 X10(3)/MCL (ref 0–0.9)
EOSINOPHIL NFR BLD AUTO: 3.8 %
ERYTHROCYTE [DISTWIDTH] IN BLOOD BY AUTOMATED COUNT: 15.9 % (ref 11.5–17)
GFR SERPLBLD CREATININE-BSD FMLA CKD-EPI: >60 ML/MIN/1.73/M2
GLOBULIN SER-MCNC: 3 GM/DL (ref 2.4–3.5)
GLUCOSE SERPL-MCNC: 139 MG/DL (ref 82–115)
GLUCOSE SERPL-MCNC: 176 MG/DL (ref 70–110)
HCO3 BLDA-SCNC: 34.6 MMOL/L (ref 22–26)
HCT VFR BLD AUTO: 28.1 % (ref 37–47)
HGB BLD-MCNC: 8.7 G/DL (ref 12–16)
IMM GRANULOCYTES # BLD AUTO: 0.29 X10(3)/MCL (ref 0–0.04)
IMM GRANULOCYTES NFR BLD AUTO: 1.6 %
INHALED O2 CONCENTRATION: 80 %
LYMPHOCYTES # BLD AUTO: 0.98 X10(3)/MCL (ref 0.6–4.6)
LYMPHOCYTES NFR BLD AUTO: 5.4 %
MAGNESIUM SERPL-MCNC: 2.2 MG/DL (ref 1.6–2.6)
MCH RBC QN AUTO: 28.8 PG (ref 27–31)
MCHC RBC AUTO-ENTMCNC: 31 G/DL (ref 33–36)
MCV RBC AUTO: 93 FL (ref 80–94)
MECH RR (OHS): 20 B/MIN
MODE (OHS): AC
MONOCYTES # BLD AUTO: 0.72 X10(3)/MCL (ref 0.1–1.3)
MONOCYTES NFR BLD AUTO: 4 %
NEUTROPHILS # BLD AUTO: 15.44 X10(3)/MCL (ref 2.1–9.2)
NEUTROPHILS NFR BLD AUTO: 85 %
NRBC BLD AUTO-RTO: 0.1 %
OXYGEN DEVICE BLOOD GAS (OHS): ABNORMAL
PCO2 BLDA: 51 MMHG (ref 35–45)
PEEP RESPIRATORY: 8 CMH2O
PH BLDA: 7.44 [PH] (ref 7.35–7.45)
PHOSPHATE SERPL-MCNC: 3.7 MG/DL (ref 2.3–4.7)
PLATELET # BLD AUTO: 260 X10(3)/MCL (ref 130–400)
PMV BLD AUTO: 11.3 FL (ref 7.4–10.4)
PO2 BLDA: 91 MMHG (ref 80–100)
POCT GLUCOSE: 134 MG/DL (ref 70–110)
POCT GLUCOSE: 176 MG/DL (ref 70–110)
POTASSIUM BLOOD GAS (OHS): 3 MMOL/L (ref 3.5–5)
POTASSIUM SERPL-SCNC: 3.6 MMOL/L (ref 3.5–5.1)
PROT SERPL-MCNC: 4.6 GM/DL (ref 5.8–7.6)
RBC # BLD AUTO: 3.02 X10(6)/MCL (ref 4.2–5.4)
SAMPLE SITE BLOOD GAS (OHS): ABNORMAL
SAO2 % BLDA: 97 %
SODIUM BLOOD GAS (OHS): 141 MMOL/L (ref 137–145)
SODIUM SERPL-SCNC: 146 MMOL/L (ref 136–145)
SPONT+MECH VT ON VENT: 450 ML
WBC # BLD AUTO: 18.16 X10(3)/MCL (ref 4.5–11.5)

## 2024-08-16 PROCEDURE — 82803 BLOOD GASES ANY COMBINATION: CPT

## 2024-08-16 PROCEDURE — 25000003 PHARM REV CODE 250: Performed by: NURSE PRACTITIONER

## 2024-08-16 PROCEDURE — 20000000 HC ICU ROOM

## 2024-08-16 PROCEDURE — 85025 COMPLETE CBC W/AUTO DIFF WBC: CPT

## 2024-08-16 PROCEDURE — 99900026 HC AIRWAY MAINTENANCE (STAT)

## 2024-08-16 PROCEDURE — 27200966 HC CLOSED SUCTION SYSTEM

## 2024-08-16 PROCEDURE — 63600175 PHARM REV CODE 636 W HCPCS: Performed by: PSYCHIATRY & NEUROLOGY

## 2024-08-16 PROCEDURE — 27100171 HC OXYGEN HIGH FLOW UP TO 24 HOURS

## 2024-08-16 PROCEDURE — 99900035 HC TECH TIME PER 15 MIN (STAT)

## 2024-08-16 PROCEDURE — 25000003 PHARM REV CODE 250: Performed by: STUDENT IN AN ORGANIZED HEALTH CARE EDUCATION/TRAINING PROGRAM

## 2024-08-16 PROCEDURE — 94003 VENT MGMT INPAT SUBQ DAY: CPT

## 2024-08-16 PROCEDURE — 25000003 PHARM REV CODE 250

## 2024-08-16 PROCEDURE — 99900031 HC PATIENT EDUCATION (STAT)

## 2024-08-16 PROCEDURE — 63600175 PHARM REV CODE 636 W HCPCS

## 2024-08-16 PROCEDURE — 36415 COLL VENOUS BLD VENIPUNCTURE: CPT

## 2024-08-16 PROCEDURE — 25000242 PHARM REV CODE 250 ALT 637 W/ HCPCS

## 2024-08-16 PROCEDURE — 83735 ASSAY OF MAGNESIUM: CPT

## 2024-08-16 PROCEDURE — 94760 N-INVAS EAR/PLS OXIMETRY 1: CPT

## 2024-08-16 PROCEDURE — 94640 AIRWAY INHALATION TREATMENT: CPT

## 2024-08-16 PROCEDURE — 94761 N-INVAS EAR/PLS OXIMETRY MLT: CPT | Mod: XB

## 2024-08-16 PROCEDURE — 36600 WITHDRAWAL OF ARTERIAL BLOOD: CPT

## 2024-08-16 PROCEDURE — 84100 ASSAY OF PHOSPHORUS: CPT

## 2024-08-16 PROCEDURE — 25000003 PHARM REV CODE 250: Performed by: PSYCHIATRY & NEUROLOGY

## 2024-08-16 PROCEDURE — 80053 COMPREHEN METABOLIC PANEL: CPT

## 2024-08-16 PROCEDURE — 63600175 PHARM REV CODE 636 W HCPCS: Performed by: STUDENT IN AN ORGANIZED HEALTH CARE EDUCATION/TRAINING PROGRAM

## 2024-08-16 RX ORDER — POLYETHYLENE GLYCOL 3350 17 G/17G
17 POWDER, FOR SOLUTION ORAL 2 TIMES DAILY
Status: DISCONTINUED | OUTPATIENT
Start: 2024-08-16 | End: 2024-08-21

## 2024-08-16 RX ORDER — DOCUSATE SODIUM 50 MG/5ML
100 LIQUID ORAL DAILY
Status: DISCONTINUED | OUTPATIENT
Start: 2024-08-16 | End: 2024-08-21

## 2024-08-16 RX ORDER — BISACODYL 10 MG/1
10 SUPPOSITORY RECTAL DAILY PRN
Status: DISCONTINUED | OUTPATIENT
Start: 2024-08-16 | End: 2024-08-24 | Stop reason: HOSPADM

## 2024-08-16 RX ORDER — SODIUM CHLORIDE FOR INHALATION 3 %
4 VIAL, NEBULIZER (ML) INHALATION ONCE
Status: COMPLETED | OUTPATIENT
Start: 2024-08-16 | End: 2024-08-16

## 2024-08-16 RX ADMIN — PROPOFOL 30 MCG/KG/MIN: 10 INJECTION, EMULSION INTRAVENOUS at 09:08

## 2024-08-16 RX ADMIN — POLYETHYLENE GLYCOL 3350 17 G: 17 POWDER, FOR SOLUTION ORAL at 10:08

## 2024-08-16 RX ADMIN — PROPRANOLOL HYDROCHLORIDE 20 MG: 10 TABLET ORAL at 08:08

## 2024-08-16 RX ADMIN — PIPERACILLIN AND TAZOBACTAM 4.5 G: 4; .5 INJECTION, POWDER, LYOPHILIZED, FOR SOLUTION INTRAVENOUS; PARENTERAL at 02:08

## 2024-08-16 RX ADMIN — PROPRANOLOL HYDROCHLORIDE 20 MG: 10 TABLET ORAL at 09:08

## 2024-08-16 RX ADMIN — SODIUM CHLORIDE SOLN NEBU 3% 4 ML: 3 NEBU SOLN at 09:08

## 2024-08-16 RX ADMIN — LEVETIRACETAM 500 MG: 100 INJECTION, SOLUTION INTRAVENOUS at 09:08

## 2024-08-16 RX ADMIN — ATORVASTATIN CALCIUM 40 MG: 40 TABLET, FILM COATED ORAL at 08:08

## 2024-08-16 RX ADMIN — CEFTRIAXONE SODIUM 2 G: 2 INJECTION, POWDER, FOR SOLUTION INTRAMUSCULAR; INTRAVENOUS at 11:08

## 2024-08-16 RX ADMIN — DOCUSATE SODIUM LIQUID 100 MG: 100 LIQUID ORAL at 10:08

## 2024-08-16 RX ADMIN — ASPIRIN 81 MG CHEWABLE TABLET 81 MG: 81 TABLET CHEWABLE at 08:08

## 2024-08-16 RX ADMIN — LEVETIRACETAM 500 MG: 100 INJECTION, SOLUTION INTRAVENOUS at 08:08

## 2024-08-16 RX ADMIN — PROPOFOL 30 MCG/KG/MIN: 10 INJECTION, EMULSION INTRAVENOUS at 12:08

## 2024-08-16 NOTE — NURSING
Nurses Note -- 4 Eyes      8/16/2024   3:09 AM      Skin assessed during: Q Shift Change      [] No Altered Skin Integrity Present    [x]Prevention Measures Documented      [x] Yes- Altered Skin Integrity Present or Discovered   [x] LDA Added if Not in Epic (Describe Wound)   [] New Altered Skin Integrity was Present on Admit and Documented in LDA   [] Wound Image Taken    Wound Care Consulted? No    Attending Nurse:  Yassine Rodriguez RN/Staff Member:  RENÉE Perez          Elidel Counseling: Patient may experience a mild burning sensation during topical application. Elidel is not approved in children less than 2 years of age. There have been case reports of hematologic and skin malignancies in patients using topical calcineurin inhibitors although causality is questionable.

## 2024-08-16 NOTE — NURSING
Nurses Note -- 4 Eyes      8/16/2024   1:42 PM      Skin assessed during: Daily Assessment      [] No Altered Skin Integrity Present    [x]Prevention Measures Documented      [x] Yes- Altered Skin Integrity Present or Discovered   [] LDA Added if Not in Epic (Describe Wound)   [] New Altered Skin Integrity was Present on Admit and Documented in LDA   [] Wound Image Taken    Wound Care Consulted? Yes    Attending Nurse:  Tea Rodriguez RN/Staff Member:  TRACEY Orozco

## 2024-08-16 NOTE — PROGRESS NOTES
[facility] - [facility]  Pulmonary Critical Care Note    Patient Name: Shannan Reza  MRN: [id number]  Admission Date: 8/7/2024  Hospital Length of Stay: 9 days  Code Status: Full Code  Attending Provider: Vladimir Acevedo MD  Primary Care Provider: Kiana Marie MD     Subjective:     HPI:   This is a 73-year-old female with past medical history of hypertension and to strokes in past presenting from outside facility for suspected CVA and seizure-like activity.  Family member present at bedside and states that patient was in normal state of health until yesterday evening.  States she started to feel weak and lethargic and eventually went unresponsive.  Had presentation like this previously when she had stroke.  CT head at outside facility did not show any acute infarct.  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 She was intubated for airway protection and loaded with Keppra.  She was transferred to [facility] for further care.  Repeat imaging was ordered.  Patient continues to be intubated and sedated on propofol. Requiring increased amounts of Levophed and started on vasopressin and stress dose steroids.  Admitted to ICU for close monitoring.    24 Hour Interval History:  NAEO documented, VSS and afebrile. SBP at goal . Remains on mechanical ventilation, still on Propofol and Fentanyl. No longer requiring pressors. Given with Lasix 80mg IV q 6hr for 2 doses, urine output 5.48L and net I&O -4.48L. Palliative care following. Leukocytosis persistent, H/H stable. ABG relatively wnl. CXR slightly improved lung opacities. RC consistent with pansensitive E coli however gram stain negative for bacteria. BC NGTD. On Day 4 of Zosyn, de-escalated to Rocephin today.     Past Medical History:   Diagnosis Date    Carotid-cavernous fistula     Cerebrovascular accident (CVA) due to thrombosis of right middle cerebral artery 6/26/2018    Essential  hypertension 2018    Hemiparesis affecting left side as late effect of cerebrovascular accident     Hx of tobacco use, presenting hazards to health        Past Surgical History:   Procedure Laterality Date    AUGMENTATION OF BREAST      BRAIN SURGERY         Social History     Socioeconomic History    Marital status: Unknown   Tobacco Use    Smoking status: Former     Current packs/day: 0.00     Average packs/day: 0.5 packs/day for 25.0 years (12.5 ttl pk-yrs)     Types: Cigarettes     Start date: 1993     Quit date: 2018     Years since quittin.1    Smokeless tobacco: Former   Substance and Sexual Activity    Alcohol use: Not Currently    Drug use: No    Sexual activity: Never   Social History Narrative    ** Merged History Encounter **          Social Determinants of Health     Financial Resource Strain: Patient Declined (2024)    Overall Financial Resource Strain (CARDIA)     Difficulty of Paying Living Expenses: Patient declined   Food Insecurity: Unknown (2024)    Hunger Vital Sign     Worried About Running Out of Food in the Last Year: Patient declined     Ran Out of Food in the Last Year: Never true   Transportation Needs: Patient Declined (2024)    TRANSPORTATION NEEDS     Transportation : Patient declined   Housing Stability: Patient Declined (2024)    Housing Stability Vital Sign     Unable to Pay for Housing in the Last Year: Patient declined     Homeless in the Last Year: Patient declined           Current Outpatient Medications   Medication Instructions    amLODIPine (NORVASC) 5 mg, Oral, Daily    aspirin (ECOTRIN) 81 mg, Oral, Daily    folic acid (FOLVITE) 1 mg, Oral, Daily    hydrOXYzine HCL (ATARAX) 25 MG tablet TAKE 1 TABLET BY MOUTH THREE TIMES DAILY AS NEEDED FOR ITCHING OR ANXIETY    multivitamin Tab 1 tablet, Oral, Daily    rosuvastatin (CRESTOR) 40 mg, Oral, Nightly       Current Inpatient Medications   aspirin  81 mg Per NG tube Daily    atorvastatin  40 mg  Oral Daily    levETIRAcetam (Keppra) IV (PEDS and ADULTS)  500 mg Intravenous BID    piperacillin-tazobactam (Zosyn) IV (PEDS and ADULTS) (extended infusion is not appropriate)  4.5 g Intravenous Q8H    propranoloL  20 mg Oral BID       Current Intravenous Infusions   fentanyl  0-250 mcg/hr Intravenous Continuous 5 mL/hr at 08/15/24 1603 50 mcg/hr at 08/15/24 1603    NORepinephrine bitartrate-D5W  0-3 mcg/kg/min Intravenous Continuous   Stopped at 08/14/24 0331    propofoL  0-50 mcg/kg/min Intravenous Continuous 3.9 mL/hr at 08/15/24 1602 10 mcg/kg/min at 08/15/24 1602         Review of Systems   Unable to perform ROS: Intubated          Objective:       Intake/Output Summary (Last 24 hours) at 8/16/2024 0141  Last data filed at 8/15/2024 2000  Gross per 24 hour   Intake 2915.98 ml   Output 4175 ml   Net -1259.02 ml         Vital Signs (Most Recent):  Temp: 97.9 °F (36.6 °C) (08/15/24 2000)  Pulse: 70 (08/15/24 2330)  Resp: 20 (08/15/24 2330)  BP: (!) 102/50 (08/15/24 2330)  SpO2: 99 % (08/15/24 2330)  Body mass index is 26.21 kg/m².  Weight: 65 kg (143 lb 4.8 oz) Vital Signs (24h Range):  Temp:  [97.9 °F (36.6 °C)-99.1 °F (37.3 °C)] 97.9 °F (36.6 °C)  Pulse:  [66-92] 70  Resp:  [14-21] 20  SpO2:  [90 %-100 %] 99 %  BP: ()/(40-79) 102/50     Physical Exam  Vitals reviewed.   Constitutional:       Appearance: She is ill-appearing.   HENT:      Head: Normocephalic.   Eyes:      Comments: L pupil 2mm, R pupil 3mm. L slightly reactive. R pupil fixed.   Cardiovascular:      Rate and Rhythm: Normal rate and regular rhythm.      Heart sounds: No murmur heard.  Pulmonary:      Breath sounds: Normal breath sounds.      Comments: Intubated and mechanically ventilated.   No dyssynchrony to mechanical ventilation.   Abdominal:      Palpations: Abdomen is soft.   Neurological:      Comments: Withdraws to pain LUE and LLE. No withdrawal on right side.            Lines/Drains/Airways       Drain  Duration                   NG/OG Tube 08/06/24 0000 Center mouth 10 days         Urethral Catheter 08/12/24 1620 Silicone 16 Fr. 3 days              Airway  Duration                  Airway - Non-Surgical 08/07/24 2147 Endotracheal Tube 8 days              Peripheral Intravenous Line  Duration                  Peripheral IV - Single Lumen 08/09/24 1511 20 G 1 in Anterior;Left;Lateral Wrist 6 days         Peripheral IV - Single Lumen 08/09/24 1511 20 G 1 in No Left;Anterior Antecubital 6 days         Peripheral IV - Single Lumen 08/13/24 0300 18 G Anterior;Left Upper Arm 2 days                    Significant Labs:    Lab Results   Component Value Date    WBC 17.83 (H) 08/15/2024    HGB 8.7 (L) 08/15/2024    HCT 28.3 (L) 08/15/2024    MCV 94.3 (H) 08/15/2024     08/15/2024           BMP  Lab Results   Component Value Date     08/15/2024    K 4.8 08/15/2024    CO2 19 (L) 08/15/2024    BUN 24.8 (H) 08/15/2024    CREATININE 0.80 08/15/2024    CALCIUM 7.8 (L) 08/15/2024    AGAP 11.0 08/15/2024    ESTGFRAFRICA >60.0 07/07/2022    EGFRNONAA >60.0 07/07/2022         ABG  Recent Labs   Lab 08/15/24  0618   PH 7.250*   PO2 72.0*   PCO2 64.0*   HCO3 28.1*   POCBASEDEF -0.40       Mechanical Ventilation Support:  Vent Mode: VOLUME A/C (08/15/24 2330)  Ventilator Initiated: Yes (08/07/24 2147)  Set Rate: 20 BPM (08/15/24 2330)  Vt Set: 450 mL (08/15/24 2330)  PEEP/CPAP: 8 cmH20 (08/15/24 2330)  Oxygen Concentration (%): 80 (08/15/24 2330)  Peak Airway Pressure: 28 cmH20 (08/15/24 2330)  Total Ve: 8.4 L/m (08/15/24 2330)  F/VT Ratio<105 (RSBI): (!) 49.75 (08/15/24 2330)      Significant Imaging:  I have reviewed the pertinent imaging within the past 24 hours.    Microbiology Results (last 7 days)       Procedure Component Value Units Date/Time    Respiratory Culture [[id number]]  (Abnormal)  (Susceptibility) Collected: 08/11/24 0836    Order Status: Completed Specimen: Sputum from Endotracheal Aspirate Updated: 08/15/24 1026     Respiratory  Culture Rare Escherichia coli      Rare Yeast     Comment: with no normal respiratory franki        GRAM STAIN Quality 2+      Rare Yeast      No bacteria seen    Blood Culture [[id number]]  (Normal) Collected: 08/08/24 0733    Order Status: Completed Specimen: Blood from Hand, Right Updated: 08/13/24 0900     Blood Culture No Growth at 5 days    Blood Culture [[id number]]  (Normal) Collected: 08/08/24 0733    Order Status: Completed Specimen: Blood from Arm, Right Updated: 08/13/24 0900     Blood Culture No Growth at 5 days    Respiratory Culture [[id number]]     Order Status: Canceled Specimen: Sputum from Trachael Aspirate              Assessment/Plan:     Assessment  Right EMANUEL stroke  Hospital-acquired pneumonia  ARDS  Hypernatremia   Leukocytosis, persistent   Newly Diagnosed Acute Systolic HF w EF 20-25%   Lactic acidosis  - resolved  Hypokalemia - persistent   Hx HTN, CVA with residual left sided weakness       Plan    Continue ICU level of care   Wean sedation as tolerated and mechanical ventilation per ARDS net protocol  Maintain MAP>65, no longer on pressors   Continue Keppra 500 mg b.i.d.  Continue aspirin, statin  RC consistent with E coli however gram stain negative for bacteria. De-escalated Zosyn to Rocephin for HAP, now Day 4 of abx.   BC x 2 NGTD   Monitor fluid status, diurese as needed   Palliative care following, Neurology signed off 8/11      34 minutes of critical care was time spent personally by me on the following activities: development of treatment plan with patient or surrogate and bedside caregivers, discussions with consultants, evaluation of patient's response to treatment, examination of patient, ordering and performing treatments and interventions, ordering and review of laboratory studies, ordering and review of radiographic studies, pulse oximetry, re-evaluation of patient's condition.  This critical care time did not overlap with that of any other provider or involve time for any  procedures.       Raymond Vasquez MD  Pulmonary Critical Care Medicine  [facility] - [facility]  DOS: 08/16/2024

## 2024-08-16 NOTE — PROGRESS NOTES
"Advance Care Planning     Date: 08/16/2024    [facility]  I engaged the family in a voluntary conversation about advance care planning and we specifically addressed what the goals of care would be moving forward, in light of the patient's change in clinical status, specifically  current condition. Patient's son states he "feels helpless and unsure of what todo" States that when he visits with the patient he tells her about his day be cause he was told that she could possibly hear him, but then feels helpless he isn't able to do more for her. States that when he isn't at the hospital "I feel like I am abandoning her". States he wants to come to the hospital today but at the same time it difficult to see the patient in her current state. Emotional support provided and validated his feelings, discuss the grieving process. States that his father was in "a coma after his stroke, but then came about and was playing video games with me a year later and I guess I am hoping for the same results with my mother".     We did specifically address the patient's likely prognosis, which is poor.  We explored the patient's values and preferences for future care.  I encouraged the patient to come to the hospital and spend time with his mother, informed him of the physician that is caring for her today and throughout the weekend and that the physician has expressed he would like to speak with him in detail and answer any questions or concerns that he may have, as he understands the importance and difficulty of the decisions being presented to him. Encouraged patient's son to come visit when he is ready and informed him that him visiting is important, patient verbalized understanding and expressed appreciation of encouragement. Patient states "thank you I am getting dressed and will come visit with the physician". Medical team updated on conversation    The family endorses that what is most important right now is to focus on quality of life, " even if it means sacrificing a little time, extending life as long as possible, even it it means sacrificing quality, and comfort and QOL     Accordingly, we have decided that the best plan to meet the patient's goals includes continuing with treatment    Palliative RN was spent on advance care planning, goals of care discussion, emotional support, formulating and communicating prognosis and exploring burden/benefit of various approaches of treatment. This discussion occurred on a fully voluntary basis with the verbal consent of the patient and/or family.

## 2024-08-16 NOTE — PLAN OF CARE
Problem: Adult Inpatient Plan of Care  Goal: Plan of Care Review  Outcome: Progressing  Goal: Patient-Specific Goal (Individualized)  Outcome: Progressing  Goal: Absence of Hospital-Acquired Illness or Injury  Outcome: Progressing  Goal: Optimal Comfort and Wellbeing  Outcome: Progressing  Goal: Readiness for Transition of Care  Outcome: Progressing     Problem: Infection  Goal: Absence of Infection Signs and Symptoms  Outcome: Progressing     Problem: Skin Injury Risk Increased  Goal: Skin Health and Integrity  Outcome: Progressing     Problem: Stroke, Ischemic (Includes Transient Ischemic Attack)  Goal: Optimal Coping  Outcome: Progressing  Goal: Effective Bowel Elimination  Outcome: Progressing  Goal: Optimal Cerebral Tissue Perfusion  Outcome: Progressing  Goal: Optimal Cognitive Function  Outcome: Progressing  Goal: Improved Communication Skills  Outcome: Progressing  Goal: Optimal Functional Ability  Outcome: Progressing  Goal: Optimal Nutrition Intake  Outcome: Progressing  Goal: Effective Oxygenation and Ventilation  Outcome: Progressing  Goal: Improved Sensorimotor Function  Outcome: Progressing  Goal: Safe and Effective Swallow  Outcome: Progressing  Goal: Effective Urinary Elimination  Outcome: Progressing     Problem: Coping Ineffective  Goal: Effective Coping  Outcome: Progressing     Problem: Wound  Goal: Optimal Coping  Outcome: Progressing  Goal: Optimal Functional Ability  Outcome: Progressing  Goal: Absence of Infection Signs and Symptoms  Outcome: Progressing  Goal: Improved Oral Intake  Outcome: Progressing  Goal: Optimal Pain Control and Function  Outcome: Progressing  Goal: Skin Health and Integrity  Outcome: Progressing  Goal: Optimal Wound Healing  Outcome: Progressing

## 2024-08-17 LAB
ALBUMIN SERPL-MCNC: 1.6 G/DL (ref 3.4–4.8)
ALBUMIN/GLOB SERPL: 0.5 RATIO (ref 1.1–2)
ALLENS TEST BLOOD GAS (OHS): YES
ALP SERPL-CCNC: 222 UNIT/L (ref 40–150)
ALT SERPL-CCNC: 30 UNIT/L (ref 0–55)
ANION GAP SERPL CALC-SCNC: 11 MEQ/L
AST SERPL-CCNC: 56 UNIT/L (ref 5–34)
BASE EXCESS BLD CALC-SCNC: 8.7 MMOL/L
BASOPHILS # BLD AUTO: 0.04 X10(3)/MCL
BASOPHILS NFR BLD AUTO: 0.2 %
BILIRUB SERPL-MCNC: 0.4 MG/DL
BLOOD GAS SAMPLE TYPE (OHS): ABNORMAL
BUN SERPL-MCNC: 35.4 MG/DL (ref 9.8–20.1)
CA-I BLD-SCNC: 1.1 MMOL/L (ref 1.12–1.23)
CALCIUM SERPL-MCNC: 7.7 MG/DL (ref 8.4–10.2)
CHLORIDE SERPL-SCNC: 108 MMOL/L (ref 98–107)
CO2 BLDA-SCNC: 34.9 MMOL/L
CO2 SERPL-SCNC: 27 MMOL/L (ref 23–31)
CREAT SERPL-MCNC: 0.83 MG/DL (ref 0.55–1.02)
CREAT/UREA NIT SERPL: 43
DRAWN BY BLOOD GAS (OHS): ABNORMAL
EOSINOPHIL # BLD AUTO: 0.64 X10(3)/MCL (ref 0–0.9)
EOSINOPHIL NFR BLD AUTO: 3.8 %
ERYTHROCYTE [DISTWIDTH] IN BLOOD BY AUTOMATED COUNT: 15.7 % (ref 11.5–17)
GFR SERPLBLD CREATININE-BSD FMLA CKD-EPI: >60 ML/MIN/1.73/M2
GLOBULIN SER-MCNC: 3.2 GM/DL (ref 2.4–3.5)
GLUCOSE SERPL-MCNC: 132 MG/DL (ref 82–115)
HCO3 BLDA-SCNC: 33.5 MMOL/L (ref 22–26)
HCT VFR BLD AUTO: 28.1 % (ref 37–47)
HGB BLD-MCNC: 8.8 G/DL (ref 12–16)
IMM GRANULOCYTES # BLD AUTO: 0.25 X10(3)/MCL (ref 0–0.04)
IMM GRANULOCYTES NFR BLD AUTO: 1.5 %
INHALED O2 CONCENTRATION: 70 %
LYMPHOCYTES # BLD AUTO: 1.43 X10(3)/MCL (ref 0.6–4.6)
LYMPHOCYTES NFR BLD AUTO: 8.5 %
MAGNESIUM SERPL-MCNC: 2.5 MG/DL (ref 1.6–2.6)
MCH RBC QN AUTO: 28.7 PG (ref 27–31)
MCHC RBC AUTO-ENTMCNC: 31.3 G/DL (ref 33–36)
MCV RBC AUTO: 91.5 FL (ref 80–94)
MECH RR (OHS): 20 B/MIN
MODE (OHS): AC
MONOCYTES # BLD AUTO: 0.78 X10(3)/MCL (ref 0.1–1.3)
MONOCYTES NFR BLD AUTO: 4.6 %
NEUTROPHILS # BLD AUTO: 13.65 X10(3)/MCL (ref 2.1–9.2)
NEUTROPHILS NFR BLD AUTO: 81.4 %
NRBC BLD AUTO-RTO: 0 %
OXYGEN DEVICE BLOOD GAS (OHS): ABNORMAL
PCO2 BLDA: 46 MMHG (ref 35–45)
PEEP RESPIRATORY: 8 CMH2O
PH BLDA: 7.47 [PH] (ref 7.35–7.45)
PHOSPHATE SERPL-MCNC: 2.5 MG/DL (ref 2.3–4.7)
PLATELET # BLD AUTO: 275 X10(3)/MCL (ref 130–400)
PMV BLD AUTO: 11.2 FL (ref 7.4–10.4)
PO2 BLDA: 74 MMHG (ref 80–100)
POTASSIUM BLOOD GAS (OHS): 2.8 MMOL/L (ref 3.5–5)
POTASSIUM SERPL-SCNC: 3.3 MMOL/L (ref 3.5–5.1)
PROT SERPL-MCNC: 4.8 GM/DL (ref 5.8–7.6)
RBC # BLD AUTO: 3.07 X10(6)/MCL (ref 4.2–5.4)
SAMPLE SITE BLOOD GAS (OHS): ABNORMAL
SAO2 % BLDA: 96 %
SODIUM BLOOD GAS (OHS): 143 MMOL/L (ref 137–145)
SODIUM SERPL-SCNC: 146 MMOL/L (ref 136–145)
SPONT+MECH VT ON VENT: 450 ML
WBC # BLD AUTO: 16.79 X10(3)/MCL (ref 4.5–11.5)

## 2024-08-17 PROCEDURE — 80053 COMPREHEN METABOLIC PANEL: CPT

## 2024-08-17 PROCEDURE — 83735 ASSAY OF MAGNESIUM: CPT

## 2024-08-17 PROCEDURE — 25000003 PHARM REV CODE 250: Performed by: PSYCHIATRY & NEUROLOGY

## 2024-08-17 PROCEDURE — 94761 N-INVAS EAR/PLS OXIMETRY MLT: CPT

## 2024-08-17 PROCEDURE — 27100171 HC OXYGEN HIGH FLOW UP TO 24 HOURS

## 2024-08-17 PROCEDURE — 63600175 PHARM REV CODE 636 W HCPCS

## 2024-08-17 PROCEDURE — 63600175 PHARM REV CODE 636 W HCPCS: Performed by: STUDENT IN AN ORGANIZED HEALTH CARE EDUCATION/TRAINING PROGRAM

## 2024-08-17 PROCEDURE — 36415 COLL VENOUS BLD VENIPUNCTURE: CPT

## 2024-08-17 PROCEDURE — 94640 AIRWAY INHALATION TREATMENT: CPT

## 2024-08-17 PROCEDURE — 95714 VEEG EA 12-26 HR UNMNTR: CPT

## 2024-08-17 PROCEDURE — 25000003 PHARM REV CODE 250

## 2024-08-17 PROCEDURE — 95700 EEG CONT REC W/VID EEG TECH: CPT

## 2024-08-17 PROCEDURE — 63600175 PHARM REV CODE 636 W HCPCS: Performed by: PSYCHIATRY & NEUROLOGY

## 2024-08-17 PROCEDURE — 82803 BLOOD GASES ANY COMBINATION: CPT

## 2024-08-17 PROCEDURE — 85025 COMPLETE CBC W/AUTO DIFF WBC: CPT

## 2024-08-17 PROCEDURE — 25000003 PHARM REV CODE 250: Performed by: STUDENT IN AN ORGANIZED HEALTH CARE EDUCATION/TRAINING PROGRAM

## 2024-08-17 PROCEDURE — 99900026 HC AIRWAY MAINTENANCE (STAT)

## 2024-08-17 PROCEDURE — 94003 VENT MGMT INPAT SUBQ DAY: CPT

## 2024-08-17 PROCEDURE — 99900031 HC PATIENT EDUCATION (STAT)

## 2024-08-17 PROCEDURE — 36600 WITHDRAWAL OF ARTERIAL BLOOD: CPT

## 2024-08-17 PROCEDURE — 99900035 HC TECH TIME PER 15 MIN (STAT)

## 2024-08-17 PROCEDURE — 20000000 HC ICU ROOM

## 2024-08-17 PROCEDURE — 25000003 PHARM REV CODE 250: Performed by: NURSE PRACTITIONER

## 2024-08-17 PROCEDURE — 84100 ASSAY OF PHOSPHORUS: CPT

## 2024-08-17 RX ORDER — POTASSIUM CHLORIDE 7.45 MG/ML
10 INJECTION INTRAVENOUS
Status: COMPLETED | OUTPATIENT
Start: 2024-08-17 | End: 2024-08-17

## 2024-08-17 RX ADMIN — PROPOFOL 30 MCG/KG/MIN: 10 INJECTION, EMULSION INTRAVENOUS at 08:08

## 2024-08-17 RX ADMIN — PROPRANOLOL HYDROCHLORIDE 20 MG: 10 TABLET ORAL at 08:08

## 2024-08-17 RX ADMIN — POTASSIUM CHLORIDE 10 MEQ: 7.46 INJECTION, SOLUTION INTRAVENOUS at 06:08

## 2024-08-17 RX ADMIN — ATORVASTATIN CALCIUM 40 MG: 40 TABLET, FILM COATED ORAL at 08:08

## 2024-08-17 RX ADMIN — ASPIRIN 81 MG CHEWABLE TABLET 81 MG: 81 TABLET CHEWABLE at 08:08

## 2024-08-17 RX ADMIN — LEVETIRACETAM 500 MG: 100 INJECTION, SOLUTION INTRAVENOUS at 08:08

## 2024-08-17 RX ADMIN — POTASSIUM CHLORIDE 10 MEQ: 7.46 INJECTION, SOLUTION INTRAVENOUS at 05:08

## 2024-08-17 RX ADMIN — POLYETHYLENE GLYCOL 3350 17 G: 17 POWDER, FOR SOLUTION ORAL at 08:08

## 2024-08-17 RX ADMIN — DOCUSATE SODIUM LIQUID 100 MG: 100 LIQUID ORAL at 08:08

## 2024-08-17 RX ADMIN — CEFTRIAXONE SODIUM 2 G: 2 INJECTION, POWDER, FOR SOLUTION INTRAMUSCULAR; INTRAVENOUS at 10:08

## 2024-08-17 RX ADMIN — POTASSIUM BICARBONATE 25 MEQ: 977.5 TABLET, EFFERVESCENT ORAL at 05:08

## 2024-08-17 RX ADMIN — PROPOFOL 30 MCG/KG/MIN: 10 INJECTION, EMULSION INTRAVENOUS at 04:08

## 2024-08-17 NOTE — NURSING
Nurses Note -- 4 Eyes      8/17/2024   10:00 AM      Skin assessed during: Daily Assessment      [] No Altered Skin Integrity Present    [x]Prevention Measures Documented      [x] Yes- Altered Skin Integrity Present or Discovered   [] LDA Added if Not in Epic (Describe Wound)   [] New Altered Skin Integrity was Present on Admit and Documented in LDA   [] Wound Image Taken    Wound Care Consulted? Yes    Attending Nurse:  Raine Rodriguez RN/Staff Member:  TRACEY Garcia

## 2024-08-17 NOTE — PLAN OF CARE
Problem: Adult Inpatient Plan of Care  Goal: Plan of Care Review  Outcome: Progressing  Flowsheets (Taken 8/17/2024 1002)  Plan of Care Reviewed With: child  Goal: Patient-Specific Goal (Individualized)  Outcome: Not Progressing  Goal: Absence of Hospital-Acquired Illness or Injury  Outcome: Progressing  Goal: Optimal Comfort and Wellbeing  Outcome: Not Progressing  Goal: Readiness for Transition of Care  Outcome: Progressing     Problem: Infection  Goal: Absence of Infection Signs and Symptoms  Outcome: Progressing     Problem: Skin Injury Risk Increased  Goal: Skin Health and Integrity  Outcome: Progressing     Problem: Stroke, Ischemic (Includes Transient Ischemic Attack)  Goal: Optimal Cerebral Tissue Perfusion  Outcome: Not Progressing  Goal: Optimal Cognitive Function  Outcome: Not Progressing

## 2024-08-17 NOTE — PROGRESS NOTES
[facility] - [facility]  Pulmonary Critical Care Note    Patient Name: Shannan Reza  MRN: [id number]  Admission Date: 8/7/2024  Hospital Length of Stay: 10 days  Code Status: Full Code  Attending Provider: Vladimir Acevedo MD  Primary Care Provider: Kiana Marie MD     Subjective:     HPI:   This is a 73-year-old female with past medical history of hypertension and to strokes in past presenting from outside facility for suspected CVA and seizure-like activity.  Family member present at bedside and states that patient was in normal state of health until yesterday evening.  States she started to feel weak and lethargic and eventually went unresponsive.  Had presentation like this previously when she had stroke.  CT head at outside facility did not show any acute infarct.  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 She was intubated for airway protection and loaded with Keppra.  She was transferred to [facility] for further care.  Repeat imaging was ordered.  Patient continues to be intubated and sedated on propofol. Requiring increased amounts of Levophed and started on vasopressin and stress dose steroids.  Admitted to ICU for close monitoring.    24 Hour Interval History:  NAEO documented, VSS and afebrile. SBP at goal 110-120. Remains on mechanical ventilation, still on Propofol and Fentanyl. No longer requiring pressors. Urine output 2.53L and net I&O -1.6L. Palliative care following, C discussion with son yesterday. Leukocytosis persistent, H/H stable. Alkalotic on ABG. On Day 5 of abx, currently on rocephin.     Past Medical History:   Diagnosis Date    Carotid-cavernous fistula     Cerebrovascular accident (CVA) due to thrombosis of right middle cerebral artery 6/26/2018    Essential hypertension 6/27/2018    Hemiparesis affecting left side as late effect of cerebrovascular accident     Hx of tobacco use, presenting hazards to  health        Past Surgical History:   Procedure Laterality Date    AUGMENTATION OF BREAST      BRAIN SURGERY         Social History     Socioeconomic History    Marital status: Unknown   Tobacco Use    Smoking status: Former     Current packs/day: 0.00     Average packs/day: 0.5 packs/day for 25.0 years (12.5 ttl pk-yrs)     Types: Cigarettes     Start date: 1993     Quit date: 2018     Years since quittin.1    Smokeless tobacco: Former   Substance and Sexual Activity    Alcohol use: Not Currently    Drug use: No    Sexual activity: Never   Social History Narrative    ** Merged History Encounter **          Social Determinants of Health     Financial Resource Strain: Patient Declined (2024)    Overall Financial Resource Strain (CARDIA)     Difficulty of Paying Living Expenses: Patient declined   Food Insecurity: Unknown (2024)    Hunger Vital Sign     Worried About Running Out of Food in the Last Year: Patient declined     Ran Out of Food in the Last Year: Never true   Transportation Needs: Patient Declined (2024)    TRANSPORTATION NEEDS     Transportation : Patient declined   Housing Stability: Patient Declined (2024)    Housing Stability Vital Sign     Unable to Pay for Housing in the Last Year: Patient declined     Homeless in the Last Year: Patient declined           Current Outpatient Medications   Medication Instructions    amLODIPine (NORVASC) 5 mg, Oral, Daily    aspirin (ECOTRIN) 81 mg, Oral, Daily    folic acid (FOLVITE) 1 mg, Oral, Daily    hydrOXYzine HCL (ATARAX) 25 MG tablet TAKE 1 TABLET BY MOUTH THREE TIMES DAILY AS NEEDED FOR ITCHING OR ANXIETY    multivitamin Tab 1 tablet, Oral, Daily    rosuvastatin (CRESTOR) 40 mg, Oral, Nightly       Current Inpatient Medications   aspirin  81 mg Per NG tube Daily    atorvastatin  40 mg Oral Daily    cefTRIAXone (Rocephin) IV (PEDS and ADULTS)  2 g Intravenous Q24H    docusate  100 mg Oral Daily    levETIRAcetam (Keppra) IV (PEDS  and ADULTS)  500 mg Intravenous BID    polyethylene glycol  17 g Oral BID    propranoloL  20 mg Oral BID       Current Intravenous Infusions   fentanyl  0-250 mcg/hr Intravenous Continuous   Stopped at 08/16/24 0824    NORepinephrine bitartrate-D5W  0-3 mcg/kg/min Intravenous Continuous   Stopped at 08/14/24 0331    propofoL  0-50 mcg/kg/min Intravenous Continuous 15.6 mL/hr at 08/17/24 0500 40 mcg/kg/min at 08/17/24 0500         Review of Systems   Unable to perform ROS: Intubated          Objective:       Intake/Output Summary (Last 24 hours) at 8/17/2024 0657  Last data filed at 8/17/2024 0500  Gross per 24 hour   Intake 913.29 ml   Output 2525 ml   Net -1611.71 ml         Vital Signs (Most Recent):  Temp: 99.1 °F (37.3 °C) (08/17/24 0400)  Pulse: 73 (08/17/24 0532)  Resp: (!) 23 (08/17/24 0532)  BP: (!) 111/49 (08/17/24 0500)  SpO2: 99 % (08/17/24 0532)  Body mass index is 26.21 kg/m².  Weight: 65 kg (143 lb 4.8 oz) Vital Signs (24h Range):  Temp:  [98.1 °F (36.7 °C)-99.9 °F (37.7 °C)] 99.1 °F (37.3 °C)  Pulse:  [73-94] 73  Resp:  [20-33] 23  SpO2:  [91 %-100 %] 99 %  BP: (111-159)/(49-80) 111/49     Physical Exam  Vitals reviewed.   Constitutional:       Appearance: She is ill-appearing.   HENT:      Head: Normocephalic.   Eyes:      Comments: L pupil 2mm, R pupil 3mm. L slightly reactive. R pupil fixed.   Cardiovascular:      Rate and Rhythm: Normal rate and regular rhythm.      Heart sounds: No murmur heard.  Pulmonary:      Breath sounds: Normal breath sounds.      Comments: Intubated and mechanically ventilated.   No dyssynchrony to mechanical ventilation.   Abdominal:      Palpations: Abdomen is soft.   Neurological:      Comments: Withdraws to pain LUE and LLE. No withdrawal on right side.            Lines/Drains/Airways       Drain  Duration                  NG/OG Tube 08/06/24 0000 Center mouth 11 days         Urethral Catheter 08/12/24 1620 Silicone 16 Fr. 4 days              Airway  Duration                   Airway - Non-Surgical 08/07/24 2147 Endotracheal Tube 9 days              Peripheral Intravenous Line  Duration                  Peripheral IV - Single Lumen 08/09/24 1511 20 G 1 in Anterior;Left;Lateral Wrist 7 days         Peripheral IV - Single Lumen 08/09/24 1511 20 G 1 in No Left;Anterior Antecubital 7 days         Peripheral IV - Single Lumen 08/13/24 0300 18 G Anterior;Left Upper Arm 4 days                    Significant Labs:    Lab Results   Component Value Date    WBC 16.79 (H) 08/17/2024    HGB 8.8 (L) 08/17/2024    HCT 28.1 (L) 08/17/2024    MCV 91.5 08/17/2024     08/17/2024           BMP  Lab Results   Component Value Date     (H) 08/17/2024    K 3.3 (L) 08/17/2024    CO2 27 08/17/2024    BUN 35.4 (H) 08/17/2024    CREATININE 0.83 08/17/2024    CALCIUM 7.7 (L) 08/17/2024    AGAP 11.0 08/17/2024    ESTGFRAFRICA >60.0 07/07/2022    EGFRNONAA >60.0 07/07/2022         ABG  Recent Labs   Lab 08/17/24 0324   PH 7.470*   PO2 74.0*   PCO2 46.0*   HCO3 33.5*   POCBASEDEF 8.70       Mechanical Ventilation Support:  Vent Mode: A/C (08/17/24 0532)  Ventilator Initiated: Yes (08/07/24 2147)  Set Rate: 20 BPM (08/17/24 0532)  Vt Set: 450 mL (08/17/24 0532)  PEEP/CPAP: 8 cmH20 (08/17/24 0532)  Oxygen Concentration (%): 70 (08/17/24 0532)  Peak Airway Pressure: 25 cmH20 (08/17/24 0532)  Total Ve: 10.3 L/m (08/17/24 0532)  F/VT Ratio<105 (RSBI): (!) 53.12 (08/17/24 0532)      Significant Imaging:  I have reviewed the pertinent imaging within the past 24 hours.    Microbiology Results (last 7 days)       Procedure Component Value Units Date/Time    Respiratory Culture [[id number]]  (Abnormal)  (Susceptibility) Collected: 08/11/24 0836    Order Status: Completed Specimen: Sputum from Endotracheal Aspirate Updated: 08/15/24 1026     Respiratory Culture Rare Escherichia coli      Rare Yeast     Comment: with no normal respiratory franki        GRAM STAIN Quality 2+      Rare Yeast      No bacteria seen     Blood Culture [[id number]]  (Normal) Collected: 08/08/24 0733    Order Status: Completed Specimen: Blood from Hand, Right Updated: 08/13/24 0900     Blood Culture No Growth at 5 days    Blood Culture [[id number]]  (Normal) Collected: 08/08/24 0733    Order Status: Completed Specimen: Blood from Arm, Right Updated: 08/13/24 0900     Blood Culture No Growth at 5 days    Respiratory Culture [[id number]]     Order Status: Canceled Specimen: Sputum from Trachael Aspirate              Assessment/Plan:     Assessment  Right EMANUEL stroke  Hospital-acquired pneumonia  ARDS  Hypernatremia   Leukocytosis, persistent   Newly Diagnosed Acute Systolic HF w EF 20-25%   Lactic acidosis  - resolved  Hypokalemia - persistent   Hx HTN, CVA with residual left sided weakness       Plan    Continue ICU level of care   Wean sedation as tolerated and mechanical ventilation per ARDS net protocol  Maintain MAP>65, no longer on pressors   Continue Keppra 500 mg b.i.d.  Continue aspirin, statin  Day 5 of abx, currently on Rocephin for E coli PNA   BC x 2 NGTD   Monitor fluid status, diurese as needed   Palliative care following, Neurology signed off 8/11      34 minutes of critical care was time spent personally by me on the following activities: development of treatment plan with patient or surrogate and bedside caregivers, discussions with consultants, evaluation of patient's response to treatment, examination of patient, ordering and performing treatments and interventions, ordering and review of laboratory studies, ordering and review of radiographic studies, pulse oximetry, re-evaluation of patient's condition.  This critical care time did not overlap with that of any other provider or involve time for any procedures.       Raymond Vasquez MD  Pulmonary Critical Care Medicine  [facility] - [facility]  DOS: 08/17/2024

## 2024-08-18 LAB
ALBUMIN SERPL-MCNC: 1.5 G/DL (ref 3.4–4.8)
ALBUMIN/GLOB SERPL: 0.5 RATIO (ref 1.1–2)
ALLENS TEST BLOOD GAS (OHS): YES
ALP SERPL-CCNC: 219 UNIT/L (ref 40–150)
ALT SERPL-CCNC: 45 UNIT/L (ref 0–55)
ANION GAP SERPL CALC-SCNC: 12 MEQ/L
AST SERPL-CCNC: 78 UNIT/L (ref 5–34)
BASE EXCESS BLD CALC-SCNC: 7.9 MMOL/L
BASOPHILS # BLD AUTO: 0.03 X10(3)/MCL
BASOPHILS NFR BLD AUTO: 0.2 %
BILIRUB SERPL-MCNC: 0.3 MG/DL
BLOOD GAS SAMPLE TYPE (OHS): ABNORMAL
BUN SERPL-MCNC: 34 MG/DL (ref 9.8–20.1)
CA-I BLD-SCNC: 1.12 MMOL/L (ref 1.12–1.23)
CALCIUM SERPL-MCNC: 7.8 MG/DL (ref 8.4–10.2)
CHLORIDE SERPL-SCNC: 104 MMOL/L (ref 98–107)
CO2 BLDA-SCNC: 34.8 MMOL/L
CO2 SERPL-SCNC: 25 MMOL/L (ref 23–31)
CREAT SERPL-MCNC: 0.71 MG/DL (ref 0.55–1.02)
CREAT/UREA NIT SERPL: 48
DRAWN BY BLOOD GAS (OHS): ABNORMAL
EOSINOPHIL # BLD AUTO: 0.52 X10(3)/MCL (ref 0–0.9)
EOSINOPHIL NFR BLD AUTO: 3.8 %
ERYTHROCYTE [DISTWIDTH] IN BLOOD BY AUTOMATED COUNT: 15.3 % (ref 11.5–17)
GFR SERPLBLD CREATININE-BSD FMLA CKD-EPI: >60 ML/MIN/1.73/M2
GLOBULIN SER-MCNC: 3.2 GM/DL (ref 2.4–3.5)
GLUCOSE SERPL-MCNC: 137 MG/DL (ref 82–115)
HCO3 BLDA-SCNC: 33.3 MMOL/L (ref 22–26)
HCT VFR BLD AUTO: 25.8 % (ref 37–47)
HGB BLD-MCNC: 8.1 G/DL (ref 12–16)
IMM GRANULOCYTES # BLD AUTO: 0.16 X10(3)/MCL (ref 0–0.04)
IMM GRANULOCYTES NFR BLD AUTO: 1.2 %
INHALED O2 CONCENTRATION: 70 %
LYMPHOCYTES # BLD AUTO: 1.23 X10(3)/MCL (ref 0.6–4.6)
LYMPHOCYTES NFR BLD AUTO: 9 %
MAGNESIUM SERPL-MCNC: 2.5 MG/DL (ref 1.6–2.6)
MCH RBC QN AUTO: 28.8 PG (ref 27–31)
MCHC RBC AUTO-ENTMCNC: 31.4 G/DL (ref 33–36)
MCV RBC AUTO: 91.8 FL (ref 80–94)
MECH RR (OHS): 20 B/MIN
MODE (OHS): AC
MONOCYTES # BLD AUTO: 0.82 X10(3)/MCL (ref 0.1–1.3)
MONOCYTES NFR BLD AUTO: 6 %
NEUTROPHILS # BLD AUTO: 10.94 X10(3)/MCL (ref 2.1–9.2)
NEUTROPHILS NFR BLD AUTO: 79.8 %
NRBC BLD AUTO-RTO: 0 %
OXYGEN DEVICE BLOOD GAS (OHS): ABNORMAL
PCO2 BLDA: 49 MMHG (ref 35–45)
PEEP RESPIRATORY: 8 CMH2O
PH BLDA: 7.44 [PH] (ref 7.35–7.45)
PHOSPHATE SERPL-MCNC: 2.3 MG/DL (ref 2.3–4.7)
PLATELET # BLD AUTO: 273 X10(3)/MCL (ref 130–400)
PMV BLD AUTO: 11.1 FL (ref 7.4–10.4)
PO2 BLDA: 104 MMHG (ref 80–100)
POCT GLUCOSE: 163 MG/DL (ref 70–110)
POTASSIUM BLOOD GAS (OHS): 3.4 MMOL/L (ref 3.5–5)
POTASSIUM SERPL-SCNC: 3.8 MMOL/L (ref 3.5–5.1)
PROT SERPL-MCNC: 4.7 GM/DL (ref 5.8–7.6)
RBC # BLD AUTO: 2.81 X10(6)/MCL (ref 4.2–5.4)
SAMPLE SITE BLOOD GAS (OHS): ABNORMAL
SAO2 % BLDA: 98 %
SODIUM BLOOD GAS (OHS): 140 MMOL/L (ref 137–145)
SODIUM SERPL-SCNC: 141 MMOL/L (ref 136–145)
SPONT+MECH VT ON VENT: 450 ML
WBC # BLD AUTO: 13.7 X10(3)/MCL (ref 4.5–11.5)

## 2024-08-18 PROCEDURE — 25000003 PHARM REV CODE 250: Performed by: NURSE PRACTITIONER

## 2024-08-18 PROCEDURE — 95714 VEEG EA 12-26 HR UNMNTR: CPT

## 2024-08-18 PROCEDURE — 20000000 HC ICU ROOM

## 2024-08-18 PROCEDURE — 83735 ASSAY OF MAGNESIUM: CPT

## 2024-08-18 PROCEDURE — 25000003 PHARM REV CODE 250

## 2024-08-18 PROCEDURE — 80053 COMPREHEN METABOLIC PANEL: CPT

## 2024-08-18 PROCEDURE — 27100171 HC OXYGEN HIGH FLOW UP TO 24 HOURS

## 2024-08-18 PROCEDURE — 99900031 HC PATIENT EDUCATION (STAT)

## 2024-08-18 PROCEDURE — 63600175 PHARM REV CODE 636 W HCPCS: Performed by: PSYCHIATRY & NEUROLOGY

## 2024-08-18 PROCEDURE — 94761 N-INVAS EAR/PLS OXIMETRY MLT: CPT | Mod: XB

## 2024-08-18 PROCEDURE — 94760 N-INVAS EAR/PLS OXIMETRY 1: CPT | Mod: XB

## 2024-08-18 PROCEDURE — 25000003 PHARM REV CODE 250: Performed by: PSYCHIATRY & NEUROLOGY

## 2024-08-18 PROCEDURE — 36600 WITHDRAWAL OF ARTERIAL BLOOD: CPT

## 2024-08-18 PROCEDURE — 82803 BLOOD GASES ANY COMBINATION: CPT

## 2024-08-18 PROCEDURE — 99900026 HC AIRWAY MAINTENANCE (STAT)

## 2024-08-18 PROCEDURE — 25000003 PHARM REV CODE 250: Performed by: STUDENT IN AN ORGANIZED HEALTH CARE EDUCATION/TRAINING PROGRAM

## 2024-08-18 PROCEDURE — 99900035 HC TECH TIME PER 15 MIN (STAT)

## 2024-08-18 PROCEDURE — 94003 VENT MGMT INPAT SUBQ DAY: CPT

## 2024-08-18 PROCEDURE — 85025 COMPLETE CBC W/AUTO DIFF WBC: CPT

## 2024-08-18 PROCEDURE — 84100 ASSAY OF PHOSPHORUS: CPT

## 2024-08-18 PROCEDURE — 63600175 PHARM REV CODE 636 W HCPCS

## 2024-08-18 PROCEDURE — 63600175 PHARM REV CODE 636 W HCPCS: Performed by: STUDENT IN AN ORGANIZED HEALTH CARE EDUCATION/TRAINING PROGRAM

## 2024-08-18 PROCEDURE — 27200966 HC CLOSED SUCTION SYSTEM

## 2024-08-18 PROCEDURE — 36415 COLL VENOUS BLD VENIPUNCTURE: CPT

## 2024-08-18 RX ORDER — POTASSIUM CHLORIDE 14.9 MG/ML
20 INJECTION INTRAVENOUS ONCE
Status: COMPLETED | OUTPATIENT
Start: 2024-08-18 | End: 2024-08-18

## 2024-08-18 RX ADMIN — PROPOFOL 40 MCG/KG/MIN: 10 INJECTION, EMULSION INTRAVENOUS at 10:08

## 2024-08-18 RX ADMIN — CEFTRIAXONE SODIUM 2 G: 2 INJECTION, POWDER, FOR SOLUTION INTRAMUSCULAR; INTRAVENOUS at 10:08

## 2024-08-18 RX ADMIN — LEVETIRACETAM 500 MG: 100 INJECTION, SOLUTION INTRAVENOUS at 08:08

## 2024-08-18 RX ADMIN — PROPOFOL 40 MCG/KG/MIN: 10 INJECTION, EMULSION INTRAVENOUS at 03:08

## 2024-08-18 RX ADMIN — POTASSIUM CHLORIDE 20 MEQ: 14.9 INJECTION, SOLUTION INTRAVENOUS at 08:08

## 2024-08-18 RX ADMIN — PROPOFOL 40 MCG/KG/MIN: 10 INJECTION, EMULSION INTRAVENOUS at 04:08

## 2024-08-18 RX ADMIN — PROPRANOLOL HYDROCHLORIDE 20 MG: 10 TABLET ORAL at 08:08

## 2024-08-18 RX ADMIN — ATORVASTATIN CALCIUM 40 MG: 40 TABLET, FILM COATED ORAL at 08:08

## 2024-08-18 RX ADMIN — ASPIRIN 81 MG CHEWABLE TABLET 81 MG: 81 TABLET CHEWABLE at 08:08

## 2024-08-18 NOTE — PROGRESS NOTES
[facility]  Pulmonary Critical Care Note    Patient Name: Shannan Reza  MRN: [id number]  Admission Date: 8/7/2024  Hospital Length of Stay: 11 days  Code Status: Full Code  Attending Provider: Vladimir Acevedo MD  Primary Care Provider: Kiana Marie MD     Subjective:     HPI:   This is a 73-year-old female with past medical history of hypertension and to strokes in past presenting from outside facility for suspected CVA and seizure-like activity.  Family member present at bedside and states that patient was in normal state of health until yesterday evening.  States she started to feel weak and lethargic and eventually went unresponsive.  Had presentation like this previously when she had stroke.  CT head at outside facility did not show any acute infarct.  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 She was intubated for airway protection and loaded with Keppra.  She was transferred to [facility] for further care.  Repeat imaging was ordered.  Patient continues to be intubated and sedated on propofol. Requiring increased amounts of Levophed and started on vasopressin and stress dose steroids.  Admitted to ICU for close monitoring.    24 Hour Interval History:  NAEO documented, VSS and afebrile. SBP at goal 110-120. Remains on mechanical ventilation, still on Propofol and Fentanyl. No longer requiring pressors. Urine output 2L and net I&O +2.47L. Palliative care following, [facility] discussion with son ongoing, is coming in today but stated he likely will not want longterm trach and PEG placement. Leukocytosis slightly downtrended, H/H downtrended 8.1/25.8. ABG wnl. On Day 6 of abx, currently on rocephin scheduled to dc tomorrow for possible PNA.     Past Medical History:   Diagnosis Date    Carotid-cavernous fistula     Cerebrovascular accident (CVA) due to thrombosis of right middle cerebral artery 6/26/2018    Essential  hypertension 2018    Hemiparesis affecting left side as late effect of cerebrovascular accident     Hx of tobacco use, presenting hazards to health        Past Surgical History:   Procedure Laterality Date    AUGMENTATION OF BREAST      BRAIN SURGERY         Social History     Socioeconomic History    Marital status: Unknown   Tobacco Use    Smoking status: Former     Current packs/day: 0.00     Average packs/day: 0.5 packs/day for 25.0 years (12.5 ttl pk-yrs)     Types: Cigarettes     Start date: 1993     Quit date: 2018     Years since quittin.1    Smokeless tobacco: Former   Substance and Sexual Activity    Alcohol use: Not Currently    Drug use: No    Sexual activity: Never   Social History Narrative    ** Merged History Encounter **          Social Determinants of Health     Financial Resource Strain: Patient Declined (2024)    Overall Financial Resource Strain (CARDIA)     Difficulty of Paying Living Expenses: Patient declined   Food Insecurity: Unknown (2024)    Hunger Vital Sign     Worried About Running Out of Food in the Last Year: Patient declined     Ran Out of Food in the Last Year: Never true   Transportation Needs: Patient Declined (2024)    TRANSPORTATION NEEDS     Transportation : Patient declined   Housing Stability: Patient Declined (2024)    Housing Stability Vital Sign     Unable to Pay for Housing in the Last Year: Patient declined     Homeless in the Last Year: Patient declined           Current Outpatient Medications   Medication Instructions    amLODIPine (NORVASC) 5 mg, Oral, Daily    aspirin (ECOTRIN) 81 mg, Oral, Daily    folic acid (FOLVITE) 1 mg, Oral, Daily    hydrOXYzine HCL (ATARAX) 25 MG tablet TAKE 1 TABLET BY MOUTH THREE TIMES DAILY AS NEEDED FOR ITCHING OR ANXIETY    multivitamin Tab 1 tablet, Oral, Daily    rosuvastatin (CRESTOR) 40 mg, Oral, Nightly       Current Inpatient Medications   aspirin  81 mg Per NG tube Daily    atorvastatin  40 mg  Oral Daily    cefTRIAXone (Rocephin) IV (PEDS and ADULTS)  2 g Intravenous Q24H    docusate  100 mg Oral Daily    levETIRAcetam (Keppra) IV (PEDS and ADULTS)  500 mg Intravenous BID    polyethylene glycol  17 g Oral BID    potassium chloride in water  20 mEq Intravenous Once    propranoloL  20 mg Oral BID       Current Intravenous Infusions   fentanyl  0-250 mcg/hr Intravenous Continuous   Stopped at 08/16/24 0824    NORepinephrine bitartrate-D5W  0-3 mcg/kg/min Intravenous Continuous   Stopped at 08/14/24 0331    propofoL  0-50 mcg/kg/min Intravenous Continuous 15.6 mL/hr at 08/18/24 0557 40 mcg/kg/min at 08/18/24 0557         Review of Systems   Unable to perform ROS: Intubated          Objective:       Intake/Output Summary (Last 24 hours) at 8/18/2024 0807  Last data filed at 8/18/2024 0600  Gross per 24 hour   Intake 4322.47 ml   Output 1500 ml   Net 2822.47 ml         Vital Signs (Most Recent):  Temp: 99.1 °F (37.3 °C) (08/18/24 0400)  Pulse: 80 (08/18/24 0532)  Resp: (!) 29 (08/18/24 0532)  BP: (!) 128/53 (08/18/24 0500)  SpO2: 96 % (08/18/24 0532)  Body mass index is 26.21 kg/m².  Weight: 65 kg (143 lb 4.8 oz) Vital Signs (24h Range):  Temp:  [98.6 °F (37 °C)-99.6 °F (37.6 °C)] 99.1 °F (37.3 °C)  Pulse:  [73-89] 80  Resp:  [19-32] 29  SpO2:  [89 %-100 %] 96 %  BP: ()/(43-76) 128/53     Physical Exam  Vitals reviewed.   Constitutional:       Appearance: She is ill-appearing.   HENT:      Head: Normocephalic.   Eyes:      Comments: L pupil 2mm, R pupil 3mm. L slightly reactive. R pupil fixed.   Cardiovascular:      Rate and Rhythm: Normal rate and regular rhythm.      Heart sounds: No murmur heard.  Pulmonary:      Breath sounds: Normal breath sounds.      Comments: Intubated and mechanically ventilated.   No dyssynchrony to mechanical ventilation.   Abdominal:      Palpations: Abdomen is soft.   Neurological:      Comments: Withdraws to pain LUE and LLE. No withdrawal on right side.             Lines/Drains/Airways       Drain  Duration                  NG/OG Tube 08/06/24 0000 Center mouth 12 days         Urethral Catheter 08/12/24 1620 Silicone 16 Fr. 5 days              Airway  Duration                  Airway - Non-Surgical 08/07/24 2147 Endotracheal Tube 10 days              Peripheral Intravenous Line  Duration                  Peripheral IV - Single Lumen 08/09/24 1511 20 G 1 in Anterior;Left;Lateral Wrist 8 days         Peripheral IV - Single Lumen 08/09/24 1511 20 G 1 in No Left;Anterior Antecubital 8 days         Peripheral IV - Single Lumen 08/13/24 0300 18 G Anterior;Left Upper Arm 5 days                    Significant Labs:    Lab Results   Component Value Date    WBC 13.70 (H) 08/18/2024    HGB 8.1 (L) 08/18/2024    HCT 25.8 (L) 08/18/2024    MCV 91.8 08/18/2024     08/18/2024           BMP  Lab Results   Component Value Date     08/18/2024    K 3.8 08/18/2024    CO2 25 08/18/2024    BUN 34.0 (H) 08/18/2024    CREATININE 0.71 08/18/2024    CALCIUM 7.8 (L) 08/18/2024    AGAP 12.0 08/18/2024    ESTGFRAFRICA >60.0 07/07/2022    EGFRNONAA >60.0 07/07/2022         ABG  Recent Labs   Lab 08/18/24 0255   PH 7.440   PO2 104.0*   PCO2 49.0*   HCO3 33.3*   POCBASEDEF 7.90       Mechanical Ventilation Support:  Vent Mode: A/C (08/18/24 0532)  Ventilator Initiated: Yes (08/07/24 2147)  Set Rate: 20 BPM (08/18/24 0532)  Vt Set: 450 mL (08/18/24 0532)  PEEP/CPAP: 8 cmH20 (08/18/24 0532)  Oxygen Concentration (%): 70 (08/18/24 0532)  Peak Airway Pressure: 21 cmH20 (08/18/24 0532)  Total Ve: 12.7 L/m (08/18/24 0532)  F/VT Ratio<105 (RSBI): (!) 68.4 (08/18/24 0532)      Significant Imaging:  I have reviewed the pertinent imaging within the past 24 hours.    Microbiology Results (last 7 days)       Procedure Component Value Units Date/Time    Respiratory Culture [[id number]]  (Abnormal)  (Susceptibility) Collected: 08/11/24 0836    Order Status: Completed Specimen: Sputum from  Endotracheal Aspirate Updated: 08/15/24 1026     Respiratory Culture Rare Escherichia coli      Rare Yeast     Comment: with no normal respiratory franki        GRAM STAIN Quality 2+      Rare Yeast      No bacteria seen    Blood Culture [[id number]]  (Normal) Collected: 08/08/24 0733    Order Status: Completed Specimen: Blood from Hand, Right Updated: 08/13/24 0900     Blood Culture No Growth at 5 days    Blood Culture [[id number]]  (Normal) Collected: 08/08/24 0733    Order Status: Completed Specimen: Blood from Arm, Right Updated: 08/13/24 0900     Blood Culture No Growth at 5 days    Respiratory Culture [[id number]]     Order Status: Canceled Specimen: Sputum from Trachael Aspirate              Assessment/Plan:     Assessment  Right EMANUEL stroke  Hospital-acquired pneumonia  ARDS  Hypernatremia - resolved   Leukocytosis, downtrending  Newly Diagnosed Acute Systolic HF w EF 20-25%   Lactic acidosis  - resolved  Hypokalemia - improved  Hx HTN, CVA with residual left sided weakness       Plan    Continue ICU level of care   Wean sedation as tolerated and mechanical ventilation per ARDS net protocol  Maintain MAP>65, no longer on pressors   Continue Keppra 500 mg b.i.d.  Continue aspirin, statin  Day 6 of abx, currently on Rocephin for E coli PNA   BC x 2 NGTD   Monitor fluid status, diurese as needed   Palliative care following, son will be coming in for GOC discussions. Likely does not want long term trach and PEG. Neurology signed off 8/11.      34 minutes of critical care was time spent personally by me on the following activities: development of treatment plan with patient or surrogate and bedside caregivers, discussions with consultants, evaluation of patient's response to treatment, examination of patient, ordering and performing treatments and interventions, ordering and review of laboratory studies, ordering and review of radiographic studies, pulse oximetry, re-evaluation of patient's condition.  This  critical care time did not overlap with that of any other provider or involve time for any procedures.       Raymond Vasquez MD  Pulmonary Critical Care Medicine  [facility]  DOS: 08/18/2024

## 2024-08-18 NOTE — PLAN OF CARE
Problem: Adult Inpatient Plan of Care  Goal: Plan of Care Review  8/18/2024 1247 by Fanta Dos Santos RN  Outcome: Progressing  8/18/2024 1246 by Fanta Dos Santos RN  Outcome: Progressing  Goal: Patient-Specific Goal (Individualized)  8/18/2024 1247 by Fanta Dos Santos RN  Outcome: Progressing  8/18/2024 1246 by Fanta Dos Santos RN  Outcome: Progressing  Goal: Absence of Hospital-Acquired Illness or Injury  8/18/2024 1247 by Fanta Dos Santos RN  Outcome: Progressing  8/18/2024 1246 by Fanta Dos Santos RN  Outcome: Progressing  Goal: Optimal Comfort and Wellbeing  8/18/2024 1247 by Fanta Dos Santos RN  Outcome: Progressing  8/18/2024 1246 by Fanta Dos Santos RN  Outcome: Progressing  Goal: Readiness for Transition of Care  8/18/2024 1247 by Fanta Dos Santos RN  Outcome: Progressing  8/18/2024 1246 by Fanta Dos Santos RN  Outcome: Progressing     Problem: Infection  Goal: Absence of Infection Signs and Symptoms  8/18/2024 1247 by Fanta Dos Santos RN  Outcome: Progressing  8/18/2024 1246 by Fanta Dos Santos RN  Outcome: Progressing     Problem: Skin Injury Risk Increased  Goal: Skin Health and Integrity  8/18/2024 1247 by Fanta Dos Santos RN  Outcome: Progressing  8/18/2024 1246 by Fanta Dos Santos RN  Outcome: Progressing     Problem: Stroke, Ischemic (Includes Transient Ischemic Attack)  Goal: Optimal Coping  8/18/2024 1247 by Fanta Dos Santos RN  Outcome: Progressing  8/18/2024 1246 by Fanta Dos Santos RN  Outcome: Progressing  Goal: Effective Bowel Elimination  8/18/2024 1247 by Fanta Dos Santos RN  Outcome: Progressing  8/18/2024 1246 by Fanta Dos Santos RN  Outcome: Progressing  Goal: Optimal Cerebral Tissue Perfusion  8/18/2024 1247 by Fanta Dos Santos RN  Outcome: Progressing  8/18/2024 1246 by Fanta Dos Santos RN  Outcome: Progressing  Goal: Optimal Cognitive Function  8/18/2024 1247 by Fanta Dos Santos RN  Outcome: Progressing  8/18/2024 1246 by Fanta Dos Santos, RN  Outcome: Progressing  Goal: Improved Communication Skills  8/18/2024 1247 by Raya,  TRACEY Jewell  Outcome: Progressing  8/18/2024 1246 by Fanta Dos Santos RN  Outcome: Progressing  Goal: Optimal Functional Ability  8/18/2024 1247 by Fanta Dos Santos RN  Outcome: Progressing  8/18/2024 1246 by Fanta Dos Santos RN  Outcome: Progressing  Goal: Optimal Nutrition Intake  8/18/2024 1247 by Fanta Dos Santos, RN  Outcome: Progressing  8/18/2024 1246 by Fanta Dos Santos, RN  Outcome: Progressing  Goal: Effective Oxygenation and Ventilation  8/18/2024 1247 by Fanta Dos Santos RN  Outcome: Progressing  8/18/2024 1246 by Fanta Dos Santos, RN  Outcome: Progressing  Goal: Improved Sensorimotor Function  8/18/2024 1247 by Fanta Dos Santos, RN  Outcome: Progressing  8/18/2024 1246 by Fanta Dos Santos RN  Outcome: Progressing  Goal: Safe and Effective Swallow  8/18/2024 1247 by Fanta Dos Santos, RN  Outcome: Progressing  8/18/2024 1246 by Fanta Dos Santos, RN  Outcome: Progressing  Goal: Effective Urinary Elimination  8/18/2024 1247 by Fanta Dos Santos, RN  Outcome: Progressing  8/18/2024 1246 by Fanta Dos Santos, RN  Outcome: Progressing     Problem: Coping Ineffective  Goal: Effective Coping  8/18/2024 1247 by Fanta Dos Santos, RN  Outcome: Progressing  8/18/2024 1246 by Fanta Dos Santos, RN  Outcome: Progressing     Problem: Wound  Goal: Optimal Coping  8/18/2024 1247 by Fanta Dos Santos, RN  Outcome: Progressing  8/18/2024 1246 by Fanta Dos Santos, RN  Outcome: Progressing  Goal: Optimal Functional Ability  8/18/2024 1247 by Fanta Dos Santos, RN  Outcome: Progressing  8/18/2024 1246 by Fanta Dos Santos, RN  Outcome: Progressing  Goal: Absence of Infection Signs and Symptoms  8/18/2024 1247 by Fanta Dos Santos, RN  Outcome: Progressing  8/18/2024 1246 by Fanta Dos Santos, RN  Outcome: Progressing  Goal: Improved Oral Intake  8/18/2024 1247 by Fanta Dos Santos, RN  Outcome: Progressing  8/18/2024 1246 by Fanta Dos Santos, RN  Outcome: Progressing  Goal: Optimal Pain Control and Function  8/18/2024 1247 by Fanta Dos Santos, RN  Outcome: Progressing  8/18/2024 1246 by Fanta Dos Santos,  RN  Outcome: Progressing  Goal: Skin Health and Integrity  8/18/2024 1247 by Fanta Dos Santos RN  Outcome: Progressing  8/18/2024 1246 by Fanta Dos Santos RN  Outcome: Progressing  Goal: Optimal Wound Healing  8/18/2024 1247 by Fanta Dos Santos, RN  Outcome: Progressing  8/18/2024 1246 by Fanta Dos Santos, RN  Outcome: Progressing

## 2024-08-18 NOTE — NURSING
Nurses Note -- 4 Eyes      8/18/2024   1:39 AM      Skin assessed during: Daily Assessment      [] No Altered Skin Integrity Present    [x]Prevention Measures Documented      [x] Yes- Altered Skin Integrity Present or Discovered   [x] LDA Added if Not in Epic (Describe Wound)   [] New Altered Skin Integrity was Present on Admit and Documented in LDA   [] Wound Image Taken    Wound Care Consulted? No    Attending Nurse:  Yassine Rodriguez RN/Staff Member:  Cathy

## 2024-08-18 NOTE — PROGRESS NOTES
[facility]                       [address] [id number]    PATIENT NAME:       GARY MIRELES   YOB: 1950  CSN:                [id number]   MRN:                [id number]  ADMIT DATE:         2024 21:45:00  PHYSICIAN:          Juju Bhardwaj MD                           TESTING    DATE OF SERVICE:  2024 00:00:00    STUDY PERFORMED:  A 24-hour EEG monitoring.    LOCATION:  The patient is in ICU 5.    REASON FOR STUDY:  Study was done for seizure workup.    DESCRIPTION:  This 24 hours EEG monitoring was done using 10/20 systems, using   21 channel.  The background activity is consistent of about 5 to 6 hertz of low   amplitude.  There was some EKG artifact and movement artifact throughout the   study.  There is no clear evidence of epileptiform discharges.    CONCLUSION:  This 24-hour EEG monitoring is abnormal due to presence of diffuse   slowing consistent with moderate cerebral dysfunction, which is nonspecific   sign, can be found toxic metabolic or anoxic brain injury.  There is no clear   evidence of any epileptiform discharges.  Clinical correlation suggested.        ______________________________  MD BK Mendosa/WIL  DD:  2024  Time:  07:49PM  DT:  2024  Time:  02:36AM  Job #:  [id number]       TESTING

## 2024-08-18 NOTE — NURSING
Nurses Note -- 4 Eyes      8/18/2024   12:46 PM      Skin assessed during: Daily Assessment      [] No Altered Skin Integrity Present    [x]Prevention Measures Documented      [x] Yes- Altered Skin Integrity Present or Discovered   [] LDA Added if Not in Epic (Describe Wound)   [] New Altered Skin Integrity was Present on Admit and Documented in LDA   [] Wound Image Taken    Wound Care Consulted? Yes    Attending Nurse:  Fanta Dos Santos RN    Second RN/Staff Member:  TRACEY Ryan

## 2024-08-19 LAB
ALBUMIN SERPL-MCNC: 1.5 G/DL (ref 3.4–4.8)
ALBUMIN/GLOB SERPL: 0.5 RATIO (ref 1.1–2)
ALLENS TEST BLOOD GAS (OHS): YES
ALP SERPL-CCNC: 232 UNIT/L (ref 40–150)
ALT SERPL-CCNC: 54 UNIT/L (ref 0–55)
ANION GAP SERPL CALC-SCNC: 10 MEQ/L
AST SERPL-CCNC: 82 UNIT/L (ref 5–34)
BASE EXCESS BLD CALC-SCNC: 8.5 MMOL/L (ref -2–2)
BASOPHILS # BLD AUTO: 0.04 X10(3)/MCL
BASOPHILS NFR BLD AUTO: 0.3 %
BILIRUB SERPL-MCNC: 0.3 MG/DL
BLOOD GAS SAMPLE TYPE (OHS): ABNORMAL
BUN SERPL-MCNC: 28.9 MG/DL (ref 9.8–20.1)
CA-I BLD-SCNC: 1.13 MMOL/L (ref 1.12–1.23)
CALCIUM SERPL-MCNC: 7.7 MG/DL (ref 8.4–10.2)
CHLORIDE SERPL-SCNC: 104 MMOL/L (ref 98–107)
CO2 BLDA-SCNC: 34.2 MMOL/L
CO2 SERPL-SCNC: 25 MMOL/L (ref 23–31)
COHGB MFR BLDA: 0.7 % (ref 0.5–1.5)
CREAT SERPL-MCNC: 0.67 MG/DL (ref 0.55–1.02)
CREAT/UREA NIT SERPL: 43
DRAWN BY BLOOD GAS (OHS): ABNORMAL
EOSINOPHIL # BLD AUTO: 0.57 X10(3)/MCL (ref 0–0.9)
EOSINOPHIL NFR BLD AUTO: 4.1 %
ERYTHROCYTE [DISTWIDTH] IN BLOOD BY AUTOMATED COUNT: 15.3 % (ref 11.5–17)
GFR SERPLBLD CREATININE-BSD FMLA CKD-EPI: >60 ML/MIN/1.73/M2
GLOBULIN SER-MCNC: 3.3 GM/DL (ref 2.4–3.5)
GLUCOSE SERPL-MCNC: 138 MG/DL (ref 82–115)
HCO3 BLDA-SCNC: 32.8 MMOL/L (ref 22–26)
HCT VFR BLD AUTO: 26.6 % (ref 37–47)
HCT VFR BLD AUTO: 28.9 % (ref 37–47)
HGB BLD-MCNC: 8.5 G/DL (ref 12–16)
HGB BLD-MCNC: 9.4 G/DL (ref 12–16)
IMM GRANULOCYTES # BLD AUTO: 0.14 X10(3)/MCL (ref 0–0.04)
IMM GRANULOCYTES NFR BLD AUTO: 1 %
INHALED O2 CONCENTRATION: 60 %
LYMPHOCYTES # BLD AUTO: 1.43 X10(3)/MCL (ref 0.6–4.6)
LYMPHOCYTES NFR BLD AUTO: 10.2 %
MAGNESIUM SERPL-MCNC: 2.3 MG/DL (ref 1.6–2.6)
MCH RBC QN AUTO: 29.1 PG (ref 27–31)
MCHC RBC AUTO-ENTMCNC: 32 G/DL (ref 33–36)
MCV RBC AUTO: 91.1 FL (ref 80–94)
MECH RR (OHS): 20 B/MIN
METHGB MFR BLDA: 0.8 % (ref 0.4–1.5)
MODE (OHS): AC
MONOCYTES # BLD AUTO: 0.9 X10(3)/MCL (ref 0.1–1.3)
MONOCYTES NFR BLD AUTO: 6.4 %
NEUTROPHILS # BLD AUTO: 10.96 X10(3)/MCL (ref 2.1–9.2)
NEUTROPHILS NFR BLD AUTO: 78 %
NRBC BLD AUTO-RTO: 0 %
O2 HB BLOOD GAS (OHS): 96.2 % (ref 94–97)
OXYGEN DEVICE BLOOD GAS (OHS): ABNORMAL
OXYHGB MFR BLDA: 8.4 G/DL (ref 12–16)
PCO2 BLDA: 44 MMHG (ref 35–45)
PEEP RESPIRATORY: 8 CMH2O
PH BLDA: 7.48 [PH] (ref 7.35–7.45)
PHOSPHATE SERPL-MCNC: 2.5 MG/DL (ref 2.3–4.7)
PLATELET # BLD AUTO: 277 X10(3)/MCL (ref 130–400)
PMV BLD AUTO: 10.9 FL (ref 7.4–10.4)
PO2 BLDA: 112 MMHG (ref 80–100)
POCT GLUCOSE: 150 MG/DL (ref 70–110)
POTASSIUM BLOOD GAS (OHS): 3.8 MMOL/L (ref 3.5–5)
POTASSIUM SERPL-SCNC: 4.1 MMOL/L (ref 3.5–5.1)
PROT SERPL-MCNC: 4.8 GM/DL (ref 5.8–7.6)
PS (OHS): ABNORMAL
RBC # BLD AUTO: 2.92 X10(6)/MCL (ref 4.2–5.4)
SAMPLE SITE BLOOD GAS (OHS): ABNORMAL
SAO2 % BLDA: 98.7 %
SODIUM BLOOD GAS (OHS): 135 MMOL/L (ref 137–145)
SODIUM SERPL-SCNC: 139 MMOL/L (ref 136–145)
SPONT+MECH VT ON VENT: 450 ML
WBC # BLD AUTO: 14.04 X10(3)/MCL (ref 4.5–11.5)

## 2024-08-19 PROCEDURE — 85018 HEMOGLOBIN: CPT

## 2024-08-19 PROCEDURE — 85025 COMPLETE CBC W/AUTO DIFF WBC: CPT

## 2024-08-19 PROCEDURE — 27100171 HC OXYGEN HIGH FLOW UP TO 24 HOURS

## 2024-08-19 PROCEDURE — 63600175 PHARM REV CODE 636 W HCPCS: Performed by: STUDENT IN AN ORGANIZED HEALTH CARE EDUCATION/TRAINING PROGRAM

## 2024-08-19 PROCEDURE — 25000003 PHARM REV CODE 250

## 2024-08-19 PROCEDURE — 25000003 PHARM REV CODE 250: Performed by: PSYCHIATRY & NEUROLOGY

## 2024-08-19 PROCEDURE — 83735 ASSAY OF MAGNESIUM: CPT

## 2024-08-19 PROCEDURE — 99900035 HC TECH TIME PER 15 MIN (STAT)

## 2024-08-19 PROCEDURE — 36415 COLL VENOUS BLD VENIPUNCTURE: CPT

## 2024-08-19 PROCEDURE — 25000003 PHARM REV CODE 250: Performed by: STUDENT IN AN ORGANIZED HEALTH CARE EDUCATION/TRAINING PROGRAM

## 2024-08-19 PROCEDURE — 36600 WITHDRAWAL OF ARTERIAL BLOOD: CPT

## 2024-08-19 PROCEDURE — 99900026 HC AIRWAY MAINTENANCE (STAT)

## 2024-08-19 PROCEDURE — 99900031 HC PATIENT EDUCATION (STAT)

## 2024-08-19 PROCEDURE — 94760 N-INVAS EAR/PLS OXIMETRY 1: CPT

## 2024-08-19 PROCEDURE — 94761 N-INVAS EAR/PLS OXIMETRY MLT: CPT

## 2024-08-19 PROCEDURE — 63600175 PHARM REV CODE 636 W HCPCS

## 2024-08-19 PROCEDURE — 20000000 HC ICU ROOM

## 2024-08-19 PROCEDURE — 25000003 PHARM REV CODE 250: Performed by: NURSE PRACTITIONER

## 2024-08-19 PROCEDURE — 63600175 PHARM REV CODE 636 W HCPCS: Performed by: PSYCHIATRY & NEUROLOGY

## 2024-08-19 PROCEDURE — 82803 BLOOD GASES ANY COMBINATION: CPT

## 2024-08-19 PROCEDURE — 94003 VENT MGMT INPAT SUBQ DAY: CPT

## 2024-08-19 PROCEDURE — 80053 COMPREHEN METABOLIC PANEL: CPT

## 2024-08-19 PROCEDURE — 84100 ASSAY OF PHOSPHORUS: CPT

## 2024-08-19 RX ORDER — HYDRALAZINE HYDROCHLORIDE 20 MG/ML
10 INJECTION INTRAMUSCULAR; INTRAVENOUS EVERY 6 HOURS PRN
Status: DISCONTINUED | OUTPATIENT
Start: 2024-08-19 | End: 2024-08-24 | Stop reason: HOSPADM

## 2024-08-19 RX ORDER — LABETALOL HYDROCHLORIDE 5 MG/ML
10 INJECTION, SOLUTION INTRAVENOUS EVERY 6 HOURS PRN
Status: DISCONTINUED | OUTPATIENT
Start: 2024-08-19 | End: 2024-08-24 | Stop reason: HOSPADM

## 2024-08-19 RX ADMIN — ATORVASTATIN CALCIUM 40 MG: 40 TABLET, FILM COATED ORAL at 08:08

## 2024-08-19 RX ADMIN — LABETALOL HYDROCHLORIDE 10 MG: 5 INJECTION, SOLUTION INTRAVENOUS at 06:08

## 2024-08-19 RX ADMIN — POLYETHYLENE GLYCOL 3350 17 G: 17 POWDER, FOR SOLUTION ORAL at 08:08

## 2024-08-19 RX ADMIN — CEFTRIAXONE SODIUM 2 G: 2 INJECTION, POWDER, FOR SOLUTION INTRAMUSCULAR; INTRAVENOUS at 10:08

## 2024-08-19 RX ADMIN — PROPRANOLOL HYDROCHLORIDE 20 MG: 10 TABLET ORAL at 08:08

## 2024-08-19 RX ADMIN — ASPIRIN 81 MG CHEWABLE TABLET 81 MG: 81 TABLET CHEWABLE at 08:08

## 2024-08-19 RX ADMIN — LEVETIRACETAM 500 MG: 100 INJECTION, SOLUTION INTRAVENOUS at 08:08

## 2024-08-19 RX ADMIN — PROPOFOL 15 MCG/KG/MIN: 10 INJECTION, EMULSION INTRAVENOUS at 11:08

## 2024-08-19 NOTE — NURSING
Nurses Note -- 4 Eyes      8/19/2024   5:32 PM      Skin assessed during: Daily Assessment      [] No Altered Skin Integrity Present    [x]Prevention Measures Documented      [x] Yes- Altered Skin Integrity Present or Discovered   [] LDA Added if Not in Epic (Describe Wound)   [] New Altered Skin Integrity was Present on Admit and Documented in LDA   [] Wound Image Taken    Wound Care Consulted? Yes    Attending Nurse:  Eduardo Rodriguez RN/Staff Member:  TRACEY Orozco

## 2024-08-19 NOTE — PROGRESS NOTES
[facility]                       [address] [address]    PATIENT NAME:       GARY MIRELES   YOB: 1950  CSN:                [id number]   MRN:                [id number]  ADMIT DATE:         2024 21:45:00  PHYSICIAN:          Juju Bhardwaj MD                           TESTING    DATE OF SERVICE:  2024 00:00:00    STUDY PERFORMED:  24-hour EEG monitoring.    REASON FOR STUDY:  Study was done to rule out seizure, procedure workup.    DESCRIPTION:  This 24 hours EEG monitoring was done using 10/20 systems, using   21 channels.  The background activity is consistent of about 6 hertz.  During   the study, there was some intermittent sharply contour wave activity in the   right frontocentral area.  There was some EKG artifact and movement artifact   during the study.    CONCLUSION:  Abnormal study due to the presence.  1. Diffuse slowing consistent with mild-to-moderate cerebral dysfunction,   nonspecific sign, can be found in toxic, metabolic, anoxic brain injury.  2. The presence of intermittent sharp waves in the right frontocentral area,   which can be consistent, but not conclusive with epileptiform discharges.    Clinical correlation suggested.        ______________________________  MD BK Mendosa/WIL  DD:  2024  Time:  06:24PM  DT:  2024  Time:  11:55PM  Job #:  [id number]       TESTING

## 2024-08-19 NOTE — PROGRESS NOTES
I discussed overall goals of care with the patient at bedside.  We discussed the prospect of tracheostomy and PEG tube placement and what that entails but more specifically if the decision was made for these procedures whether or not contribute or promote enough time for the patient to make a meaningful recovery.  He stated at baseline she was able to dress herself and feed herself at home despite her previous strokes but given her current neurologic status it does not appear that she will get back to that baseline level of function.  We discussed the concept of DNR and he stated that he did not want anybody to discontinue any level of care at this time.  He did state that he knew that CPR was traumatic and that his other would not have wanted that performed.  I indicated that DNR only indicates that we will not aggressive resuscitative measures specifically CPR if she decompensates.  He is in agreement with initiating/changing to a DNR status and continuing current goals of care.

## 2024-08-19 NOTE — PROGRESS NOTES
Inpatient Nutrition Assessment    Admit Date: 8/7/2024   Total duration of encounter: 12 days   Patient Age: 73 y.o.    Nutrition Recommendation/Prescription     Tube feeding recommendations:     Peptamen AF, goal rate of 60 mL/hr    1600 kcal/day (98% estimated needs, 109% with meds)  90 g protein/day (100% estimated needs)  972 mL free water/day (66% estimated needs)  Calculations based on estimated run time of 20 hours/day    If no IV fluids running, can give 100ml q 2hr water flushes. Total water provided: 1972ml (134% est needs.) If labs become low, may need to decrease water.     Communication of Recommendations: reviewed with nurse    Nutrition Assessment     Malnutrition Assessment/Nutrition-Focused Physical Exam    Malnutrition Context: other (see comments) (unable to obtain) (08/08/24 1114)     Energy Intake (Malnutrition): other (see comments) (unable to obtain) (08/08/24 1114)  Weight Loss (Malnutrition): other (see comments) (unable to obtain) (08/08/24 1114)  Subcutaneous Fat (Malnutrition): other (see comments) (unable to obtain) (08/08/24 1114)           Muscle Mass (Malnutrition): other (see comments) (unable to obtain) (08/08/24 1114)                          Fluid Accumulation (Malnutrition): other (see comments) (none per flowsheets) (08/08/24 1114)        A minimum of two characteristics is recommended for diagnosis of either severe or non-severe malnutrition.    Chart Review    Reason Seen: continuous nutrition monitoring, malnutrition screening tool (MST), physician consult for tube feeding recommendations, and follow-up    Malnutrition Screening Tool Results   Have you recently lost weight without trying?: Unsure  Have you been eating poorly because of a decreased appetite?: No   MST Score: 2   Diagnosis:  Suspected CVA versus seizures  Lactic acidosis  Hypokalemia    Relevant Medical History:   CVA, HTN, hemiparesis affected left side    Scheduled Medications:  aspirin, 81 mg,  Daily  atorvastatin, 40 mg, Daily  docusate, 100 mg, Daily  levETIRAcetam (Keppra) IV (PEDS and ADULTS), 500 mg, BID  polyethylene glycol, 17 g, BID  propranoloL, 20 mg, BID    Continuous Infusions:  fentanyl, Last Rate: Stopped (08/18/24 0440)  NORepinephrine bitartrate-D5W, Last Rate: Stopped (08/14/24 0331)  propofoL, Last Rate: 15 mcg/kg/min (08/19/24 1158)    PRN Medications:  bisacodyL, 10 mg, Daily PRN  dextrose 10%, 12.5 g, PRN  dextrose 10%, 25 g, PRN  fentaNYL, 25 mcg, Q2H PRN  sodium chloride 0.9%, 10 mL, PRN    Calorie Containing IV Medications: Diprivan @ 6 ml/hr (provides 160 kcal/d)    Recent Labs   Lab 08/13/24  0447 08/14/24  0304 08/14/24  1120 08/14/24  1808 08/15/24  0338 08/15/24  0656 08/15/24  0748 08/16/24  0335 08/17/24  0248 08/18/24  0308 08/19/24  0246   * 153* 153* 150* 144  --   --  146* 146* 141 139   K 3.8 4.1 4.1 4.2 4.8  --   --  3.6 3.3* 3.8 4.1   CALCIUM 8.6 8.6 8.7 8.5 7.8*  --   --  7.4* 7.7* 7.8* 7.7*   PHOS  --   --   --   --   --  4.8*  --  3.7 2.5 2.3 2.5   MG  --   --   --   --  2.40  --   --  2.20 2.50 2.50 2.30   CO2 20* 19* 21* 21* 19*  --   --  26 27 25 25   BUN 16.9 18.4 18.8 17.8 24.8*  --   --  34.6* 35.4* 34.0* 28.9*   CREATININE 0.59 0.68 0.70 0.70 0.80  --   --  0.90 0.83 0.71 0.67   EGFRNORACEVR >60 >60 >60 >60 >60  --   --  >60 >60 >60 >60   GLUCOSE 117* 125* 150* 149* 129*  --   --  139* 132* 137* 138*   BILITOT 0.6 0.5  --   --  0.5  --   --  0.4 0.4 0.3 0.3   ALKPHOS 113 119  --   --  117  --   --  120 222* 219* 232*   ALT 41 30  --   --  27  --   --  22 30 45 54   AST 49* 35*  --   --  38*  --   --  33 56* 78* 82*   ALBUMIN 2.3* 1.9*  --   --  1.9*  --   --  1.6* 1.6* 1.5* 1.5*   WBC 18.94* 18.69*  --   --   --   --  17.83* 18.16* 16.79* 13.70* 14.04*   HGB 11.2* 11.9*  --   --   --   --  8.7* 8.7* 8.8* 8.1* 8.5*   HCT 34.2* 36.8*  --   --   --   --  28.3* 28.1* 28.1* 25.8* 26.6*     Nutrition Orders:  Diet NPO  Tube Feedings/Formulas 60; 1,200;  "Peptamen AF; OG; Banatrol TF; 100; Every 2 hours    Appetite/Oral Intake: NPO/not applicable  Factors Affecting Nutritional Intake: on mechanical ventilation  Social Needs Impacting Access to Food: unable to assess at this time; will attempt on follow-up  Food/Gnosticist/Cultural Preferences: unable to obtain  Food Allergies: unable to obtain  Last Bowel Movement: 08/18/24  Wound(s):     Wound 08/14/24 0745 Blister(s) Right proximal;posterior Arm #1-Tissue loss description: Partial thickness       Wound 08/15/24 0800 Skin Tear midline Sacral spine-Tissue loss description: Not applicable    Comments    8/8/24: Intubated. Currently off sedation. On vasopressor support. MAP > 65. OG in place. No nutrition support ordered at this time. Plan for angiogram later today noted. No family at bedside to gather information on nutrition/weight history. Per chart review/past records, ~10 lb weight gain over the last 9-10 months.    8/9/24: Consulted for TF recommendations. Remains intubated without continuous sedation. Weaning vasopressor support. OG in place, trickle feeds ordered - discussed with nursing staff - will adjust goal rate to meet estimated needs.    8/13/24: TF continues, tolerated per RN.     8/14/24: TF continues. Noted increase in Na. Will increase FWF.     8/15/24: TF continues, tolerated per RN. Na and Cl WNL. Will monitor for need for decreasing FWF if Na or Cl becomes low.     8/19/24: TF continues, tolerated per RN. No kcal from meds.     Anthropometrics    Height: 5' 2" (157.5 cm),    Last Weight: 65 kg (143 lb 4.8 oz) (08/07/24 2200), Weight Method: Bed Scale     BMI Classification: overweight (BMI 25-29.9)     Ideal Body Weight (IBW), Female: 110 lb                                   Usual Weight Provided By: EMR weight history    Wt Readings from Last 5 Encounters:   08/07/24 65 kg (143 lb 4.8 oz)   10/23/23 60.7 kg (133 lb 12.8 oz)   04/10/23 59.4 kg (131 lb)   10/10/22 60.8 kg (134 lb)   08/02/22 60.8 kg " (134 lb)     Weight Change(s) Since Admission:   Wt Readings from Last 1 Encounters:   08/07/24 2200 65 kg (143 lb 4.8 oz)   Admit Weight: 65 kg (143 lb 4.8 oz) (08/07/24 2200), Weight Method: Bed Scale    Estimated Needs    Weight Used For Calorie Calculations: 65 kg (143 lb 4.8 oz)  Energy Calorie Requirements (kcal): 1468 kcal/day  Energy Need Method: [facility] (modified)  Weight Used For Protein Calculations: 65 kg (143 lb 4.8 oz)  Protein Requirements: 78-98 g (1.2-1.5 g/kg)  Fluid Requirements (mL): 1468 mL (1 mL/kcal)  CHO Requirement: 165 g (45% of estimated needs)   Calculated 8/8/24: Tmax 36.7C, Ve 10.2    Enteral Nutrition     Formula: Peptamen AF  Rate/Volume: 60 mL/hr  Water Flushes: 100 mL q2h  Additives/Modulars: none at this time  Route: orogastric tube  Method: continuous  Total Nutrition Provided by Tube Feeding, Additives, and Flushes:  Calories Provided  1440 kcal/d, 98% needs   Protein Provided  90 g/d, 100% needs   Fluid Provided  972 ml/d, 63% needs   Continuous feeding calculations based on estimated 20 hr/d run time unless otherwise stated.    Parenteral Nutrition     Patient not receiving parenteral nutrition support at this time.    Evaluation of Received Nutrient Intake    Calories: meeting estimated needs  Protein: meeting estimated needs    Patient Education     Not applicable.    Nutrition Diagnosis     PES: Inadequate oral intake related to inability to consume sufficient nutrients as evidenced by intubated since admit. (active)     Nutrition Interventions     Intervention(s): collaboration with other providers    Goal: Meet greater than 80% of nutritional needs by follow-up. (goal progressing)  Goal: Tolerate enteral feeding at goal rate by follow-up. (goal progressing)    Nutrition Goals & Monitoring     Dietitian will monitor: energy intake, enteral nutrition intake, weight, electrolyte/renal panel, glucose/endocrine profile, and gastrointestinal profile  Discharge planning: too  early to determine; pending clinical course  Nutrition Risk/Follow-Up: high (follow-up in 1-4 days)   Please consult if re-assessment needed sooner.

## 2024-08-19 NOTE — PROGRESS NOTES
"Advance Care Planning     Date: 08/19/2024    [facility]  I engaged the family in a voluntary conversation about advance care planning and we specifically addressed what the goals of care would be moving forward, in light of the patient's change in clinical status, specifically current condition.  We did specifically address the patient's likely prognosis, which is poor.  We explored the patient's values and preferences for future care.      Son at bedside, goals of care discussed in detail. Patient currently off of sedation was on FI02 of 70% decreased to 50%. Patient's son states "remain hopeful that she will pull through". States he is now leaning towards PEG/Trach, "wants to give her a chance".  We discussed code status and education provided regarding CPR vs DNR while on life supportive devices to which the son responded "that he wants to give her a chance but if it's her time and she passes during procedures or on supportive devices than he understands that attempt would be a poorer prognosis". Attempted to discuss in more detail regarding Full Code vs DNR and encouraged to continue to think about what patient would want. Encouraged to speak to palliative care or medical team should he decided for code status to be changed to DNR. Informed that he spoke to a physician over the weekend and would like to speak further to the physician. Informed primary RN patient's son is at bedside and request to speak to intensive physician in more detail regarding current status.     We did discuss in detail peg/trach vs comfort care. Discussed long and short term scenarios of each. Discussed in detail with medical team regarding discussion with patient's son and that the family endorses that what is most important right now is to focus on symptom/pain control, quality of life, even if it means sacrificing a little time, extending life as long as possible, even it it means sacrificing quality, improvement in condition but with " limits to invasive therapies, and comfort and QOL     Accordingly, we have decided that the best plan to meet the patient's goals includes continuing with treatment    Palliative Care RN visit was spent on advance care planning, goals of care discussion, emotional support, formulating and communicating prognosis and exploring burden/benefit of various approaches of treatment. This discussion occurred on a fully voluntary basis with the verbal consent of the patient and/or family.

## 2024-08-19 NOTE — PROGRESS NOTES
[facility] - [facility]  Pulmonary Critical Care Note    Patient Name: Shannan Reza  MRN: [id number]  Admission Date: 8/7/2024  Hospital Length of Stay: 12 days  Code Status: Full Code  Attending Provider: Vladimir Acevedo MD  Primary Care Provider: Kiana Marie MD     Subjective:     HPI:   This is a 73-year-old female with past medical history of hypertension and to strokes in past presenting from outside facility for suspected CVA and seizure-like activity.  Family member present at bedside and states that patient was in normal state of health until yesterday evening.  States she started to feel weak and lethargic and eventually went unresponsive.  Had presentation like this previously when she had stroke.  CT head at outside facility did not show any acute infarct.  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 She was intubated for airway protection and loaded with Keppra.  She was transferred to [facility] for further care.  Repeat imaging was ordered.  Patient continues to be intubated and sedated on propofol. Requiring increased amounts of Levophed and started on vasopressin and stress dose steroids.  Admitted to ICU for close monitoring.    24 Hour Interval History:  No acute events overnight. CT head yesterday with no changes.     Past Medical History:   Diagnosis Date    Carotid-cavernous fistula     Cerebrovascular accident (CVA) due to thrombosis of right middle cerebral artery 6/26/2018    Essential hypertension 6/27/2018    Hemiparesis affecting left side as late effect of cerebrovascular accident     Hx of tobacco use, presenting hazards to health        Past Surgical History:   Procedure Laterality Date    AUGMENTATION OF BREAST      BRAIN SURGERY         Social History     Socioeconomic History    Marital status: Unknown   Tobacco Use    Smoking status: Former     Current packs/day: 0.00     Average packs/day: 0.5 packs/day  for 25.0 years (12.5 ttl pk-yrs)     Types: Cigarettes     Start date: 1993     Quit date: 2018     Years since quittin.1    Smokeless tobacco: Former   Substance and Sexual Activity    Alcohol use: Not Currently    Drug use: No    Sexual activity: Never   Social History Narrative    ** Merged History Encounter **          Social Determinants of Health     Financial Resource Strain: Patient Declined (2024)    Overall Financial Resource Strain (CARDIA)     Difficulty of Paying Living Expenses: Patient declined   Food Insecurity: Unknown (2024)    Hunger Vital Sign     Worried About Running Out of Food in the Last Year: Patient declined     Ran Out of Food in the Last Year: Never true   Transportation Needs: Patient Declined (2024)    TRANSPORTATION NEEDS     Transportation : Patient declined   Housing Stability: Patient Declined (2024)    Housing Stability Vital Sign     Unable to Pay for Housing in the Last Year: Patient declined     Homeless in the Last Year: Patient declined           Current Outpatient Medications   Medication Instructions    amLODIPine (NORVASC) 5 mg, Oral, Daily    aspirin (ECOTRIN) 81 mg, Oral, Daily    folic acid (FOLVITE) 1 mg, Oral, Daily    hydrOXYzine HCL (ATARAX) 25 MG tablet TAKE 1 TABLET BY MOUTH THREE TIMES DAILY AS NEEDED FOR ITCHING OR ANXIETY    multivitamin Tab 1 tablet, Oral, Daily    rosuvastatin (CRESTOR) 40 mg, Oral, Nightly       Current Inpatient Medications   aspirin  81 mg Per NG tube Daily    atorvastatin  40 mg Oral Daily    cefTRIAXone (Rocephin) IV (PEDS and ADULTS)  2 g Intravenous Q24H    docusate  100 mg Oral Daily    levETIRAcetam (Keppra) IV (PEDS and ADULTS)  500 mg Intravenous BID    polyethylene glycol  17 g Oral BID    propranoloL  20 mg Oral BID       Current Intravenous Infusions   fentanyl  0-250 mcg/hr Intravenous Continuous   Stopped at 24 0440    NORepinephrine bitartrate-D5W  0-3 mcg/kg/min Intravenous Continuous    Stopped at 08/14/24 0331    propofoL  0-50 mcg/kg/min Intravenous Continuous 15.6 mL/hr at 08/19/24 0600 40 mcg/kg/min at 08/19/24 0600         Review of Systems   Unable to perform ROS: Intubated          Objective:       Intake/Output Summary (Last 24 hours) at 8/19/2024 0943  Last data filed at 8/19/2024 0800  Gross per 24 hour   Intake 3396.06 ml   Output 2475 ml   Net 921.06 ml         Vital Signs (Most Recent):  Temp: 99.2 °F (37.3 °C) (08/19/24 0800)  Pulse: 88 (08/19/24 0500)  Resp: (!) 28 (08/19/24 0500)  BP: (!) 132/58 (08/19/24 0500)  SpO2: 98 % (08/19/24 0500)  Body mass index is 26.21 kg/m².  Weight: 65 kg (143 lb 4.8 oz) Vital Signs (24h Range):  Temp:  [98.2 °F (36.8 °C)-99.2 °F (37.3 °C)] 99.2 °F (37.3 °C)  Pulse:  [72-94] 88  Resp:  [20-32] 28  SpO2:  [95 %-100 %] 98 %  BP: (111-142)/(49-72) 132/58     Physical Exam  Vitals reviewed.   Constitutional:       Appearance: She is ill-appearing.   HENT:      Head: Normocephalic.   Eyes:      Comments: L pupil 2mm, R pupil 3mm. L slightly reactive. R pupil fixed.   Cardiovascular:      Rate and Rhythm: Normal rate and regular rhythm.      Heart sounds: No murmur heard.  Pulmonary:      Breath sounds: Normal breath sounds.      Comments: Intubated and mechanically ventilated.   No dyssynchrony to mechanical ventilation.   Abdominal:      Palpations: Abdomen is soft.   Neurological:      Comments: Withdraws to pain LUE and LLE. No withdrawal on right side.            Lines/Drains/Airways       Drain  Duration                  NG/OG Tube 08/06/24 0000 Center mouth 13 days         Urethral Catheter 08/12/24 1620 Silicone 16 Fr. 6 days              Airway  Duration                  Airway - Non-Surgical 08/07/24 2147 Endotracheal Tube 11 days              Peripheral Intravenous Line  Duration                  Peripheral IV - Single Lumen 08/09/24 1511 20 G 1 in Anterior;Left;Lateral Wrist 9 days         Peripheral IV - Single Lumen 08/09/24 1511 20 G 1 in No  Left;Anterior Antecubital 9 days         Peripheral IV - Single Lumen 08/13/24 0300 18 G Anterior;Left Upper Arm 6 days                    Significant Labs:    Lab Results   Component Value Date    WBC 14.04 (H) 08/19/2024    HGB 8.5 (L) 08/19/2024    HCT 26.6 (L) 08/19/2024    MCV 91.1 08/19/2024     08/19/2024           BMP  Lab Results   Component Value Date     08/19/2024    K 4.1 08/19/2024    CO2 25 08/19/2024    BUN 28.9 (H) 08/19/2024    CREATININE 0.67 08/19/2024    CALCIUM 7.7 (L) 08/19/2024    AGAP 10.0 08/19/2024    ESTGFRAFRICA >60.0 07/07/2022    EGFRNONAA >60.0 07/07/2022         ABG  Recent Labs   Lab 08/19/24 0525   PH 7.480*   PO2 112.0*   PCO2 44.0   HCO3 32.8*   POCBASEDEF 8.50*       Mechanical Ventilation Support:  Vent Mode: A/C (08/19/24 0514)  Ventilator Initiated: Yes (08/07/24 2147)  Set Rate: 20 BPM (08/19/24 0514)  Vt Set: 450 mL (08/19/24 0514)  PEEP/CPAP: 8 cmH20 (08/19/24 0514)  Oxygen Concentration (%): 60 (08/19/24 0800)  Peak Airway Pressure: 36 cmH20 (08/19/24 0514)  Total Ve: 13.9 L/m (08/19/24 0514)  F/VT Ratio<105 (RSBI): (!) 67.15 (08/19/24 0247)      Significant Imaging:  I have reviewed the pertinent imaging within the past 24 hours.    Microbiology Results (last 7 days)       Procedure Component Value Units Date/Time    Respiratory Culture [[id number]]  (Abnormal)  (Susceptibility) Collected: 08/11/24 0836    Order Status: Completed Specimen: Sputum from Endotracheal Aspirate Updated: 08/15/24 1026     Respiratory Culture Rare Escherichia coli      Rare Yeast     Comment: with no normal respiratory franki        GRAM STAIN Quality 2+      Rare Yeast      No bacteria seen    Blood Culture [[id number]]  (Normal) Collected: 08/08/24 0733    Order Status: Completed Specimen: Blood from Hand, Right Updated: 08/13/24 0900     Blood Culture No Growth at 5 days    Blood Culture [[id number]]  (Normal) Collected: 08/08/24 0733    Order Status: Completed Specimen: Blood  from Arm, Right Updated: 08/13/24 0900     Blood Culture No Growth at 5 days    Respiratory Culture [[id number]]     Order Status: Canceled Specimen: Sputum from Trachael Aspirate              Assessment/Plan:     Assessment  Right EMANUEL stroke  Hospital-acquired pneumonia  ARDS  Hypernatremia - resolved   Leukocytosis, downtrending  Newly Diagnosed Acute Systolic HF w EF 20-25%   Lactic acidosis  - resolved  Hypokalemia - improved  Hx HTN, CVA with residual left sided weakness       Plan    Continue ICU level of care   Wean sedation as tolerated and mechanical ventilation per ARDS net protocol  Maintain MAP>65, no longer on pressors   Continue Keppra 500 mg b.i.d.  Continue aspirin, statin  Completed Rocephin for E coli PNA   BC x 2 NGTD   Monitor fluid status, diurese as needed   Palliative care following, son will be coming in for GOC discussions. Likely does not want long term trach and PEG. Neurology signed off 8/11.      34 minutes of critical care was time spent personally by me on the following activities: development of treatment plan with patient or surrogate and bedside caregivers, discussions with consultants, evaluation of patient's response to treatment, examination of patient, ordering and performing treatments and interventions, ordering and review of laboratory studies, ordering and review of radiographic studies, pulse oximetry, re-evaluation of patient's condition.  This critical care time did not overlap with that of any other provider or involve time for any procedures.       LINNEA Ontiveros  Pulmonary Critical Care Medicine  [facility] - [facility]  DOS: 08/19/2024

## 2024-08-20 LAB
ALBUMIN SERPL-MCNC: 1.7 G/DL (ref 3.4–4.8)
ALBUMIN/GLOB SERPL: 0.4 RATIO (ref 1.1–2)
ALLENS TEST BLOOD GAS (OHS): YES
ALP SERPL-CCNC: 276 UNIT/L (ref 40–150)
ALT SERPL-CCNC: 79 UNIT/L (ref 0–55)
ANION GAP SERPL CALC-SCNC: 9 MEQ/L
AST SERPL-CCNC: 114 UNIT/L (ref 5–34)
BASE EXCESS BLD CALC-SCNC: 5.9 MMOL/L (ref -2–2)
BASOPHILS # BLD AUTO: 0.05 X10(3)/MCL
BASOPHILS NFR BLD AUTO: 0.4 %
BILIRUB SERPL-MCNC: 0.3 MG/DL
BLOOD GAS SAMPLE TYPE (OHS): ABNORMAL
BUN SERPL-MCNC: 26.5 MG/DL (ref 9.8–20.1)
CA-I BLD-SCNC: 1.11 MMOL/L (ref 1.12–1.23)
CALCIUM SERPL-MCNC: 8.9 MG/DL (ref 8.4–10.2)
CHLORIDE SERPL-SCNC: 102 MMOL/L (ref 98–107)
CO2 BLDA-SCNC: 30.9 MMOL/L
CO2 SERPL-SCNC: 25 MMOL/L (ref 23–31)
COHGB MFR BLDA: 0.8 % (ref 0.5–1.5)
CREAT SERPL-MCNC: 0.65 MG/DL (ref 0.55–1.02)
CREAT/UREA NIT SERPL: 41
DRAWN BY BLOOD GAS (OHS): ABNORMAL
EOSINOPHIL # BLD AUTO: 0.42 X10(3)/MCL (ref 0–0.9)
EOSINOPHIL NFR BLD AUTO: 3.4 %
ERYTHROCYTE [DISTWIDTH] IN BLOOD BY AUTOMATED COUNT: 15.4 % (ref 11.5–17)
GFR SERPLBLD CREATININE-BSD FMLA CKD-EPI: >60 ML/MIN/1.73/M2
GLOBULIN SER-MCNC: 4.4 GM/DL (ref 2.4–3.5)
GLUCOSE SERPL-MCNC: 142 MG/DL (ref 82–115)
HCO3 BLDA-SCNC: 29.7 MMOL/L (ref 22–26)
HCT VFR BLD AUTO: 30.3 % (ref 37–47)
HGB BLD-MCNC: 9.6 G/DL (ref 12–16)
IMM GRANULOCYTES # BLD AUTO: 0.1 X10(3)/MCL (ref 0–0.04)
IMM GRANULOCYTES NFR BLD AUTO: 0.8 %
INHALED O2 CONCENTRATION: 50 %
LYMPHOCYTES # BLD AUTO: 1.2 X10(3)/MCL (ref 0.6–4.6)
LYMPHOCYTES NFR BLD AUTO: 9.8 %
MAGNESIUM SERPL-MCNC: 2.3 MG/DL (ref 1.6–2.6)
MCH RBC QN AUTO: 29 PG (ref 27–31)
MCHC RBC AUTO-ENTMCNC: 31.7 G/DL (ref 33–36)
MCV RBC AUTO: 91.5 FL (ref 80–94)
MECH RR (OHS): 20 B/MIN
METHGB MFR BLDA: 0.7 % (ref 0.4–1.5)
MODE (OHS): AC
MONOCYTES # BLD AUTO: 0.7 X10(3)/MCL (ref 0.1–1.3)
MONOCYTES NFR BLD AUTO: 5.7 %
NEUTROPHILS # BLD AUTO: 9.8 X10(3)/MCL (ref 2.1–9.2)
NEUTROPHILS NFR BLD AUTO: 79.9 %
NRBC BLD AUTO-RTO: 0 %
O2 HB BLOOD GAS (OHS): 94.9 % (ref 94–97)
OXYGEN DEVICE BLOOD GAS (OHS): ABNORMAL
OXYHGB MFR BLDA: 9.2 G/DL (ref 12–16)
PCO2 BLDA: 39 MMHG (ref 35–45)
PEEP RESPIRATORY: 8 CMH2O
PH BLDA: 7.49 [PH] (ref 7.35–7.45)
PHOSPHATE SERPL-MCNC: 2.6 MG/DL (ref 2.3–4.7)
PLATELET # BLD AUTO: 331 X10(3)/MCL (ref 130–400)
PMV BLD AUTO: 10.7 FL (ref 7.4–10.4)
PO2 BLDA: 78 MMHG (ref 80–100)
POTASSIUM BLOOD GAS (OHS): 3.6 MMOL/L (ref 3.5–5)
POTASSIUM SERPL-SCNC: 4.1 MMOL/L (ref 3.5–5.1)
PROT SERPL-MCNC: 6.1 GM/DL (ref 5.8–7.6)
RBC # BLD AUTO: 3.31 X10(6)/MCL (ref 4.2–5.4)
SAMPLE SITE BLOOD GAS (OHS): ABNORMAL
SAO2 % BLDA: 96.4 %
SODIUM BLOOD GAS (OHS): 134 MMOL/L (ref 137–145)
SODIUM SERPL-SCNC: 136 MMOL/L (ref 136–145)
SPONT+MECH VT ON VENT: 450 ML
WBC # BLD AUTO: 12.27 X10(3)/MCL (ref 4.5–11.5)

## 2024-08-20 PROCEDURE — 85025 COMPLETE CBC W/AUTO DIFF WBC: CPT

## 2024-08-20 PROCEDURE — 25000003 PHARM REV CODE 250: Performed by: NURSE PRACTITIONER

## 2024-08-20 PROCEDURE — 94761 N-INVAS EAR/PLS OXIMETRY MLT: CPT | Mod: XB

## 2024-08-20 PROCEDURE — 27200966 HC CLOSED SUCTION SYSTEM

## 2024-08-20 PROCEDURE — 94760 N-INVAS EAR/PLS OXIMETRY 1: CPT | Mod: XB

## 2024-08-20 PROCEDURE — 51798 US URINE CAPACITY MEASURE: CPT

## 2024-08-20 PROCEDURE — 82803 BLOOD GASES ANY COMBINATION: CPT

## 2024-08-20 PROCEDURE — 25000003 PHARM REV CODE 250

## 2024-08-20 PROCEDURE — 63600175 PHARM REV CODE 636 W HCPCS: Performed by: PSYCHIATRY & NEUROLOGY

## 2024-08-20 PROCEDURE — 83735 ASSAY OF MAGNESIUM: CPT

## 2024-08-20 PROCEDURE — 36600 WITHDRAWAL OF ARTERIAL BLOOD: CPT

## 2024-08-20 PROCEDURE — 99900031 HC PATIENT EDUCATION (STAT)

## 2024-08-20 PROCEDURE — 84100 ASSAY OF PHOSPHORUS: CPT

## 2024-08-20 PROCEDURE — 27100171 HC OXYGEN HIGH FLOW UP TO 24 HOURS

## 2024-08-20 PROCEDURE — 25000003 PHARM REV CODE 250: Performed by: PSYCHIATRY & NEUROLOGY

## 2024-08-20 PROCEDURE — 20000000 HC ICU ROOM

## 2024-08-20 PROCEDURE — 94003 VENT MGMT INPAT SUBQ DAY: CPT

## 2024-08-20 PROCEDURE — 80053 COMPREHEN METABOLIC PANEL: CPT

## 2024-08-20 PROCEDURE — 25000003 PHARM REV CODE 250: Performed by: STUDENT IN AN ORGANIZED HEALTH CARE EDUCATION/TRAINING PROGRAM

## 2024-08-20 PROCEDURE — 36415 COLL VENOUS BLD VENIPUNCTURE: CPT

## 2024-08-20 PROCEDURE — 99900035 HC TECH TIME PER 15 MIN (STAT)

## 2024-08-20 PROCEDURE — 99900026 HC AIRWAY MAINTENANCE (STAT)

## 2024-08-20 PROCEDURE — 63600175 PHARM REV CODE 636 W HCPCS

## 2024-08-20 RX ADMIN — LEVETIRACETAM 500 MG: 100 INJECTION, SOLUTION INTRAVENOUS at 08:08

## 2024-08-20 RX ADMIN — PROPRANOLOL HYDROCHLORIDE 20 MG: 10 TABLET ORAL at 08:08

## 2024-08-20 RX ADMIN — ATORVASTATIN CALCIUM 40 MG: 40 TABLET, FILM COATED ORAL at 08:08

## 2024-08-20 RX ADMIN — ASPIRIN 81 MG CHEWABLE TABLET 81 MG: 81 TABLET CHEWABLE at 08:08

## 2024-08-20 RX ADMIN — FENTANYL CITRATE 25 MCG: 50 INJECTION, SOLUTION INTRAMUSCULAR; INTRAVENOUS at 10:08

## 2024-08-20 NOTE — PROGRESS NOTES
[facility] - [facility]  Pulmonary Critical Care Note    Patient Name: Shannan Reza  MRN: [id number]  Admission Date: 8/7/2024  Hospital Length of Stay: 13 days  Code Status: DNR  Attending Provider: Vladimir Acevedo MD  Primary Care Provider: Kiana Marie MD     Subjective:     HPI:   This is a 73-year-old female with past medical history of hypertension and to strokes in past presenting from outside facility for suspected CVA and seizure-like activity.  Family member present at bedside and states that patient was in normal state of health until yesterday evening.  States she started to feel weak and lethargic and eventually went unresponsive.  Had presentation like this previously when she had stroke.  CT head at outside facility did not show any acute infarct.  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 She was intubated for airway protection and loaded with Keppra.  She was transferred to [facility] for further care.  Repeat imaging was ordered.  Patient continues to be intubated and sedated on propofol. Requiring increased amounts of Levophed and started on vasopressin and stress dose steroids.  Admitted to ICU for close monitoring.    24 Hour Interval History:  NAEON. Patient remains afebrile 99.4, pulse 95, tachypneic 27, with stable bp 132/55, and saturating 98% on 50% FiO2. I/O 1426/2450, net negative 1023. CBC with improving leukocytosis from 14 to 12.27. H/H stable 9.6/30.3, platelets stable 331. CMP with mildly worsening liver enzymes,  to 276, AST/ALT from 82/54 to 114/79. ABG this morning pH 7.49, pCO2 39, pO2 78, pHCO3 29.7. Vent settings PEEP 8, tidal volume 450, RR 20, 50% FiO2.     Past Medical History:   Diagnosis Date    Carotid-cavernous fistula     Cerebrovascular accident (CVA) due to thrombosis of right middle cerebral artery 6/26/2018    Essential hypertension 6/27/2018    Hemiparesis affecting left side as  late effect of cerebrovascular accident     Hx of tobacco use, presenting hazards to health        Past Surgical History:   Procedure Laterality Date    AUGMENTATION OF BREAST      BRAIN SURGERY         Social History     Socioeconomic History    Marital status: Unknown   Tobacco Use    Smoking status: Former     Current packs/day: 0.00     Average packs/day: 0.5 packs/day for 25.0 years (12.5 ttl pk-yrs)     Types: Cigarettes     Start date: 1993     Quit date: 2018     Years since quittin.1    Smokeless tobacco: Former   Substance and Sexual Activity    Alcohol use: Not Currently    Drug use: No    Sexual activity: Never   Social History Narrative    ** Merged History Encounter **          Social Determinants of Health     Financial Resource Strain: Patient Declined (2024)    Overall Financial Resource Strain (CARDIA)     Difficulty of Paying Living Expenses: Patient declined   Food Insecurity: Unknown (2024)    Hunger Vital Sign     Worried About Running Out of Food in the Last Year: Patient declined     Ran Out of Food in the Last Year: Never true   Transportation Needs: Patient Declined (2024)    TRANSPORTATION NEEDS     Transportation : Patient declined   Housing Stability: Patient Declined (2024)    Housing Stability Vital Sign     Unable to Pay for Housing in the Last Year: Patient declined     Homeless in the Last Year: Patient declined           Current Outpatient Medications   Medication Instructions    amLODIPine (NORVASC) 5 mg, Oral, Daily    aspirin (ECOTRIN) 81 mg, Oral, Daily    folic acid (FOLVITE) 1 mg, Oral, Daily    hydrOXYzine HCL (ATARAX) 25 MG tablet TAKE 1 TABLET BY MOUTH THREE TIMES DAILY AS NEEDED FOR ITCHING OR ANXIETY    multivitamin Tab 1 tablet, Oral, Daily    rosuvastatin (CRESTOR) 40 mg, Oral, Nightly       Current Inpatient Medications   aspirin  81 mg Per NG tube Daily    atorvastatin  40 mg Oral Daily    docusate  100 mg Oral Daily    levETIRAcetam  (Keppra) IV (PEDS and ADULTS)  500 mg Intravenous BID    polyethylene glycol  17 g Oral BID    propranoloL  20 mg Oral BID       Current Intravenous Infusions   fentanyl  0-250 mcg/hr Intravenous Continuous   Stopped at 08/18/24 0440    NORepinephrine bitartrate-D5W  0-3 mcg/kg/min Intravenous Continuous   Stopped at 08/14/24 0331    propofoL  0-50 mcg/kg/min Intravenous Continuous   Stopped at 08/19/24 1241         Review of Systems   Unable to perform ROS: Intubated          Objective:       Intake/Output Summary (Last 24 hours) at 8/20/2024 0657  Last data filed at 8/20/2024 0430  Gross per 24 hour   Intake 1426.43 ml   Output 2450 ml   Net -1023.57 ml         Vital Signs (Most Recent):  Temp: 98 °F (36.7 °C) (08/20/24 0400)  Pulse: 95 (08/20/24 0400)  Resp: (!) 27 (08/20/24 0523)  BP: (!) 132/55 (08/20/24 0400)  SpO2: 98 % (08/20/24 0400)  Body mass index is 26.21 kg/m².  Weight: 65 kg (143 lb 4.8 oz) Vital Signs (24h Range):  Temp:  [98 °F (36.7 °C)-99.4 °F (37.4 °C)] 98 °F (36.7 °C)  Pulse:  [] 95  Resp:  [24-32] 27  SpO2:  [96 %-98 %] 98 %  BP: (131-174)/(54-90) 132/55     Physical Exam  Vitals reviewed.   Constitutional:       Appearance: She is ill-appearing.   HENT:      Head: Normocephalic.   Eyes:      Comments: L pupil 2mm, R pupil 3mm. L slightly reactive. R pupil fixed.   Cardiovascular:      Rate and Rhythm: Normal rate and regular rhythm.      Heart sounds: No murmur heard.  Pulmonary:      Breath sounds: Normal breath sounds.      Comments: Intubated and mechanically ventilated.   No dyssynchrony to mechanical ventilation.   Abdominal:      Palpations: Abdomen is soft.   Neurological:      Comments: Withdraws to pain LUE and LLE. No withdrawal on right side.            Lines/Drains/Airways       Drain  Duration                  NG/OG Tube 08/06/24 0000 Center mouth 14 days    Female External Urinary Catheter w/ Suction 08/20/24 0432 <1 day              Airway  Duration                  Airway  - Non-Surgical 08/07/24 2147 Endotracheal Tube 12 days              Peripheral Intravenous Line  Duration                  Peripheral IV - Single Lumen 08/13/24 0300 18 G Anterior;Left Upper Arm 7 days         Peripheral IV - Single Lumen 08/19/24 1000 20 G Anterior;Right Forearm <1 day                    Significant Labs:    Lab Results   Component Value Date    WBC 12.27 (H) 08/20/2024    HGB 9.6 (L) 08/20/2024    HCT 30.3 (L) 08/20/2024    MCV 91.5 08/20/2024     08/20/2024           BMP  Lab Results   Component Value Date     08/20/2024    K 4.1 08/20/2024    CO2 25 08/20/2024    BUN 26.5 (H) 08/20/2024    CREATININE 0.65 08/20/2024    CALCIUM 8.9 08/20/2024    AGAP 9.0 08/20/2024    ESTGFRAFRICA >60.0 07/07/2022    EGFRNONAA >60.0 07/07/2022         ABG  Recent Labs   Lab 08/20/24 0622   PH 7.490*   PO2 78.0*   PCO2 39.0   HCO3 29.7*   POCBASEDEF 5.90*       Mechanical Ventilation Support:  Vent Mode: A/C (08/20/24 0523)  Ventilator Initiated: Yes (08/07/24 2147)  Set Rate: 20 BPM (08/20/24 0523)  Vt Set: 450 mL (08/20/24 0523)  PEEP/CPAP: 8 cmH20 (08/20/24 0523)  Oxygen Concentration (%): 50 (08/20/24 0523)  Peak Airway Pressure: 19 cmH20 (08/20/24 0523)  Total Ve: 11.9 L/m (08/20/24 0523)  F/VT Ratio<105 (RSBI): (!) 62.65 (08/20/24 0523)      Significant Imaging:  I have reviewed the pertinent imaging within the past 24 hours.    Microbiology Results (last 7 days)       Procedure Component Value Units Date/Time    Respiratory Culture [[id number]]  (Abnormal)  (Susceptibility) Collected: 08/11/24 0836    Order Status: Completed Specimen: Sputum from Endotracheal Aspirate Updated: 08/15/24 1026     Respiratory Culture Rare Escherichia coli      Rare Yeast     Comment: with no normal respiratory franki        GRAM STAIN Quality 2+      Rare Yeast      No bacteria seen    Blood Culture [[id number]]  (Normal) Collected: 08/08/24 5076    Order Status: Completed Specimen: Blood from Hand, Right Updated:  08/13/24 0900     Blood Culture No Growth at 5 days    Blood Culture [[id number]]  (Normal) Collected: 08/08/24 0733    Order Status: Completed Specimen: Blood from Arm, Right Updated: 08/13/24 0900     Blood Culture No Growth at 5 days    Respiratory Culture [[id number]]     Order Status: Canceled Specimen: Sputum from Trachael Aspirate              Assessment/Plan:     Assessment  Right EMANUEL stroke  Hospital-acquired pneumonia  ARDS  Hypernatremia - resolved   Leukocytosis, downtrending  Newly Diagnosed Acute Systolic HF w EF 20-25%   Lactic acidosis  - resolved  Hypokalemia - improved  Hx HTN, CVA with residual left sided weakness       Plan    Continue ICU level of care   Wean sedation as tolerated and mechanical ventilation per ARDS net protocol  Propofol stopped on 8/19 @ 1241. Currently without any purposeful neurologic response.   Maintain MAP>65, no longer on pressors   Continue Keppra 500 mg b.i.d.  Continue aspirin, statin  Completed Rocephin for E coli PNA   BC x 2 NGTD   Monitor fluid status, diurese as needed   Palliative care following, son will be coming in for GOC discussions. Likely does not want long term trach and PEG. Neurology signed off 8/11.      34 minutes of critical care was time spent personally by me on the following activities: development of treatment plan with patient or surrogate and bedside caregivers, discussions with consultants, evaluation of patient's response to treatment, examination of patient, ordering and performing treatments and interventions, ordering and review of laboratory studies, ordering and review of radiographic studies, pulse oximetry, re-evaluation of patient's condition.  This critical care time did not overlap with that of any other provider or involve time for any procedures.       Gary Santa,   Pulmonary Critical Care Medicine  [facility] - [facility]  DOS: 08/20/2024

## 2024-08-20 NOTE — NURSING
Nurses Note -- 4 Eyes      8/20/2024   1:38 PM      Skin assessed during: Q Shift Change      [] No Altered Skin Integrity Present    [x]Prevention Measures Documented      [x] Yes- Altered Skin Integrity Present or Discovered   [] LDA Added if Not in Epic (Describe Wound)   [] New Altered Skin Integrity was Present on Admit and Documented in LDA   [] Wound Image Taken    Wound Care Consulted? Yes    Attending Nurse:  Frank Ortiz rn    Second RN/Staff Member:  TRACEY Orozco

## 2024-08-20 NOTE — PLAN OF CARE
Problem: Adult Inpatient Plan of Care  Goal: Plan of Care Review  Outcome: Progressing  Goal: Patient-Specific Goal (Individualized)  Outcome: Progressing  Goal: Absence of Hospital-Acquired Illness or Injury  Outcome: Progressing  Goal: Optimal Comfort and Wellbeing  Outcome: Progressing  Goal: Readiness for Transition of Care  Outcome: Progressing     Problem: Infection  Goal: Absence of Infection Signs and Symptoms  Outcome: Progressing     Problem: Skin Injury Risk Increased  Goal: Skin Health and Integrity  Outcome: Progressing     Problem: Stroke, Ischemic (Includes Transient Ischemic Attack)  Goal: Optimal Coping  Outcome: Progressing  Goal: Effective Bowel Elimination  Outcome: Progressing  Goal: Optimal Cerebral Tissue Perfusion  Outcome: Progressing  Goal: Optimal Cognitive Function  Outcome: Progressing  Goal: Improved Communication Skills  Outcome: Progressing  Goal: Optimal Functional Ability  Outcome: Progressing  Goal: Optimal Nutrition Intake  Outcome: Progressing  Goal: Effective Oxygenation and Ventilation  Outcome: Progressing  Goal: Improved Sensorimotor Function  Outcome: Progressing  Goal: Safe and Effective Swallow  Outcome: Progressing  Goal: Effective Urinary Elimination  Outcome: Progressing     Problem: Wound  Goal: Optimal Coping  Outcome: Progressing  Goal: Optimal Functional Ability  Outcome: Progressing  Goal: Absence of Infection Signs and Symptoms  Outcome: Progressing  Goal: Improved Oral Intake  Outcome: Progressing  Goal: Optimal Pain Control and Function  Outcome: Progressing  Goal: Skin Health and Integrity  Outcome: Progressing  Goal: Optimal Wound Healing  Outcome: Progressing

## 2024-08-20 NOTE — NURSING
Nurses Note -- 4 Eyes      8/20/2024   3:52 AM      Skin assessed during: Daily Assessment      [] No Altered Skin Integrity Present    [x]Prevention Measures Documented      [x] Yes- Altered Skin Integrity Present or Discovered   [] LDA Added if Not in Epic (Describe Wound)   [] New Altered Skin Integrity was Present on Admit and Documented in LDA   [] Wound Image Taken    Wound Care Consulted? Yes    Attending Nurse:  Yassine Rodriguez RN/Staff Member:  Aster

## 2024-08-21 LAB
ALBUMIN SERPL-MCNC: 1.6 G/DL (ref 3.4–4.8)
ALBUMIN/GLOB SERPL: 0.5 RATIO (ref 1.1–2)
ALP SERPL-CCNC: 271 UNIT/L (ref 40–150)
ALT SERPL-CCNC: 93 UNIT/L (ref 0–55)
ANION GAP SERPL CALC-SCNC: 11 MEQ/L
AST SERPL-CCNC: 122 UNIT/L (ref 5–34)
BASOPHILS # BLD AUTO: 0.05 X10(3)/MCL
BASOPHILS NFR BLD AUTO: 0.4 %
BILIRUB SERPL-MCNC: 0.3 MG/DL
BUN SERPL-MCNC: 27.6 MG/DL (ref 9.8–20.1)
CALCIUM SERPL-MCNC: 7.9 MG/DL (ref 8.4–10.2)
CHLORIDE SERPL-SCNC: 103 MMOL/L (ref 98–107)
CO2 SERPL-SCNC: 23 MMOL/L (ref 23–31)
CREAT SERPL-MCNC: 0.61 MG/DL (ref 0.55–1.02)
CREAT/UREA NIT SERPL: 45
EOSINOPHIL # BLD AUTO: 0.44 X10(3)/MCL (ref 0–0.9)
EOSINOPHIL NFR BLD AUTO: 3.5 %
ERYTHROCYTE [DISTWIDTH] IN BLOOD BY AUTOMATED COUNT: 15.2 % (ref 11.5–17)
GFR SERPLBLD CREATININE-BSD FMLA CKD-EPI: >60 ML/MIN/1.73/M2
GLOBULIN SER-MCNC: 3.5 GM/DL (ref 2.4–3.5)
GLUCOSE SERPL-MCNC: 132 MG/DL (ref 82–115)
HCT VFR BLD AUTO: 26.7 % (ref 37–47)
HGB BLD-MCNC: 8.9 G/DL (ref 12–16)
IMM GRANULOCYTES # BLD AUTO: 0.11 X10(3)/MCL (ref 0–0.04)
IMM GRANULOCYTES NFR BLD AUTO: 0.9 %
LYMPHOCYTES # BLD AUTO: 1.34 X10(3)/MCL (ref 0.6–4.6)
LYMPHOCYTES NFR BLD AUTO: 10.6 %
MAGNESIUM SERPL-MCNC: 2.2 MG/DL (ref 1.6–2.6)
MCH RBC QN AUTO: 29 PG (ref 27–31)
MCHC RBC AUTO-ENTMCNC: 33.3 G/DL (ref 33–36)
MCV RBC AUTO: 87 FL (ref 80–94)
MONOCYTES # BLD AUTO: 0.87 X10(3)/MCL (ref 0.1–1.3)
MONOCYTES NFR BLD AUTO: 6.9 %
NEUTROPHILS # BLD AUTO: 9.79 X10(3)/MCL (ref 2.1–9.2)
NEUTROPHILS NFR BLD AUTO: 77.7 %
NRBC BLD AUTO-RTO: 0 %
PHOSPHATE SERPL-MCNC: 2.6 MG/DL (ref 2.3–4.7)
PLATELET # BLD AUTO: 402 X10(3)/MCL (ref 130–400)
PMV BLD AUTO: 10.6 FL (ref 7.4–10.4)
POTASSIUM SERPL-SCNC: 4.1 MMOL/L (ref 3.5–5.1)
PROT SERPL-MCNC: 5.1 GM/DL (ref 5.8–7.6)
RBC # BLD AUTO: 3.07 X10(6)/MCL (ref 4.2–5.4)
SODIUM SERPL-SCNC: 137 MMOL/L (ref 136–145)
WBC # BLD AUTO: 12.6 X10(3)/MCL (ref 4.5–11.5)

## 2024-08-21 PROCEDURE — 94760 N-INVAS EAR/PLS OXIMETRY 1: CPT

## 2024-08-21 PROCEDURE — 63600175 PHARM REV CODE 636 W HCPCS: Performed by: INTERNAL MEDICINE

## 2024-08-21 PROCEDURE — 27200966 HC CLOSED SUCTION SYSTEM

## 2024-08-21 PROCEDURE — 99900035 HC TECH TIME PER 15 MIN (STAT)

## 2024-08-21 PROCEDURE — 94003 VENT MGMT INPAT SUBQ DAY: CPT

## 2024-08-21 PROCEDURE — 99900031 HC PATIENT EDUCATION (STAT)

## 2024-08-21 PROCEDURE — 36415 COLL VENOUS BLD VENIPUNCTURE: CPT

## 2024-08-21 PROCEDURE — 84100 ASSAY OF PHOSPHORUS: CPT

## 2024-08-21 PROCEDURE — 99900026 HC AIRWAY MAINTENANCE (STAT)

## 2024-08-21 PROCEDURE — 85025 COMPLETE CBC W/AUTO DIFF WBC: CPT

## 2024-08-21 PROCEDURE — 80053 COMPREHEN METABOLIC PANEL: CPT

## 2024-08-21 PROCEDURE — 25000003 PHARM REV CODE 250: Performed by: INTERNAL MEDICINE

## 2024-08-21 PROCEDURE — 27100171 HC OXYGEN HIGH FLOW UP TO 24 HOURS

## 2024-08-21 PROCEDURE — 25000003 PHARM REV CODE 250: Performed by: NURSE PRACTITIONER

## 2024-08-21 PROCEDURE — 83735 ASSAY OF MAGNESIUM: CPT

## 2024-08-21 PROCEDURE — 20000000 HC ICU ROOM

## 2024-08-21 RX ORDER — LEVETIRACETAM 100 MG/ML
500 SOLUTION ORAL 2 TIMES DAILY
Status: DISCONTINUED | OUTPATIENT
Start: 2024-08-21 | End: 2024-08-23

## 2024-08-21 RX ORDER — POLYETHYLENE GLYCOL 3350 17 G/17G
17 POWDER, FOR SOLUTION ORAL 2 TIMES DAILY
Status: DISCONTINUED | OUTPATIENT
Start: 2024-08-21 | End: 2024-08-23

## 2024-08-21 RX ORDER — ACETAMINOPHEN 650 MG/20.3ML
650 LIQUID ORAL EVERY 4 HOURS PRN
Status: DISCONTINUED | OUTPATIENT
Start: 2024-08-21 | End: 2024-08-24 | Stop reason: HOSPADM

## 2024-08-21 RX ORDER — ATORVASTATIN CALCIUM 40 MG/1
40 TABLET, FILM COATED ORAL DAILY
Status: DISCONTINUED | OUTPATIENT
Start: 2024-08-21 | End: 2024-08-23

## 2024-08-21 RX ORDER — PROPRANOLOL HYDROCHLORIDE 10 MG/1
20 TABLET ORAL 2 TIMES DAILY
Status: DISCONTINUED | OUTPATIENT
Start: 2024-08-21 | End: 2024-08-23

## 2024-08-21 RX ORDER — HEPARIN SODIUM 5000 [USP'U]/ML
5000 INJECTION, SOLUTION INTRAVENOUS; SUBCUTANEOUS EVERY 8 HOURS
Status: DISCONTINUED | OUTPATIENT
Start: 2024-08-21 | End: 2024-08-23

## 2024-08-21 RX ORDER — DOCUSATE SODIUM 50 MG/5ML
100 LIQUID ORAL DAILY
Status: DISCONTINUED | OUTPATIENT
Start: 2024-08-21 | End: 2024-08-23

## 2024-08-21 RX ADMIN — LEVETIRACETAM 500 MG: 500 SOLUTION ORAL at 09:08

## 2024-08-21 RX ADMIN — POLYETHYLENE GLYCOL 3350 17 G: 17 POWDER, FOR SOLUTION ORAL at 09:08

## 2024-08-21 RX ADMIN — HEPARIN SODIUM 5000 UNITS: 5000 INJECTION, SOLUTION INTRAVENOUS; SUBCUTANEOUS at 09:08

## 2024-08-21 RX ADMIN — DOCUSATE SODIUM LIQUID 100 MG: 100 LIQUID ORAL at 09:08

## 2024-08-21 RX ADMIN — PROPRANOLOL HYDROCHLORIDE 20 MG: 10 TABLET ORAL at 09:08

## 2024-08-21 RX ADMIN — HEPARIN SODIUM 5000 UNITS: 5000 INJECTION, SOLUTION INTRAVENOUS; SUBCUTANEOUS at 02:08

## 2024-08-21 RX ADMIN — ATORVASTATIN CALCIUM 40 MG: 40 TABLET, FILM COATED ORAL at 09:08

## 2024-08-21 RX ADMIN — ASPIRIN 81 MG CHEWABLE TABLET 81 MG: 81 TABLET CHEWABLE at 09:08

## 2024-08-21 NOTE — PROGRESS NOTES
[facility] - [facility]  Pulmonary Critical Care Note    Patient Name: Shannan Reza  MRN: [id number]  Admission Date: 8/7/2024  Hospital Length of Stay: 14 days  Code Status: DNR  Attending Provider: Vladimir Acevedo MD  Primary Care Provider: Kiana Marie MD     Subjective:     HPI:   This is a 73-year-old female with past medical history of hypertension and to strokes in past presenting from outside facility for suspected CVA and seizure-like activity.  Family member present at bedside and states that patient was in normal state of health until yesterday evening.  States she started to feel weak and lethargic and eventually went unresponsive.  Had presentation like this previously when she had stroke.  CT head at outside facility did not show any acute infarct.  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 She was intubated for airway protection and loaded with Keppra.  She was transferred to [facility] for further care.  Repeat imaging was ordered.  Patient continues to be intubated and sedated on propofol. Requiring increased amounts of Levophed and started on vasopressin and stress dose steroids.  Admitted to ICU for close monitoring.    24 Hour Interval History:  NAEON. Patient remains afebrile 99.8F, tachycardic 100s, tachypneic 27, with stable bp 115/52, and saturating 98% on 40% FiO2. 1200 UOP yesterday. CBC with stable leukocytosis 12.6. H/H stable 8.9/26.7, platelets mildly increased to 402. Liver enzymes continue to worsen, ALP improving 276 to 271, AST/ALT worsening from 114/79 to 122/93. Vent settings PEEP 8, tidal volume 450, RR 20, 40% FiO2.     Past Medical History:   Diagnosis Date    Carotid-cavernous fistula     Cerebrovascular accident (CVA) due to thrombosis of right middle cerebral artery 6/26/2018    Essential hypertension 6/27/2018    Hemiparesis affecting left side as late effect of cerebrovascular accident     Hx  of tobacco use, presenting hazards to health        Past Surgical History:   Procedure Laterality Date    AUGMENTATION OF BREAST      BRAIN SURGERY         Social History     Socioeconomic History    Marital status: Unknown   Tobacco Use    Smoking status: Former     Current packs/day: 0.00     Average packs/day: 0.5 packs/day for 25.0 years (12.5 ttl pk-yrs)     Types: Cigarettes     Start date: 1993     Quit date: 2018     Years since quittin.1    Smokeless tobacco: Former   Substance and Sexual Activity    Alcohol use: Not Currently    Drug use: No    Sexual activity: Never   Social History Narrative    ** Merged History Encounter **          Social Determinants of Health     Financial Resource Strain: Patient Declined (2024)    Overall Financial Resource Strain (CARDIA)     Difficulty of Paying Living Expenses: Patient declined   Food Insecurity: Unknown (2024)    Hunger Vital Sign     Worried About Running Out of Food in the Last Year: Patient declined     Ran Out of Food in the Last Year: Never true   Transportation Needs: Patient Declined (2024)    TRANSPORTATION NEEDS     Transportation : Patient declined   Housing Stability: Patient Declined (2024)    Housing Stability Vital Sign     Unable to Pay for Housing in the Last Year: Patient declined     Homeless in the Last Year: Patient declined           Current Outpatient Medications   Medication Instructions    amLODIPine (NORVASC) 5 mg, Oral, Daily    aspirin (ECOTRIN) 81 mg, Oral, Daily    folic acid (FOLVITE) 1 mg, Oral, Daily    hydrOXYzine HCL (ATARAX) 25 MG tablet TAKE 1 TABLET BY MOUTH THREE TIMES DAILY AS NEEDED FOR ITCHING OR ANXIETY    multivitamin Tab 1 tablet, Oral, Daily    rosuvastatin (CRESTOR) 40 mg, Oral, Nightly       Current Inpatient Medications   aspirin  81 mg Per NG tube Daily    atorvastatin  40 mg Oral Daily    docusate  100 mg Oral Daily    levETIRAcetam (Keppra) IV (PEDS and ADULTS)  500 mg Intravenous  BID    polyethylene glycol  17 g Oral BID    propranoloL  20 mg Oral BID       Current Intravenous Infusions   fentanyl  0-250 mcg/hr Intravenous Continuous   Stopped at 08/18/24 0440    NORepinephrine bitartrate-D5W  0-3 mcg/kg/min Intravenous Continuous   Stopped at 08/14/24 0331    propofoL  0-50 mcg/kg/min Intravenous Continuous   Stopped at 08/19/24 1241         Review of Systems   Unable to perform ROS: Intubated          Objective:       Intake/Output Summary (Last 24 hours) at 8/21/2024 0650  Last data filed at 8/20/2024 1756  Gross per 24 hour   Intake --   Output 1200 ml   Net -1200 ml         Vital Signs (Most Recent):  Temp: 99.1 °F (37.3 °C) (08/21/24 0400)  Pulse: 103 (08/21/24 0630)  Resp: (!) 27 (08/21/24 0630)  BP: (!) 115/52 (08/21/24 0600)  SpO2: 97 % (08/21/24 0630)  Body mass index is 26.21 kg/m².  Weight: 65 kg (143 lb 4.8 oz) Vital Signs (24h Range):  Temp:  [98.2 °F (36.8 °C)-99.8 °F (37.7 °C)] 99.1 °F (37.3 °C)  Pulse:  [] 103  Resp:  [22-28] 27  SpO2:  [95 %-99 %] 97 %  BP: (115-143)/(49-72) 115/52     Physical Exam  Vitals reviewed.   Constitutional:       Appearance: She is ill-appearing.   HENT:      Head: Normocephalic.   Eyes:      Comments: L pupil 2mm, R pupil 3mm. L slightly reactive. R pupil fixed.   Cardiovascular:      Rate and Rhythm: Normal rate and regular rhythm.      Heart sounds: No murmur heard.  Pulmonary:      Breath sounds: Normal breath sounds.      Comments: Intubated and mechanically ventilated.   No dyssynchrony to mechanical ventilation.   Abdominal:      Palpations: Abdomen is soft.   Neurological:      Comments: Withdraws to pain LUE and LLE. No withdrawal on right side.            Lines/Drains/Airways       Drain  Duration                  NG/OG Tube 08/06/24 0000 Center mouth 15 days         Urethral Catheter 08/20/24 1200 <1 day              Airway  Duration                  Airway - Non-Surgical 08/07/24 2147 Endotracheal Tube 13 days               Peripheral Intravenous Line  Duration                  Peripheral IV - Single Lumen 08/13/24 0300 18 G Anterior;Left Upper Arm 8 days         Peripheral IV - Single Lumen 08/19/24 1000 20 G Anterior;Right Forearm 1 day                    Significant Labs:    Lab Results   Component Value Date    WBC 12.60 (H) 08/21/2024    HGB 8.9 (L) 08/21/2024    HCT 26.7 (L) 08/21/2024    MCV 87.0 08/21/2024     (H) 08/21/2024           BMP  Lab Results   Component Value Date     08/21/2024    K 4.1 08/21/2024    CO2 23 08/21/2024    BUN 27.6 (H) 08/21/2024    CREATININE 0.61 08/21/2024    CALCIUM 7.9 (L) 08/21/2024    AGAP 11.0 08/21/2024    ESTGFRAFRICA >60.0 07/07/2022    EGFRNONAA >60.0 07/07/2022         ABG  Recent Labs   Lab 08/20/24 0622   PH 7.490*   PO2 78.0*   PCO2 39.0   HCO3 29.7*   POCBASEDEF 5.90*       Mechanical Ventilation Support:  Vent Mode: A/C (08/21/24 0630)  Ventilator Initiated: Yes (08/07/24 2147)  Set Rate: 20 BPM (08/21/24 0630)  Vt Set: 450 mL (08/21/24 0630)  PEEP/CPAP: 8 cmH20 (08/21/24 0630)  Oxygen Concentration (%): 40 (08/20/24 1739)  Peak Airway Pressure: 30 cmH20 (08/21/24 0630)  Total Ve: 15.7 L/m (08/21/24 0630)  F/VT Ratio<105 (RSBI): (!) 49 (08/21/24 0630)      Significant Imaging:  I have reviewed the pertinent imaging within the past 24 hours.    Microbiology Results (last 7 days)       Procedure Component Value Units Date/Time    Respiratory Culture [[id number]]  (Abnormal)  (Susceptibility) Collected: 08/11/24 0836    Order Status: Completed Specimen: Sputum from Endotracheal Aspirate Updated: 08/15/24 1026     Respiratory Culture Rare Escherichia coli      Rare Yeast     Comment: with no normal respiratory franki        GRAM STAIN Quality 2+      Rare Yeast      No bacteria seen             Assessment/Plan:     Assessment  Right EMANUEL stroke  Hospital-acquired pneumonia  ARDS  Hypernatremia - resolved   Leukocytosis, downtrending  Newly Diagnosed Acute Systolic HF w EF  20-25%   Lactic acidosis  - resolved  Hypokalemia - improved  Hx HTN, CVA with residual left sided weakness       Plan    Continue ICU level of care   Wean sedation as tolerated and mechanical ventilation per ARDS net protocol  Propofol stopped on 8/19 @ 1241. Continues to lack any purposeful neurological response  Maintain MAP>65, no longer on pressors   Continue Keppra 500 mg b.i.d.  Continue aspirin, statin  Completed Rocephin for E coli PNA   BC x 2 NGTD   Monitor fluid status, diurese as needed   Palliative care following, son will be coming in for GOC discussions. Likely does not want long term trach and PEG. Neurology signed off 8/11.      34 minutes of critical care was time spent personally by me on the following activities: development of treatment plan with patient or surrogate and bedside caregivers, discussions with consultants, evaluation of patient's response to treatment, examination of patient, ordering and performing treatments and interventions, ordering and review of laboratory studies, ordering and review of radiographic studies, pulse oximetry, re-evaluation of patient's condition.  This critical care time did not overlap with that of any other provider or involve time for any procedures.       Gary Santa, DO  Pulmonary Critical Care Medicine  [facility] - [facility]  DOS: 08/21/2024

## 2024-08-21 NOTE — NURSING
Nurses Note -- 4 Eyes      8/21/2024   10:19 AM      Skin assessed during: Q Shift Change      [] No Altered Skin Integrity Present    [x]Prevention Measures Documented      [x] Yes- Altered Skin Integrity Present or Discovered   [] LDA Added if Not in Epic (Describe Wound)   [] New Altered Skin Integrity was Present on Admit and Documented in LDA   [] Wound Image Taken    Wound Care Consulted? Yes    Attending Nurse:  Robert Rodriguez RN/Staff Member:  Sophie AGUILERA

## 2024-08-21 NOTE — NURSING
Nurses Note -- 4 Eyes      8/21/2024   4:54 AM      Skin assessed during: Daily Assessment      [] No Altered Skin Integrity Present    [x]Prevention Measures Documented      [x] Yes- Altered Skin Integrity Present or Discovered   [] LDA Added if Not in Epic (Describe Wound)   [] New Altered Skin Integrity was Present on Admit and Documented in LDA   [] Wound Image Taken    Wound Care Consulted? No    Attending Nurse:  Yassine Rodriguez RN/Staff Member:  RENÉE Walker

## 2024-08-22 LAB
ALBUMIN SERPL-MCNC: 1.8 G/DL (ref 3.4–4.8)
ALBUMIN/GLOB SERPL: 0.5 RATIO (ref 1.1–2)
ALP SERPL-CCNC: 284 UNIT/L (ref 40–150)
ALT SERPL-CCNC: 116 UNIT/L (ref 0–55)
ANION GAP SERPL CALC-SCNC: 11 MEQ/L
AST SERPL-CCNC: 136 UNIT/L (ref 5–34)
BASOPHILS # BLD AUTO: 0.07 X10(3)/MCL
BASOPHILS NFR BLD AUTO: 0.6 %
BILIRUB SERPL-MCNC: 0.3 MG/DL
BUN SERPL-MCNC: 29.6 MG/DL (ref 9.8–20.1)
CALCIUM SERPL-MCNC: 8 MG/DL (ref 8.4–10.2)
CHLORIDE SERPL-SCNC: 101 MMOL/L (ref 98–107)
CO2 SERPL-SCNC: 22 MMOL/L (ref 23–31)
CREAT SERPL-MCNC: 0.65 MG/DL (ref 0.55–1.02)
CREAT/UREA NIT SERPL: 46
EOSINOPHIL # BLD AUTO: 0.32 X10(3)/MCL (ref 0–0.9)
EOSINOPHIL NFR BLD AUTO: 2.8 %
ERYTHROCYTE [DISTWIDTH] IN BLOOD BY AUTOMATED COUNT: 15.3 % (ref 11.5–17)
GFR SERPLBLD CREATININE-BSD FMLA CKD-EPI: >60 ML/MIN/1.73/M2
GLOBULIN SER-MCNC: 3.6 GM/DL (ref 2.4–3.5)
GLUCOSE SERPL-MCNC: 128 MG/DL (ref 82–115)
HCT VFR BLD AUTO: 27.3 % (ref 37–47)
HGB BLD-MCNC: 8.7 G/DL (ref 12–16)
IMM GRANULOCYTES # BLD AUTO: 0.15 X10(3)/MCL (ref 0–0.04)
IMM GRANULOCYTES NFR BLD AUTO: 1.3 %
LYMPHOCYTES # BLD AUTO: 1.72 X10(3)/MCL (ref 0.6–4.6)
LYMPHOCYTES NFR BLD AUTO: 15 %
MAGNESIUM SERPL-MCNC: 2.3 MG/DL (ref 1.6–2.6)
MCH RBC QN AUTO: 28.5 PG (ref 27–31)
MCHC RBC AUTO-ENTMCNC: 31.9 G/DL (ref 33–36)
MCV RBC AUTO: 89.5 FL (ref 80–94)
MONOCYTES # BLD AUTO: 1.03 X10(3)/MCL (ref 0.1–1.3)
MONOCYTES NFR BLD AUTO: 9 %
NEUTROPHILS # BLD AUTO: 8.14 X10(3)/MCL (ref 2.1–9.2)
NEUTROPHILS NFR BLD AUTO: 71.3 %
NRBC BLD AUTO-RTO: 0 %
PHOSPHATE SERPL-MCNC: 3 MG/DL (ref 2.3–4.7)
PLATELET # BLD AUTO: 443 X10(3)/MCL (ref 130–400)
PMV BLD AUTO: 10.9 FL (ref 7.4–10.4)
POTASSIUM SERPL-SCNC: 4.2 MMOL/L (ref 3.5–5.1)
PROT SERPL-MCNC: 5.4 GM/DL (ref 5.8–7.6)
RBC # BLD AUTO: 3.05 X10(6)/MCL (ref 4.2–5.4)
SODIUM SERPL-SCNC: 134 MMOL/L (ref 136–145)
WBC # BLD AUTO: 11.43 X10(3)/MCL (ref 4.5–11.5)

## 2024-08-22 PROCEDURE — 85025 COMPLETE CBC W/AUTO DIFF WBC: CPT

## 2024-08-22 PROCEDURE — 63600175 PHARM REV CODE 636 W HCPCS: Performed by: INTERNAL MEDICINE

## 2024-08-22 PROCEDURE — 36415 COLL VENOUS BLD VENIPUNCTURE: CPT

## 2024-08-22 PROCEDURE — 25000003 PHARM REV CODE 250: Performed by: NURSE PRACTITIONER

## 2024-08-22 PROCEDURE — 80053 COMPREHEN METABOLIC PANEL: CPT

## 2024-08-22 PROCEDURE — 99900031 HC PATIENT EDUCATION (STAT)

## 2024-08-22 PROCEDURE — 94761 N-INVAS EAR/PLS OXIMETRY MLT: CPT

## 2024-08-22 PROCEDURE — 94003 VENT MGMT INPAT SUBQ DAY: CPT

## 2024-08-22 PROCEDURE — 83735 ASSAY OF MAGNESIUM: CPT

## 2024-08-22 PROCEDURE — 99900035 HC TECH TIME PER 15 MIN (STAT)

## 2024-08-22 PROCEDURE — 27100171 HC OXYGEN HIGH FLOW UP TO 24 HOURS

## 2024-08-22 PROCEDURE — 84100 ASSAY OF PHOSPHORUS: CPT

## 2024-08-22 PROCEDURE — 20000000 HC ICU ROOM

## 2024-08-22 PROCEDURE — 25000003 PHARM REV CODE 250: Performed by: INTERNAL MEDICINE

## 2024-08-22 PROCEDURE — 99233 SBSQ HOSP IP/OBS HIGH 50: CPT | Mod: ,,, | Performed by: NURSE PRACTITIONER

## 2024-08-22 PROCEDURE — 99900026 HC AIRWAY MAINTENANCE (STAT)

## 2024-08-22 PROCEDURE — 27200966 HC CLOSED SUCTION SYSTEM

## 2024-08-22 PROCEDURE — 94760 N-INVAS EAR/PLS OXIMETRY 1: CPT

## 2024-08-22 RX ADMIN — ASPIRIN 81 MG CHEWABLE TABLET 81 MG: 81 TABLET CHEWABLE at 08:08

## 2024-08-22 RX ADMIN — POLYETHYLENE GLYCOL 3350 17 G: 17 POWDER, FOR SOLUTION ORAL at 09:08

## 2024-08-22 RX ADMIN — LEVETIRACETAM 500 MG: 500 SOLUTION ORAL at 08:08

## 2024-08-22 RX ADMIN — ATORVASTATIN CALCIUM 40 MG: 40 TABLET, FILM COATED ORAL at 08:08

## 2024-08-22 RX ADMIN — DOCUSATE SODIUM LIQUID 100 MG: 100 LIQUID ORAL at 08:08

## 2024-08-22 RX ADMIN — HEPARIN SODIUM 5000 UNITS: 5000 INJECTION, SOLUTION INTRAVENOUS; SUBCUTANEOUS at 09:08

## 2024-08-22 RX ADMIN — PROPRANOLOL HYDROCHLORIDE 20 MG: 10 TABLET ORAL at 09:08

## 2024-08-22 RX ADMIN — POLYETHYLENE GLYCOL 3350 17 G: 17 POWDER, FOR SOLUTION ORAL at 08:08

## 2024-08-22 RX ADMIN — LEVETIRACETAM 500 MG: 500 SOLUTION ORAL at 09:08

## 2024-08-22 RX ADMIN — PROPRANOLOL HYDROCHLORIDE 20 MG: 10 TABLET ORAL at 08:08

## 2024-08-22 RX ADMIN — HEPARIN SODIUM 5000 UNITS: 5000 INJECTION, SOLUTION INTRAVENOUS; SUBCUTANEOUS at 02:08

## 2024-08-22 RX ADMIN — HEPARIN SODIUM 5000 UNITS: 5000 INJECTION, SOLUTION INTRAVENOUS; SUBCUTANEOUS at 05:08

## 2024-08-22 NOTE — PROGRESS NOTES
[facility] - [facility]  Pulmonary Critical Care Note    Patient Name: Shannan Reza  MRN: [id number]  Admission Date: 8/7/2024  Hospital Length of Stay: 15 days  Code Status: DNR  Attending Provider: Vladimir Acevedo MD  Primary Care Provider: Kiana Marie MD     Subjective:     HPI:   This is a 73-year-old female with past medical history of hypertension and to strokes in past presenting from outside facility for suspected CVA and seizure-like activity.  Family member present at bedside and states that patient was in normal state of health until yesterday evening.  States she started to feel weak and lethargic and eventually went unresponsive.  Had presentation like this previously when she had stroke.  CT head at outside facility did not show any acute infarct.  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 She was intubated for airway protection and loaded with Keppra.  She was transferred to [facility] for further care.  Repeat imaging was ordered.  Patient continues to be intubated and sedated on propofol. Requiring increased amounts of Levophed and started on vasopressin and stress dose steroids.  Admitted to ICU for close monitoring.    24 Hour Interval History:  NAEON. Patient febrile overnight, Tmax 100.6F.  tachycardic 100s, tachypneic 23s, with stable bp 115/52, and saturating 98% on 40% FiO2. I/O 2477/2125, net positive 352. CBC with stable leukocytosis 11.4. H/H stable 8.7/27.3, platelets mildly increased to 443. Liver enzymes continue to worsen, ALP stable 284, AST/ALT worsening from 122/93 to 136/116. Vent settings PEEP 8, tidal volume 450, RR 20, 40% FiO2.     Past Medical History:   Diagnosis Date    Carotid-cavernous fistula     Cerebrovascular accident (CVA) due to thrombosis of right middle cerebral artery 6/26/2018    Essential hypertension 6/27/2018    Hemiparesis affecting left side as late effect of cerebrovascular  accident     Hx of tobacco use, presenting hazards to health        Past Surgical History:   Procedure Laterality Date    AUGMENTATION OF BREAST      BRAIN SURGERY         Social History     Socioeconomic History    Marital status: Unknown   Tobacco Use    Smoking status: Former     Current packs/day: 0.00     Average packs/day: 0.5 packs/day for 25.0 years (12.5 ttl pk-yrs)     Types: Cigarettes     Start date: 1993     Quit date: 2018     Years since quittin.1    Smokeless tobacco: Former   Substance and Sexual Activity    Alcohol use: Not Currently    Drug use: No    Sexual activity: Never   Social History Narrative    ** Merged History Encounter **          Social Determinants of Health     Financial Resource Strain: Patient Declined (2024)    Overall Financial Resource Strain (CARDIA)     Difficulty of Paying Living Expenses: Patient declined   Food Insecurity: Unknown (2024)    Hunger Vital Sign     Worried About Running Out of Food in the Last Year: Patient declined     Ran Out of Food in the Last Year: Never true   Transportation Needs: Patient Declined (2024)    TRANSPORTATION NEEDS     Transportation : Patient declined   Housing Stability: Patient Declined (2024)    Housing Stability Vital Sign     Unable to Pay for Housing in the Last Year: Patient declined     Homeless in the Last Year: Patient declined           Current Outpatient Medications   Medication Instructions    amLODIPine (NORVASC) 5 mg, Oral, Daily    aspirin (ECOTRIN) 81 mg, Oral, Daily    folic acid (FOLVITE) 1 mg, Oral, Daily    hydrOXYzine HCL (ATARAX) 25 MG tablet TAKE 1 TABLET BY MOUTH THREE TIMES DAILY AS NEEDED FOR ITCHING OR ANXIETY    multivitamin Tab 1 tablet, Oral, Daily    rosuvastatin (CRESTOR) 40 mg, Oral, Nightly       Current Inpatient Medications   aspirin  81 mg Per NG tube Daily    atorvastatin  40 mg Per NG tube Daily    docusate  100 mg Per NG tube Daily    heparin (porcine)  5,000 Units  Subcutaneous Q8H    levetiracetam  500 mg Per NG tube BID    polyethylene glycol  17 g Per NG tube BID    propranoloL  20 mg Per NG tube BID       Current Intravenous Infusions   fentanyl  0-250 mcg/hr Intravenous Continuous   Stopped at 08/18/24 0440    NORepinephrine bitartrate-D5W  0-3 mcg/kg/min Intravenous Continuous   Stopped at 08/14/24 0331    propofoL  0-50 mcg/kg/min Intravenous Continuous   Stopped at 08/19/24 1241         Review of Systems   Unable to perform ROS: Intubated          Objective:       Intake/Output Summary (Last 24 hours) at 8/22/2024 0639  Last data filed at 8/22/2024 0500  Gross per 24 hour   Intake 2477.6 ml   Output 2125 ml   Net 352.6 ml         Vital Signs (Most Recent):  Temp: 100 °F (37.8 °C) (08/22/24 0400)  Pulse: 100 (08/22/24 0626)  Resp: (!) 22 (08/22/24 0626)  BP: (!) 132/58 (08/22/24 0600)  SpO2: 99 % (08/22/24 0626)  Body mass index is 26.21 kg/m².  Weight: 65 kg (143 lb 4.8 oz) Vital Signs (24h Range):  Temp:  [98.6 °F (37 °C)-100.6 °F (38.1 °C)] 100 °F (37.8 °C)  Pulse:  [] 100  Resp:  [18-31] 22  SpO2:  [95 %-100 %] 99 %  BP: (109-157)/(51-77) 132/58     Physical Exam  Vitals reviewed.   Constitutional:       Appearance: She is ill-appearing.   HENT:      Head: Normocephalic.   Eyes:      Comments: L pupil 2mm, R pupil 3mm. L slightly reactive. R pupil fixed.   Cardiovascular:      Rate and Rhythm: Normal rate and regular rhythm.      Heart sounds: No murmur heard.  Pulmonary:      Breath sounds: Normal breath sounds.      Comments: Intubated and mechanically ventilated.   No dyssynchrony to mechanical ventilation.   Abdominal:      Palpations: Abdomen is soft.   Neurological:      Comments: Withdraws to pain LUE and LLE. No withdrawal on right side.            Lines/Drains/Airways       Drain  Duration                  NG/OG Tube 08/06/24 0000 Center mouth 16 days         Urethral Catheter 08/20/24 1200 1 day         Rectal Tube 08/22/24 0230 fecal management  system <1 day              Airway  Duration                  Airway - Non-Surgical 08/07/24 2147 Endotracheal Tube 14 days              Peripheral Intravenous Line  Duration                  Peripheral IV - Single Lumen 08/13/24 0300 18 G Anterior;Left Upper Arm 9 days         Peripheral IV - Single Lumen 08/19/24 1000 20 G Anterior;Right Forearm 2 days                    Significant Labs:    Lab Results   Component Value Date    WBC 11.43 08/22/2024    HGB 8.7 (L) 08/22/2024    HCT 27.3 (L) 08/22/2024    MCV 89.5 08/22/2024     (H) 08/22/2024           BMP  Lab Results   Component Value Date     (L) 08/22/2024    K 4.2 08/22/2024    CO2 22 (L) 08/22/2024    BUN 29.6 (H) 08/22/2024    CREATININE 0.65 08/22/2024    CALCIUM 8.0 (L) 08/22/2024    AGAP 11.0 08/22/2024    ESTGFRAFRICA >60.0 07/07/2022    EGFRNONAA >60.0 07/07/2022         ABG  Recent Labs   Lab 08/20/24 0622   PH 7.490*   PO2 78.0*   PCO2 39.0   HCO3 29.7*   POCBASEDEF 5.90*       Mechanical Ventilation Support:  Vent Mode: A/C (08/22/24 0626)  Ventilator Initiated: Yes (08/07/24 2147)  Set Rate: 20 BPM (08/22/24 0626)  Vt Set: 450 mL (08/22/24 0626)  PEEP/CPAP: 8 cmH20 (08/22/24 0626)  Oxygen Concentration (%): 40 (08/22/24 0400)  Peak Airway Pressure: 32 cmH20 (08/22/24 0626)  Total Ve: 10.3 L/m (08/22/24 0626)  F/VT Ratio<105 (RSBI): (!) 51.64 (08/22/24 0626)      Significant Imaging:  I have reviewed the pertinent imaging within the past 24 hours.    Microbiology Results (last 7 days)       Procedure Component Value Units Date/Time    Respiratory Culture [[id number]]  (Abnormal)  (Susceptibility) Collected: 08/11/24 0836    Order Status: Completed Specimen: Sputum from Endotracheal Aspirate Updated: 08/15/24 1026     Respiratory Culture Rare Escherichia coli      Rare Yeast     Comment: with no normal respiratory franki        GRAM STAIN Quality 2+      Rare Yeast      No bacteria seen             Assessment/Plan:     Assessment  Right  EMANUEL stroke  Hospital-acquired pneumonia  ARDS  Hypernatremia - resolved   Leukocytosis, downtrending  Newly Diagnosed Acute Systolic HF w EF 20-25%   Lactic acidosis  - resolved  Hypokalemia - improved  Hx HTN, CVA with residual left sided weakness       Plan    Continue ICU level of care   Wean sedation as tolerated and mechanical ventilation per ARDS net protocol  Propofol stopped on 8/19 @ 1241. Continues to lack any purposeful neurological recovery  Maintain MAP>65, no longer on pressors   Continue Keppra 500 mg b.i.d.  Continue aspirin, statin  Completed Rocephin for E coli PNA   BC x 2 NGTD   Monitor fluid status, diurese as needed   Palliative care following. Patient currently DNR. Will continue goals of care discussions with son.     34 minutes of critical care was time spent personally by me on the following activities: development of treatment plan with patient or surrogate and bedside caregivers, discussions with consultants, evaluation of patient's response to treatment, examination of patient, ordering and performing treatments and interventions, ordering and review of laboratory studies, ordering and review of radiographic studies, pulse oximetry, re-evaluation of patient's condition.  This critical care time did not overlap with that of any other provider or involve time for any procedures.       Gary Santa, DO  Pulmonary Critical Care Medicine  [facility]  DOS: 08/22/2024

## 2024-08-22 NOTE — CONSULTS
Consults    Patient Name: Shannan Reza   MRN: [id number]   Admission Date: 8/7/2024   Hospital Length of Stay: 15   Attending Provider: Vladimir Acevedo MD   Consulting Provider: Nohelia YARBROUGH  Reason for Consult: Goals of Care  Primary Care Physician:  Kiana Marie MD     Principal Problem: <principal problem not specified>       Final diagnoses:  [I63.9] CVA (cerebral vascular accident)      Assessment/Plan:     I reviewed the patient and family's understanding of the seriousness of the illness and its expected prognosis. We discussed the patient's goals of care and treatment preferences.        Advance Care Planning     Date: 08/22/2024    [facility]  I engaged the family in a voluntary conversation about advance care planning and we specifically addressed what the goals of care would be moving forward, in light of the patient's change in clinical status, specifically current condition.  We did specifically address the patient's likely prognosis, which is poor.  We explored the patient's values and preferences for future care.  The family endorses that what is most important right now is to focus on improvement in condition but with limits to invasive therapies    Accordingly, we have decided that the best plan to meet the patient's goals includes continuing with treatment    Extensive sean discussion with patient's son David about patient's current condition.  Discussed her lack of meaningful neurological and respiratory improvement and her high risk for decompensation and decline.  Son noted that patient's FIO2 has improved and that he hopes patient will tolerate further weaning.  Discussed that she has been on the ventilator for over 2 weeks and that if son wants to give patient more time, then he would need to consider trach/PEG to give patient more time.    Son reiterated that he knows that patient would not want her life prolonged with poor quality.  Discussed that unfortunately for patient, she has  not made improvement to ensure safe weaning and that trach may not ensure liberation from the ventilator.  Son expressed that if the doctors feel that it is time to consider comfort measures or trach then he would be in agreement for comfort measures    Son expressed that his family had to make decision to withdraw life support on his grandmother and he does not want to be at hospital when life support is withdrawn--discussed that we honor a family request and we ensure that patient does not experience suffering or distress.  I attempted to read last intensivist note to son but line was disconnected.  I attempted to call son twice, and left VM.      Updated nursing staff and Dr. Gardner.  Interval History:     Patient remains intubated on 40% FIO2 with T max 100.6.  Lab work stable except for worsening AST/ALT.        Active Ambulatory Problems     Diagnosis Date Noted    Cerebrovascular accident (CVA) due to thrombosis of right middle cerebral artery 06/26/2018    Carotid-cavernous fistula     Hemiparesis affecting left side as late effect of cerebrovascular accident 06/27/2018    Essential hypertension 06/27/2018    Anterior communicating artery aneurysm 06/28/2018    Macrocytic anemia 04/26/2019    Hx of tobacco use, presenting hazards to health 12/23/2020    Closed fracture of left proximal humerus 06/02/2021    Chronic left shoulder pain 06/02/2021     Resolved Ambulatory Problems     Diagnosis Date Noted    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     No Additional Past Medical History        Past Surgical History:   Procedure Laterality Date    AUGMENTATION OF BREAST      BRAIN SURGERY          Review of patient's allergies indicates:   Allergen Reactions    Percodan [oxycodone hcl-oxycodone-asa] Itching          Current Facility-Administered Medications:     acetaminophen oral solution 650 mg, 650 mg, Oral, Q4H PRN, Gary Santa,     aspirin chewable tablet 81 mg, 81 mg, Per NG tube, Daily, Shantell Vail NP, 81  mg at 08/22/24 0844    atorvastatin tablet 40 mg, 40 mg, Per NG tube, Daily, Ari Gardner MD, 40 mg at 08/22/24 0844    bisacodyL suppository 10 mg, 10 mg, Rectal, Daily PRN, Raymond Vasquez MD    dextrose 10% bolus 125 mL 125 mL, 12.5 g, Intravenous, PRN, Carol Rehman MD    dextrose 10% bolus 250 mL 250 mL, 25 g, Intravenous, PRN, Carol Rehman MD    docusate 50 mg/5 mL liquid 100 mg, 100 mg, Per NG tube, Daily, Ari Gardner MD, 100 mg at 08/22/24 0844    fentaNYL 2500 mcg in 0.9% sodium chloride 250 mL infusion premix (titrating), 0-250 mcg/hr, Intravenous, Continuous, Vladimir Acevedo MD, Stopped at 08/18/24 0440    fentaNYL injection 25 mcg, 25 mcg, Intravenous, Q2H PRN, Tyson Garcia DO, 25 mcg at 08/20/24 1049    heparin (porcine) injection 5,000 Units, 5,000 Units, Subcutaneous, Q8H, Ari Gardner MD, 5,000 Units at 08/22/24 0517    hydrALAZINE injection 10 mg, 10 mg, Intravenous, Q6H PRN, Gabrielle Garciaa, DO    labetaloL injection 10 mg, 10 mg, Intravenous, Q6H PRN, Tyson Garcia DO, 10 mg at 08/19/24 1823    levetiracetam 500 mg/5 mL (5 mL) liquid Soln 500 mg, 500 mg, Per NG tube, BID, Ari Gardner MD, 500 mg at 08/22/24 0844    NORepinephrine 8 mg in dextrose 5% 250 mL infusion, 0-3 mcg/kg/min, Intravenous, Continuous, Vladimir Acevedo MD, Stopped at 08/14/24 0331    polyethylene glycol packet 17 g, 17 g, Per NG tube, BID, Ari Gardner MD, 17 g at 08/22/24 0844    propofol (DIPRIVAN) 10 mg/mL infusion, 0-50 mcg/kg/min, Intravenous, Continuous, Vladimir Acevedo MD, Stopped at 08/19/24 1241    propranoloL tablet 20 mg, 20 mg, Per NG tube, BID, Ari Gardner MD, 20 mg at 08/22/24 0844    sodium chloride 0.9% flush 10 mL, 10 mL, Intravenous, PRN, Carol Rehman MD       Current Facility-Administered Medications:     acetaminophen, 650 mg, Oral, Q4H PRN    bisacodyL, 10 mg, Rectal, Daily PRN    dextrose 10%, 12.5 g, Intravenous, PRN    dextrose 10%, 25 g,  "Intravenous, PRN    fentaNYL, 25 mcg, Intravenous, Q2H PRN    hydrALAZINE, 10 mg, Intravenous, Q6H PRN    labetalol, 10 mg, Intravenous, Q6H PRN    sodium chloride 0.9%, 10 mL, Intravenous, PRN     Family History   Problem Relation Name Age of Onset    Deep vein thrombosis Father      Melanoma Neg Hx            Review of Systems   Unable to perform ROS: Intubated            Objective:   /82   Pulse 97   Temp 100 °F (37.8 °C) (Oral)   Resp (!) 29   Ht 5' 2" (1.575 m)   Wt 65 kg (143 lb 4.8 oz)   LMP  (LMP Unknown)   SpO2 100%   BMI 26.21 kg/m²      Physical Exam   Constitutional: She appears ill.   HENT:   Head: Normocephalic.   Cardiovascular: An irregular rhythm present. Pulmonary:      Breath sounds: Rhonchi present.     Abdominal: Soft.   Neurological:   obtunded   Skin:   ashen            Review of Symptoms      Symptom Assessment (ESAS 0-10 Scale)  Pain:  0  Dyspnea:  0  Anxiety:  0  Nausea:  0  Depression:  0  Anorexia:  0  Fatigue:  0  Insomnia:  0  Restlessness:  0  Agitation:  0         Bowel Management Plan (BMP):  Yes      Psychosocial/Cultural:   See Palliative Psychosocial Note: Yes  **Primary  to Follow**  Palliative Care  Consult: No    Advance Care Planning   Advance Directives:   Do Not Resuscitate Status: Yes      Decision Making:  Family answered questions  Goals of Care: The family endorses that what is most important right now is to focus on improvement in condition but with limits to invasive therapies    Accordingly, we have decided that the best plan to meet the patient's goals includes continuing with treatment and pivot to comfort-focused care          PAINAD: NA    Caregiver burden formerly assessed: Yes      No results displayed because visit has over 200 results.               > 50% of 45 min of encounter was spent in chart review, face to face discussion of goals of care, symptom assessment, coordination of care and emotional support.    Nohelia" Arti FNP, [facility]  Palliative Medicine  [facility]

## 2024-08-22 NOTE — PROGRESS NOTES
[facility] [address]  Wound Care    Patient Name:  Shannan Reza   MRN:  [id number]  Date: 2024  Diagnosis: <principal problem not specified>    History:     Past Medical History:   Diagnosis Date    Carotid-cavernous fistula     Cerebrovascular accident (CVA) due to thrombosis of right middle cerebral artery 2018    Essential hypertension 2018    Hemiparesis affecting left side as late effect of cerebrovascular accident     Hx of tobacco use, presenting hazards to health        Social History     Socioeconomic History    Marital status: Unknown   Tobacco Use    Smoking status: Former     Current packs/day: 0.00     Average packs/day: 0.5 packs/day for 25.0 years (12.5 ttl pk-yrs)     Types: Cigarettes     Start date: 1993     Quit date: 2018     Years since quittin.1    Smokeless tobacco: Former   Substance and Sexual Activity    Alcohol use: Not Currently    Drug use: No    Sexual activity: Never   Social History Narrative    ** Merged History Encounter **          Social Determinants of Health     Financial Resource Strain: Patient Declined (2024)    Overall Financial Resource Strain (CARDIA)     Difficulty of Paying Living Expenses: Patient declined   Food Insecurity: Unknown (2024)    Hunger Vital Sign     Worried About Running Out of Food in the Last Year: Patient declined     Ran Out of Food in the Last Year: Never true   Transportation Needs: Patient Declined (2024)    TRANSPORTATION NEEDS     Transportation : Patient declined   Housing Stability: Patient Declined (2024)    Housing Stability Vital Sign     Unable to Pay for Housing in the Last Year: Patient declined     Homeless in the Last Year: Patient declined       Precautions:     Allergies as of 2024 - Reviewed 2024   Allergen Reaction Noted    Percodan [oxycodone hcl-oxycodone-asa] Itching 10/18/2015       WO Assessment Details/Treatment      24 1113   WO Assessment    Visit Date 08/22/24   Visit Time 1113   Consult Type Follow Up   [facility] Wound   Intervention chart review;assessed;applied   Teaching on-going        Wound 08/14/24 0745 Blister(s) Right proximal;posterior Arm #1   Date First Assessed/Time First Assessed: 08/14/24 0745   Present on Original Admission: No  Primary Wound Type: Blister(s)  Side: Right  Orientation: proximal;posterior  Location: Arm  Wound Number: #1  Is this injury device related?: No   Wound Image    Dressing Appearance Open to air   Drainage Amount None   Drainage Characteristics/Odor No odor   Appearance Intact;Red;Maroon;Purple   Tissue loss description Partial thickness   Black (%), Wound Tissue Color 0 %   Red (%), Wound Tissue Color 100 %   Yellow (%), Wound Tissue Color 0 %   Periwound Area Intact;Dry;Pink   Wound Edges Defined;Irregular   Wound Length (cm) 3.2 cm   Wound Width (cm) 2.4 cm   Wound Depth (cm) 0.1 cm   Wound Volume (cm^3) 0.768 cm^3   Wound Surface Area (cm^2) 7.68 cm^2   Care Cleansed with:;Sterile normal saline     WO follow up for right arm. Treatment recommendations in place. Area cleansed, new photographs taken for chart. Area dried and left open to air. Patient on MIKAELA mattress and unable to receive education at this time. Nursing to continue with treatment recommendations and other preventative measures. No further questions at this time from staff. Will follow up.   08/22/2024

## 2024-08-22 NOTE — PROGRESS NOTES
Inpatient Nutrition Assessment    Admit Date: 8/7/2024   Total duration of encounter: 15 days   Patient Age: 73 y.o.    Nutrition Recommendation/Prescription     Tube feeding recommendations:     Peptamen AF, goal rate of 60 mL/hr    1600 kcal/day (98% estimated needs, 109% with meds)  90 g protein/day (100% estimated needs)  972 mL free water/day (66% estimated needs)  Calculations based on estimated run time of 20 hours/day    If no IV fluids running, can give 50ml q 2hr water flushes. Total water provided: 1472ml (100% est needs.)    Communication of Recommendations: reviewed with nurse    Nutrition Assessment     Malnutrition Assessment/Nutrition-Focused Physical Exam    Malnutrition Context: other (see comments) (unable to obtain) (08/08/24 1114)     Energy Intake (Malnutrition): other (see comments) (unable to obtain) (08/08/24 1114)  Weight Loss (Malnutrition): other (see comments) (unable to obtain) (08/08/24 1114)  Subcutaneous Fat (Malnutrition): other (see comments) (unable to obtain) (08/08/24 1114)           Muscle Mass (Malnutrition): other (see comments) (unable to obtain) (08/08/24 1114)                          Fluid Accumulation (Malnutrition): other (see comments) (none per flowsheets) (08/08/24 1114)        A minimum of two characteristics is recommended for diagnosis of either severe or non-severe malnutrition.    Chart Review    Reason Seen: continuous nutrition monitoring, malnutrition screening tool (MST), physician consult for tube feeding recommendations, and follow-up    Malnutrition Screening Tool Results   Have you recently lost weight without trying?: Unsure  Have you been eating poorly because of a decreased appetite?: No   MST Score: 2   Diagnosis:  Suspected CVA versus seizures  Lactic acidosis  Hypokalemia    Relevant Medical History:   CVA, HTN, hemiparesis affected left side    Scheduled Medications:  aspirin, 81 mg, Daily  atorvastatin, 40 mg, Daily  docusate, 100 mg,  Daily  heparin (porcine), 5,000 Units, Q8H  levetiracetam, 500 mg, BID  polyethylene glycol, 17 g, BID  propranoloL, 20 mg, BID    Continuous Infusions:  fentanyl, Last Rate: Stopped (08/18/24 0440)  NORepinephrine bitartrate-D5W, Last Rate: Stopped (08/14/24 0331)  propofoL, Last Rate: Stopped (08/19/24 1241)    PRN Medications:  acetaminophen, 650 mg, Q4H PRN  bisacodyL, 10 mg, Daily PRN  dextrose 10%, 12.5 g, PRN  dextrose 10%, 25 g, PRN  fentaNYL, 25 mcg, Q2H PRN  hydrALAZINE, 10 mg, Q6H PRN  labetalol, 10 mg, Q6H PRN  sodium chloride 0.9%, 10 mL, PRN    Calorie Containing IV Medications: no significant kcals from medications at this time    Recent Labs   Lab 08/16/24  0335 08/17/24  0248 08/18/24  0308 08/19/24  0246 08/19/24  1639 08/20/24  0242 08/21/24  0200 08/22/24  0319 08/22/24  0320   * 146* 141 139  --  136 137 134*  --    K 3.6 3.3* 3.8 4.1  --  4.1 4.1 4.2  --    CALCIUM 7.4* 7.7* 7.8* 7.7*  --  8.9 7.9* 8.0*  --    PHOS 3.7 2.5 2.3 2.5  --  2.6 2.6 3.0  --    MG 2.20 2.50 2.50 2.30  --  2.30 2.20 2.30  --    CO2 26 27 25 25  --  25 23 22*  --    BUN 34.6* 35.4* 34.0* 28.9*  --  26.5* 27.6* 29.6*  --    CREATININE 0.90 0.83 0.71 0.67  --  0.65 0.61 0.65  --    EGFRNORACEVR >60 >60 >60 >60  --  >60 >60 >60  --    GLUCOSE 139* 132* 137* 138*  --  142* 132* 128*  --    BILITOT 0.4 0.4 0.3 0.3  --  0.3 0.3 0.3  --    ALKPHOS 120 222* 219* 232*  --  276* 271* 284*  --    ALT 22 30 45 54  --  79* 93* 116*  --    AST 33 56* 78* 82*  --  114* 122* 136*  --    ALBUMIN 1.6* 1.6* 1.5* 1.5*  --  1.7* 1.6* 1.8*  --    WBC 18.16* 16.79* 13.70* 14.04*  --  12.27* 12.60*  --  11.43   HGB 8.7* 8.8* 8.1* 8.5* 9.4* 9.6* 8.9*  --  8.7*   HCT 28.1* 28.1* 25.8* 26.6* 28.9* 30.3* 26.7*  --  27.3*     Nutrition Orders:  Diet NPO  Tube Feedings/Formulas 60; 1,200; Peptamen AF; OG; Banatrol TF; 100; Every 2 hours    Appetite/Oral Intake: NPO/not applicable  Factors Affecting Nutritional Intake: on mechanical  "ventilation  Social Needs Impacting Access to Food: unable to assess at this time; will attempt on follow-up  Food/Judaism/Cultural Preferences: unable to obtain  Food Allergies: unable to obtain  Last Bowel Movement: 08/22/24  Wound(s):     Wound 08/14/24 0745 Blister(s) Right proximal;posterior Arm #1-Tissue loss description: Partial thickness       Wound 08/15/24 0800 Skin Tear midline Sacral spine-Tissue loss description: Not applicable    Comments    8/8/24: Intubated. Currently off sedation. On vasopressor support. MAP > 65. OG in place. No nutrition support ordered at this time. Plan for angiogram later today noted. No family at bedside to gather information on nutrition/weight history. Per chart review/past records, ~10 lb weight gain over the last 9-10 months.    8/9/24: Consulted for TF recommendations. Remains intubated without continuous sedation. Weaning vasopressor support. OG in place, trickle feeds ordered - discussed with nursing staff - will adjust goal rate to meet estimated needs.    8/13/24: TF continues, tolerated per RN.     8/14/24: TF continues. Noted increase in Na. Will increase FWF.     8/15/24: TF continues, tolerated per RN. Na and Cl WNL. Will monitor for need for decreasing FWF if Na or Cl becomes low.     8/19/24: TF continues, tolerated per RN. No kcal from meds.     8/22/24: TF continues, tolerated per RN.     Anthropometrics    Height: 5' 2" (157.5 cm),    Last Weight: 65 kg (143 lb 4.8 oz) (08/07/24 2200), Weight Method: Bed Scale     BMI Classification: overweight (BMI 25-29.9)     Ideal Body Weight (IBW), Female: 110 lb                                   Usual Weight Provided By: EMR weight history    Wt Readings from Last 5 Encounters:   08/07/24 65 kg (143 lb 4.8 oz)   10/23/23 60.7 kg (133 lb 12.8 oz)   04/10/23 59.4 kg (131 lb)   10/10/22 60.8 kg (134 lb)   08/02/22 60.8 kg (134 lb)     Weight Change(s) Since Admission:   Wt Readings from Last 1 Encounters:   08/07/24 2200 " 65 kg (143 lb 4.8 oz)   Admit Weight: 65 kg (143 lb 4.8 oz) (08/07/24 2200), Weight Method: Bed Scale    Estimated Needs    Weight Used For Calorie Calculations: 65 kg (143 lb 4.8 oz)  Energy Calorie Requirements (kcal): 1468 kcal/day  Energy Need Method: [facility] (modified)  Weight Used For Protein Calculations: 65 kg (143 lb 4.8 oz)  Protein Requirements: 78-98 g (1.2-1.5 g/kg)  Fluid Requirements (mL): 1468 mL (1 mL/kcal)  CHO Requirement: 165 g (45% of estimated needs)   Calculated 8/8/24: Tmax 36.7C, Ve 10.2    Enteral Nutrition     Formula: Peptamen AF  Rate/Volume: 60 mL/hr  Water Flushes: 100 mL q2h  Additives/Modulars: none at this time  Route: orogastric tube  Method: continuous  Total Nutrition Provided by Tube Feeding, Additives, and Flushes:  Calories Provided  1440 kcal/d, 98% needs   Protein Provided  90 g/d, 100% needs   Fluid Provided  972 ml/d, 63% needs   Continuous feeding calculations based on estimated 20 hr/d run time unless otherwise stated.    Parenteral Nutrition     Patient not receiving parenteral nutrition support at this time.    Evaluation of Received Nutrient Intake    Calories: meeting estimated needs  Protein: meeting estimated needs    Patient Education     Not applicable.    Nutrition Diagnosis     PES: Inadequate oral intake related to inability to consume sufficient nutrients as evidenced by intubated since admit. (active)     Nutrition Interventions     Intervention(s): collaboration with other providers    Goal: Meet greater than 80% of nutritional needs by follow-up. (goal progressing)  Goal: Tolerate enteral feeding at goal rate by follow-up. (goal progressing)    Nutrition Goals & Monitoring     Dietitian will monitor: energy intake, enteral nutrition intake, weight, electrolyte/renal panel, glucose/endocrine profile, and gastrointestinal profile  Discharge planning: too early to determine; pending clinical course  Nutrition Risk/Follow-Up: high (follow-up in 1-4 days)    Please consult if re-assessment needed sooner.

## 2024-08-23 PROBLEM — Z51.5 COMFORT MEASURES ONLY STATUS: Status: ACTIVE | Noted: 2024-08-23

## 2024-08-23 LAB
ALBUMIN SERPL-MCNC: 1.8 G/DL (ref 3.4–4.8)
ALBUMIN/GLOB SERPL: 0.5 RATIO (ref 1.1–2)
ALP SERPL-CCNC: 241 UNIT/L (ref 40–150)
ALT SERPL-CCNC: 110 UNIT/L (ref 0–55)
ANION GAP SERPL CALC-SCNC: 10 MEQ/L
AST SERPL-CCNC: 114 UNIT/L (ref 5–34)
BASOPHILS # BLD AUTO: 0.05 X10(3)/MCL
BASOPHILS NFR BLD AUTO: 0.5 %
BILIRUB SERPL-MCNC: 0.3 MG/DL
BUN SERPL-MCNC: 25.1 MG/DL (ref 9.8–20.1)
CALCIUM SERPL-MCNC: 8.1 MG/DL (ref 8.4–10.2)
CHLORIDE SERPL-SCNC: 100 MMOL/L (ref 98–107)
CO2 SERPL-SCNC: 23 MMOL/L (ref 23–31)
CREAT SERPL-MCNC: 0.59 MG/DL (ref 0.55–1.02)
CREAT/UREA NIT SERPL: 43
EOSINOPHIL # BLD AUTO: 0.3 X10(3)/MCL (ref 0–0.9)
EOSINOPHIL NFR BLD AUTO: 2.8 %
ERYTHROCYTE [DISTWIDTH] IN BLOOD BY AUTOMATED COUNT: 15.1 % (ref 11.5–17)
GFR SERPLBLD CREATININE-BSD FMLA CKD-EPI: >60 ML/MIN/1.73/M2
GLOBULIN SER-MCNC: 3.6 GM/DL (ref 2.4–3.5)
GLUCOSE SERPL-MCNC: 124 MG/DL (ref 82–115)
HCT VFR BLD AUTO: 25.5 % (ref 37–47)
HGB BLD-MCNC: 8.3 G/DL (ref 12–16)
IMM GRANULOCYTES # BLD AUTO: 0.1 X10(3)/MCL (ref 0–0.04)
IMM GRANULOCYTES NFR BLD AUTO: 0.9 %
LYMPHOCYTES # BLD AUTO: 1.71 X10(3)/MCL (ref 0.6–4.6)
LYMPHOCYTES NFR BLD AUTO: 16.2 %
MAGNESIUM SERPL-MCNC: 2.2 MG/DL (ref 1.6–2.6)
MCH RBC QN AUTO: 28.4 PG (ref 27–31)
MCHC RBC AUTO-ENTMCNC: 32.5 G/DL (ref 33–36)
MCV RBC AUTO: 87.3 FL (ref 80–94)
MONOCYTES # BLD AUTO: 1.09 X10(3)/MCL (ref 0.1–1.3)
MONOCYTES NFR BLD AUTO: 10.3 %
NEUTROPHILS # BLD AUTO: 7.3 X10(3)/MCL (ref 2.1–9.2)
NEUTROPHILS NFR BLD AUTO: 69.3 %
NRBC BLD AUTO-RTO: 0 %
PHOSPHATE SERPL-MCNC: 3.2 MG/DL (ref 2.3–4.7)
PLATELET # BLD AUTO: 525 X10(3)/MCL (ref 130–400)
PMV BLD AUTO: 10.7 FL (ref 7.4–10.4)
POTASSIUM SERPL-SCNC: 4.6 MMOL/L (ref 3.5–5.1)
PROT SERPL-MCNC: 5.4 GM/DL (ref 5.8–7.6)
RBC # BLD AUTO: 2.92 X10(6)/MCL (ref 4.2–5.4)
SODIUM SERPL-SCNC: 133 MMOL/L (ref 136–145)
WBC # BLD AUTO: 10.55 X10(3)/MCL (ref 4.5–11.5)

## 2024-08-23 PROCEDURE — 99900031 HC PATIENT EDUCATION (STAT)

## 2024-08-23 PROCEDURE — 99900035 HC TECH TIME PER 15 MIN (STAT)

## 2024-08-23 PROCEDURE — 83735 ASSAY OF MAGNESIUM: CPT

## 2024-08-23 PROCEDURE — 25000003 PHARM REV CODE 250: Performed by: INTERNAL MEDICINE

## 2024-08-23 PROCEDURE — 99900026 HC AIRWAY MAINTENANCE (STAT)

## 2024-08-23 PROCEDURE — 36415 COLL VENOUS BLD VENIPUNCTURE: CPT

## 2024-08-23 PROCEDURE — 94761 N-INVAS EAR/PLS OXIMETRY MLT: CPT

## 2024-08-23 PROCEDURE — 25000003 PHARM REV CODE 250: Performed by: NURSE PRACTITIONER

## 2024-08-23 PROCEDURE — 99233 SBSQ HOSP IP/OBS HIGH 50: CPT | Mod: ,,, | Performed by: NURSE PRACTITIONER

## 2024-08-23 PROCEDURE — 27100171 HC OXYGEN HIGH FLOW UP TO 24 HOURS

## 2024-08-23 PROCEDURE — 25000003 PHARM REV CODE 250

## 2024-08-23 PROCEDURE — 85025 COMPLETE CBC W/AUTO DIFF WBC: CPT

## 2024-08-23 PROCEDURE — 27200966 HC CLOSED SUCTION SYSTEM

## 2024-08-23 PROCEDURE — 80053 COMPREHEN METABOLIC PANEL: CPT

## 2024-08-23 PROCEDURE — 84100 ASSAY OF PHOSPHORUS: CPT

## 2024-08-23 PROCEDURE — 63600175 PHARM REV CODE 636 W HCPCS: Performed by: NURSE PRACTITIONER

## 2024-08-23 PROCEDURE — 63600175 PHARM REV CODE 636 W HCPCS: Performed by: INTERNAL MEDICINE

## 2024-08-23 PROCEDURE — 94003 VENT MGMT INPAT SUBQ DAY: CPT

## 2024-08-23 PROCEDURE — 94760 N-INVAS EAR/PLS OXIMETRY 1: CPT

## 2024-08-23 PROCEDURE — 11000001 HC ACUTE MED/SURG PRIVATE ROOM

## 2024-08-23 RX ORDER — MIDAZOLAM HYDROCHLORIDE 2 MG/2ML
2 INJECTION, SOLUTION INTRAMUSCULAR; INTRAVENOUS EVERY 4 HOURS PRN
Status: DISCONTINUED | OUTPATIENT
Start: 2024-08-23 | End: 2024-08-24 | Stop reason: HOSPADM

## 2024-08-23 RX ORDER — MORPHINE SULFATE 10 MG/ML
10 INJECTION INTRAMUSCULAR; INTRAVENOUS; SUBCUTANEOUS ONCE AS NEEDED
Status: COMPLETED | OUTPATIENT
Start: 2024-08-23 | End: 2024-08-23

## 2024-08-23 RX ORDER — MORPHINE SULFATE 4 MG/ML
4 INJECTION, SOLUTION INTRAMUSCULAR; INTRAVENOUS
Status: DISCONTINUED | OUTPATIENT
Start: 2024-08-23 | End: 2024-08-24 | Stop reason: HOSPADM

## 2024-08-23 RX ORDER — GLYCOPYRROLATE 0.2 MG/ML
0.2 INJECTION INTRAMUSCULAR; INTRAVENOUS EVERY 4 HOURS PRN
Status: DISCONTINUED | OUTPATIENT
Start: 2024-08-23 | End: 2024-08-24 | Stop reason: HOSPADM

## 2024-08-23 RX ADMIN — MORPHINE SULFATE 4 MG: 4 INJECTION, SOLUTION INTRAMUSCULAR; INTRAVENOUS at 02:08

## 2024-08-23 RX ADMIN — PROPRANOLOL HYDROCHLORIDE 20 MG: 10 TABLET ORAL at 08:08

## 2024-08-23 RX ADMIN — ATORVASTATIN CALCIUM 40 MG: 40 TABLET, FILM COATED ORAL at 08:08

## 2024-08-23 RX ADMIN — MIDAZOLAM HYDROCHLORIDE 2 MG: 1 INJECTION, SOLUTION INTRAMUSCULAR; INTRAVENOUS at 09:08

## 2024-08-23 RX ADMIN — LEVETIRACETAM 500 MG: 500 SOLUTION ORAL at 08:08

## 2024-08-23 RX ADMIN — ASPIRIN 81 MG CHEWABLE TABLET 81 MG: 81 TABLET CHEWABLE at 08:08

## 2024-08-23 RX ADMIN — MORPHINE SULFATE 10 MG: 10 INJECTION INTRAVENOUS at 12:08

## 2024-08-23 RX ADMIN — HEPARIN SODIUM 5000 UNITS: 5000 INJECTION, SOLUTION INTRAVENOUS; SUBCUTANEOUS at 05:08

## 2024-08-23 RX ADMIN — MIDAZOLAM HYDROCHLORIDE 2 MG: 1 INJECTION, SOLUTION INTRAMUSCULAR; INTRAVENOUS at 12:08

## 2024-08-23 RX ADMIN — MORPHINE SULFATE 4 MG: 4 INJECTION, SOLUTION INTRAMUSCULAR; INTRAVENOUS at 03:08

## 2024-08-23 RX ADMIN — GLYCOPYRROLATE 0.2 MG: 0.2 INJECTION INTRAMUSCULAR; INTRAVENOUS at 12:08

## 2024-08-23 RX ADMIN — ACETAMINOPHEN 650 MG: 650 SOLUTION ORAL at 08:08

## 2024-08-23 NOTE — NURSING
Nurses Note -- 4 Eyes      8/23/2024   4:57 AM      Skin assessed during: Daily Assessment      [] No Altered Skin Integrity Present    [x]Prevention Measures Documented      [x] Yes- Altered Skin Integrity Present or Discovered   [] LDA Added if Not in Epic (Describe Wound)   [] New Altered Skin Integrity was Present on Admit and Documented in LDA   [] Wound Image Taken    Wound Care Consulted? Yes    Attending Nurse:  Deyanira Rodriguez RN/Staff Member:  Roberto Rodriguez RN

## 2024-08-23 NOTE — CONSULTS
Consults    Patient Name: Shannan Reza   MRN: [id number]   Admission Date: 8/7/2024   Hospital Length of Stay: 16   Attending Provider: Vladimir Acevedo MD   Consulting Provider: Nohelia YARBROUGH  Reason for Consult: Goals of Care  Primary Care Physician:  Kiana Marie MD     Principal Problem: <principal problem not specified>       Final diagnoses:  [I63.9] CVA (cerebral vascular accident)      Assessment/Plan:     I reviewed the patient and family's understanding of the seriousness of the illness and its expected prognosis. We discussed the patient's goals of care and treatment preferences.        Advance Care Planning     Date: 08/23/2024    [facility]  I engaged the family in a voluntary conversation about advance care planning and we specifically addressed what the goals of care would be moving forward, in light of the patient's change in clinical status, specifically current condition.  We did specifically address the patient's likely prognosis, which is poor.  We explored the patient's values and preferences for future care.  The family endorses that what is most important right now is to focus on comfort and QOL     Accordingly, we have decided that the best plan to meet the patient's goals includes pivot to comfort-focused care    Patient's son at bedside who states that he is ready for withdrawal of life support and to seek comfort measures.  Discussed protocol to which he verbalized understanding.  Discussed outpatient grief support with son as well.  Offered support and encouraged to call for any questions or concerns    Recommendations:     Comfort measures       Interval History:     Patient remains intubated been off sedation for several days with minimal neurological response.  Palliative Medicine continuing to follow for assistance with goals of care.      Active Ambulatory Problems     Diagnosis Date Noted    Cerebrovascular accident (CVA) due to thrombosis of right middle cerebral artery  06/26/2018    Carotid-cavernous fistula     Hemiparesis affecting left side as late effect of cerebrovascular accident 06/27/2018    Essential hypertension 06/27/2018    Anterior communicating artery aneurysm 06/28/2018    Macrocytic anemia 04/26/2019    Hx of tobacco use, presenting hazards to health 12/23/2020    Closed fracture of left proximal humerus 06/02/2021    Chronic left shoulder pain 06/02/2021     Resolved Ambulatory Problems     Diagnosis Date Noted    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     No Additional Past Medical History        Past Surgical History:   Procedure Laterality Date    AUGMENTATION OF BREAST      BRAIN SURGERY          Review of patient's allergies indicates:   Allergen Reactions    Percodan [oxycodone hcl-oxycodone-asa] Itching          Current Facility-Administered Medications:     acetaminophen oral solution 650 mg, 650 mg, Oral, Q4H PRN, Gary Santa DO, 650 mg at 08/23/24 0849    aspirin chewable tablet 81 mg, 81 mg, Per NG tube, Daily, Shantell Vail NP, 81 mg at 08/23/24 0848    atorvastatin tablet 40 mg, 40 mg, Per NG tube, Daily, Ari Gardner MD, 40 mg at 08/23/24 0848    bisacodyL suppository 10 mg, 10 mg, Rectal, Daily PRN, Raymond Vasquez MD    dextrose 10% bolus 125 mL 125 mL, 12.5 g, Intravenous, PRN, Carol Rehman MD    dextrose 10% bolus 250 mL 250 mL, 25 g, Intravenous, PRN, Carol Rehman MD    docusate 50 mg/5 mL liquid 100 mg, 100 mg, Per NG tube, Daily, Ari Gardner MD, 100 mg at 08/22/24 0844    fentaNYL 2500 mcg in 0.9% sodium chloride 250 mL infusion premix (titrating), 0-250 mcg/hr, Intravenous, Continuous, Vladimir Acevedo MD, Stopped at 08/18/24 0440    glycopyrrolate injection 0.2 mg, 0.2 mg, Intravenous, Q4H PRN, Nohelia Chi FNP, 0.2 mg at 08/23/24 1251    heparin (porcine) injection 5,000 Units, 5,000 Units, Subcutaneous, Q8H, Ari Gardner MD, 5,000 Units at 08/23/24 0507    hydrALAZINE injection 10 mg, 10 mg,  "Intravenous, Q6H PRN, Tyson Garcia DO    labetaloL injection 10 mg, 10 mg, Intravenous, Q6H PRN, Tyson Garcia DO, 10 mg at 08/19/24 1823    levetiracetam 500 mg/5 mL (5 mL) liquid Soln 500 mg, 500 mg, Per NG tube, BID, Ari Gardner MD, 500 mg at 08/23/24 0848    midazolam (PF) (VERSED) 1 mg/mL injection 2 mg, 2 mg, Intravenous, Q4H PRN, Nohelia Chi FNP, 2 mg at 08/23/24 1251    morphine injection 4 mg, 4 mg, Intravenous, Q1H PRN, Nohelia Chi FNP    NORepinephrine 8 mg in dextrose 5% 250 mL infusion, 0-3 mcg/kg/min, Intravenous, Continuous, Vladimir Acevedo MD, Stopped at 08/14/24 0331    polyethylene glycol packet 17 g, 17 g, Per NG tube, BID, Ari Gardner MD, 17 g at 08/22/24 2105    propofol (DIPRIVAN) 10 mg/mL infusion, 0-50 mcg/kg/min, Intravenous, Continuous, Vladimir Acevedo MD, Stopped at 08/19/24 1241    propranoloL tablet 20 mg, 20 mg, Per NG tube, BID, Ari Gardner MD, 20 mg at 08/23/24 0848    sodium chloride 0.9% flush 10 mL, 10 mL, Intravenous, PRN, Carol Rehman MD       Current Facility-Administered Medications:     acetaminophen, 650 mg, Oral, Q4H PRN    bisacodyL, 10 mg, Rectal, Daily PRN    dextrose 10%, 12.5 g, Intravenous, PRN    dextrose 10%, 25 g, Intravenous, PRN    glycopyrrolate, 0.2 mg, Intravenous, Q4H PRN    hydrALAZINE, 10 mg, Intravenous, Q6H PRN    labetalol, 10 mg, Intravenous, Q6H PRN    midazolam, 2 mg, Intravenous, Q4H PRN    morphine, 4 mg, Intravenous, Q1H PRN    sodium chloride 0.9%, 10 mL, Intravenous, PRN     Family History   Problem Relation Name Age of Onset    Deep vein thrombosis Father      Melanoma Neg Hx            Review of Systems   Unable to perform ROS: Intubated            Objective:   BP (!) 113/54 (Patient Position: Lying)   Pulse 83   Temp 98 °F (36.7 °C) (Oral)   Resp (!) 21   Ht 5' 2" (1.575 m)   Wt 65 kg (143 lb 4.8 oz)   LMP  (LMP Unknown)   SpO2 (!) 86% Comment: Withdrawal of care  BMI 26.21 kg/m²  "     Physical Exam   Constitutional: She appears ill.   HENT:   Head: Normocephalic.   Eyes: Pupils are equal, round, and reactive to light.   Cardiovascular: An irregular rhythm present. Pulmonary:      Breath sounds: Rhonchi present.     Abdominal: Soft.   Neurological:   obtunded   Skin: There is pallor.            Review of Symptoms      Symptom Assessment (ESAS 0-10 Scale)  Pain:  0  Dyspnea:  0  Anxiety:  0  Nausea:  0  Depression:  0  Anorexia:  0  Fatigue:  0  Insomnia:  0  Restlessness:  0  Agitation:  0         Bowel Management Plan (BMP):  Yes      Psychosocial/Cultural:   See Palliative Psychosocial Note: Yes  **Primary  to Follow**  Palliative Care  Consult: No      Advance Care Planning   Advance Care Planning        PAINAD: NA    Caregiver burden formerly assessed: Yes      No results displayed because visit has over 200 results.               > 50% of 40 min of encounter was spent in chart review, face to face discussion of goals of care, symptom assessment, coordination of care and emotional support.    Nohelia PENAP, [facility]  Palliative Medicine  [facility]

## 2024-08-23 NOTE — PROGRESS NOTES
[facility] - [facility] North ICU  Pulmonary Critical Care Note    Patient Name: Shannan Reza  MRN: [id number]  Admission Date: 8/7/2024  Hospital Length of Stay: 16 days  Code Status: DNR  Attending Provider: Vladimir Acevedo MD  Primary Care Provider: Kiana Marie MD     Subjective:     HPI:   This is a 73-year-old female with past medical history of hypertension and to strokes in past presenting from outside facility for suspected CVA and seizure-like activity.  Family member present at bedside and states that patient was in normal state of health until yesterday evening.  States she started to feel weak and lethargic and eventually went unresponsive.  Had presentation like this previously when she had stroke.  CT head at outside facility did not show any acute infarct.  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 She was intubated for airway protection and loaded with Keppra.  She was transferred to [facility] for further care.  Repeat imaging was ordered.  Patient continues to be intubated and sedated on propofol. Requiring increased amounts of Levophed and started on vasopressin and stress dose steroids.  Admitted to ICU for close monitoring.    24 Hour Interval History:  NAEON. Afebrile and VSS. CBC essentially unchanged with increased thrombocytosis noted now at 525 K. CMP essentially unchanged from prior. Saturating well on current vent settings 20/480/8/30%    Past Medical History:   Diagnosis Date    Carotid-cavernous fistula     Cerebrovascular accident (CVA) due to thrombosis of right middle cerebral artery 6/26/2018    Essential hypertension 6/27/2018    Hemiparesis affecting left side as late effect of cerebrovascular accident     Hx of tobacco use, presenting hazards to health        Past Surgical History:   Procedure Laterality Date    AUGMENTATION OF BREAST      BRAIN SURGERY         Social History     Socioeconomic History     Marital status: Unknown   Tobacco Use    Smoking status: Former     Current packs/day: 0.00     Average packs/day: 0.5 packs/day for 25.0 years (12.5 ttl pk-yrs)     Types: Cigarettes     Start date: 1993     Quit date: 2018     Years since quittin.1    Smokeless tobacco: Former   Substance and Sexual Activity    Alcohol use: Not Currently    Drug use: No    Sexual activity: Never   Social History Narrative    ** Merged History Encounter **          Social Determinants of Health     Financial Resource Strain: Patient Declined (2024)    Overall Financial Resource Strain (CARDIA)     Difficulty of Paying Living Expenses: Patient declined   Food Insecurity: Unknown (2024)    Hunger Vital Sign     Worried About Running Out of Food in the Last Year: Patient declined     Ran Out of Food in the Last Year: Never true   Transportation Needs: Patient Declined (2024)    TRANSPORTATION NEEDS     Transportation : Patient declined   Housing Stability: Patient Declined (2024)    Housing Stability Vital Sign     Unable to Pay for Housing in the Last Year: Patient declined     Homeless in the Last Year: Patient declined           Current Outpatient Medications   Medication Instructions    amLODIPine (NORVASC) 5 mg, Oral, Daily    aspirin (ECOTRIN) 81 mg, Oral, Daily    folic acid (FOLVITE) 1 mg, Oral, Daily    hydrOXYzine HCL (ATARAX) 25 MG tablet TAKE 1 TABLET BY MOUTH THREE TIMES DAILY AS NEEDED FOR ITCHING OR ANXIETY    multivitamin Tab 1 tablet, Oral, Daily    rosuvastatin (CRESTOR) 40 mg, Oral, Nightly       Current Inpatient Medications   aspirin  81 mg Per NG tube Daily    atorvastatin  40 mg Per NG tube Daily    docusate  100 mg Per NG tube Daily    heparin (porcine)  5,000 Units Subcutaneous Q8H    levetiracetam  500 mg Per NG tube BID    polyethylene glycol  17 g Per NG tube BID    propranoloL  20 mg Per NG tube BID       Current Intravenous Infusions   fentanyl  0-250 mcg/hr Intravenous  Continuous   Stopped at 08/18/24 0440    NORepinephrine bitartrate-D5W  0-3 mcg/kg/min Intravenous Continuous   Stopped at 08/14/24 0331    propofoL  0-50 mcg/kg/min Intravenous Continuous   Stopped at 08/19/24 1241         Review of Systems   Unable to perform ROS: Intubated          Objective:       Intake/Output Summary (Last 24 hours) at 8/23/2024 0355  Last data filed at 8/23/2024 0200  Gross per 24 hour   Intake 2190 ml   Output 4305 ml   Net -2115 ml         Vital Signs (Most Recent):  Temp: 99.6 °F (37.6 °C) (08/23/24 0000)  Pulse: 103 (08/23/24 0300)  Resp: (!) 25 (08/23/24 0300)  BP: 128/63 (08/23/24 0300)  SpO2: 99 % (08/23/24 0300)  Body mass index is 26.21 kg/m².  Weight: 65 kg (143 lb 4.8 oz) Vital Signs (24h Range):  Temp:  [98.9 °F (37.2 °C)-100 °F (37.8 °C)] 99.6 °F (37.6 °C)  Pulse:  [] 103  Resp:  [19-29] 25  SpO2:  [96 %-100 %] 99 %  BP: (108-150)/(57-87) 128/63     Physical Exam  Vitals reviewed.   Constitutional:       Appearance: She is ill-appearing.   HENT:      Head: Normocephalic.   Eyes:      Comments: L pupil 2mm, R pupil 3mm. L slightly reactive. R pupil fixed.   Cardiovascular:      Rate and Rhythm: Normal rate and regular rhythm.      Heart sounds: No murmur heard.  Pulmonary:      Breath sounds: Normal breath sounds.      Comments: Intubated and mechanically ventilated.   No dyssynchrony to mechanical ventilation.   Abdominal:      Palpations: Abdomen is soft.   Neurological:      Comments: Withdraws to pain LUE and LLE. No withdrawal on right side.            Lines/Drains/Airways       Drain  Duration                  NG/OG Tube 08/06/24 0000 Center mouth 17 days         Urethral Catheter 08/20/24 1200 2 days         Rectal Tube 08/22/24 0230 fecal management system 1 day              Airway  Duration                  Airway - Non-Surgical 08/07/24 2147 Endotracheal Tube 15 days              Peripheral Intravenous Line  Duration                  Peripheral IV - Single Lumen  08/13/24 0300 18 G Anterior;Left Upper Arm 10 days         Peripheral IV - Single Lumen 08/19/24 1000 20 G Anterior;Right Forearm 3 days                    Significant Labs:    Lab Results   Component Value Date    WBC 10.55 08/23/2024    HGB 8.3 (L) 08/23/2024    HCT 25.5 (L) 08/23/2024    MCV 87.3 08/23/2024     (H) 08/23/2024           BMP  Lab Results   Component Value Date     (L) 08/23/2024    K 4.6 08/23/2024    CO2 23 08/23/2024    BUN 25.1 (H) 08/23/2024    CREATININE 0.59 08/23/2024    CALCIUM 8.1 (L) 08/23/2024    AGAP 10.0 08/23/2024    ESTGFRAFRICA >60.0 07/07/2022    EGFRNONAA >60.0 07/07/2022         ABG  Recent Labs   Lab 08/20/24 0622   PH 7.490*   PO2 78.0*   PCO2 39.0   HCO3 29.7*   POCBASEDEF 5.90*       Mechanical Ventilation Support:  Vent Mode: VOLUME A/C (08/23/24 0232)  Ventilator Initiated: Yes (08/07/24 2147)  Set Rate: 20 BPM (08/23/24 0232)  Vt Set: 450 mL (08/23/24 0232)  PEEP/CPAP: 8 cmH20 (08/23/24 0232)  Oxygen Concentration (%): 30 (08/23/24 0232)  Peak Airway Pressure: 26 cmH20 (08/23/24 0232)  Total Ve: 10.2 L/m (08/23/24 0232)  F/VT Ratio<105 (RSBI): (!) 54.12 (08/23/24 0232)      Significant Imaging:  I have reviewed the pertinent imaging within the past 24 hours.    Microbiology Results (last 7 days)       ** No results found for the last 168 hours. **             Assessment/Plan:     Assessment  Right EMANUEL stroke  Hospital-acquired pneumonia  ARDS  Newly Diagnosed Acute Systolic HF w EF 20-25%   Hx HTN, CVA with residual left sided weakness       Plan  Continue ICU level of care   Wean mechanical ventilation per ARDS net protocol  Propofol stopped on 8/19 @ 1241. Continues to lack any purposeful neurological recovery  Maintain MAP>65, no longer on pressors   Continue Keppra 500 mg b.i.d.  Continue aspirin, statin  Completed Rocephin for E coli PNA   BC x 2 NGTD   Monitor fluid status, diurese as needed   Palliative care following. Patient currently DNR. Will  continue goals of care discussions with son.     34 minutes of critical care was time spent personally by me on the following activities: development of treatment plan with patient or surrogate and bedside caregivers, discussions with consultants, evaluation of patient's response to treatment, examination of patient, ordering and performing treatments and interventions, ordering and review of laboratory studies, ordering and review of radiographic studies, pulse oximetry, re-evaluation of patient's condition.  This critical care time did not overlap with that of any other provider or involve time for any procedures.       Saturnino Bridges MD  Pulmonary Critical Care Medicine  [facility] - [facility] ICU  DOS: 08/23/2024

## 2024-08-24 VITALS
RESPIRATION RATE: 36 BRPM | HEIGHT: 62 IN | TEMPERATURE: 98 F | OXYGEN SATURATION: 79 % | WEIGHT: 143.31 LBS | SYSTOLIC BLOOD PRESSURE: 113 MMHG | HEART RATE: 117 BPM | DIASTOLIC BLOOD PRESSURE: 54 MMHG | BODY MASS INDEX: 26.37 KG/M2

## 2024-08-24 PROCEDURE — 63600175 PHARM REV CODE 636 W HCPCS: Performed by: NURSE PRACTITIONER

## 2024-08-24 RX ADMIN — MIDAZOLAM HYDROCHLORIDE 2 MG: 1 INJECTION, SOLUTION INTRAMUSCULAR; INTRAVENOUS at 07:08

## 2024-08-24 RX ADMIN — MORPHINE SULFATE 4 MG: 4 INJECTION, SOLUTION INTRAMUSCULAR; INTRAVENOUS at 08:08

## 2024-08-24 RX ADMIN — MORPHINE SULFATE 4 MG: 4 INJECTION, SOLUTION INTRAMUSCULAR; INTRAVENOUS at 03:08

## 2024-08-24 RX ADMIN — MIDAZOLAM HYDROCHLORIDE 2 MG: 1 INJECTION, SOLUTION INTRAMUSCULAR; INTRAVENOUS at 02:08

## 2024-08-24 RX ADMIN — MIDAZOLAM HYDROCHLORIDE 2 MG: 1 INJECTION, SOLUTION INTRAMUSCULAR; INTRAVENOUS at 04:08

## 2024-08-24 RX ADMIN — MORPHINE SULFATE 4 MG: 4 INJECTION, SOLUTION INTRAMUSCULAR; INTRAVENOUS at 01:08

## 2024-08-24 RX ADMIN — MORPHINE SULFATE 4 MG: 4 INJECTION, SOLUTION INTRAMUSCULAR; INTRAVENOUS at 07:08

## 2024-08-24 NOTE — DISCHARGE SUMMARY
[facility] Hospital Medicine Discharge Summary    Admit Date: 8/7/2024  Discharge Date and Time: 8/24/20249:38 AM  Admitting Physician:  Team  Discharging Physician: Joann Juarez MD.  Primary Care Physician: Kiana Marie MD  Consults: {consultation: [id number]}    Discharge Diagnoses:  ***    Hospital Course:   ***  Pt was seen and examined on the day of discharge  Vitals:  VITAL SIGNS: 24 HRS MIN & MAX LAST   Temp  Min: 98 °F (36.7 °C)  Max: 98 °F (36.7 °C) 98 °F (36.7 °C)   BP  Min: 106/53  Max: 116/58 (!) 113/54   Pulse  Min: 81  Max: 108  108   Resp  Min: 14  Max: 30 18   SpO2  Min: 79 %  Max: 98 % (!) 88 %       Physical Exam:  ***    Procedures Performed: No admission procedures for hospital encounter.     Significant Diagnostic Studies: See Full reports for all details    Recent Labs   Lab 08/21/24  0200 08/22/24  0320 08/23/24  0231   WBC 12.60* 11.43 10.55   RBC 3.07* 3.05* 2.92*   HGB 8.9* 8.7* 8.3*   HCT 26.7* 27.3* 25.5*   MCV 87.0 89.5 87.3   MCH 29.0 28.5 28.4   MCHC 33.3 31.9* 32.5*   RDW 15.2 15.3 15.1   * 443* 525*   MPV 10.6* 10.9* 10.7*       Recent Labs   Lab 08/18/24  0255 08/18/24  0308 08/19/24  0525 08/20/24  0242 08/20/24  0622 08/21/24  0200 08/22/24  0319 08/23/24  0231   NA  --    < >  --    < >  --  137 134* 133*   K  --    < >  --    < >  --  4.1 4.2 4.6   CL  --    < >  --    < >  --  103 101 100   CO2  --    < >  --    < >  --  23 22* 23   BUN  --    < >  --    < >  --  27.6* 29.6* 25.1*   CREATININE  --    < >  --    < >  --  0.61 0.65 0.59   CALCIUM  --    < >  --    < >  --  7.9* 8.0* 8.1*   PH 7.440  --  7.480*  --  7.490*  --   --   --    MG  --    < >  --    < >  --  2.20 2.30 2.20   ALBUMIN  --    < >  --    < >  --  1.6* 1.8* 1.8*   ALKPHOS  --    < >  --    < >  --  271* 284* 241*   ALT  --    < >  --    < >  --  93* 116* 110*   AST  --    < >  --    < >  --  122* 136* 114*   BILITOT  --    < >  --    < >  --  0.3 0.3 0.3    < > =  values in this interval not displayed.        Microbiology Results (last 7 days)       ** No results found for the last 168 hours. **             X-Ray Chest 1 View  Narrative: EXAMINATION:  XR CHEST 1 VIEW    CPT 71201    CLINICAL HISTORY:  intubated;    COMPARISON:  August 16, 2024    FINDINGS:  Examination reveals cardiomediastinal silhouette to be unchanged as compared with the previous exam.    Persistent increase interstitial markings and confluent opacities more extensively seen on the right lung the also identified in the left upper lobe there has been improved aeration throughout the left perihilar region and left base with better definition of the left hemidiaphragm.    Some persistent blunting of the costophrenic angles but improved on the right as compared with the previous exam of August 16, 2024.    Support catheters remain in place  Impression: Persistent increase interstitial markings in confluent opacities more so on the right than on the left with slightly more confluent opacities in the left upper lobe infiltrate cannot be excluded.    Improved aeration in the left perihilar region and left base with better definition of the left hemidiaphragm.    Bilateral pleural effusions improved as compared with previous exam    Electronically signed by: Cameron Woodard  Date:    08/21/2024  Time:    10:09         Medication List        STOP taking these medications      amLODIPine 5 MG tablet  Commonly known as: NORVASC     aspirin 81 MG EC tablet  Commonly known as: ECOTRIN     folic acid 1 MG tablet  Commonly known as: FOLVITE     hydrOXYzine HCL 25 MG tablet  Commonly known as: ATARAX     multivitamin Tab     rosuvastatin 40 MG Tab  Commonly known as: CRESTOR               Explained in detail to the patient about the discharge plan, medications, and follow-up visits. Pt understands and agrees with the treatment plan  Discharge Disposition: Home or Self Care   Discharged Condition: stable  Diet-    Dietary Orders (From admission, onward)       Start     Ordered    08/08/24 1537  Diet NPO  Diet effective now        Comments: No eating, drinking, or oral medications until patient passes the [place] or evaluated by Speech.    08/08/24 1611                   Medications Per DC med rec  Activities as tolerated   Follow-up Information       Follow up with inpatient hospice Follow up.                           For further questions contact hospitalist office    Discharge time 33 minutes    For worsening symptoms, chest pain, shortness of breath, increased abdominal pain, high grade fever, stroke or stroke like symptoms, immediately go to the nearest Emergency Room or call 911 as soon as possible.      Joann Barnes M.D, on 8/24/2024. at 9:38 AM.

## 2024-08-24 NOTE — PLAN OF CARE
Received call from Ayesha from [facility]. Patient has been accepted and bed is available today for transfer. Discharge information sent via [facility]. Transport arranged with [facility] and placed in will call.

## 2024-08-24 NOTE — PLAN OF CARE
Received call from Dr Juarez about inpatient hospice. Spoke to son David via phone and he is in agreement. Referral sent to [facility] for inpatient evaluation for [facility] and spoke contacted the answering service. She will notify on call nurse Cecy.

## 2024-08-24 NOTE — H&P
[facility] Hospital Medicine History & Physical Examination       Patient Name: Shannan Reza  MRN: [id number]  Patient Class: IP- Inpatient   Admission Date: 8/7/2024   Admitting Physician: ROSA Service   Length of Stay: 16  Attending Physician: Joann Juarez MD  Primary Care Provider: Kiana Marie MD  Face-to-Face encounter date: 08/23/2024  Chief Complaint: No chief complaint on file.      Screening for Social Drivers for health:  Patient screened for food insecurity, housing instability, transportation needs, utility difficulties, and interpersonal safety (select all that apply as identified as concern)  []Housing or Food  []Transportation Needs  []Utility Difficulties  []Interpersonal safety  [x]None      Patient information was obtained from patient, patient's family, past medical records and ER records.  ED records were reviewed in detail and documented below    HISTORY OF PRESENT ILLNESS:   73-year-old female with significant history of HTN, CVA was initially transferred from outlying facility for suspected CVA/seizure-like activity.  Per family patient was noted to be lethargic and eventually went unresponsive, similar presentation when she had CVA in the past.  Patient was significantly encephalopathic and was therefore intubated for airway protection and was loaded with Keppra in outlying facility and was transferred to our hospital for higher level of care.  Patient was hypotensive, required vasopressors, stress dose steroids.  Initial CT was negative for acute stroke,.  Also had temp spikes concerning for sepsis.  Stroke workup ordered, neurology services consulted.  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  Patient continued to be profoundly encephalopathic,.  Echocardiogram revealed new diagnosis heart failure with ejection fraction 20-25%.   Cardiology evaluated, overall prognosis poor given that there was no meaningful recovery from stroke standpoint .  Low NSS recommendations and recommended palliative care consult, palliative care team was consulted.  Patient also diagnosed with E coli pneumonia leading to sepsis which was treated with antibiotic placement.  Patient unfortunately had no signs of meaningful neurological recovery, poor prognosis was discussed with family by neurologist and also intensivist and family decided withdrawal of care/comfort measures.  Palliative extubation performed and patient was downgraded to hospital medicine services on     PAST MEDICAL HISTORY:     CVA   HTN       PAST SURGICAL HISTORY:     Past Surgical History:   Procedure Laterality Date    AUGMENTATION OF BREAST      BRAIN SURGERY         ALLERGIES:   Percodan [oxycodone hcl-oxycodone-asa]    FAMILY HISTORY:   Reviewed and negative    SOCIAL HISTORY:     Social History     Tobacco Use    Smoking status: Former     Current packs/day: 0.00     Average packs/day: 0.5 packs/day for 25.0 years (12.5 ttl pk-yrs)     Types: Cigarettes     Start date: 1993     Quit date: 2018     Years since quittin.1    Smokeless tobacco: Former   Substance Use Topics    Alcohol use: Not Currently        HOME MEDICATIONS:     Prior to Admission medications    Medication Sig Start Date End Date Taking? Authorizing Provider   amLODIPine (NORVASC) 5 MG tablet Take 1 tablet (5 mg total) by mouth once daily. 23   Kiana Marie MD   aspirin (ECOTRIN) 81 MG EC tablet Take 81 mg by mouth once daily.    Provider, Historical   folic acid (FOLVITE) 1 MG tablet Take 1 tablet (1 mg total) by mouth once daily. 22  Kiana Marie MD   hydrOXYzine HCL (ATARAX) 25 MG tablet TAKE 1 TABLET BY MOUTH THREE TIMES DAILY AS NEEDED FOR ITCHING OR ANXIETY 24   Kiana Marie MD   multivitamin Tab Take 1 tablet by mouth once daily. 22   Zechariah Dia MD    rosuvastatin (CRESTOR) 40 MG Tab Take 1 tablet (40 mg total) by mouth every evening. 12/8/23   Kiana Marie MD   clopidogreL (PLAVIX) 75 mg tablet Take 1 tablet (75 mg total) by mouth once daily. 7/9/22 7/8/22  Zechariah Dia MD       REVIEW OF SYSTEMS:   Except as documented, all other systems reviewed and negative     PHYSICAL EXAM:     VITAL SIGNS: 24 HRS MIN & MAX LAST   Temp  Min: 98 °F (36.7 °C)  Max: 100.6 °F (38.1 °C) 98 °F (36.7 °C)   BP  Min: 104/49  Max: 132/76 (!) 113/54   Pulse  Min: 83  Max: 107  85   Resp  Min: 14  Max: 28 19   SpO2  Min: 83 %  Max: 99 % (!) 83 %     General appearance:  No acute distress  HENT: Atraumatic   Lungs: Clear to auscultation bilaterally.   Heart: RRR,No edema  Abdomen: Soft,   Extremities: warm  Neuro:  Obtunded   Psych/mental status:  Obtunded  LABS AND IMAGING:     Recent Labs   Lab 08/21/24  0200 08/22/24  0320 08/23/24  0231   WBC 12.60* 11.43 10.55   RBC 3.07* 3.05* 2.92*   HGB 8.9* 8.7* 8.3*   HCT 26.7* 27.3* 25.5*   MCV 87.0 89.5 87.3   MCH 29.0 28.5 28.4   MCHC 33.3 31.9* 32.5*   RDW 15.2 15.3 15.1   * 443* 525*   MPV 10.6* 10.9* 10.7*       Recent Labs   Lab 08/18/24  0255 08/18/24  0308 08/19/24  0525 08/20/24  0242 08/20/24  0622 08/21/24  0200 08/22/24  0319 08/23/24  0231   NA  --    < >  --    < >  --  137 134* 133*   K  --    < >  --    < >  --  4.1 4.2 4.6   CL  --    < >  --    < >  --  103 101 100   CO2  --    < >  --    < >  --  23 22* 23   BUN  --    < >  --    < >  --  27.6* 29.6* 25.1*   CREATININE  --    < >  --    < >  --  0.61 0.65 0.59   CALCIUM  --    < >  --    < >  --  7.9* 8.0* 8.1*   PH 7.440  --  7.480*  --  7.490*  --   --   --    MG  --    < >  --    < >  --  2.20 2.30 2.20   ALBUMIN  --    < >  --    < >  --  1.6* 1.8* 1.8*   ALKPHOS  --    < >  --    < >  --  271* 284* 241*   ALT  --    < >  --    < >  --  93* 116* 110*   AST  --    < >  --    < >  --  122* 136* 114*   BILITOT  --    < >  --    < >  --  0.3 0.3 0.3     < > = values in this interval not displayed.       Microbiology Results (last 7 days)       ** No results found for the last 168 hours. **             X-Ray Chest 1 View  Narrative: EXAMINATION:  XR CHEST 1 VIEW    CPT 12000    CLINICAL HISTORY:  intubated;    COMPARISON:  August 16, 2024    FINDINGS:  Examination reveals cardiomediastinal silhouette to be unchanged as compared with the previous exam.    Persistent increase interstitial markings and confluent opacities more extensively seen on the right lung the also identified in the left upper lobe there has been improved aeration throughout the left perihilar region and left base with better definition of the left hemidiaphragm.    Some persistent blunting of the costophrenic angles but improved on the right as compared with the previous exam of August 16, 2024.    Support catheters remain in place  Impression: Persistent increase interstitial markings in confluent opacities more so on the right than on the left with slightly more confluent opacities in the left upper lobe infiltrate cannot be excluded.    Improved aeration in the left perihilar region and left base with better definition of the left hemidiaphragm.    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  Profound encephalopathy requiring intubation, mechanical ventilatory support for airway protection status post palliative extubation 8/23  New diagnosis cardiomyopathy with severely reduced systolic function 20-25%  Hospital-acquired pneumonia secondary to E coli  Sepsis secondary to above  History of essential HTN   History of CVA in the past   Chronic tobacco use   Prophylaxis    Patient underwent palliative extubation  I spoke with son David on the phone and he would like to continue only comfort measures and no active medical  management   Patient is DNR   Discussed inpatient hospice option with son and he is agreeable to this   I will consult case management and plan for transfer to inpatient hospice tomorrow  For now continue comfort measures only          __________________________________________________________________________  INPATIENT LIST OF MEDICATIONS     Scheduled Meds:  Continuous Infusions:  PRN Meds:.  Current Facility-Administered Medications:     acetaminophen, 650 mg, Oral, Q4H PRN    bisacodyL, 10 mg, Rectal, Daily PRN    dextrose 10%, 12.5 g, Intravenous, PRN    dextrose 10%, 25 g, Intravenous, PRN    glycopyrrolate, 0.2 mg, Intravenous, Q4H PRN    hydrALAZINE, 10 mg, Intravenous, Q6H PRN    labetalol, 10 mg, Intravenous, Q6H PRN    midazolam, 2 mg, Intravenous, Q4H PRN    morphine, 4 mg, Intravenous, Q1H PRN    sodium chloride 0.9%, 10 mL, Intravenous, PRN          08/23/2024    _______________________________________    If patient was admitted under observational status it is with my approval/permission.        All diagnosis and differential diagnosis have been reviewed; assessment and plan has been documented; I have personally reviewed the labs and test results that are presently available; I have reviewed the patients medication list; I have reviewed the consulting providers response and recommendations. I have reviewed or attempted to review medical records based upon their availability.    All of the patient and family questions have been addressed and answered. Patient's is agreeable to the above stated plan. I will continue to monitor closely and make adjustments to medical management as needed.    If patient was admitted under observational status it is with my approval/permission.      Jaonn Juarez MD   08/23/2024